# Patient Record
Sex: FEMALE | Race: WHITE | NOT HISPANIC OR LATINO | Employment: UNEMPLOYED | URBAN - METROPOLITAN AREA
[De-identification: names, ages, dates, MRNs, and addresses within clinical notes are randomized per-mention and may not be internally consistent; named-entity substitution may affect disease eponyms.]

---

## 2018-04-10 DIAGNOSIS — Z12.31 ENCOUNTER FOR SCREENING MAMMOGRAM FOR BREAST CANCER: Primary | ICD-10-CM

## 2018-04-10 DIAGNOSIS — R19.09 LEFT GROIN MASS: ICD-10-CM

## 2018-04-12 ENCOUNTER — HOSPITAL ENCOUNTER (OUTPATIENT)
Dept: RADIOLOGY | Facility: HOSPITAL | Age: 33
Discharge: HOME | End: 2018-04-12
Attending: SPECIALIST
Payer: COMMERCIAL

## 2018-04-12 DIAGNOSIS — R19.09 LEFT GROIN MASS: ICD-10-CM

## 2018-04-12 DIAGNOSIS — Z12.31 ENCOUNTER FOR SCREENING MAMMOGRAM FOR BREAST CANCER: ICD-10-CM

## 2018-04-12 PROCEDURE — 76856 US EXAM PELVIC COMPLETE: CPT | Mod: 26,,, | Performed by: RADIOLOGY

## 2018-04-12 PROCEDURE — 77067 SCR MAMMO BI INCL CAD: CPT | Mod: TC

## 2018-04-12 PROCEDURE — 76830 TRANSVAGINAL US NON-OB: CPT | Mod: 26,,, | Performed by: RADIOLOGY

## 2018-04-12 PROCEDURE — 77063 BREAST TOMOSYNTHESIS BI: CPT | Mod: 26,,, | Performed by: RADIOLOGY

## 2018-04-12 PROCEDURE — 77067 SCR MAMMO BI INCL CAD: CPT | Mod: 26,,, | Performed by: RADIOLOGY

## 2018-04-12 PROCEDURE — 76830 TRANSVAGINAL US NON-OB: CPT | Mod: TC

## 2018-05-05 ENCOUNTER — HOSPITAL ENCOUNTER (EMERGENCY)
Facility: HOSPITAL | Age: 33
Discharge: HOME | End: 2018-05-06
Attending: EMERGENCY MEDICINE
Payer: COMMERCIAL

## 2018-05-05 DIAGNOSIS — G43.419 INTRACTABLE HEMIPLEGIC MIGRAINE WITHOUT STATUS MIGRAINOSUS: Primary | ICD-10-CM

## 2018-05-05 PROCEDURE — 99284 EMERGENCY DEPT VISIT MOD MDM: CPT | Mod: 25

## 2018-05-05 PROCEDURE — 96365 THER/PROPH/DIAG IV INF INIT: CPT

## 2018-05-05 PROCEDURE — 96361 HYDRATE IV INFUSION ADD-ON: CPT

## 2018-05-05 PROCEDURE — 96375 TX/PRO/DX INJ NEW DRUG ADDON: CPT

## 2018-05-05 RX ORDER — BUPROPION HYDROCHLORIDE 150 MG/1
150 TABLET, EXTENDED RELEASE ORAL DAILY
COMMUNITY
End: 2019-09-30

## 2018-05-05 RX ORDER — VENLAFAXINE HYDROCHLORIDE 150 MG/1
75 CAPSULE, EXTENDED RELEASE ORAL DAILY
COMMUNITY
End: 2020-02-11

## 2018-05-06 VITALS
WEIGHT: 170 LBS | OXYGEN SATURATION: 100 % | HEART RATE: 84 BPM | SYSTOLIC BLOOD PRESSURE: 123 MMHG | RESPIRATION RATE: 16 BRPM | DIASTOLIC BLOOD PRESSURE: 85 MMHG | HEIGHT: 66 IN | TEMPERATURE: 98 F | BODY MASS INDEX: 27.32 KG/M2

## 2018-05-06 LAB
B-HCG UR QL: NEGATIVE
CTP QC/QA: YES

## 2018-05-06 PROCEDURE — 25000003 PHARM REV CODE 250: Performed by: EMERGENCY MEDICINE

## 2018-05-06 PROCEDURE — 63600175 PHARM REV CODE 636 W HCPCS: Performed by: EMERGENCY MEDICINE

## 2018-05-06 PROCEDURE — 81025 URINE PREGNANCY TEST: CPT | Performed by: EMERGENCY MEDICINE

## 2018-05-06 RX ORDER — KETOROLAC TROMETHAMINE 30 MG/ML
15 INJECTION, SOLUTION INTRAMUSCULAR; INTRAVENOUS
Status: COMPLETED | OUTPATIENT
Start: 2018-05-06 | End: 2018-05-06

## 2018-05-06 RX ORDER — DIPHENHYDRAMINE HYDROCHLORIDE 50 MG/ML
12.5 INJECTION INTRAMUSCULAR; INTRAVENOUS
Status: COMPLETED | OUTPATIENT
Start: 2018-05-06 | End: 2018-05-06

## 2018-05-06 RX ORDER — SODIUM CHLORIDE 9 MG/ML
1000 INJECTION, SOLUTION INTRAVENOUS
Status: COMPLETED | OUTPATIENT
Start: 2018-05-06 | End: 2018-05-06

## 2018-05-06 RX ORDER — METHYLPREDNISOLONE SOD SUCC 125 MG
125 VIAL (EA) INJECTION
Status: COMPLETED | OUTPATIENT
Start: 2018-05-06 | End: 2018-05-06

## 2018-05-06 RX ADMIN — KETOROLAC TROMETHAMINE 15 MG: 30 INJECTION, SOLUTION INTRAMUSCULAR at 12:05

## 2018-05-06 RX ADMIN — DIPHENHYDRAMINE HYDROCHLORIDE 12.5 MG: 50 INJECTION, SOLUTION INTRAMUSCULAR; INTRAVENOUS at 01:05

## 2018-05-06 RX ADMIN — SODIUM CHLORIDE 1000 ML: 0.9 INJECTION, SOLUTION INTRAVENOUS at 12:05

## 2018-05-06 RX ADMIN — PROMETHAZINE HYDROCHLORIDE 25 MG: 25 INJECTION INTRAMUSCULAR; INTRAVENOUS at 12:05

## 2018-05-06 RX ADMIN — METHYLPREDNISOLONE SODIUM SUCCINATE 125 MG: 125 INJECTION, POWDER, FOR SOLUTION INTRAMUSCULAR; INTRAVENOUS at 12:05

## 2018-05-06 NOTE — ED PROVIDER NOTES
Encounter Date: 5/5/2018    SCRIBE #1 NOTE: IAntony, am scribing for, and in the presence of, Dr. Haley.       History     Chief Complaint   Patient presents with    Headache     2 days       05/06/2018 1:01 AM     Chief complaint: Headaches      Subha Bach is a 32 y.o. female with a past medical history of migraine headaches presenting to the ED with a gradual onset of an acute headache beginning 2 days ago. The pt reported current symptoms are similar to previous migraine headache exacerbations. She noted her headache radiates from the back of her head to the top of her head with eye pressure. The pt noted she has been hospitalized previously for migraines. Associated symptoms of photophobia and nausea. She reported she took Topamax, Zofran,  motrin, and essential oils with no improvement of symptoms. The pt denied pregnancy and lifestyle changes. She reported her next appointment with Dr. Brenner is in June for migraine headache. The pt has no history of cigarette smoking. She has no pertinent surgical history noted.       The history is provided by the patient.     Review of patient's allergies indicates:   Allergen Reactions    Nubain [nalbuphine] Hives     Past Medical History:   Diagnosis Date    Migraine headache      Past Surgical History:   Procedure Laterality Date    SHOULDER SURGERY       History reviewed. No pertinent family history.  Social History   Substance Use Topics    Smoking status: Never Smoker    Smokeless tobacco: Never Used    Alcohol use Yes     Review of Systems   Constitutional: Negative for fever.   HENT: Negative for congestion.    Eyes: Positive for photophobia. Negative for visual disturbance.   Respiratory: Negative for wheezing.    Cardiovascular: Negative for chest pain.   Gastrointestinal: Positive for nausea. Negative for abdominal pain.   Genitourinary: Negative for dysuria.   Musculoskeletal: Negative for joint swelling.   Skin: Negative for rash.    Neurological: Positive for headaches. Negative for syncope.   Hematological: Does not bruise/bleed easily.   Psychiatric/Behavioral: Negative for confusion.       Physical Exam     Initial Vitals [05/05/18 2325]   BP Pulse Resp Temp SpO2   123/85 96 16 98.1 °F (36.7 °C) 100 %      MAP       97.67         Physical Exam    Nursing note and vitals reviewed.  Constitutional: She appears well-nourished.   HENT:   Head: Normocephalic and atraumatic.   Eyes: Conjunctivae and EOM are normal.   Neck: Normal range of motion. Neck supple. No thyroid mass present.   Cardiovascular: Normal rate, regular rhythm and normal heart sounds. Exam reveals no gallop and no friction rub.    No murmur heard.  Pulmonary/Chest: Breath sounds normal. She has no wheezes. She has no rhonchi. She has no rales.   Abdominal: Soft. Normal appearance and bowel sounds are normal. There is no tenderness.   Musculoskeletal: Normal range of motion.   Neurological: She is alert and oriented to person, place, and time. She has normal strength. No cranial nerve deficit or sensory deficit.   Skin: Skin is warm and dry. No rash noted. No erythema.   Psychiatric: She has a normal mood and affect. Her speech is normal. Cognition and memory are normal.         ED Course   Procedures  Labs Reviewed   POCT URINE PREGNANCY             Medical Decision Making:   History:   Old Medical Records: I decided to obtain old medical records.  Clinical Tests:   Lab Tests: Ordered and Reviewed  ED Management:  This patient was interviewed and examined him urgently.  Vital signs noted to be stable. She has no focal neurologic deficits. Patient's headache is consistent with previous headache and currently lacks features concerning for emergent or life threatening condition.  I do not suspect SAH, meningitis, increased IC pressure, infectious, toxic, vascular, CNS, or other EMC.  She had marked improvement with multiple IV migraine medications and hydration and reported  improvement on the tree assessment.  On final reassessment she was noted to be sleeping quietly and will be asked to go home and rest and follow up with her migraine specialist as soon as possible.  She is asked to return to the ER for any new, concerning, or worsening symptoms.  Patient and mother were agreeable with this plan for follow-up and the child was discharged in stable condition with her mother as a .            Scribe Attestation:   Scribe #1: I performed the above scribed service and the documentation accurately describes the services I performed. I attest to the accuracy of the note.    I, Dr. Imer Haley, personally performed the services described in this documentation. All medical record entries made by the scribe were at my direction and in my presence.  I have reviewed the chart and agree that the record reflects my personal performance and is accurate and complete. Imer Haley MD.  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    Disposition:   Disposition: Discharged  Condition: Stable                        Imer Haley MD  05/06/18 0352

## 2018-05-07 ENCOUNTER — OFFICE VISIT (OUTPATIENT)
Dept: SPORTS MEDICINE | Facility: CLINIC | Age: 33
End: 2018-05-07
Payer: COMMERCIAL

## 2018-05-07 ENCOUNTER — HOSPITAL ENCOUNTER (OUTPATIENT)
Dept: RADIOLOGY | Facility: HOSPITAL | Age: 33
Discharge: HOME | End: 2018-05-07
Attending: PHYSICIAN ASSISTANT
Payer: COMMERCIAL

## 2018-05-07 VITALS
HEIGHT: 66 IN | SYSTOLIC BLOOD PRESSURE: 138 MMHG | DIASTOLIC BLOOD PRESSURE: 90 MMHG | HEART RATE: 108 BPM | BODY MASS INDEX: 27.8 KG/M2 | WEIGHT: 173 LBS

## 2018-05-07 DIAGNOSIS — M25.552 BILATERAL HIP PAIN: Primary | ICD-10-CM

## 2018-05-07 DIAGNOSIS — M25.551 BILATERAL HIP PAIN: Primary | ICD-10-CM

## 2018-05-07 DIAGNOSIS — M25.552 BILATERAL HIP PAIN: ICD-10-CM

## 2018-05-07 DIAGNOSIS — M25.552 ACUTE HIP PAIN, LEFT: ICD-10-CM

## 2018-05-07 DIAGNOSIS — M25.551 CHRONIC RIGHT HIP PAIN: ICD-10-CM

## 2018-05-07 DIAGNOSIS — G89.29 CHRONIC RIGHT HIP PAIN: ICD-10-CM

## 2018-05-07 DIAGNOSIS — M25.551 BILATERAL HIP PAIN: ICD-10-CM

## 2018-05-07 PROCEDURE — 73523 X-RAY EXAM HIPS BI 5/> VIEWS: CPT | Mod: TC,FY,PO,LT

## 2018-05-07 PROCEDURE — 99203 OFFICE O/P NEW LOW 30 MIN: CPT | Mod: S$GLB,,, | Performed by: PHYSICIAN ASSISTANT

## 2018-05-07 PROCEDURE — 3008F BODY MASS INDEX DOCD: CPT | Mod: CPTII,S$GLB,, | Performed by: PHYSICIAN ASSISTANT

## 2018-05-07 PROCEDURE — 73523 X-RAY EXAM HIPS BI 5/> VIEWS: CPT | Mod: 26,LT,, | Performed by: RADIOLOGY

## 2018-05-07 PROCEDURE — 99999 PR PBB SHADOW E&M-EST. PATIENT-LVL IV: CPT | Mod: PBBFAC,,, | Performed by: PHYSICIAN ASSISTANT

## 2018-05-07 RX ORDER — MELOXICAM 15 MG/1
TABLET ORAL
Qty: 21 TABLET | Refills: 0 | Status: SHIPPED | OUTPATIENT
Start: 2018-05-07 | End: 2018-05-28 | Stop reason: SDUPTHER

## 2018-05-09 ENCOUNTER — TELEPHONE (OUTPATIENT)
Dept: SPORTS MEDICINE | Facility: CLINIC | Age: 33
End: 2018-05-09

## 2018-05-09 NOTE — TELEPHONE ENCOUNTER
----- Message from Haris Díaz III, PA-C sent at 5/8/2018  9:40 PM CDT -----  Contact: Pt  Okay. Need more specific messages, please. Don't have time to call all patients for every specific issue.        PT was sent to Stanislav at [facility] in [place]. Please call her and see what she needs. Does she need another recommendation. I can give her one if so.     Michael        ----- Message -----  From: Lynn Duran MA  Sent: 5/8/2018   3:09 PM  To: Haris Díaz III, PA-C        ----- Message -----  From: Leonor Pearce  Sent: 5/8/2018   2:54 PM  To: [facility] Staff    Would like a call regarding physical therapy recommendation.     Call her @ [phone]

## 2018-05-09 NOTE — TELEPHONE ENCOUNTER
Spoke with Pt. Regarding in basket message, was informed that her insurance does not cover Physical Therapy, needing new order. Will call her back before the end of business to update her with new information.

## 2018-05-12 NOTE — PROGRESS NOTES
"CC: Bilateral hip pain (right hip for 1 year and left hip for 2 months)    HPI:   Subha Bach is a pleasant 32 y.o. 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 No trauma, no mech sxs/instabilty. Right >left.     She describes her pain as constant aching and throbbing lateral hip pain that began 1 year ago in her right hip and 2 months ago in her left hip. She reports being out of exercise for some time and pain began when she tried to get back into activity. She also states that her right hip pain feels like something is "hitting inside" and it "feels loose".     Today the patient rates pain at a 4/10 on visual analog scale.      Affecting ADLs and exercising    Her pain is worse with getting in and out of a care or sitting for long periods. Also hurts to sleep on her right side.     Review of Systems   Constitution: Negative. Negative for chills, fever and night sweats.   HENT: Negative for congestion and headaches.    Eyes: Negative for blurred vision, left vision loss and right vision loss.   Cardiovascular: Negative for chest pain and syncope.   Respiratory: Negative for cough and shortness of breath.    Endocrine: Negative for polydipsia, polyphagia and polyuria.   Hematologic/Lymphatic: Negative for bleeding problem. Does not bruise/bleed easily.   Skin: Negative for dry skin, itching and rash.   Musculoskeletal: Negative for falls and muscle weakness.   Gastrointestinal: Negative for abdominal pain and bowel incontinence.   Genitourinary: Negative for bladder incontinence and nocturia.   Neurological: Negative for disturbances in coordination, loss of balance and seizures.   Psychiatric/Behavioral: Negative for depression. The patient does not have insomnia.    Allergic/Immunologic: Negative for hives and persistent infections.   All other systems negative.    PAST MEDICAL HISTORY:   Past Medical History:   Diagnosis Date    Migraine " "headache      PAST SURGICAL HISTORY:   Past Surgical History:   Procedure Laterality Date    SHOULDER SURGERY       FAMILY HISTORY: History reviewed. No pertinent family history.  SOCIAL HISTORY:   Social History     Social History    Marital status: Single     Spouse name: N/A    Number of children: N/A    Years of education: N/A     Occupational History    Not on file.     Social History Main Topics    Smoking status: Never Smoker    Smokeless tobacco: Never Used    Alcohol use Yes    Drug use: No    Sexual activity: Not on file     Other Topics Concern    Not on file     Social History Narrative    No narrative on file       MEDICATIONS:   Current Outpatient Prescriptions:     buPROPion (WELLBUTRIN SR) 150 MG TBSR 12 hr tablet, Take 150 mg by mouth once daily., Disp: , Rfl:     venlafaxine (EFFEXOR-XR) 150 MG Cp24, Take 75 mg by mouth once daily., Disp: , Rfl:     meloxicam (MOBIC) 15 MG tablet, Take 1 tablet by mouth once daily with food. May need 20mg prilosec once daily on days that you are taking mobic to protect the stomach., Disp: 21 tablet, Rfl: 0  ALLERGIES:   Review of patient's allergies indicates:   Allergen Reactions    Nubain [nalbuphine] Hives       VITAL SIGNS: BP (!) 138/90   Pulse 108   Ht 5' 6" (1.676 m)   Wt 78.5 kg (173 lb)   LMP 05/03/2018   BMI 27.92 kg/m²        PHYSICAL EXAM /  HIP  PHYSICAL EXAMINATION  General:  The patient is alert and oriented x 3.  Mood is pleasant.  Observation of ears, eyes and nose reveal no gross abnormalities.  HEENT: NCAT, sclera nonicteric  Lungs: Respirations are equal and unlabored..    Bilateral HIP EXAMINATION     OBSERVATION / INSPECTION  Gait:   Nonantalgic   Alignment:  Neutral   Scars:   None   Muscle atrophy: None   Effusion:  None   Warmth:  None   Discoloration:   None   Leg lengths:   Equal   Pelvis:   Level     TENDERNESS / CREPITUS (T/C):      T / C  Trochanteric bursa   Mild + on right / -  Piriformis    - / -  SI joint    - " / -  Psoas tendon   - / -  Rectus insertion  - / -  Adductor insertion  - / -  Pubic symphysis  - / -    + TTP of glute tendons on right and negative TTP on left.     ROM: (* = pain)    Flexion:    120 degrees  External rotation: 40 degrees  Internal rotation with axial load: 30 degrees  Internal rotation without axial load: 40 degrees  Abduction:  45 degrees  Adduction:   20 degrees    SPECIAL TESTS:  Pain w/ forced internal rotation (FADIR): Mild +  Pain w/ forced external rotation (PORTILLO): Absent   Circumduction test:    -  Stinchfield test:    Negative   Log roll:      Negative   Snapping hip (internal):   Negative   Sit-up pain:     Negative   Resisted sit-up pain:    Negative   Resisted sit-up with adductor contraction pain:  Negative   Step-down test:    +  Trendelenburg test:    Negative  Bridge test     +     EXTREMITY NEURO-VASCULAR EXAMINATION:   Sensation:  Grossly intact to light touch all dermatomal regions.   Motor Function:  Fully intact motor function at hip, knee, foot and ankle    DTRs;  quadriceps and  achilles 2+.  No clonus and downgoing Babinski.    Vascular status:  DP and PT pulses 2+, brisk capillary refill, symmetric.    Skin:  intact, compartments soft.    OTHER FINDINGS:    XRAYS:  CE angle:  Right 31 and left 34  Crossover:  + bilateral  CAM: + bilateral  Joint space narrowing: none    ASSESSMENT:    1. right hip pain, chronic  2. Left hip pain, acute  hip abd/core weakness    PLAN:  1. PT with Pradeep Jay for bilateral hip pain much worse on right. Posterior lateral hip pain likely from glute tendinopathy and tendinitis. PT for hip, pelvis, core stretching and strengthening. HEP and modalities if possible.  2. Mobic prn  3. Activity modifications as discussed. Avoid activity that causes pain. Okay to swim.   4. RTC in 2 months for recheck.     All questions were answered, pt will contact us for questions or concerns in the interim.

## 2018-05-28 ENCOUNTER — TELEPHONE (OUTPATIENT)
Dept: SPORTS MEDICINE | Facility: CLINIC | Age: 33
End: 2018-05-28

## 2018-05-28 ENCOUNTER — CLINICAL SUPPORT (OUTPATIENT)
Dept: REHABILITATION | Facility: HOSPITAL | Age: 33
End: 2018-05-28
Attending: PHYSICIAN ASSISTANT
Payer: COMMERCIAL

## 2018-05-28 DIAGNOSIS — M25.552 BILATERAL HIP PAIN: ICD-10-CM

## 2018-05-28 DIAGNOSIS — M25.551 BILATERAL HIP PAIN: ICD-10-CM

## 2018-05-28 DIAGNOSIS — M25.551 BILATERAL HIP PAIN: Primary | ICD-10-CM

## 2018-05-28 DIAGNOSIS — M25.552 BILATERAL HIP PAIN: Primary | ICD-10-CM

## 2018-05-28 PROCEDURE — 97161 PT EVAL LOW COMPLEX 20 MIN: CPT | Mod: PN | Performed by: PHYSICAL THERAPIST

## 2018-05-28 PROCEDURE — 97110 THERAPEUTIC EXERCISES: CPT | Mod: PN | Performed by: PHYSICAL THERAPIST

## 2018-05-28 RX ORDER — MELOXICAM 15 MG/1
TABLET ORAL
Qty: 21 TABLET | Refills: 0 | Status: SHIPPED | OUTPATIENT
Start: 2018-05-28 | End: 2018-06-25 | Stop reason: SDUPTHER

## 2018-05-28 NOTE — PLAN OF CARE
"[facility]   PATIENT EVALUATION    Date: 05/28/2018  Start Time: 10:00  Stop Time: 11:00    Patient Name: Subha Bach  Clinic Number: [id number]  Age: 32 y.o.  Gender: female    Diagnosis:   Encounter Diagnosis   Name Primary?    Bilateral hip pain Yes       Referring Physician: Haris Díaz III, *  Treatment Orders: PT Eval and Treat      History     Past Medical History:   Diagnosis Date    Migraine headache        Current Outpatient Prescriptions   Medication Sig    buPROPion (WELLBUTRIN SR) 150 MG TBSR 12 hr tablet Take 150 mg by mouth once daily.    meloxicam (MOBIC) 15 MG tablet Take 1 tablet by mouth once daily with food. May need 20mg prilosec once daily on days that you are taking mobic to protect the stomach.    venlafaxine (EFFEXOR-XR) 150 MG Cp24 Take 75 mg by mouth once daily.     No current facility-administered medications for this visit.        Review of patient's allergies indicates:   Allergen Reactions    Nubain [nalbuphine] Hives         Subjective     History of Present Condition: Pt reports having prior PT for left shoulder surgeries but not having been for the hips. Pt reports having bilateral hip pain that started 1 year ago. Reports having issues with activities with working out with weights and cardio. Pt backed off to just walking. Pt had to stop lifting due to increased pain after lifting. Pt does reports mild hypermobility. States I have bilateral hip "popping" but it is mainly my right hip. Pt is a former swimmer and triathlete.    PMHx: left shoulder surgery    Onset Date: 1 year   Date of Surgery: n/a  Precautions: none    Mechanism of Injury: gradual    Pain Location: bilateral hips (R>L)  Pain Description: Aching and Dull  Current Pain: 2/10  Least Pain: 0/10  Worst Pain: 8/10  Aggravating Factors: lifting, running, squatting, single leg deadlifts  Relieving Factors: rest      Prior Therapy: with success    Occupation: , works " at home, family    Sports/Recreational Activities: swimming, working out  Extremity Dominance: R    Prior Level of Function: Independent  Functional Deficits Leading to Referral/Nature of Injury: none  Patient Therapy Goals: To get back to full painfree activity  Cultural/Environmental/Spiritual Barriers to Treatment or Learning: none      Objective       Posture: increased anterior pelvic tilt  Gait: Left hip drop, increased stance LLE.    Palpation: TTP along proximal bilateral hip flexors.    Range of Motion:   WFL bilaterally without impingement    Joint Mobility:  Normal   Flexibility: increased tension with bilateral hamstrings, paraspinals, and gastroc/soleus    Strength:   Diminished core stability  Right hip abd = 3/5  Left hip abd = 3+/5    Bilateral hip ext = 5/5    Special Tests: - PORTILLO, - FADIR, - Scour, + Noble's bilaterally    Other:   DLS with trunk collapse and quad dominance  SLS with bilateral valgus and trunk collapse/quad dominance.    Treatment:   Clamshells 3x10  Side steps x 2 laps  SL hip abd 3x10      Functional Limitations Reports - G Codes  Category: Mobility  Tool: FOTO  Score: 48        History  Co-morbidities and personal factors that may impact the plan of care Low    Stable Clinical Presentation   Co-morbidities:       Personal Factors:     Body regions    Body systems    Activity Limitations    Participation Restrictons   No personal factors and/ or  comorbidites          Address 1-2 elements:           Assessment     This is a 32 y.o. female referred to outpatient physical therapy and presents with a medical diagnosis of bilateral hip pain and demonstrates limitations as described in the problem list. Pt demonstrates good rehab potential. Pt will benefit from physcial therapy services in order to maximize pain free and/or functional use of bilateral LEs. The following goals were discussed with the patient and patient is in agreement with them as to be addressed in the treatment  plan. Pt was given a HEP consisting of functional hip PREs. Pt verbally understood the instructions as they were given and demonstrated proper form and technique during therapy. Pt was advised to perform these exercises free of pain, and to stop performing them if pain occurs.     Medical necessity is demonstrated by the following problem list:   - Pain limits function of effected part for all activities  - Unable to participate in daily activities   - Requires skilled supervision to complete and progress HEP  - Fall risk - impaired balance   - Continued inability to participate in vocational pursuits    Short Term Goals (4-6 Weeks):  - Pt will increase strength of bilateral hip abd = 5/5  - Decrease Pain to 1/10 at worst with ADLs.  - Pt to self correct posture independently.   - Pt independent with HEP with progressions.     Long Term Goals (14-16 Weeks):  - Pt will increase ROM to WFL and painfree.  - Pt with normalized SLS x 1 min and Ybalance.  - Decrease Pain to 0/10 with sport specifics.   - Pt to return to training without pain or instability.       Plan     Pt will be treated by physical therapy 1-2 times a week for 14-16  weeks for manual therapy, therapeutic exercise, home exercise program, patient education, and modalities PRN to achieve established goals. Subha may at times be seen by a PTA as part of the Rehab Team.     Therapist: CECY ARAUZ, PT    I CERTIFY THE NEED FOR THESE SERVICES FURNISHED UNDER THIS PLAN OF TREATMENT AND WHILE UNDER MY CARE    Physician's comments: ________________________________________________________________________________________________________________________________________________    Physician's Name: ___________________________________

## 2018-06-04 ENCOUNTER — CLINICAL SUPPORT (OUTPATIENT)
Dept: REHABILITATION | Facility: HOSPITAL | Age: 33
End: 2018-06-04
Attending: PHYSICIAN ASSISTANT
Payer: COMMERCIAL

## 2018-06-04 DIAGNOSIS — M25.551 BILATERAL HIP PAIN: Primary | ICD-10-CM

## 2018-06-04 DIAGNOSIS — M25.552 BILATERAL HIP PAIN: Primary | ICD-10-CM

## 2018-06-04 PROCEDURE — 97110 THERAPEUTIC EXERCISES: CPT | Mod: PN | Performed by: PHYSICAL THERAPIST

## 2018-06-04 NOTE — PROGRESS NOTES
"                                                  Physical Therapy Daily Note     Date: 06/04/2018  Name: Subha Bach  Clinic Number: [id number]  Diagnosis:   Encounter Diagnosis   Name Primary?    Bilateral hip pain Yes     Physician: Haris Díaz III, *    Evaluation Date: 5/28/18  Date of Surgery: n/a  Visit #: 2  Start Time:  8:00  Stop Time:  9:00  Total Treatment Time: 60    Precautions: none    Subjective     Pt reports the hip is feeling a little better. Jenise been keeping up with the HEP.    Pain: 2/10    Objective     Measurements taken: none    Patient received individual therapy to increase strength, endurance and ROM with activities as follows:     Subha received therapeutic exercises to develop strength, endurance and ROM for 40 minutes including:   Clamshells 3x10  SL hip abd 3x10  SL bridges 3x10  Side steps x 2 laps  6 in step downs 3x10  Bird-dog UEs/LEs x 20 ea  Bird-dog alt x 20 ea  Fwd planks 3x30"        Subha received the following manual therapy techniques: Joint mobilizations and Soft tissue Mobilization were applied to the: bilateral hips  for 0 minutes.   PROM: flex, abd  Toña  Fadir  SL quad/hip flexor stretch      Written Home Exercises Provided: updated as per therex list  Pt demo good understanding of the education provided. Subha demonstrated good return demonstration of activities.     Education provided:  Subha verbalized good understanding of education provided.   No spiritual or educational barriers to learning identified.    Assessment     Improving bilateral hip and core activation. Needs continued work on core activation/maintaining neutral pelvis.     This is a 32 y.o. female referred to outpatient physical therapy and presents with a medical diagnosis of bilateral hip pain and demonstrates limitations as described in the problem list Pt prognosis is Good. Pt will continue to benefit from skilled outpatient physical therapy to address the deficits listed below in the problem " list, provide pt/family education and to maximize pt's level of independence in the home and community environment.    Will the patient continue to benefit from skilled PT intervention? yes        Medical necessity is demonstrated by:   - Pain limits function of effected part for all activities  - Unable to participate in daily activities   - Requires skilled supervision to complete and progress HEP  - Fall risk - impaired balance   - Continued inability to participate in vocational pursuits    Short Term Goals (4-6 Weeks):  - Pt will increase strength of bilateral hip abd = 5/5  - Decrease Pain to 1/10 at worst with ADLs.  - Pt to self correct posture independently.   - Pt independent with HEP with progressions.      Long Term Goals (14-16 Weeks):  - Pt will increase ROM to WFL and painfree.  - Pt with normalized SLS x 1 min and Ybalance.  - Decrease Pain to 0/10 with sport specifics.   - Pt to return to training without pain or instability.        Plan   Continue with established Plan of Care towards PT goals.      Therapist: CECY ARAUZ, PT

## 2018-06-04 NOTE — PLAN OF CARE
"[facility]   PATIENT EVALUATION    Date: 06/04/2018  Start Time: 10:00  Stop Time: 11:00    Patient Name: Subha Bach  Clinic Number: [id number]  Age: 32 y.o.  Gender: female    Diagnosis:   Encounter Diagnosis   Name Primary?    Bilateral hip pain Yes       Referring Physician: Haris Díaz III, *  Treatment Orders: PT Eval and Treat      History     Past Medical History:   Diagnosis Date    Migraine headache        Current Outpatient Prescriptions   Medication Sig    buPROPion (WELLBUTRIN SR) 150 MG TBSR 12 hr tablet Take 150 mg by mouth once daily.    meloxicam (MOBIC) 15 MG tablet Take 1 tablet by mouth once daily with food. May need 20mg prilosec once daily on days that you are taking mobic to protect the stomach.    venlafaxine (EFFEXOR-XR) 150 MG Cp24 Take 75 mg by mouth once daily.     No current facility-administered medications for this visit.        Review of patient's allergies indicates:   Allergen Reactions    Nubain [nalbuphine] Hives         Subjective     History of Present Condition: Pt reports having prior PT for left shoulder surgeries but not having been for the hips. Pt reports having bilateral hip pain that started 1 year ago. Reports having issues with activities with working out with weights and cardio. Pt backed off to just walking. Pt had to stop lifting due to increased pain after lifting. Pt does reports mild hypermobility. States I have bilateral hip "popping" but it is mainly my right hip. Pt is a former swimmer and triathlete.    PMHx: left shoulder surgery    Onset Date: 1 year   Date of Surgery: n/a  Precautions: none    Mechanism of Injury: gradual    Pain Location: bilateral hips (R>L)  Pain Description: Aching and Dull  Current Pain: 2/10  Least Pain: 0/10  Worst Pain: 8/10  Aggravating Factors: lifting, running, squatting, single leg deadlifts  Relieving Factors: rest      Prior Therapy: with success    Occupation: , works " at home, family    Sports/Recreational Activities: swimming, working out  Extremity Dominance: R    Prior Level of Function: Independent  Functional Deficits Leading to Referral/Nature of Injury: none  Patient Therapy Goals: To get back to full painfree activity  Cultural/Environmental/Spiritual Barriers to Treatment or Learning: none      Objective       Posture: increased anterior pelvic tilt  Gait: Left hip drop, increased stance LLE.    Palpation: TTP along proximal bilateral hip flexors.    Range of Motion:   WFL bilaterally without impingement    Joint Mobility:  Normal   Flexibility: increased tension with bilateral hamstrings, paraspinals, and gastroc/soleus    Strength:   Diminished core stability  Right hip abd = 3/5  Left hip abd = 3+/5    Bilateral hip ext = 5/5    Special Tests: - PORTILLO, - FADIR, - Scour, + Noble's bilaterally    Other:   DLS with trunk collapse and quad dominance  SLS with bilateral valgus and trunk collapse/quad dominance.    Treatment:   Clamshells 3x10  Side steps x 2 laps  SL hip abd 3x10      Functional Limitations Reports - G Codes  Category: Mobility  Tool: FOTO  Score: 48        History  Co-morbidities and personal factors that may impact the plan of care Low    Stable Clinical Presentation   Co-morbidities:       Personal Factors:     Body regions    Body systems    Activity Limitations    Participation Restrictons   No personal factors and/ or  comorbidites          Address 1-2 elements:           Assessment     This is a 32 y.o. female referred to outpatient physical therapy and presents with a medical diagnosis of bilateral hip pain and demonstrates limitations as described in the problem list. Pt demonstrates good rehab potential. Pt will benefit from physcial therapy services in order to maximize pain free and/or functional use of bilateral LEs. The following goals were discussed with the patient and patient is in agreement with them as to be addressed in the treatment  plan. Pt was given a HEP consisting of functional hip PREs. Pt verbally understood the instructions as they were given and demonstrated proper form and technique during therapy. Pt was advised to perform these exercises free of pain, and to stop performing them if pain occurs.     Medical necessity is demonstrated by the following problem list:   - Pain limits function of effected part for all activities  - Unable to participate in daily activities   - Requires skilled supervision to complete and progress HEP  - Fall risk - impaired balance   - Continued inability to participate in vocational pursuits    Short Term Goals (4-6 Weeks):  - Pt will increase strength of bilateral hip abd = 5/5  - Decrease Pain to 1/10 at worst with ADLs.  - Pt to self correct posture independently.   - Pt independent with HEP with progressions.     Long Term Goals (14-16 Weeks):  - Pt will increase ROM to WFL and painfree.  - Pt with normalized SLS x 1 min and Ybalance.  - Decrease Pain to 0/10 with sport specifics.   - Pt to return to training without pain or instability.       Plan     Pt will be treated by physical therapy 1-2 times a week for 14-16  weeks for manual therapy, therapeutic exercise, home exercise program, patient education, and modalities PRN to achieve established goals. Subha may at times be seen by a PTA as part of the Rehab Team.     Therapist: CECY ARAUZ, PT    I CERTIFY THE NEED FOR THESE SERVICES FURNISHED UNDER THIS PLAN OF TREATMENT AND WHILE UNDER MY CARE    Physician's comments: ________________________________________________________________________________________________________________________________________________    Physician's Name: ___________________________________

## 2018-06-11 ENCOUNTER — CLINICAL SUPPORT (OUTPATIENT)
Dept: REHABILITATION | Facility: HOSPITAL | Age: 33
End: 2018-06-11
Attending: PHYSICIAN ASSISTANT
Payer: COMMERCIAL

## 2018-06-11 DIAGNOSIS — M25.552 BILATERAL HIP PAIN: Primary | ICD-10-CM

## 2018-06-11 DIAGNOSIS — M25.551 BILATERAL HIP PAIN: Primary | ICD-10-CM

## 2018-06-11 PROCEDURE — 97110 THERAPEUTIC EXERCISES: CPT | Mod: PN | Performed by: PHYSICAL THERAPIST

## 2018-06-11 NOTE — PROGRESS NOTES
"                                                  Physical Therapy Daily Note     Date: 06/11/2018  Name: Subha Bach  Clinic Number: [id number]  Diagnosis:   Encounter Diagnosis   Name Primary?    Bilateral hip pain Yes     Physician: Haris Díaz III, *    Evaluation Date: 5/28/18  Date of Surgery: n/a  Visit #: 3  Start Time:  8:00  Stop Time:  9:00  Total Treatment Time: 60    Precautions: none    Subjective     Pt reports the hip is doing well.     Pain: 1/10    Objective     Measurements taken: none    Patient received individual therapy to increase strength, endurance and ROM with activities as follows:     Subha received therapeutic exercises to develop strength, endurance and ROM for 40 minutes including:   Clamshells 3x10  SL hip abd 3x10  SL bridges 3x10  Side steps x 2 laps  6 in step downs 3x10  Bird-dog UEs/LEs x 20 ea  Bird-dog alt x 20 ea  Fwd planks 3x30"  SL RDLs 3x10  Sport cord DL squats 3x10        Subha received the following manual therapy techniques: Joint mobilizations and Soft tissue Mobilization were applied to the: bilateral hips  for 0 minutes.   PROM: flex, abd  Toña  Fadir  SL quad/hip flexor stretch      Written Home Exercises Provided: updated as per therex list  Pt demo good understanding of the education provided. Subha demonstrated good return demonstration of activities.     Education provided:  Subha verbalized good understanding of education provided.   No spiritual or educational barriers to learning identified.    Assessment     Functional hip stability and core strength continue to improve with rx. Continue to progress functional hip stability and optimal loading mechanics.     This is a 32 y.o. female referred to outpatient physical therapy and presents with a medical diagnosis of bilateral hip pain and demonstrates limitations as described in the problem list Pt prognosis is Good. Pt will continue to benefit from skilled outpatient physical therapy to address the " deficits listed below in the problem list, provide pt/family education and to maximize pt's level of independence in the home and community environment.    Will the patient continue to benefit from skilled PT intervention? yes        Medical necessity is demonstrated by:   - Pain limits function of effected part for all activities  - Unable to participate in daily activities   - Requires skilled supervision to complete and progress HEP  - Fall risk - impaired balance   - Continued inability to participate in vocational pursuits    Short Term Goals (4-6 Weeks):  - Pt will increase strength of bilateral hip abd = 5/5  - Decrease Pain to 1/10 at worst with ADLs.  - Pt to self correct posture independently.   - Pt independent with HEP with progressions.      Long Term Goals (14-16 Weeks):  - Pt will increase ROM to WFL and painfree.  - Pt with normalized SLS x 1 min and Ybalance.  - Decrease Pain to 0/10 with sport specifics.   - Pt to return to training without pain or instability.        Plan   Continue with established Plan of Care towards PT goals.      Therapist: CECY ARAUZ, PT

## 2018-06-11 NOTE — PLAN OF CARE
"[facility]   PATIENT EVALUATION    Date: 06/11/2018  Start Time: 10:00  Stop Time: 11:00    Patient Name: Subha Bach  Clinic Number: [id number]  Age: 32 y.o.  Gender: female    Diagnosis:   Encounter Diagnosis   Name Primary?    Bilateral hip pain Yes       Referring Physician: Haris Díaz III, *  Treatment Orders: PT Eval and Treat      History     Past Medical History:   Diagnosis Date    Migraine headache        Current Outpatient Prescriptions   Medication Sig    buPROPion (WELLBUTRIN SR) 150 MG TBSR 12 hr tablet Take 150 mg by mouth once daily.    meloxicam (MOBIC) 15 MG tablet Take 1 tablet by mouth once daily with food. May need 20mg prilosec once daily on days that you are taking mobic to protect the stomach.    venlafaxine (EFFEXOR-XR) 150 MG Cp24 Take 75 mg by mouth once daily.     No current facility-administered medications for this visit.        Review of patient's allergies indicates:   Allergen Reactions    Nubain [nalbuphine] Hives         Subjective     History of Present Condition: Pt reports having prior PT for left shoulder surgeries but not having been for the hips. Pt reports having bilateral hip pain that started 1 year ago. Reports having issues with activities with working out with weights and cardio. Pt backed off to just walking. Pt had to stop lifting due to increased pain after lifting. Pt does reports mild hypermobility. States I have bilateral hip "popping" but it is mainly my right hip. Pt is a former swimmer and triathlete.    PMHx: left shoulder surgery    Onset Date: 1 year   Date of Surgery: n/a  Precautions: none    Mechanism of Injury: gradual    Pain Location: bilateral hips (R>L)  Pain Description: Aching and Dull  Current Pain: 2/10  Least Pain: 0/10  Worst Pain: 8/10  Aggravating Factors: lifting, running, squatting, single leg deadlifts  Relieving Factors: rest      Prior Therapy: with success    Occupation: , works " at home, family    Sports/Recreational Activities: swimming, working out  Extremity Dominance: R    Prior Level of Function: Independent  Functional Deficits Leading to Referral/Nature of Injury: none  Patient Therapy Goals: To get back to full painfree activity  Cultural/Environmental/Spiritual Barriers to Treatment or Learning: none      Objective       Posture: increased anterior pelvic tilt  Gait: Left hip drop, increased stance LLE.    Palpation: TTP along proximal bilateral hip flexors.    Range of Motion:   WFL bilaterally without impingement    Joint Mobility:  Normal   Flexibility: increased tension with bilateral hamstrings, paraspinals, and gastroc/soleus    Strength:   Diminished core stability  Right hip abd = 3/5  Left hip abd = 3+/5    Bilateral hip ext = 5/5    Special Tests: - PORTILLO, - FADIR, - Scour, + Noble's bilaterally    Other:   DLS with trunk collapse and quad dominance  SLS with bilateral valgus and trunk collapse/quad dominance.    Treatment:   Clamshells 3x10  Side steps x 2 laps  SL hip abd 3x10      Functional Limitations Reports - G Codes  Category: Mobility  Tool: FOTO  Score: 48        History  Co-morbidities and personal factors that may impact the plan of care Low    Stable Clinical Presentation   Co-morbidities:       Personal Factors:     Body regions    Body systems    Activity Limitations    Participation Restrictons   No personal factors and/ or  comorbidites          Address 1-2 elements:           Assessment     This is a 32 y.o. female referred to outpatient physical therapy and presents with a medical diagnosis of bilateral hip pain and demonstrates limitations as described in the problem list. Pt demonstrates good rehab potential. Pt will benefit from physcial therapy services in order to maximize pain free and/or functional use of bilateral LEs. The following goals were discussed with the patient and patient is in agreement with them as to be addressed in the treatment  plan. Pt was given a HEP consisting of functional hip PREs. Pt verbally understood the instructions as they were given and demonstrated proper form and technique during therapy. Pt was advised to perform these exercises free of pain, and to stop performing them if pain occurs.     Medical necessity is demonstrated by the following problem list:   - Pain limits function of effected part for all activities  - Unable to participate in daily activities   - Requires skilled supervision to complete and progress HEP  - Fall risk - impaired balance   - Continued inability to participate in vocational pursuits    Short Term Goals (4-6 Weeks):  - Pt will increase strength of bilateral hip abd = 5/5  - Decrease Pain to 1/10 at worst with ADLs.  - Pt to self correct posture independently.   - Pt independent with HEP with progressions.     Long Term Goals (14-16 Weeks):  - Pt will increase ROM to WFL and painfree.  - Pt with normalized SLS x 1 min and Ybalance.  - Decrease Pain to 0/10 with sport specifics.   - Pt to return to training without pain or instability.       Plan     Pt will be treated by physical therapy 1-2 times a week for 14-16  weeks for manual therapy, therapeutic exercise, home exercise program, patient education, and modalities PRN to achieve established goals. Subha may at times be seen by a PTA as part of the Rehab Team.     Therapist: CECY ARAUZ, PT    I CERTIFY THE NEED FOR THESE SERVICES FURNISHED UNDER THIS PLAN OF TREATMENT AND WHILE UNDER MY CARE    Physician's comments: ________________________________________________________________________________________________________________________________________________    Physician's Name: ___________________________________

## 2018-06-25 ENCOUNTER — TELEPHONE (OUTPATIENT)
Dept: SPORTS MEDICINE | Facility: CLINIC | Age: 33
End: 2018-06-25

## 2018-06-25 DIAGNOSIS — M25.551 BILATERAL HIP PAIN: ICD-10-CM

## 2018-06-25 DIAGNOSIS — M25.552 BILATERAL HIP PAIN: ICD-10-CM

## 2018-06-25 RX ORDER — MELOXICAM 15 MG/1
TABLET ORAL
Qty: 21 TABLET | Refills: 0 | Status: SHIPPED | OUTPATIENT
Start: 2018-06-25 | End: 2019-09-30

## 2019-09-30 ENCOUNTER — OFFICE VISIT (OUTPATIENT)
Dept: FAMILY MEDICINE | Facility: CLINIC | Age: 34
End: 2019-09-30
Payer: COMMERCIAL

## 2019-09-30 VITALS
OXYGEN SATURATION: 97 % | DIASTOLIC BLOOD PRESSURE: 72 MMHG | SYSTOLIC BLOOD PRESSURE: 116 MMHG | WEIGHT: 179.44 LBS | BODY MASS INDEX: 28.84 KG/M2 | HEIGHT: 66 IN | HEART RATE: 96 BPM | TEMPERATURE: 99 F

## 2019-09-30 DIAGNOSIS — Z00.00 ANNUAL PHYSICAL EXAM: Primary | ICD-10-CM

## 2019-09-30 DIAGNOSIS — M25.552 BILATERAL HIP PAIN: ICD-10-CM

## 2019-09-30 DIAGNOSIS — G43.009 MIGRAINE WITHOUT AURA AND WITHOUT STATUS MIGRAINOSUS, NOT INTRACTABLE: ICD-10-CM

## 2019-09-30 DIAGNOSIS — Z87.39: ICD-10-CM

## 2019-09-30 DIAGNOSIS — K21.9 GASTROESOPHAGEAL REFLUX DISEASE WITHOUT ESOPHAGITIS: ICD-10-CM

## 2019-09-30 DIAGNOSIS — M25.551 BILATERAL HIP PAIN: ICD-10-CM

## 2019-09-30 PROCEDURE — 99385 PREV VISIT NEW AGE 18-39: CPT | Mod: S$GLB,,, | Performed by: NURSE PRACTITIONER

## 2019-09-30 PROCEDURE — 99999 PR PBB SHADOW E&M-EST. PATIENT-LVL IV: CPT | Mod: PBBFAC,,, | Performed by: NURSE PRACTITIONER

## 2019-09-30 PROCEDURE — 99999 PR PBB SHADOW E&M-EST. PATIENT-LVL IV: ICD-10-PCS | Mod: PBBFAC,,, | Performed by: NURSE PRACTITIONER

## 2019-09-30 PROCEDURE — 99385 PR PREVENTIVE VISIT,NEW,18-39: ICD-10-PCS | Mod: S$GLB,,, | Performed by: NURSE PRACTITIONER

## 2019-09-30 RX ORDER — ELETRIPTAN HYDROBROMIDE 40 MG/1
40 TABLET, FILM COATED ORAL
COMMUNITY

## 2019-09-30 RX ORDER — TOPIRAMATE 50 MG/1
50 TABLET, FILM COATED ORAL NIGHTLY
COMMUNITY
Start: 2018-09-01 | End: 2022-05-05

## 2019-09-30 RX ORDER — OMEPRAZOLE 20 MG/1
20 CAPSULE, DELAYED RELEASE ORAL DAILY
Qty: 30 CAPSULE | Refills: 11 | Status: SHIPPED | OUTPATIENT
Start: 2019-09-30 | End: 2022-05-05

## 2019-09-30 RX ORDER — MELOXICAM 15 MG/1
15 TABLET ORAL DAILY PRN
Qty: 30 TABLET | Refills: 1 | Status: SHIPPED | OUTPATIENT
Start: 2019-09-30 | End: 2020-02-11

## 2019-09-30 NOTE — PATIENT INSTRUCTIONS
"mobic as needed  prilosec daily   GERD (Adult)    The esophagus is a tube that carries food from the mouth to the stomach. A valve at the lower end of the esophagus prevents stomach acid from flowing upward. When this valve doesn't work properly, stomach contents may repeatedly flow back up (reflux) into the esophagus. This is called gastroesophageal reflux disease (GERD). GERD can irritate the esophagus. It can cause problems with swallowing or breathing. In severe cases, GERD can cause recurrent pneumonia or other serious problems.  Symptoms of reflux include burning, pressure or sharp pain in the upper abdomen or mid to lower chest. The pain can spread to the neck, back, or shoulder. There may be belching, an acid taste in the back of the throat, chronic cough, or sore throat or hoarseness. GERD symptoms often occur during the day after a big meal. They can also occur at night when lying down.   Home care  Lifestyle changes can help reduce symptoms. If needed, medicines may be prescribed. Symptoms often improve with treatment, but if treatment is stopped, the symptoms often return after a few months. So most persons with GERD will need to continue treatment.  Lifestyle changes  · Limit or avoid fatty, fried, and spicy foods, as well as coffee, chocolate, mint, and foods with high acid content such as tomatoes and citrus fruit and juices (orange, grapefruit, lemon).  · Dont eat large meals, especially at night. Frequent, smaller meals are best. Do not lie down right after eating. And dont eat anything 3 hours before going to bed.  · Avoid drinking alcohol and smoking. As much as possible, stay away from second hand smoke.  · If you are overweight, losing weight will reduce symptoms.   · Avoid wearing tight clothing around your stomach area.  · If your symptoms occur during sleep, use a foam wedge to elevate your upper body (not just your head.) Or, place 4" blocks under the head of your bed.  Medicines  If " needed, medicines can help relieve the symptoms of GERD and prevent damage to the esophagus. Discuss a medicine plan with your healthcare provider. This may include one or more of the following medicines:  · Antacids to help neutralize the normal acids in your stomach.  · Acid blockers (H2 blockers) to decrease acid production.  · Acid inhibitors (PPIs) to decrease acid production in a different way than the blockers. They may work better, but can take a little longer to take effect.  Take an antacid 30-60 minutes after eating and at bedtime, but not at the same time as an acid blocker.  Try not to take medicines such as ibuprofen and aspirin. If you are taking aspirin for your heart or other medical reasons, talk to your healthcare provider about stopping it.  Follow-up care  Follow up with your healthcare provider or as advised by our staff.  When to seek medical advice  Call your healthcare provider if any of the following occur:  · Stomach pain gets worse or moves to the lower right abdomen (appendix area)  · Chest pain appears or gets worse, or spreads to the back, neck, shoulder, or arm  · Frequent vomiting (cant keep down liquids)  · Blood in the stool or vomit (red or black in color)  · Feeling weak or dizzy  · Fever of 100.4ºF (38ºC) or higher, or as directed by your healthcare provider  Date Last Reviewed: 6/23/2015 © 2000-2017 [employer]. [address] [place] [address], [place] [address]. All rights reserved. This information is not intended as a substitute for professional medical care. Always follow your healthcare professional's instructions.        Lifestyle Changes for Controlling GERD  When you have GERD, stomach acid feels as if its backing up toward your mouth. Whether or not you take medicine to control your GERD, your symptoms can often be improved with lifestyle changes. Talk to your healthcare provider about the following suggestions. These suggestions may help you get relief from  your symptoms.      Raise your head  Reflux is more likely to strike when youre lying down flat, because stomach fluid can flow backward more easily. Raising the head of your bed 4 to 6 inches can help. To do this:  · Slide blocks or books under the legs at the head of your bed. Or, place a wedge under the mattress. Many foam stores can make a suitable wedge for you. The wedge should run from your waist to the top of your head.  · Dont just prop your head on several pillows. This increases pressure on your stomach. It can make GERD worse.  Watch your eating habits  Certain foods may increase the acid in your stomach or relax the lower esophageal sphincter. This makes GERD more likely. Its best to avoid the following if they cause you symptoms:  · Coffee, tea, and carbonated drinks (with and without caffeine)  · Fatty, fried, or spicy food  · Mint, chocolate, onions, and tomatoes  · Peppermint  · Any other foods that seem to irritate your stomach or cause you pain  Relieve the pressure  Tips include the following:  · Eat smaller meals, even if you have to eat more often.  · Dont lie down right after you eat. Wait a few hours for your stomach to empty.  · Avoid tight belts and tight-fitting clothes.  · Lose excess weight.  Tobacco and alcohol  Avoid smoking tobacco and drinking alcohol. They can make GERD symptoms worse.  Date Last Reviewed: 7/1/2016  © 5609-8382 [employer]. [address] [place] [address], [place] [address]. All rights reserved. This information is not intended as a substitute for professional medical care. Always follow your healthcare professional's instructions.

## 2019-09-30 NOTE — PROGRESS NOTES
"Subjective:       Patient ID: Subha Bach is a 34 y.o. female.    Chief Complaint: Establish Care    Ms. Bach is a 34 year old female who presents today to [facility]. No previous PCP. GYN Dr. Ramos. Dr. Felix- neurologist, manages migraines. Diagnosed with migraines as a teenager, hospitalized 3x. While she was pregnant with her daughter (now 7 years old), developed gestational diabetes. Also had strep throat during pregnancy. After completion of the antibiotic, had severe all over joint pain. Led to a 3 day admission in the hospital. Diagnosed with what was thought to be reactive arthritis. Has rheumatology workup, told it was fibromyalgia. 2 weeks before her daughter's delivery, symptoms resolved. Still has intermittent joint pain. Describes this as pain "deep in the bones". Takes anti-inflammatories as needed but does not notice much improvement. Was also hospitalized at age 16 for what was thought to be spinal meningitis but ultimately diagnosed as a migraine headache. Also reports GERD symptoms. Has family history of thyroid disease and is progressively gaining weight. Has thyroid studies through GYN, within normal range. Vitals stable today.     Patient Active Problem List   Diagnosis    Gastroesophageal reflux disease without esophagitis    Personal history of reactive arthritis    Migraine without aura and without status migrainosus, not intractable       Current Outpatient Medications:     eletriptan (RELPAX) 40 MG tablet, Take 40 mg by mouth as needed. may repeat in 2 hours if necessary, Disp: , Rfl:     topiramate (TOPAMAX) 50 MG tablet, , Disp: , Rfl:     venlafaxine (EFFEXOR-XR) 150 MG Cp24, Take 75 mg by mouth once daily., Disp: , Rfl:     meloxicam (MOBIC) 15 MG tablet, Take 1 tablet (15 mg total) by mouth daily as needed for Pain., Disp: 30 tablet, Rfl: 1    omeprazole (PRILOSEC) 20 MG capsule, Take 1 capsule (20 mg total) by mouth once daily., Disp: 30 capsule, Rfl: 11    Review of " Systems   Constitutional: Positive for fatigue. Negative for activity change and unexpected weight change.   HENT: Negative for hearing loss, rhinorrhea and trouble swallowing.    Eyes: Negative for discharge and visual disturbance.   Respiratory: Negative for chest tightness and wheezing.    Cardiovascular: Negative for chest pain and palpitations.   Gastrointestinal: Positive for constipation. Negative for blood in stool, diarrhea and vomiting.   Endocrine: Negative for polydipsia and polyuria.   Genitourinary: Negative for difficulty urinating, dysuria, hematuria and menstrual problem.   Musculoskeletal: Positive for arthralgias and myalgias. Negative for joint swelling and neck pain.   Neurological: Positive for headaches (hx of migraines). Negative for weakness.   Psychiatric/Behavioral: Negative for confusion and dysphoric mood.       Objective:      Physical Exam   Constitutional: She is oriented to person, place, and time. Vital signs are normal. She appears well-developed and well-nourished. No distress.   Cardiovascular: Normal rate, regular rhythm and normal heart sounds.   Pulmonary/Chest: Effort normal and breath sounds normal. She has no wheezes.   Abdominal: Soft. Normal appearance and bowel sounds are normal.   Musculoskeletal: She exhibits no edema.   Neurological: She is alert and oriented to person, place, and time.   Skin: Skin is warm and dry.   Psychiatric: She has a normal mood and affect.   Nursing note and vitals reviewed.      Assessment:       1. Annual physical exam    2. Gastroesophageal reflux disease without esophagitis    3. Personal history of reactive arthritis    4. Migraine without aura and without status migrainosus, not intractable    5. Bilateral hip pain        Plan:       Subha was seen today for establish care.    Diagnoses and all orders for this visit:    Annual physical exam  -     Comprehensive metabolic panel; Future  -     CBC auto differential; Future  -     Lipid  panel; Future  Screen and treat as needed    Gastroesophageal reflux disease without esophagitis  -     omeprazole (PRILOSEC) 20 MG capsule; Take 1 capsule (20 mg total) by mouth once daily.  Counseled patient on prevention of reflux with changes in diet and behavior.  I recommended avoidance of greasy and spicy foods, caffeine and eating within 3 hours of bedtime.  I counseled the patient to avoid eating large meals and instead eating more frequent small meals.  I also recommended weight loss and elevation of the head of the bed by 6 inches.     Personal history of reactive arthritis  -     meloxicam (MOBIC) 15 MG tablet; Take 1 tablet (15 mg total) by mouth daily as needed for Pain.  -     Sedimentation rate; Future  -     C-reactive protein; Future  -     Rheumatoid factor; Future  -     ANNELISE Screen w/Reflex; Future  Refill Mobic to use PRN. Educated this may cause GERD symptoms. If this occurs, stop medication and notify clinic    Migraine without aura and without status migrainosus, not intractable  Continue current medications  Neurology following    Bilateral hip pain  -     meloxicam (MOBIC) 15 MG tablet; Take 1 tablet (15 mg total) by mouth daily as needed for Pain.  See above    F/U 1 month   Obtain labs, Pap records from GYN. Will fax request.

## 2019-10-01 ENCOUNTER — LAB VISIT (OUTPATIENT)
Dept: LAB | Facility: HOSPITAL | Age: 34
End: 2019-10-01
Attending: NURSE PRACTITIONER
Payer: COMMERCIAL

## 2019-10-01 DIAGNOSIS — Z00.00 ANNUAL PHYSICAL EXAM: ICD-10-CM

## 2019-10-01 DIAGNOSIS — Z87.39: ICD-10-CM

## 2019-10-01 LAB
ALBUMIN SERPL BCP-MCNC: 3.7 G/DL (ref 3.5–5.2)
ALP SERPL-CCNC: 78 U/L (ref 55–135)
ALT SERPL W/O P-5'-P-CCNC: 19 U/L (ref 10–44)
ANION GAP SERPL CALC-SCNC: 9 MMOL/L (ref 8–16)
AST SERPL-CCNC: 23 U/L (ref 10–40)
BASOPHILS # BLD AUTO: 0.05 K/UL (ref 0–0.2)
BASOPHILS NFR BLD: 0.6 % (ref 0–1.9)
BILIRUB SERPL-MCNC: 0.2 MG/DL (ref 0.1–1)
BUN SERPL-MCNC: 14 MG/DL (ref 6–20)
CALCIUM SERPL-MCNC: 9.2 MG/DL (ref 8.7–10.5)
CHLORIDE SERPL-SCNC: 105 MMOL/L (ref 95–110)
CHOLEST SERPL-MCNC: 166 MG/DL (ref 120–199)
CHOLEST/HDLC SERPL: 3.6 {RATIO} (ref 2–5)
CO2 SERPL-SCNC: 25 MMOL/L (ref 23–29)
CREAT SERPL-MCNC: 0.8 MG/DL (ref 0.5–1.4)
CRP SERPL-MCNC: 4.9 MG/L (ref 0–8.2)
DIFFERENTIAL METHOD: ABNORMAL
EOSINOPHIL # BLD AUTO: 0.1 K/UL (ref 0–0.5)
EOSINOPHIL NFR BLD: 0.8 % (ref 0–8)
ERYTHROCYTE [DISTWIDTH] IN BLOOD BY AUTOMATED COUNT: 14.2 % (ref 11.5–14.5)
ERYTHROCYTE [SEDIMENTATION RATE] IN BLOOD BY WESTERGREN METHOD: 22 MM/HR (ref 0–20)
EST. GFR  (AFRICAN AMERICAN): >60 ML/MIN/1.73 M^2
EST. GFR  (NON AFRICAN AMERICAN): >60 ML/MIN/1.73 M^2
GLUCOSE SERPL-MCNC: 81 MG/DL (ref 70–110)
HCT VFR BLD AUTO: 38.4 % (ref 37–48.5)
HDLC SERPL-MCNC: 46 MG/DL (ref 40–75)
HDLC SERPL: 27.7 % (ref 20–50)
HGB BLD-MCNC: 11 G/DL (ref 12–16)
IMM GRANULOCYTES # BLD AUTO: 0.02 K/UL (ref 0–0.04)
IMM GRANULOCYTES NFR BLD AUTO: 0.3 % (ref 0–0.5)
LDLC SERPL CALC-MCNC: 100.6 MG/DL (ref 63–159)
LYMPHOCYTES # BLD AUTO: 1.8 K/UL (ref 1–4.8)
LYMPHOCYTES NFR BLD: 22.7 % (ref 18–48)
MCH RBC QN AUTO: 25.2 PG (ref 27–31)
MCHC RBC AUTO-ENTMCNC: 28.6 G/DL (ref 32–36)
MCV RBC AUTO: 88 FL (ref 82–98)
MONOCYTES # BLD AUTO: 0.6 K/UL (ref 0.3–1)
MONOCYTES NFR BLD: 8 % (ref 4–15)
NEUTROPHILS # BLD AUTO: 5.4 K/UL (ref 1.8–7.7)
NEUTROPHILS NFR BLD: 67.6 % (ref 38–73)
NONHDLC SERPL-MCNC: 120 MG/DL
NRBC BLD-RTO: 0 /100 WBC
PLATELET # BLD AUTO: 376 K/UL (ref 150–350)
PMV BLD AUTO: 10.5 FL (ref 9.2–12.9)
POTASSIUM SERPL-SCNC: 4.2 MMOL/L (ref 3.5–5.1)
PROT SERPL-MCNC: 7.6 G/DL (ref 6–8.4)
RBC # BLD AUTO: 4.36 M/UL (ref 4–5.4)
RHEUMATOID FACT SERPL-ACNC: 13 IU/ML (ref 0–15)
SODIUM SERPL-SCNC: 139 MMOL/L (ref 136–145)
TRIGL SERPL-MCNC: 97 MG/DL (ref 30–150)
WBC # BLD AUTO: 7.98 K/UL (ref 3.9–12.7)

## 2019-10-01 PROCEDURE — 85651 RBC SED RATE NONAUTOMATED: CPT | Mod: PO

## 2019-10-01 PROCEDURE — 86235 NUCLEAR ANTIGEN ANTIBODY: CPT | Mod: 59

## 2019-10-01 PROCEDURE — 80053 COMPREHEN METABOLIC PANEL: CPT

## 2019-10-01 PROCEDURE — 86038 ANTINUCLEAR ANTIBODIES: CPT

## 2019-10-01 PROCEDURE — 86039 ANTINUCLEAR ANTIBODIES (ANA): CPT

## 2019-10-01 PROCEDURE — 36415 COLL VENOUS BLD VENIPUNCTURE: CPT | Mod: PO

## 2019-10-01 PROCEDURE — 86140 C-REACTIVE PROTEIN: CPT

## 2019-10-01 PROCEDURE — 85025 COMPLETE CBC W/AUTO DIFF WBC: CPT

## 2019-10-01 PROCEDURE — 86431 RHEUMATOID FACTOR QUANT: CPT

## 2019-10-01 PROCEDURE — 80061 LIPID PANEL: CPT

## 2019-10-02 LAB
ANA SER QL IF: POSITIVE
ANA TITR SER IF: NORMAL {TITER}

## 2019-10-04 DIAGNOSIS — Z87.39: ICD-10-CM

## 2019-10-04 DIAGNOSIS — R76.8 POSITIVE ANA (ANTINUCLEAR ANTIBODY): ICD-10-CM

## 2019-10-04 DIAGNOSIS — D64.9 ANEMIA, UNSPECIFIED TYPE: Primary | ICD-10-CM

## 2019-10-04 LAB
ANTI SM ANTIBODY: 1.82 EU (ref 0–19.99)
ANTI SM/RNP ANTIBODY: 1.32 EU (ref 0–19.99)
ANTI-SM INTERPRETATION: NEGATIVE
ANTI-SM/RNP INTERPRETATION: NEGATIVE
ANTI-SSA ANTIBODY: 0.88 EU (ref 0–19.99)
ANTI-SSA INTERPRETATION: NEGATIVE
ANTI-SSB ANTIBODY: 1.44 EU (ref 0–19.99)
ANTI-SSB INTERPRETATION: NEGATIVE
DSDNA AB SER-ACNC: NORMAL [IU]/ML

## 2019-10-06 ENCOUNTER — PATIENT MESSAGE (OUTPATIENT)
Dept: FAMILY MEDICINE | Facility: CLINIC | Age: 34
End: 2019-10-06

## 2019-10-08 ENCOUNTER — INITIAL CONSULT (OUTPATIENT)
Dept: RHEUMATOLOGY | Facility: CLINIC | Age: 34
End: 2019-10-08
Payer: COMMERCIAL

## 2019-10-08 VITALS
HEIGHT: 66 IN | WEIGHT: 183.88 LBS | SYSTOLIC BLOOD PRESSURE: 115 MMHG | HEART RATE: 80 BPM | BODY MASS INDEX: 29.55 KG/M2 | DIASTOLIC BLOOD PRESSURE: 80 MMHG

## 2019-10-08 DIAGNOSIS — M79.7 FIBROMYALGIA: ICD-10-CM

## 2019-10-08 DIAGNOSIS — G43.009 MIGRAINE WITHOUT AURA AND WITHOUT STATUS MIGRAINOSUS, NOT INTRACTABLE: Primary | ICD-10-CM

## 2019-10-08 PROCEDURE — 99999 PR PBB SHADOW E&M-EST. PATIENT-LVL III: CPT | Mod: PBBFAC,,, | Performed by: INTERNAL MEDICINE

## 2019-10-08 PROCEDURE — 99999 PR PBB SHADOW E&M-EST. PATIENT-LVL III: ICD-10-PCS | Mod: PBBFAC,,, | Performed by: INTERNAL MEDICINE

## 2019-10-08 PROCEDURE — 99244 PR OFFICE CONSULTATION,LEVEL IV: ICD-10-PCS | Mod: S$GLB,,, | Performed by: INTERNAL MEDICINE

## 2019-10-08 PROCEDURE — 99244 OFF/OP CNSLTJ NEW/EST MOD 40: CPT | Mod: S$GLB,,, | Performed by: INTERNAL MEDICINE

## 2019-10-08 RX ORDER — GABAPENTIN 100 MG/1
100 CAPSULE ORAL 3 TIMES DAILY
Qty: 90 CAPSULE | Refills: 3 | Status: SHIPPED | OUTPATIENT
Start: 2019-10-08 | End: 2020-02-11

## 2019-10-08 ASSESSMENT — ROUTINE ASSESSMENT OF PATIENT INDEX DATA (RAPID3)
AM STIFFNESS SCORE: 1, YES
PSYCHOLOGICAL DISTRESS SCORE: 2.2
MDHAQ FUNCTION SCORE: .7
WHEN YOU AWAKENED IN THE MORNING OVER THE LAST WEEK, PLEASE INDICATE THE AMOUNT OF TIME IT TAKES UNTIL YOU ARE AS LIMBER AS YOU WILL BE FOR THE DAY: 6HRS
TOTAL RAPID3 SCORE: 3.94
PAIN SCORE: 4.5
PATIENT GLOBAL ASSESSMENT SCORE: 5
FATIGUE SCORE: 8.5

## 2019-10-08 NOTE — PROGRESS NOTES
Chief Complaint   Patient presents with    Disease Management     EVALUATION OF POSITIVE ANNELISE        Patient was referred by Gale King    History of presenting illness    34 year old female started having symptoms 6 and half year ago : had strep infection and she got antibiotics : 24 weeks pregnant : then she had arm discomfort,deep   She couldn't raise the arms,she couldn't  the phone  Then went to the hospital  Skin was very tender in the arms and chest wall    She doesn't think she had swelling  Some stiffness    She did blood work    Steroids helped some not everything     Lortab,ibuprofen was offered  She took steroids until delivery    She had emesis the whole pregnancy/she had severe migraines/had gestational diabetes    After delivery she stopped steroids  Pain became mild  She would have discomfort in the hands and wrists,prn ibuprofen    But now she aches deep in the arms and hands and wrists  Hands feel hot and swollen,but looking at them they are not swollen   Neck is ok   No aggravating or relieving factors  Rest vs activity no diffference     Sometimes hips ache,gluteal muscles hurt    She does have minimal hypermobility     She has fatigue    ANNELISE 1: 160 speckled   Profile neg  RF neg  CRP nml  ESR 22  CBC 11/38.4  plts 376  CMP nml      Past history : migraines,gestational diabetes     Family history : asthma,migraines,arthritis,rheumatic fever,hyopothyroidism  Rheumatoid arthritis mom and grandmom      Social history : not a smoker         Review of Systems       No skin rashes,malar rash,photosensitivity   No telangiectasias   No calcinosis   No psoriasis   No patchy alopecia   No oral and nasal ulcers   No dry eyes and dry mouth   No pleurisy or any cardiopulmonary complaints   No dysphagia,diplopia and dysphonia and muscle weakness   No n/v/d/c   Has acid reflux+   No raynaud's+   No digital ulcers   No cytopenias   No renal issues   No blood clots   No fever,chills,night sweats,weight  loss and loss of appetite   No pregnancy losses/pre term deliveries /pregnancy complications   No new onset headaches   No recurrent conjunctivitis or uveitis or scleritis or episcleritis   No chronic or bloody diarrhea with no u colitis or crohn's /inflammatory bowel disease   No vaginal and urethral  d/c/STDs/no ulcers     Physical Exam     MADDEN-28 tender joint count: 0  MADDEN-28 swollen joint count: 0    Arm tenderness and fibromyalgia trigger point tenderness noted  No synovitis  Some hypermobility    Physical Exam   Constitutional: She is oriented to person, place, and time and well-developed, well-nourished, and in no distress. No distress.   HENT:   Head: Normocephalic.   Mouth/Throat: Oropharynx is clear and moist.   Eyes: Conjunctivae are normal. Pupils are equal, round, and reactive to light. Right eye exhibits no discharge. Left eye exhibits no discharge. No scleral icterus.   Neck: Normal range of motion. No thyromegaly present.   Cardiovascular: Normal rate, regular rhythm, normal heart sounds and intact distal pulses.    Pulmonary/Chest: Effort normal and breath sounds normal. No stridor.   Abdominal: Soft. Bowel sounds are normal.   Lymphadenopathy:     She has no cervical adenopathy.   Neurological: She is alert and oriented to person, place, and time.   Skin: Skin is warm. No rash noted. She is not diaphoretic.     Psychiatric: Affect and judgment normal.   Musculoskeletal: Normal range of motion.           Assessment     34 year old female with chronic pain in her arms for 6+ years  Started when she was sick with a strep infection when she was 24 weeks pregnant  Got misdiagnosed as reactive arthritis     She has a diffuse arm pain  She doesn't have joint pain  She has hip ache in the muscles     She does have hypermobility in some areas    She has done blood work in the past and that didn't help with a diagnosis  A rheumatologist told her she had fibromyalgia    Different providers had offered her  steroids,NSAIDs and lortab and she didn't respond much to any medication    She had emesis the whole pregnancy/she had severe migraines/had gestational diabetes    Exam : tenderness+    Labs :  ANNELISE 1: 160 speckled and anemia     No symptoms of systemic lupus    On exam she has diffuse upper extremity tenderness     She has fibromyalgia     1. Migraine without aura and without status migrainosus, not intractable    2. Fibromyalgia        New problem     Plan    Magnesium    Vi d    Gabapentin 100 mg bed time  Titration max dose 2400 mg daily    Yoga,water aerobics,nessa chi    Sleep clinic evaluation    Dietary modifications      Subha was seen today for disease management.    Diagnoses and all orders for this visit:    Migraine without aura and without status migrainosus, not intractable    Fibromyalgia    Other orders  -     gabapentin (NEURONTIN) 100 MG capsule; Take 1 capsule (100 mg total) by mouth 3 (three) times daily.

## 2019-10-08 NOTE — PROGRESS NOTES
Rapid3 Question Responses and Scores 10/7/2019   MDHAQ Score 0.7   Psychologic Score 2.2   Pain Score 4.5   When you awakened in the morning OVER THE LAST WEEK, did you feel stiff? Yes   If Yes, please indicate the number of hours until you are as limber as you will be for the day 6   Fatigue Score 8.5   Global Health Score 5   RAPID3 Score 3.94

## 2019-10-08 NOTE — LETTER
October 8, 2019      Gale King, NP  [address] [facility] [address]           [facility]  [address]  [place] [address]  Phone: [phone]  Fax: [phone]          Patient: Subha Bach   MR Number: [id number]   YOB: 1985   Date of Visit: 10/8/2019       Dear Gale King:    Thank you for referring Subha Bach to me for evaluation. Attached you will find relevant portions of my assessment and plan of care.    If you have questions, please do not hesitate to call me. I look forward to following Subha Bach along with you.    Sincerely,    Romeo Roach MD    Enclosure  CC:  No Recipients    If you would like to receive this communication electronically, please contact [email or web address] or [phone] to request more information on [facility] [employer] access.    For providers and/or their staff who would like to refer a patient to [facility], please contact us through our one-stop-shop provider referral line, [facility], at [phone].    If you feel you have received this communication in error or would no longer like to receive these types of communications, please e-mail [email or web address]

## 2019-10-28 ENCOUNTER — PATIENT MESSAGE (OUTPATIENT)
Dept: RHEUMATOLOGY | Facility: CLINIC | Age: 34
End: 2019-10-28

## 2020-02-11 ENCOUNTER — OFFICE VISIT (OUTPATIENT)
Dept: RHEUMATOLOGY | Facility: CLINIC | Age: 35
End: 2020-02-11
Payer: COMMERCIAL

## 2020-02-11 VITALS
HEART RATE: 106 BPM | BODY MASS INDEX: 29.09 KG/M2 | WEIGHT: 181 LBS | HEIGHT: 66 IN | DIASTOLIC BLOOD PRESSURE: 82 MMHG | SYSTOLIC BLOOD PRESSURE: 122 MMHG

## 2020-02-11 DIAGNOSIS — M79.7 FIBROMYALGIA: Primary | ICD-10-CM

## 2020-02-11 PROCEDURE — 3008F PR BODY MASS INDEX (BMI) DOCUMENTED: ICD-10-PCS | Mod: CPTII,S$GLB,, | Performed by: INTERNAL MEDICINE

## 2020-02-11 PROCEDURE — 99999 PR PBB SHADOW E&M-EST. PATIENT-LVL III: ICD-10-PCS | Mod: PBBFAC,,, | Performed by: INTERNAL MEDICINE

## 2020-02-11 PROCEDURE — 99214 OFFICE O/P EST MOD 30 MIN: CPT | Mod: S$GLB,,, | Performed by: INTERNAL MEDICINE

## 2020-02-11 PROCEDURE — 99999 PR PBB SHADOW E&M-EST. PATIENT-LVL III: CPT | Mod: PBBFAC,,, | Performed by: INTERNAL MEDICINE

## 2020-02-11 PROCEDURE — 3008F BODY MASS INDEX DOCD: CPT | Mod: CPTII,S$GLB,, | Performed by: INTERNAL MEDICINE

## 2020-02-11 PROCEDURE — 99214 PR OFFICE/OUTPT VISIT, EST, LEVL IV, 30-39 MIN: ICD-10-PCS | Mod: S$GLB,,, | Performed by: INTERNAL MEDICINE

## 2020-02-11 RX ORDER — BENZONATATE 100 MG/1
CAPSULE ORAL
Refills: 0 | COMMUNITY
Start: 2019-11-09 | End: 2020-02-11

## 2020-02-11 RX ORDER — VENLAFAXINE HYDROCHLORIDE 150 MG/1
150 CAPSULE, EXTENDED RELEASE ORAL DAILY
Qty: 30 CAPSULE | Refills: 4 | Status: SHIPPED | OUTPATIENT
Start: 2020-02-11 | End: 2022-06-22

## 2020-02-11 RX ORDER — BACLOFEN 10 MG/1
TABLET ORAL
COMMUNITY
Start: 2019-12-08 | End: 2020-02-11

## 2020-02-11 RX ORDER — GABAPENTIN 300 MG/1
300 CAPSULE ORAL 2 TIMES DAILY
Qty: 60 CAPSULE | Refills: 6 | Status: SHIPPED | OUTPATIENT
Start: 2020-02-11 | End: 2020-09-02 | Stop reason: CLARIF

## 2020-02-11 RX ORDER — PREDNISONE 20 MG/1
TABLET ORAL
Refills: 0 | COMMUNITY
Start: 2019-11-09 | End: 2020-02-11

## 2020-02-11 NOTE — PROGRESS NOTES
Chief Complaint   Patient presents with    Follow-up         History of presenting illness    34 year old female started having symptoms 6 and half year ago : had strep infection and she got antibiotics : 24 weeks pregnant : then she had arm discomfort,deep   She couldn't raise the arms,she couldn't  the phone  Then went to the hospital  Skin was very tender in the arms and chest wall    She doesn't think she had swelling  Some stiffness    She did blood work    Steroids helped some not everything     Lortab,ibuprofen was offered  She took steroids until delivery    She had emesis the whole pregnancy/she had severe migraines/had gestational diabetes    After delivery she stopped steroids  Pain became mild  She would have discomfort in the hands and wrists,prn ibuprofen    But now she aches deep in the arms and hands and wrists  Hands feel hot and swollen,but looking at them they are not swollen   Neck is ok   No aggravating or relieving factors  Rest vs activity no diffference     Sometimes hips ache,gluteal muscles hurt    She does have minimal hypermobility     We diagnosed her with fibromyalgia    She has fatigue    ANNELISE 1: 160 speckled   Profile neg  RF neg  CRP nml  ESR 22  CBC 11/38.4  plts 376  CMP nml      Now on gabapentin 600 mg bed time  She does well overall  She has some aches     She has made some dietary changes,she has done well    She has noticed changes in weather,hormones trigger her flares    Past history : migraines,gestational diabetes     Family history : asthma,migraines,arthritis,rheumatic fever,hyopothyroidism  Rheumatoid arthritis mom and grandmom      Social history : not a smoker         Review of Systems   Constitutional: Negative for fever and unexpected weight change.   HENT: Negative for trouble swallowing.    Eyes: Negative for redness.   Respiratory: Negative for cough and shortness of breath.    Cardiovascular: Negative for chest pain.   Gastrointestinal: Positive for  constipation. Negative for diarrhea.   Genitourinary: Negative for dysuria and genital sores.   Skin: Negative for rash.   Neurological: Positive for headaches.   Hematological: Does not bruise/bleed easily.          No skin rashes,malar rash,photosensitivity   No telangiectasias   No calcinosis   No psoriasis   No patchy alopecia   No oral and nasal ulcers   No dry eyes and dry mouth   No pleurisy or any cardiopulmonary complaints   No dysphagia,diplopia and dysphonia and muscle weakness   No n/v/d/c   Has acid reflux+   No raynaud's+   No digital ulcers   No cytopenias   No renal issues   No blood clots   No fever,chills,night sweats,weight loss and loss of appetite   No pregnancy losses/pre term deliveries /pregnancy complications   No new onset headaches   No recurrent conjunctivitis or uveitis or scleritis or episcleritis   No chronic or bloody diarrhea with no u colitis or crohn's /inflammatory bowel disease   No vaginal and urethral  d/c/STDs/no ulcers     Physical Exam     MADDEN-28 tender joint count: 0  MADDEN-28 swollen joint count: 0    Arm tenderness and fibromyalgia trigger point tenderness noted  No synovitis  Some hypermobility    Physical Exam   Constitutional: She is oriented to person, place, and time and well-developed, well-nourished, and in no distress. No distress.   HENT:   Head: Normocephalic.   Mouth/Throat: Oropharynx is clear and moist.   Eyes: Conjunctivae are normal. Pupils are equal, round, and reactive to light. Right eye exhibits no discharge. Left eye exhibits no discharge. No scleral icterus.   Neck: Normal range of motion. No thyromegaly present.   Cardiovascular: Normal rate, regular rhythm, normal heart sounds and intact distal pulses.    Pulmonary/Chest: Effort normal and breath sounds normal. No stridor.   Abdominal: Soft. Bowel sounds are normal.   Lymphadenopathy:     She has no cervical adenopathy.   Neurological: She is alert and oriented to person, place, and time.   Skin: Skin  is warm. No rash noted. She is not diaphoretic.     Psychiatric: Affect and judgment normal.   Musculoskeletal: Normal range of motion.           Assessment     34 year old female with chronic pain in her arms for 6+ years  Started when she was sick with a strep infection when she was 24 weeks pregnant  Got misdiagnosed as reactive arthritis     She has a diffuse arm pain  She doesn't have joint pain  She has hip ache in the muscles     She does have hypermobility in some areas    She has done blood work in the past and that didn't help with a diagnosis  A rheumatologist told her she had fibromyalgia    Different providers had offered her steroids,NSAIDs and lortab and she didn't respond much to any medication    She had emesis the whole pregnancy/she had severe migraines/had gestational diabetes    Exam : tenderness+    Labs :  ANNELISE 1: 160 speckled and anemia     No symptoms of systemic lupus    On exam she has diffuse upper extremity tenderness     She has fibromyalgia     She finds relief with gabapentin 600 mg daily  She does go through flares but she knows how to identify triggers and knows how to handle them  Migraines are much better now    On effexor 150 mg daily    1. Fibromyalgia        F/u  problem     Plan    Continue current meds    Daily exercise     Magnesium    Vit d    Yoga,water aerobics,nessa chi    Sleep clinic evaluation    Dietary modifications      Subah was seen today for follow-up.    Diagnoses and all orders for this visit:    Fibromyalgia    Other orders  -     venlafaxine (EFFEXOR-XR) 150 MG Cp24; Take 1 capsule (150 mg total) by mouth once daily.  -     gabapentin (NEURONTIN) 300 MG capsule; Take 1 capsule (300 mg total) by mouth 2 (two) times daily.        rtc in 6 months

## 2020-03-16 ENCOUNTER — PATIENT MESSAGE (OUTPATIENT)
Dept: RHEUMATOLOGY | Facility: CLINIC | Age: 35
End: 2020-03-16

## 2020-03-20 ENCOUNTER — OFFICE VISIT (OUTPATIENT)
Dept: URGENT CARE | Facility: CLINIC | Age: 35
End: 2020-03-20
Payer: COMMERCIAL

## 2020-03-20 VITALS
HEART RATE: 104 BPM | BODY MASS INDEX: 27 KG/M2 | HEIGHT: 66 IN | RESPIRATION RATE: 16 BRPM | DIASTOLIC BLOOD PRESSURE: 80 MMHG | SYSTOLIC BLOOD PRESSURE: 120 MMHG | TEMPERATURE: 99 F | WEIGHT: 168 LBS | OXYGEN SATURATION: 99 %

## 2020-03-20 DIAGNOSIS — R05.9 COUGH: ICD-10-CM

## 2020-03-20 DIAGNOSIS — J40 BRONCHITIS: ICD-10-CM

## 2020-03-20 DIAGNOSIS — R50.9 FEVER, UNSPECIFIED FEVER CAUSE: Primary | ICD-10-CM

## 2020-03-20 LAB
CTP QC/QA: YES
FLUAV AG NPH QL: NEGATIVE
FLUBV AG NPH QL: NEGATIVE

## 2020-03-20 PROCEDURE — 87804 INFLUENZA ASSAY W/OPTIC: CPT | Mod: QW,,, | Performed by: NURSE PRACTITIONER

## 2020-03-20 PROCEDURE — 99204 OFFICE O/P NEW MOD 45 MIN: CPT | Mod: S$GLB,,, | Performed by: NURSE PRACTITIONER

## 2020-03-20 PROCEDURE — 71046 PR XRAY, CHEST, 2 VIEWS: ICD-10-PCS | Mod: S$GLB,,, | Performed by: NURSE PRACTITIONER

## 2020-03-20 PROCEDURE — 87804 POCT INFLUENZA A/B: ICD-10-PCS | Mod: QW,,, | Performed by: NURSE PRACTITIONER

## 2020-03-20 PROCEDURE — 99204 PR OFFICE/OUTPT VISIT, NEW, LEVL IV, 45-59 MIN: ICD-10-PCS | Mod: S$GLB,,, | Performed by: NURSE PRACTITIONER

## 2020-03-20 PROCEDURE — 71046 X-RAY EXAM CHEST 2 VIEWS: CPT | Mod: S$GLB,,, | Performed by: NURSE PRACTITIONER

## 2020-03-20 RX ORDER — ALBUTEROL SULFATE 90 UG/1
2 AEROSOL, METERED RESPIRATORY (INHALATION) EVERY 6 HOURS PRN
Qty: 18 G | Refills: 0 | Status: SHIPPED | OUTPATIENT
Start: 2020-03-20 | End: 2020-09-02 | Stop reason: SDUPTHER

## 2020-03-20 RX ORDER — AZITHROMYCIN 250 MG/1
TABLET, FILM COATED ORAL
Qty: 6 TABLET | Refills: 0 | Status: SHIPPED | OUTPATIENT
Start: 2020-03-20 | End: 2020-09-02 | Stop reason: CLARIF

## 2020-03-20 NOTE — PATIENT INSTRUCTIONS
Quarantine until we notify you of your Covid result. Exercise proper handwashing. Go to the ER for any worsening of symptoms or difficulty breathing. Do not take Motrin of Advil for fever, only take Tylenol, as it could worsen your symptoms if you are Covid positive.       What Is Acute Bronchitis?  Acute bronchitis is when the airways in your lungs (bronchial tubes) become red and swollen (inflamed). It is usually caused by a viral infection. But it can also occur because of a bacteria or allergen. Symptoms include a cough that produces yellow or greenish mucus and can last for days or sometimes weeks.  Inside healthy lungs    Air travels in and out of the lungs through the airways. The linings of these airways produce sticky mucus. This mucus traps particles that enter the lungs. Tiny structures called cilia then sweep the particles out of the airways.     Healthy airway: Airways are normally open. Air moves in and out easily.      Healthy cilia: Tiny, hairlike cilia sweep mucus and particles up and out of the airways.   Lungs with bronchitis  Bronchitis often occurs with a cold or the flu virus. The airways become inflamed (red and swollen). There is a deep hacking cough from the extra mucus. Other symptoms may include:  · Wheezing  · Chest discomfort  · Shortness of breath  · Mild fever  A second infection, this time due to bacteria, may then occur. And airways irritated by allergens or smoke are more likely to get infected.        Inflamed airway: Inflammation and extra mucus narrow the airway, causing shortness of breath.      Impaired cilia: Extra mucus impairs cilia, causing congestion and wheezing. Smoking makes the problem worse.   Making a diagnosis  A physical exam, health history, and certain tests help your healthcare provider make the diagnosis.  Health history  Your healthcare provider will ask you about your symptoms.  The exam  Your provider listens to your chest for signs of congestion. He or she  may also check your ears, nose, and throat.  Possible tests  · A sputum test for bacteria. This requires a sample of mucus from your lungs.  · A nasal or throat swab. This tests to see if you have a bacterial infection.  · A chest X-ray. This is done if your healthcare provider thinks you have pneumonia.  · Tests to check for an underlying condition. Other tests may be done to check for things such as allergies, asthma, or COPD (chronic obstructive pulmonary disease). You may need to see a specialist for more lung function testing.  Treating a cough  The main treatment for bronchitis is easing symptoms. Avoiding smoke, allergens, and other things that trigger coughing can often help. If the infection is bacterial, you may be given antibiotics. During the illness, it's important to get plenty of sleep. To ease symptoms:  · Dont smoke. Also avoid secondhand smoke.  · Use a humidifier. Or try breathing in steam from a hot shower. This may help loosen mucus.  · Drink a lot of water and juice. They can soothe the throat and may help thin mucus.  · Sit up or use extra pillows when in bed. This helps to lessen coughing and congestion.  · Ask your provider about using medicine. Ask about using cough medicine, pain and fever medicine, or a decongestant.  Antibiotics  Most cases of bronchitis are caused by cold or flu viruses. They dont need antibiotics to treat them, even if your mucus is thick and green or yellow. Antibiotics dont treat viral illness and antibiotics have not been shown to have any benefit in cases of acute bronchitis. Taking antibiotics when they are not needed increases your risk of getting an infection later that is antibiotic-resistant. Antibiotics can also cause severe cases of diarrhea that require other antibiotics to treat.  It is important that you accept your healthcare provider's opinion to not use antibiotics. Your provider will prescribe antibiotics if the infection is caused by bacteria. If  they are prescribed:  · Take all of the medicine. Take the medicine until it is used up, even if symptoms have improved. If you dont, the bronchitis may come back.  · Take the medicines as directed. For instance, some medicines should be taken with food.  · Ask about side effects. Ask your provider or pharmacist what side effects are common, and what to do about them.  Follow-up care  You should see your provider again in 2 to 3 weeks. By this time, symptoms should have improved. An infection that lasts longer may mean you have a more serious problem.  Prevention  · Avoid tobacco smoke. If you smoke, quit. Stay away from smoky places. Ask friends and family not to smoke around you, or in your home or car.  · Get checked for allergies.  · Ask your provider about getting a yearly flu shot. Also ask about pneumococcal or pneumonia shots.  · Wash your hands often. This helps reduce the chance of picking up viruses that cause colds and flu.  Call your healthcare provider if:  · Symptoms worsen, or you have new symptoms  · Breathing problems worsen or  become severe  · Symptoms dont get better within a week, or within 3 days of taking antibiotics   Date Last Reviewed: 2/1/2017  © 1314-4577 [employer]. [address] [place] [address], [place] [address]. All rights reserved. This information is not intended as a substitute for professional medical care. Always follow your healthcare professional's instructions.

## 2020-03-20 NOTE — PROGRESS NOTES
"Subjective:       Patient ID: Subha Bach is a 34 y.o. female.    Vitals:  height is 5' 6" (1.676 m) and weight is 76.2 kg (168 lb). Her oral temperature is 98.7 °F (37.1 °C). Her blood pressure is 120/80 and her pulse is 104. Her respiration is 16 and oxygen saturation is 99%.     Chief Complaint: Cough    Patient reports he has had a fever, cough, and shortness of breath for a few days. Reports she has a history of asthma and fibromyalgia. Reports fever of 100.9 this morning. Denies any known exposure to covid positive patient, denies any recent travel.     Cough   This is a new problem. Episode onset: 03/15/2020. The problem has been gradually worsening. Associated symptoms include a fever, headaches and shortness of breath. Pertinent negatives include no chest pain, chills, myalgias, rash or sore throat. Treatments tried: Proair inhaler. The treatment provided mild relief. Her past medical history is significant for asthma.       Constitution: Positive for fever. Negative for chills and fatigue.   HENT: Negative for congestion and sore throat.    Neck: Negative for painful lymph nodes.   Cardiovascular: Negative for chest pain and leg swelling.   Eyes: Negative for double vision and blurred vision.   Respiratory: Positive for cough and shortness of breath.    Gastrointestinal: Negative for nausea, vomiting and diarrhea.   Genitourinary: Negative for dysuria, frequency, urgency and history of kidney stones.   Musculoskeletal: Negative for joint pain, joint swelling, muscle cramps and muscle ache.   Skin: Negative for color change, pale, rash and bruising.   Allergic/Immunologic: Negative for seasonal allergies.   Neurological: Positive for headaches. Negative for dizziness, history of vertigo, light-headedness and passing out.   Hematologic/Lymphatic: Negative for swollen lymph nodes.   Psychiatric/Behavioral: Negative for nervous/anxious, sleep disturbance and depression. The patient is not nervous/anxious.  "       Objective:      Physical Exam   Constitutional: She is oriented to person, place, and time. She appears well-developed and well-nourished. She is cooperative.  Non-toxic appearance. She does not have a sickly appearance. She does not appear ill. No distress.   HENT:   Head: Normocephalic and atraumatic.   Right Ear: Hearing, tympanic membrane, external ear and ear canal normal.   Left Ear: Hearing, tympanic membrane, external ear and ear canal normal.   Nose: Nose normal. No mucosal edema, rhinorrhea or nasal deformity. No epistaxis. Right sinus exhibits no maxillary sinus tenderness and no frontal sinus tenderness. Left sinus exhibits no maxillary sinus tenderness and no frontal sinus tenderness.   Mouth/Throat: Uvula is midline, oropharynx is clear and moist and mucous membranes are normal. No trismus in the jaw. Normal dentition. No uvula swelling. No posterior oropharyngeal erythema.   Eyes: Conjunctivae and lids are normal. Right eye exhibits no discharge. Left eye exhibits no discharge. No scleral icterus.   Neck: Trachea normal, normal range of motion, full passive range of motion without pain and phonation normal. Neck supple.   Cardiovascular: Regular rhythm, normal heart sounds, intact distal pulses and normal pulses. Tachycardia present.   Pulmonary/Chest: Effort normal and breath sounds normal. No respiratory distress.   Abdominal: Soft. Normal appearance and bowel sounds are normal. She exhibits no distension, no pulsatile midline mass and no mass. There is no tenderness.   Musculoskeletal: Normal range of motion. She exhibits no edema or deformity.   Lymphadenopathy:     She has no cervical adenopathy.   Neurological: She is alert and oriented to person, place, and time. She exhibits normal muscle tone. Coordination normal. GCS eye subscore is 4. GCS verbal subscore is 5. GCS motor subscore is 6.   Skin: Skin is warm, dry, intact, not diaphoretic and not pale.   Psychiatric: She has a normal mood  and affect. Her speech is normal and behavior is normal. Judgment and thought content normal. Cognition and memory are normal.   Nursing note and vitals reviewed.        Assessment:       1. Fever, unspecified fever cause    2. Cough        Plan:       Influenza negative. CXR negative.     Lungs clear throughout, sats 99% , and no respiratory distress.  I do not suspect pneumonia, pulmonary embolism or any other more serious condition requiring further treatment. Advised pt to return to clinic or go to ER for any worsening of symptoms. Based on my clinical evaluation, I do not appreciate any immediate, emergent, or life threatening condition or etiology that warrants additional workup today and feel that the patient can be discharged with close follow up care.    Advised patient: Quarantine until we notify you of your Covid result. Exercise proper handwashing. Go to the ER for any worsening of symptoms or difficulty breathing. Do not take Motrin of Advil for fever, only take Tylenol, as it could worsen your symptoms if you are Covid positive.       Fever, unspecified fever cause  -     SARS- CoV-2 (COVID-19) QUALITATIVE PCR  -     POCT Influenza A/B  -     X-Ray Chest PA And Lateral; Future; Expected date: 03/20/2020    Cough  -     SARS- CoV-2 (COVID-19) QUALITATIVE PCR  -     X-Ray Chest PA And Lateral; Future; Expected date: 03/20/2020

## 2020-03-21 ENCOUNTER — TELEPHONE (OUTPATIENT)
Dept: URGENT CARE | Facility: CLINIC | Age: 35
End: 2020-03-21

## 2020-03-21 NOTE — TELEPHONE ENCOUNTER
"Follow up call for COVID 19   Pt was informed that lab results wasn't in yet.  Pt was asked how was she feeling and pt states, " not better, still has a cough, sob, fever, H/A, and fatigue.'' Pt was told that if sx gets worse to go to the ER.     "

## 2020-03-22 ENCOUNTER — TELEPHONE (OUTPATIENT)
Dept: URGENT CARE | Facility: CLINIC | Age: 35
End: 2020-03-22

## 2020-03-22 NOTE — TELEPHONE ENCOUNTER
"Pt states "about the same as yesterday, breathing is worse, chest tightness and headache" informed pt that if pain became unbearable to go to hospital   "

## 2020-03-23 ENCOUNTER — HOSPITAL ENCOUNTER (EMERGENCY)
Facility: HOSPITAL | Age: 35
Discharge: HOME | End: 2020-03-24
Attending: EMERGENCY MEDICINE
Payer: COMMERCIAL

## 2020-03-23 ENCOUNTER — TELEPHONE (OUTPATIENT)
Dept: URGENT CARE | Facility: CLINIC | Age: 35
End: 2020-03-23

## 2020-03-23 VITALS
OXYGEN SATURATION: 98 % | RESPIRATION RATE: 18 BRPM | DIASTOLIC BLOOD PRESSURE: 78 MMHG | BODY MASS INDEX: 27.32 KG/M2 | WEIGHT: 170 LBS | SYSTOLIC BLOOD PRESSURE: 123 MMHG | HEIGHT: 66 IN | TEMPERATURE: 98 F | HEART RATE: 108 BPM

## 2020-03-23 DIAGNOSIS — R06.02 SOB (SHORTNESS OF BREATH): ICD-10-CM

## 2020-03-23 DIAGNOSIS — J06.9 VIRAL URI WITH COUGH: Primary | ICD-10-CM

## 2020-03-23 LAB
ALLENS TEST: ABNORMAL
DELSYS: ABNORMAL
FIO2: 21
HCO3 UR-SCNC: 19.9 MMOL/L (ref 24–28)
MODE: ABNORMAL
PCO2 BLDA: 33.5 MMHG (ref 35–45)
PH SMN: 7.38 [PH] (ref 7.35–7.45)
PO2 BLDA: 98 MMHG (ref 80–100)
POC BE: -5 MMOL/L
POC SATURATED O2: 98 % (ref 95–100)
POC TCO2: 21 MMOL/L (ref 23–27)
SAMPLE: ABNORMAL
SITE: ABNORMAL

## 2020-03-23 PROCEDURE — 82803 BLOOD GASES ANY COMBINATION: CPT

## 2020-03-23 PROCEDURE — 36600 WITHDRAWAL OF ARTERIAL BLOOD: CPT

## 2020-03-23 PROCEDURE — 99284 EMERGENCY DEPT VISIT MOD MDM: CPT | Mod: 25

## 2020-03-23 PROCEDURE — 99900035 HC TECH TIME PER 15 MIN (STAT)

## 2020-03-23 NOTE — TELEPHONE ENCOUNTER
Spoke with pt.  covid-19 results still pending.  Pt reports no fever, slight body aches, major fatigue, coughing(dry), and sob.  Pt has been using her inhaler and doing neb treatments with some relief.  Pt notified to pay attention to her symptoms and if symptoms worsen to go to er.  Pt to call with any questions or concerns.  Pt to continue self quarantine.  Pt expressed understanding.

## 2020-03-24 ENCOUNTER — TELEPHONE (OUTPATIENT)
Dept: URGENT CARE | Facility: CLINIC | Age: 35
End: 2020-03-24

## 2020-03-24 LAB
ALBUMIN SERPL BCP-MCNC: 3.8 G/DL (ref 3.5–5.2)
ALP SERPL-CCNC: 76 U/L (ref 55–135)
ALT SERPL W/O P-5'-P-CCNC: 11 U/L (ref 10–44)
ANION GAP SERPL CALC-SCNC: 8 MMOL/L (ref 8–16)
AST SERPL-CCNC: 12 U/L (ref 10–40)
BASOPHILS # BLD AUTO: 0.07 K/UL (ref 0–0.2)
BASOPHILS NFR BLD: 0.9 % (ref 0–1.9)
BILIRUB SERPL-MCNC: 0.1 MG/DL (ref 0.1–1)
BUN SERPL-MCNC: 8 MG/DL (ref 6–20)
CALCIUM SERPL-MCNC: 8.7 MG/DL (ref 8.7–10.5)
CHLORIDE SERPL-SCNC: 110 MMOL/L (ref 95–110)
CO2 SERPL-SCNC: 21 MMOL/L (ref 23–29)
CREAT SERPL-MCNC: 0.7 MG/DL (ref 0.5–1.4)
DIFFERENTIAL METHOD: ABNORMAL
EOSINOPHIL # BLD AUTO: 0.2 K/UL (ref 0–0.5)
EOSINOPHIL NFR BLD: 2 % (ref 0–8)
ERYTHROCYTE [DISTWIDTH] IN BLOOD BY AUTOMATED COUNT: 16 % (ref 11.5–14.5)
EST. GFR  (AFRICAN AMERICAN): >60 ML/MIN/1.73 M^2
EST. GFR  (NON AFRICAN AMERICAN): >60 ML/MIN/1.73 M^2
GLUCOSE SERPL-MCNC: 107 MG/DL (ref 70–110)
HCT VFR BLD AUTO: 25.1 % (ref 37–48.5)
HGB BLD-MCNC: 6.9 G/DL (ref 12–16)
IMM GRANULOCYTES # BLD AUTO: 0.02 K/UL (ref 0–0.04)
IMM GRANULOCYTES NFR BLD AUTO: 0.3 % (ref 0–0.5)
LYMPHOCYTES # BLD AUTO: 2.3 K/UL (ref 1–4.8)
LYMPHOCYTES NFR BLD: 31.2 % (ref 18–48)
MCH RBC QN AUTO: 19.3 PG (ref 27–31)
MCHC RBC AUTO-ENTMCNC: 27.5 G/DL (ref 32–36)
MCV RBC AUTO: 70 FL (ref 82–98)
MONOCYTES # BLD AUTO: 0.6 K/UL (ref 0.3–1)
MONOCYTES NFR BLD: 8.5 % (ref 4–15)
NEUTROPHILS # BLD AUTO: 4.2 K/UL (ref 1.8–7.7)
NEUTROPHILS NFR BLD: 57.1 % (ref 38–73)
NRBC BLD-RTO: 0 /100 WBC
PLATELET # BLD AUTO: 458 K/UL (ref 150–350)
PMV BLD AUTO: 9.6 FL (ref 9.2–12.9)
POTASSIUM SERPL-SCNC: 3.4 MMOL/L (ref 3.5–5.1)
PROT SERPL-MCNC: 7.3 G/DL (ref 6–8.4)
RBC # BLD AUTO: 3.57 M/UL (ref 4–5.4)
SODIUM SERPL-SCNC: 139 MMOL/L (ref 136–145)
WBC # BLD AUTO: 7.41 K/UL (ref 3.9–12.7)

## 2020-03-24 PROCEDURE — 85025 COMPLETE CBC W/AUTO DIFF WBC: CPT

## 2020-03-24 PROCEDURE — 80053 COMPREHEN METABOLIC PANEL: CPT

## 2020-03-24 PROCEDURE — 36415 COLL VENOUS BLD VENIPUNCTURE: CPT

## 2020-03-24 RX ORDER — PROMETHAZINE HYDROCHLORIDE AND DEXTROMETHORPHAN HYDROBROMIDE 6.25; 15 MG/5ML; MG/5ML
5 SYRUP ORAL EVERY 4 HOURS PRN
Qty: 118 ML | Refills: 0 | Status: SHIPPED | OUTPATIENT
Start: 2020-03-24 | End: 2020-04-03

## 2020-03-24 NOTE — ED TRIAGE NOTES
Subhaaudra Sorto Isreal is here with SOB and cough and dizzy with -flu test and pending Covid-19 test.

## 2020-03-24 NOTE — TELEPHONE ENCOUNTER
"Called to follow up with pt and she states "Was seen at the ER yesterday for SOB and chest pain and is still experiencing it today" I informed her if it seemed to worsen to go back to the ER or if she had any questions or concerns that we were here for her and to give us a call here at the clinic.  "

## 2020-03-24 NOTE — DISCHARGE INSTRUCTIONS
You may take over the counter medications as needed. You were tested for COVID 19 at another clinic. Follow with them for test results. Until then you are to self quarantine. Follow up with primary care doctor. Return to ED for new or worsening symptoms.

## 2020-03-24 NOTE — ED PROVIDER NOTES
Encounter Date: 3/23/2020       History     Chief Complaint   Patient presents with    Shortness of Breath     cough and dizzy     03/23/2020  11:33 PM     Chief Complaint:     The patient is a 34 y.o. female who presents with c/o worsening cough and increased sob. Pt reports symptoms began 8 days ago. Was seen at  3 days ago and had negative flu swab. Covid 19 testing done but pending. Pt reports she had normal CXR at that time. Reports intermittent fevers. Reports has been using albuterol with little improvement . PMHx of fibromyalgia and asthma.           Review of patient's allergies indicates:   Allergen Reactions    Nubain [nalbuphine] Hives     Past Medical History:   Diagnosis Date    Asthma     Gestational diabetes     Migraine headache      Past Surgical History:   Procedure Laterality Date    SHOULDER SURGERY       Family History   Problem Relation Age of Onset    Asthma Mother     Migraines Mother     Hypothyroidism Mother     Arthritis Mother     Migraines Father     Allergies Father     Chiari malformation Sister     Migraines Sister     Pancreatitis Maternal Grandmother     Migraines Maternal Grandmother     Thyroid disease Maternal Grandmother     Other Maternal Grandmother         pituitary gland issues    Heart failure Maternal Grandfather     Diabetes Maternal Grandfather     Kidney disease Maternal Grandfather     Alzheimer's disease Paternal Grandmother     Prostate cancer Paternal Grandfather      Social History     Tobacco Use    Smoking status: Never Smoker    Smokeless tobacco: Never Used   Substance Use Topics    Alcohol use: Yes     Frequency: Monthly or less     Drinks per session: 1 or 2     Binge frequency: Never    Drug use: No     Review of Systems   Constitutional: Negative for chills and fever.   HENT: Negative for facial swelling and trouble swallowing.    Eyes: Negative for discharge.   Respiratory: Positive for cough and shortness of breath. Negative  for chest tightness and wheezing.    Cardiovascular: Negative for chest pain and palpitations.   Gastrointestinal: Negative for abdominal pain, diarrhea, nausea and vomiting.   Genitourinary: Negative for dysuria and hematuria.   Musculoskeletal: Negative for arthralgias, back pain, joint swelling, myalgias, neck pain and neck stiffness.   Skin: Negative for color change, pallor, rash and wound.   Neurological: Negative for dizziness, syncope, weakness, light-headedness, numbness and headaches.   Hematological: Does not bruise/bleed easily.   Psychiatric/Behavioral: The patient is not nervous/anxious.        Physical Exam     Initial Vitals [03/23/20 2237]   BP Pulse Resp Temp SpO2   123/78 108 18 98.4 °F (36.9 °C) 100 %      MAP       --         Physical Exam    Nursing note and vitals reviewed.  Constitutional: She appears well-developed and well-nourished. She is not diaphoretic. No distress.   HENT:   Head: Normocephalic and atraumatic.   Right Ear: External ear normal.   Left Ear: External ear normal.   Nose: Nose normal.   Mouth/Throat: Oropharynx is clear and moist.   Eyes: Conjunctivae and EOM are normal. Pupils are equal, round, and reactive to light.   Neck: Normal range of motion. Neck supple.   Cardiovascular: Normal rate, regular rhythm, normal heart sounds and intact distal pulses. Exam reveals no gallop and no friction rub.    No murmur heard.  Pulmonary/Chest: Breath sounds normal. No respiratory distress. She has no wheezes. She has no rhonchi. She has no rales.   Abdominal: Soft. She exhibits no distension and no mass. There is no tenderness.   Musculoskeletal: Normal range of motion. She exhibits no edema or tenderness.   Lymphadenopathy:     She has no cervical adenopathy.   Neurological: She is alert and oriented to person, place, and time. She has normal strength. No cranial nerve deficit or sensory deficit.   Skin: Skin is warm and dry. No rash and no abscess noted. No erythema.   Psychiatric:  She has a normal mood and affect.         ED Course   Procedures  Labs Reviewed   CBC W/ AUTO DIFFERENTIAL - Abnormal; Notable for the following components:       Result Value    RBC 3.57 (*)     Hemoglobin 6.9 (*)     Hematocrit 25.1 (*)     Mean Corpuscular Volume 70 (*)     Mean Corpuscular Hemoglobin 19.3 (*)     Mean Corpuscular Hemoglobin Conc 27.5 (*)     RDW 16.0 (*)     Platelets 458 (*)     All other components within normal limits   COMPREHENSIVE METABOLIC PANEL - Abnormal; Notable for the following components:    Potassium 3.4 (*)     CO2 21 (*)     All other components within normal limits   ISTAT PROCEDURE - Abnormal; Notable for the following components:    POC PCO2 33.5 (*)     POC HCO3 19.9 (*)     POC TCO2 21 (*)     All other components within normal limits          Imaging Results          X-Ray Chest 1 View (In process)                  Medical Decision Making:   History:   Old Medical Records: I decided to obtain old medical records.  Differential Diagnosis:   Pneumonia  Viral syndrome  Respiratory distress   Clinical Tests:   Lab Tests: Ordered and Reviewed  Radiological Study: Ordered and Reviewed       APC / Resident Notes:   The patient appears to have a viral upper respiratory infection. She was previously tested for Covid 19 but tests are pending. Today's CXR shows no acute findings. CBC and CMP unremarkable. ABGs show no signs of significant hypoxia. Physical exam normal, no tachypnea or increased work of breathing.   Based upon the history and physical exam the patient does not appear to have a serious bacterial infection such as pneumonia, sepsis, otitis media, bacterial sinusitis, strep pharyngitis, parapharyngeal or peritonsillar abscess, meningitis.  Patient appears very well and I have given specific return precautions to the patient  The patient can take over the counter medications and does not appear to need antibiotics at this time. She is instructed to continue self quarantine  until covid test results. She is given specific return precautions. I have discussed pt with Dr Haley who agrees with poc. Pt  voices understanding and is agreeable to the plan.  She is given specific return precautions.                                 Clinical Impression:       ICD-10-CM ICD-9-CM   1. Viral URI with cough J06.9 465.9    B97.89    2. SOB (shortness of breath) R06.02 786.05         Disposition:   Disposition: Discharged  Condition: Stable     ED Disposition Condition    Discharge Stable        ED Prescriptions     Medication Sig Dispense Start Date End Date Auth. Provider    promethazine-dextromethorphan (PROMETHAZINE-DM) 6.25-15 mg/5 mL Syrp Take 5 mLs by mouth every 4 (four) hours as needed. 118 mL 3/24/2020 4/3/2020 Alessia Fish NP        Follow-up Information     Follow up With Specialties Details Why Contact Info    [facility]  Schedule an appointment as soon as possible for a visit in 1 week for primary care evaluation [address] [place] [id number]  [phone]      [facility]  As needed, If symptoms worsen [address] [place] [id number]  [phone]                                     Alessia Fish NP  03/24/20 0138

## 2020-03-25 ENCOUNTER — TELEPHONE (OUTPATIENT)
Dept: URGENT CARE | Facility: CLINIC | Age: 35
End: 2020-03-25

## 2020-03-25 NOTE — TELEPHONE ENCOUNTER
Spoke to pt. Same symptoms as yesterday. Nothing is better and nothing has worsened. I advised her we would let her know as soon as we get results in however, if anything worsens she should go to the ER. Pt expressed understanding.

## 2020-03-26 ENCOUNTER — TELEPHONE (OUTPATIENT)
Dept: URGENT CARE | Facility: CLINIC | Age: 35
End: 2020-03-26

## 2020-03-26 NOTE — TELEPHONE ENCOUNTER
Spoke with pt.  Pending covid-19 results.  Pt reports continued sob and coughing.  Fever has decreased.  Pt did go ER on 03/23/20 for sob and chest tightness.  Discussed with pt returnable precautions to the ER.  Understanding was expressed.  Pt to continue self quarantine.

## 2020-03-27 ENCOUNTER — TELEPHONE (OUTPATIENT)
Dept: URGENT CARE | Facility: CLINIC | Age: 35
End: 2020-03-27

## 2020-03-27 NOTE — TELEPHONE ENCOUNTER
"Pt states "Still experiencing symptoms including fatigue, headaches, SOB, chest tightness/pain. She did states she had been diagnosed with pleurisy and that could be why; she told me that she's trying to take care of it on her on, but I did tell her if her symptoms worsened, to return to the ER. I also informed her of pending results and we will contact her as soon as we receive them.  "

## 2020-03-29 ENCOUNTER — TELEPHONE (OUTPATIENT)
Dept: URGENT CARE | Facility: CLINIC | Age: 35
End: 2020-03-29

## 2020-03-29 NOTE — TELEPHONE ENCOUNTER
"Spoke with patient via telephone. I informed her that her COVID-19 test from 03/20/20 results were negative and resulted as "non-detected." All questions answered appropriately.       "

## 2020-05-22 ENCOUNTER — HOSPITAL ENCOUNTER (EMERGENCY)
Facility: HOSPITAL | Age: 35
Discharge: HOME | End: 2020-05-22
Attending: EMERGENCY MEDICINE
Payer: COMMERCIAL

## 2020-05-22 ENCOUNTER — PATIENT MESSAGE (OUTPATIENT)
Dept: RHEUMATOLOGY | Facility: CLINIC | Age: 35
End: 2020-05-22

## 2020-05-22 VITALS
TEMPERATURE: 98 F | HEIGHT: 66 IN | RESPIRATION RATE: 18 BRPM | SYSTOLIC BLOOD PRESSURE: 106 MMHG | BODY MASS INDEX: 26.84 KG/M2 | DIASTOLIC BLOOD PRESSURE: 67 MMHG | WEIGHT: 167 LBS | OXYGEN SATURATION: 100 % | HEART RATE: 87 BPM
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
## 2020-05-22 PROCEDURE — 96372 THER/PROPH/DIAG INJ SC/IM: CPT | Mod: 59

## 2020-05-22 PROCEDURE — 63600175 PHARM REV CODE 636 W HCPCS: Performed by: EMERGENCY MEDICINE

## 2020-05-22 PROCEDURE — 99284 EMERGENCY DEPT VISIT MOD MDM: CPT | Mod: 25

## 2020-05-22 RX ORDER — AMOXICILLIN AND CLAVULANATE POTASSIUM 875; 125 MG/1; MG/1
1 TABLET, FILM COATED ORAL 2 TIMES DAILY
Qty: 14 TABLET | Refills: 0 | Status: SHIPPED | OUTPATIENT
Start: 2020-05-22 | End: 2020-09-02 | Stop reason: CLARIF

## 2020-05-22 RX ORDER — CEFAZOLIN SODIUM 1 G/3ML
1 INJECTION, POWDER, FOR SOLUTION INTRAMUSCULAR; INTRAVENOUS
Status: COMPLETED | OUTPATIENT
Start: 2020-05-22 | End: 2020-05-22

## 2020-05-22 RX ADMIN — CEFAZOLIN 1 G: 1 INJECTION, POWDER, FOR SOLUTION INTRAMUSCULAR; INTRAVENOUS; PARENTERAL at 09:05
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
## 2020-09-02 ENCOUNTER — LAB VISIT (OUTPATIENT)
Dept: LAB | Facility: HOSPITAL | Age: 35
End: 2020-09-02
Attending: INTERNAL MEDICINE
Payer: COMMERCIAL

## 2020-09-02 ENCOUNTER — OFFICE VISIT (OUTPATIENT)
Dept: PULMONOLOGY | Facility: CLINIC | Age: 35
End: 2020-09-02
Payer: COMMERCIAL

## 2020-09-02 ENCOUNTER — HOSPITAL ENCOUNTER (OUTPATIENT)
Facility: HOSPITAL | Age: 35
Discharge: HOME | End: 2020-09-03
Attending: EMERGENCY MEDICINE | Admitting: HOSPITALIST
Payer: COMMERCIAL

## 2020-09-02 VITALS
OXYGEN SATURATION: 100 % | WEIGHT: 170.63 LBS | BODY MASS INDEX: 27.54 KG/M2 | HEART RATE: 90 BPM | SYSTOLIC BLOOD PRESSURE: 121 MMHG | DIASTOLIC BLOOD PRESSURE: 95 MMHG

## 2020-09-02 DIAGNOSIS — J45.20 MILD INTERMITTENT ASTHMA WITHOUT COMPLICATION: ICD-10-CM

## 2020-09-02 DIAGNOSIS — D50.9 MICROCYTIC ANEMIA: ICD-10-CM

## 2020-09-02 DIAGNOSIS — D64.9 SYMPTOMATIC ANEMIA: Primary | ICD-10-CM

## 2020-09-02 DIAGNOSIS — N92.0 MENORRHAGIA WITH REGULAR CYCLE: ICD-10-CM

## 2020-09-02 DIAGNOSIS — R06.09 DOE (DYSPNEA ON EXERTION): ICD-10-CM

## 2020-09-02 LAB
ABO + RH BLD: NORMAL
ANION GAP SERPL CALC-SCNC: 10 MMOL/L (ref 8–16)
ANISOCYTOSIS BLD QL SMEAR: SLIGHT
BASOPHILS # BLD AUTO: 0.05 K/UL (ref 0–0.2)
BASOPHILS # BLD AUTO: 0.07 K/UL (ref 0–0.2)
BASOPHILS NFR BLD: 0.6 % (ref 0–1.9)
BASOPHILS NFR BLD: 0.7 % (ref 0–1.9)
BLD GP AB SCN CELLS X3 SERPL QL: NORMAL
BLD PROD TYP BPU: NORMAL
BLD PROD TYP BPU: NORMAL
BLOOD UNIT EXPIRATION DATE: NORMAL
BLOOD UNIT EXPIRATION DATE: NORMAL
BLOOD UNIT TYPE CODE: 5100
BLOOD UNIT TYPE CODE: 5100
BLOOD UNIT TYPE: NORMAL
BLOOD UNIT TYPE: NORMAL
BUN SERPL-MCNC: 9 MG/DL (ref 6–20)
CALCIUM SERPL-MCNC: 8.8 MG/DL (ref 8.7–10.5)
CHLORIDE SERPL-SCNC: 108 MMOL/L (ref 95–110)
CO2 SERPL-SCNC: 20 MMOL/L (ref 23–29)
CODING SYSTEM: NORMAL
CODING SYSTEM: NORMAL
CREAT SERPL-MCNC: 0.7 MG/DL (ref 0.5–1.4)
DIFFERENTIAL METHOD: ABNORMAL
DIFFERENTIAL METHOD: ABNORMAL
DISPENSE STATUS: NORMAL
DISPENSE STATUS: NORMAL
EOSINOPHIL # BLD AUTO: 0.1 K/UL (ref 0–0.5)
EOSINOPHIL # BLD AUTO: 0.1 K/UL (ref 0–0.5)
EOSINOPHIL NFR BLD: 1.3 % (ref 0–8)
EOSINOPHIL NFR BLD: 1.4 % (ref 0–8)
ERYTHROCYTE [DISTWIDTH] IN BLOOD BY AUTOMATED COUNT: 20.7 % (ref 11.5–14.5)
ERYTHROCYTE [DISTWIDTH] IN BLOOD BY AUTOMATED COUNT: 20.8 % (ref 11.5–14.5)
EST. GFR  (AFRICAN AMERICAN): >60 ML/MIN/1.73 M^2
EST. GFR  (NON AFRICAN AMERICAN): >60 ML/MIN/1.73 M^2
FERRITIN SERPL-MCNC: <1 NG/ML (ref 20–300)
GLUCOSE SERPL-MCNC: 81 MG/DL (ref 70–110)
HCT VFR BLD AUTO: 21.9 % (ref 37–48.5)
HCT VFR BLD AUTO: 22.8 % (ref 37–48.5)
HGB BLD-MCNC: 5.5 G/DL (ref 12–16)
HGB BLD-MCNC: 5.7 G/DL (ref 12–16)
HYPOCHROMIA BLD QL SMEAR: ABNORMAL
IMM GRANULOCYTES # BLD AUTO: 0.05 K/UL (ref 0–0.04)
IMM GRANULOCYTES # BLD AUTO: 0.05 K/UL (ref 0–0.04)
IMM GRANULOCYTES NFR BLD AUTO: 0.5 % (ref 0–0.5)
IMM GRANULOCYTES NFR BLD AUTO: 0.6 % (ref 0–0.5)
IRON SERPL-MCNC: <10 UG/DL (ref 30–160)
LYMPHOCYTES # BLD AUTO: 2.1 K/UL (ref 1–4.8)
LYMPHOCYTES # BLD AUTO: 2.4 K/UL (ref 1–4.8)
LYMPHOCYTES NFR BLD: 21.3 % (ref 18–48)
LYMPHOCYTES NFR BLD: 26.6 % (ref 18–48)
MCH RBC QN AUTO: 15.2 PG (ref 27–31)
MCH RBC QN AUTO: 15.7 PG (ref 27–31)
MCHC RBC AUTO-ENTMCNC: 25 G/DL (ref 32–36)
MCHC RBC AUTO-ENTMCNC: 25.1 G/DL (ref 32–36)
MCV RBC AUTO: 61 FL (ref 82–98)
MCV RBC AUTO: 63 FL (ref 82–98)
MONOCYTES # BLD AUTO: 0.7 K/UL (ref 0.3–1)
MONOCYTES # BLD AUTO: 0.8 K/UL (ref 0.3–1)
MONOCYTES NFR BLD: 7.7 % (ref 4–15)
MONOCYTES NFR BLD: 8.7 % (ref 4–15)
NEUTROPHILS # BLD AUTO: 5.7 K/UL (ref 1.8–7.7)
NEUTROPHILS # BLD AUTO: 6.5 K/UL (ref 1.8–7.7)
NEUTROPHILS NFR BLD: 63.2 % (ref 38–73)
NEUTROPHILS NFR BLD: 67.4 % (ref 38–73)
NRBC BLD-RTO: 0 /100 WBC
NRBC BLD-RTO: 0 /100 WBC
NUM UNITS TRANS PACKED RBC: NORMAL
NUM UNITS TRANS PACKED RBC: NORMAL
OVALOCYTES BLD QL SMEAR: ABNORMAL
PLATELET # BLD AUTO: 449 K/UL (ref 150–350)
PLATELET # BLD AUTO: 471 K/UL (ref 150–350)
PMV BLD AUTO: 9.5 FL (ref 9.2–12.9)
PMV BLD AUTO: 9.6 FL (ref 9.2–12.9)
POIKILOCYTOSIS BLD QL SMEAR: SLIGHT
POTASSIUM SERPL-SCNC: 3.9 MMOL/L (ref 3.5–5.1)
RBC # BLD AUTO: 3.61 M/UL (ref 4–5.4)
RBC # BLD AUTO: 3.63 M/UL (ref 4–5.4)
RETICS/RBC NFR AUTO: 1.8 % (ref 0.5–2.5)
SARS-COV-2 RDRP RESP QL NAA+PROBE: NEGATIVE
SATURATED IRON: ABNORMAL % (ref 20–50)
SODIUM SERPL-SCNC: 138 MMOL/L (ref 136–145)
TOTAL IRON BINDING CAPACITY: 571 UG/DL (ref 250–450)
TRANSFERRIN SERPL-MCNC: 386 MG/DL (ref 200–375)
TRANSFERRIN SERPL-MCNC: 386 MG/DL (ref 200–375)
WBC # BLD AUTO: 9.06 K/UL (ref 3.9–12.7)
WBC # BLD AUTO: 9.66 K/UL (ref 3.9–12.7)

## 2020-09-02 PROCEDURE — P9016 RBC LEUKOCYTES REDUCED: HCPCS

## 2020-09-02 PROCEDURE — 99999 PR PBB SHADOW E&M-EST. PATIENT-LVL IV: ICD-10-PCS | Mod: PBBFAC,,, | Performed by: INTERNAL MEDICINE

## 2020-09-02 PROCEDURE — 86920 COMPATIBILITY TEST SPIN: CPT

## 2020-09-02 PROCEDURE — 85025 COMPLETE CBC W/AUTO DIFF WBC: CPT

## 2020-09-02 PROCEDURE — 3008F BODY MASS INDEX DOCD: CPT | Mod: CPTII,S$GLB,, | Performed by: INTERNAL MEDICINE

## 2020-09-02 PROCEDURE — U0002 COVID-19 LAB TEST NON-CDC: HCPCS

## 2020-09-02 PROCEDURE — 80048 BASIC METABOLIC PNL TOTAL CA: CPT

## 2020-09-02 PROCEDURE — 85025 COMPLETE CBC W/AUTO DIFF WBC: CPT | Mod: 91

## 2020-09-02 PROCEDURE — 99204 PR OFFICE/OUTPT VISIT, NEW, LEVL IV, 45-59 MIN: ICD-10-PCS | Mod: S$GLB,,, | Performed by: INTERNAL MEDICINE

## 2020-09-02 PROCEDURE — 36415 COLL VENOUS BLD VENIPUNCTURE: CPT

## 2020-09-02 PROCEDURE — 99999 PR PBB SHADOW E&M-EST. PATIENT-LVL IV: CPT | Mod: PBBFAC,,, | Performed by: INTERNAL MEDICINE

## 2020-09-02 PROCEDURE — 83540 ASSAY OF IRON: CPT

## 2020-09-02 PROCEDURE — G0378 HOSPITAL OBSERVATION PER HR: HCPCS

## 2020-09-02 PROCEDURE — 36430 TRANSFUSION BLD/BLD COMPNT: CPT

## 2020-09-02 PROCEDURE — 85045 AUTOMATED RETICULOCYTE COUNT: CPT

## 2020-09-02 PROCEDURE — 99291 CRITICAL CARE FIRST HOUR: CPT | Mod: 25

## 2020-09-02 PROCEDURE — 99204 OFFICE O/P NEW MOD 45 MIN: CPT | Mod: S$GLB,,, | Performed by: INTERNAL MEDICINE

## 2020-09-02 PROCEDURE — 25000003 PHARM REV CODE 250: Performed by: HOSPITALIST

## 2020-09-02 PROCEDURE — 86901 BLOOD TYPING SEROLOGIC RH(D): CPT

## 2020-09-02 PROCEDURE — 82728 ASSAY OF FERRITIN: CPT

## 2020-09-02 PROCEDURE — 3008F PR BODY MASS INDEX (BMI) DOCUMENTED: ICD-10-PCS | Mod: CPTII,S$GLB,, | Performed by: INTERNAL MEDICINE

## 2020-09-02 RX ORDER — LANOLIN ALCOHOL/MO/W.PET/CERES
800 CREAM (GRAM) TOPICAL
Status: DISCONTINUED | OUTPATIENT
Start: 2020-09-02 | End: 2020-09-03 | Stop reason: HOSPADM

## 2020-09-02 RX ORDER — HYDROCODONE BITARTRATE AND ACETAMINOPHEN 500; 5 MG/1; MG/1
TABLET ORAL
Status: DISCONTINUED | OUTPATIENT
Start: 2020-09-02 | End: 2020-09-03 | Stop reason: HOSPADM

## 2020-09-02 RX ORDER — GABAPENTIN 300 MG/1
600 CAPSULE ORAL 2 TIMES DAILY
Status: DISCONTINUED | OUTPATIENT
Start: 2020-09-02 | End: 2020-09-03 | Stop reason: HOSPADM

## 2020-09-02 RX ORDER — POTASSIUM CHLORIDE 1.5 G/1.58G
60 POWDER, FOR SOLUTION ORAL
Status: DISCONTINUED | OUTPATIENT
Start: 2020-09-02 | End: 2020-09-03 | Stop reason: HOSPADM

## 2020-09-02 RX ORDER — POTASSIUM CHLORIDE 1.5 G/1.58G
40 POWDER, FOR SOLUTION ORAL
Status: DISCONTINUED | OUTPATIENT
Start: 2020-09-02 | End: 2020-09-03 | Stop reason: HOSPADM

## 2020-09-02 RX ORDER — ONDANSETRON 4 MG/1
8 TABLET, ORALLY DISINTEGRATING ORAL EVERY 8 HOURS PRN
Status: DISCONTINUED | OUTPATIENT
Start: 2020-09-02 | End: 2020-09-03 | Stop reason: HOSPADM

## 2020-09-02 RX ORDER — SODIUM,POTASSIUM PHOSPHATES 280-250MG
2 POWDER IN PACKET (EA) ORAL
Status: DISCONTINUED | OUTPATIENT
Start: 2020-09-02 | End: 2020-09-03 | Stop reason: HOSPADM

## 2020-09-02 RX ORDER — IBUPROFEN 200 MG
24 TABLET ORAL
Status: DISCONTINUED | OUTPATIENT
Start: 2020-09-02 | End: 2020-09-03 | Stop reason: HOSPADM

## 2020-09-02 RX ORDER — TIZANIDINE 4 MG/1
4 TABLET ORAL NIGHTLY
COMMUNITY
Start: 2020-07-17

## 2020-09-02 RX ORDER — ALBUTEROL SULFATE 90 UG/1
2 AEROSOL, METERED RESPIRATORY (INHALATION) EVERY 6 HOURS PRN
Qty: 18 G | Refills: 11 | Status: SHIPPED | OUTPATIENT
Start: 2020-09-02 | End: 2022-05-05

## 2020-09-02 RX ORDER — GLUCAGON 1 MG
1 KIT INJECTION
Status: DISCONTINUED | OUTPATIENT
Start: 2020-09-02 | End: 2020-09-03 | Stop reason: HOSPADM

## 2020-09-02 RX ORDER — SODIUM CHLORIDE 0.9 % (FLUSH) 0.9 %
10 SYRINGE (ML) INJECTION
Status: DISCONTINUED | OUTPATIENT
Start: 2020-09-02 | End: 2020-09-03 | Stop reason: HOSPADM

## 2020-09-02 RX ORDER — TOPIRAMATE 25 MG/1
50 TABLET ORAL NIGHTLY
Status: DISCONTINUED | OUTPATIENT
Start: 2020-09-02 | End: 2020-09-03 | Stop reason: HOSPADM

## 2020-09-02 RX ORDER — GABAPENTIN 600 MG/1
600 TABLET ORAL 2 TIMES DAILY
COMMUNITY

## 2020-09-02 RX ORDER — IBUPROFEN 200 MG
16 TABLET ORAL
Status: DISCONTINUED | OUTPATIENT
Start: 2020-09-02 | End: 2020-09-03 | Stop reason: HOSPADM

## 2020-09-02 RX ORDER — TIZANIDINE 4 MG/1
4 TABLET ORAL NIGHTLY
Status: DISCONTINUED | OUTPATIENT
Start: 2020-09-02 | End: 2020-09-03 | Stop reason: HOSPADM

## 2020-09-02 RX ORDER — VENLAFAXINE HYDROCHLORIDE 150 MG/1
150 CAPSULE, EXTENDED RELEASE ORAL DAILY
Status: DISCONTINUED | OUTPATIENT
Start: 2020-09-03 | End: 2020-09-03 | Stop reason: HOSPADM

## 2020-09-02 RX ORDER — FREMANEZUMAB-VFRM 225 MG/1.5ML
INJECTION SUBCUTANEOUS
COMMUNITY
Start: 2020-08-14 | End: 2022-05-05

## 2020-09-02 RX ADMIN — TOPIRAMATE 50 MG: 25 TABLET, FILM COATED ORAL at 09:09

## 2020-09-02 RX ADMIN — GABAPENTIN 600 MG: 300 CAPSULE ORAL at 09:09

## 2020-09-02 RX ADMIN — TIZANIDINE 4 MG: 4 TABLET ORAL at 09:09

## 2020-09-02 NOTE — ED NOTES
Transfusing 1 unti of PRBCs after dual RN check-off via blood tubing & pump with saline backup. Tolerating well. NAD. Will continue to monitor

## 2020-09-02 NOTE — ED NOTES
"Report called to ALEXI Saldaña (1st Floor) re: pending admit. "Room not yet clean...will call when available to receive patient". NAD  "

## 2020-09-02 NOTE — SUBJECTIVE & OBJECTIVE
Past Medical History:   Diagnosis Date    Asthma     Gestational diabetes     Migraine headache        Past Surgical History:   Procedure Laterality Date    SHOULDER SURGERY         Review of patient's allergies indicates:   Allergen Reactions    Nubain [nalbuphine] Hives       No current facility-administered medications on file prior to encounter.      Current Outpatient Medications on File Prior to Encounter   Medication Sig    AJOVY AUTOINJECTOR 225 mg/1.5 mL autoinjector INJECT 1.5 MG INTO THE SKIN Q MONTH    albuterol (PROVENTIL HFA) 90 mcg/actuation inhaler Inhale 2 puffs into the lungs every 6 (six) hours as needed for Wheezing or Shortness of Breath. Rescue    eletriptan (RELPAX) 40 MG tablet Take 40 mg by mouth as needed. may repeat in 2 hours if necessary    gabapentin (NEURONTIN) 600 MG tablet Take 600 mg by mouth 2 (two) times daily.    omeprazole (PRILOSEC) 20 MG capsule Take 1 capsule (20 mg total) by mouth once daily. (Patient taking differently: Take 20 mg by mouth daily as needed (heartburn). )    tiZANidine (ZANAFLEX) 4 MG tablet Take 4 mg by mouth every evening. May take up to 3 tablets    topiramate (TOPAMAX) 50 MG tablet Take 50 mg by mouth every evening.     venlafaxine (EFFEXOR-XR) 150 MG Cp24 Take 1 capsule (150 mg total) by mouth once daily.    [DISCONTINUED] albuterol (PROVENTIL HFA) 90 mcg/actuation inhaler Inhale 2 puffs into the lungs every 6 (six) hours as needed for Wheezing. Rescue (Patient not taking: Reported on 9/2/2020)    [DISCONTINUED] amoxicillin-clavulanate 875-125mg (AUGMENTIN) 875-125 mg per tablet Take 1 tablet by mouth 2 (two) times daily.    [DISCONTINUED] azithromycin (ZITHROMAX) 250 MG tablet Take 2 tablets (500 mg) on  Day 1,  followed by 1 tablet (250 mg) once daily on Days 2 through 5.    [DISCONTINUED] gabapentin (NEURONTIN) 300 MG capsule Take 1 capsule (300 mg total) by mouth 2 (two) times daily.     Family History     Problem Relation (Age of  Onset)    Allergies Father    Alzheimer's disease Paternal Grandmother    Arthritis Mother    Asthma Mother    Chiari malformation Sister    Diabetes Maternal Grandfather    Heart failure Maternal Grandfather    Hypothyroidism Mother    Kidney disease Maternal Grandfather    Migraines Mother, Father, Sister, Maternal Grandmother    Other Maternal Grandmother    Pancreatitis Maternal Grandmother    Prostate cancer Paternal Grandfather    Thyroid disease Maternal Grandmother        Tobacco Use    Smoking status: Never Smoker    Smokeless tobacco: Never Used   Substance and Sexual Activity    Alcohol use: Yes     Frequency: Monthly or less     Drinks per session: 1 or 2     Binge frequency: Never    Drug use: No    Sexual activity: Not on file     Review of Systems   Constitutional: Positive for fatigue. Negative for chills and fever.   HENT: Negative for congestion and rhinorrhea.    Respiratory: Positive for shortness of breath. Negative for cough and wheezing.    Cardiovascular: Negative for chest pain, palpitations and leg swelling.   Gastrointestinal: Negative for abdominal distention, abdominal pain, nausea and vomiting.   Endocrine: Negative for cold intolerance and heat intolerance.   Genitourinary: Negative for dysuria and urgency.   Musculoskeletal: Negative for arthralgias and neck stiffness.   Skin: Positive for pallor. Negative for rash and wound.   Neurological: Negative for dizziness and weakness.   Psychiatric/Behavioral: Negative for agitation and confusion.   All other systems reviewed and are negative.    Objective:     Vital Signs (Most Recent):  Temp: 99 °F (37.2 °C) (09/02/20 1445)  Pulse: 81 (09/02/20 1445)  Resp: 18 (09/02/20 1445)  BP: 122/65 (09/02/20 1445)  SpO2: 100 % (09/02/20 1445) Vital Signs (24h Range):  Temp:  [98.5 °F (36.9 °C)-99 °F (37.2 °C)] 99 °F (37.2 °C)  Pulse:  [81-95] 81  Resp:  [16-20] 18  SpO2:  [99 %-100 %] 100 %  BP: (110-131)/(65-95) 122/65     Weight: 77.1 kg (170  lb)  Body mass index is 27.44 kg/m².    Physical Exam  Constitutional:       General: She is not in acute distress.     Appearance: She is well-developed.   HENT:      Head: Normocephalic and atraumatic.   Eyes:      Pupils: Pupils are equal, round, and reactive to light.   Cardiovascular:      Rate and Rhythm: Normal rate and regular rhythm.      Heart sounds: No murmur.   Pulmonary:      Effort: Pulmonary effort is normal. No respiratory distress.      Breath sounds: Normal breath sounds. No wheezing or rales.   Abdominal:      General: Bowel sounds are normal. There is no distension.      Palpations: Abdomen is soft.      Tenderness: There is no abdominal tenderness.   Musculoskeletal: Normal range of motion.   Skin:     General: Skin is warm and dry.      Coloration: Skin is pale.      Findings: No rash.   Neurological:      Mental Status: She is alert and oriented to person, place, and time.      Cranial Nerves: No cranial nerve deficit.   Psychiatric:         Behavior: Behavior normal.           CRANIAL NERVES     CN III, IV, VI   Pupils are equal, round, and reactive to light.       Significant Labs: All pertinent labs within the past 24 hours have been reviewed.    Significant Imaging: I have reviewed and interpreted all pertinent imaging results/findings within the past 24 hours.

## 2020-09-02 NOTE — ED NOTES
NAD. Texting on computer @ this time. Will continue to monitor. Still with no reaction noted. Awaiting admit bed availability

## 2020-09-02 NOTE — PATIENT INSTRUCTIONS
You are very anemic on labs in March- this can explain your symptoms  Go to the lab and get blood tests  Will follow up labs and likely send you to a hematologist for further management  Albuterol inhaler reordered if you need it

## 2020-09-02 NOTE — NURSING
Patient arrived to floor via stretcher. Patient currently has blood infusing to a PIV that is c/d/i. Patient is AAO. Admission started, patient oriented to floor. No further questions at this time. Will continue to monitor.

## 2020-09-02 NOTE — ED NOTES
Dr. Argueta (Hospitalist) @ bedside. Transfusion of 1 unit of PRBCs in progress via IVP without difficulty

## 2020-09-02 NOTE — ED PROVIDER NOTES
Encounter Date: 9/2/2020    SCRIBE #1 NOTE: I, Fartun Driver, am scribing for, and in the presence of, Alejandro You MD.       History     Chief Complaint   Patient presents with    Anemia     Hgb 5     Time seen by provider: 12:09 PM on 09/02/2020    Subha Bach is a 35 y.o. female with a PMHx of fibroids, heavy menstrual cycles, migraines, asthma, and gestational diabetes who presents to the ED with an onset of anemia. The patient was directed to the ED by Pulmonologist, Dr. Em Lee, for a hemoglobin of 5.7. The patient complains of feeling SOB since March. Patient denies history of anemia. Patient was COVID-19 positive ~6 months ago, and had a hemoglobin of 6.9. Patient's LMP was almost a month ago. The patient denies history of liver disease. Patient denies taking ibuprofen regularly. Patient denies alcohol use. The patient denies blood in stool, hematuria, vaginal bleeding, hematemesis, nausea, vomiting, diarrhea or any other symptoms at this time. No pertinent PSHx.    The history is provided by the patient.     Review of patient's allergies indicates:   Allergen Reactions    Nubain [nalbuphine] Hives     Past Medical History:   Diagnosis Date    Asthma     Gestational diabetes     Migraine headache      Past Surgical History:   Procedure Laterality Date    SHOULDER SURGERY       Family History   Problem Relation Age of Onset    Asthma Mother     Migraines Mother     Hypothyroidism Mother     Arthritis Mother     Migraines Father     Allergies Father     Chiari malformation Sister     Migraines Sister     Pancreatitis Maternal Grandmother     Migraines Maternal Grandmother     Thyroid disease Maternal Grandmother     Other Maternal Grandmother         pituitary gland issues    Heart failure Maternal Grandfather     Diabetes Maternal Grandfather     Kidney disease Maternal Grandfather     Alzheimer's disease Paternal Grandmother     Prostate cancer Paternal Grandfather       Social History     Tobacco Use    Smoking status: Never Smoker    Smokeless tobacco: Never Used   Substance Use Topics    Alcohol use: Yes     Frequency: Monthly or less     Drinks per session: 1 or 2     Binge frequency: Never    Drug use: No     Review of Systems   Constitutional: Negative for activity change, diaphoresis and fever.   HENT: Negative for ear pain, rhinorrhea, sore throat and trouble swallowing.    Eyes: Negative for pain and visual disturbance.   Respiratory: Negative for cough, shortness of breath and stridor.    Cardiovascular: Negative for chest pain.   Gastrointestinal: Negative for abdominal pain, blood in stool, diarrhea, nausea and vomiting.        + Hematemesis.   Genitourinary: Negative for dysuria, hematuria, vaginal bleeding and vaginal discharge.   Musculoskeletal: Negative for gait problem.   Skin: Negative for rash and wound.   Neurological: Negative for seizures and headaches.   Psychiatric/Behavioral: Negative for hallucinations and suicidal ideas.       Physical Exam     Initial Vitals [09/02/20 1204]   BP Pulse Resp Temp SpO2   131/69 94 16 98.7 °F (37.1 °C) 100 %      MAP       --         Physical Exam    Nursing note and vitals reviewed.  Constitutional: She appears well-developed. She is not diaphoretic. No distress.   HENT:   Head: Normocephalic and atraumatic.   Nose: Nose normal.   Eyes: EOM are normal. No scleral icterus.   Pale conjunctivae.   Neck: Normal range of motion. Neck supple.   Cardiovascular: Normal rate, regular rhythm, normal heart sounds and intact distal pulses. Exam reveals no gallop and no friction rub.    No murmur heard.  Pulmonary/Chest: Breath sounds normal. No stridor. No respiratory distress. She has no wheezes. She has no rhonchi. She has no rales.   Abdominal: Soft. Bowel sounds are normal. There is no abdominal tenderness.   Musculoskeletal: Normal range of motion.   Neurological: She is alert and oriented to person, place, and time. No  cranial nerve deficit.   Skin: Skin is warm and dry. Capillary refill takes less than 2 seconds. No rash noted.   Psychiatric: She has a normal mood and affect.         ED Course   Critical Care    Date/Time: 9/2/2020 4:36 PM  Performed by: Alejandro You MD  Authorized by: Alejandro You MD   Total critical care time (exclusive of procedural time) : 30 minutes  Critical care time was exclusive of separately billable procedures and treating other patients and teaching time.  Critical care was necessary to treat or prevent imminent or life-threatening deterioration of the following conditions: symptomatic anemia.  Critical care was time spent personally by me on the following activities: examination of patient, obtaining history from patient or surrogate, ordering and performing treatments and interventions, ordering and review of laboratory studies, re-evaluation of patient's condition and pulse oximetry.        Labs Reviewed   CBC W/ AUTO DIFFERENTIAL - Abnormal; Notable for the following components:       Result Value    RBC 3.61 (*)     Hemoglobin 5.5 (*)     Hematocrit 21.9 (*)     Mean Corpuscular Volume 61 (*)     Mean Corpuscular Hemoglobin 15.2 (*)     Mean Corpuscular Hemoglobin Conc 25.1 (*)     RDW 20.8 (*)     Platelets 471 (*)     Immature Granulocytes 0.6 (*)     Immature Grans (Abs) 0.05 (*)     All other components within normal limits    Narrative:        critical result(s) called and verbal readback obtained from Keisha Russell @1922 by [facility] 09/02/2020 13:28   BASIC METABOLIC PANEL - Abnormal; Notable for the following components:    CO2 20 (*)     All other components within normal limits   SARS-COV-2 RNA AMPLIFICATION, QUAL   TYPE & SCREEN   PREPARE RBC SOFT   PREPARE RBC SOFT          Imaging Results    None          Medical Decision Making:   History:   Old Medical Records: I decided to obtain old medical records.  Clinical Tests:   Lab Tests: Ordered and Reviewed            Scribe  Attestation:   Scribe #1: I performed the above scribed service and the documentation accurately describes the services I performed. I attest to the accuracy of the note.      Attending Attestation:     Physician Attestation for Scribe:    I, Dr. Alejandro You, personally performed the services described in this documentation.   All medical record entries made by the scribe were at my direction and in my presence.   I have reviewed the chart and agree that the record is accurate and complete.   Alejandro You MD  4:35 PM 09/02/2020     DISCLAIMER: This note was prepared with [employer] voice recognition transcription software. Garbled syntax, mangled pronouns, and other bizarre constructions may be attributed to that software system.          ED Course as of Sep 02 1635   Wed Sep 02, 2020   1446 35-year-old female presents from pulmonology Clinic for symptomatic anemia.  Hemoglobin of 5.5 in the ED. Hemodynamically stable.  Patient consented and type and cross for 2 units PRBC.  Patient admitted to Hospital Medicine Dr. Argueta who accepted patient and will admit for further workup and management.    [BD]      ED Course User Index  [BD] Alejandro You MD                Clinical Impression:       ICD-10-CM ICD-9-CM   1. Symptomatic anemia  D64.9 285.9             ED Disposition Condition    Observation                           Alejandro You MD  09/02/20 1637

## 2020-09-02 NOTE — HPI
Patient is a 35 female with a history of migraines, fibromyalgia, asthma,  menorrhagia who presents to the ED today pulmonary clinic for symptomatic anemia.  Patient reports she has been sort of breath since being diagnosed with COVID in March.  That shortness of breath had been overall unchanged until few weeks ago when it started  to become progressively worse.  She reports a few presyncopal episodes but no loss of consciousness.  She reports that her menstrual cycles are very heavy and last 7 days.  She wears tampons along with pads, both meant for the heaviest flow.  She has to change these as frequently as every hour.  Her cycles are very regular and her last cycle started 24 days ago and her next cycle should start in 4 days.  Patient states she has no known history of anemia, but from her laboratory in March her hemoglobin at that time was 6.9.  Patient reports fatigue.  She denies any fevers, chills, chest pain, abdominal pain, bowel complaints.  She is currently receiving a unit of PRBCs in the ED.

## 2020-09-02 NOTE — H&P
[facility] Hospital Medicine  History & Physical    Patient Name: Subha Bach  MRN: [id number]  Admission Date: 9/2/2020  Attending Physician: Kim Argueta MD  Primary Care Provider: Primary Doctor No         Patient information was obtained from patient and ER records.     Subjective:     Principal Problem:Symptomatic anemia    Chief Complaint:   Chief Complaint   Patient presents with    Anemia     Hgb 5        HPI: Patient is a 35 female with a history of migraines, fibromyalgia, asthma,  menorrhagia who presents to the ED today pulmonary clinic for symptomatic anemia.  Patient reports she has been sort of breath since being diagnosed with COVID in March.  That shortness of breath had been overall unchanged until few weeks ago when it started  to become progressively worse.  She reports a few presyncopal episodes but no loss of consciousness.  She reports that her menstrual cycles are very heavy and last 7 days.  She wears tampons along with pads, both meant for the heaviest flow.  She has to change these as frequently as every hour.  Her cycles are very regular and her last cycle started 24 days ago and her next cycle should start in 4 days.  Patient states she has no known history of anemia, but from her laboratory in March her hemoglobin at that time was 6.9.  Patient reports fatigue.  She denies any fevers, chills, chest pain, abdominal pain, bowel complaints.  She is currently receiving a unit of PRBCs in the ED.    Past Medical History:   Diagnosis Date    Asthma     Gestational diabetes     Migraine headache        Past Surgical History:   Procedure Laterality Date    SHOULDER SURGERY         Review of patient's allergies indicates:   Allergen Reactions    Nubain [nalbuphine] Hives       No current facility-administered medications on file prior to encounter.      Current Outpatient Medications on File Prior to Encounter   Medication Sig    AJOVY AUTOINJECTOR 225 mg/1.5 mL  autoinjector INJECT 1.5 MG INTO THE SKIN Q MONTH    albuterol (PROVENTIL HFA) 90 mcg/actuation inhaler Inhale 2 puffs into the lungs every 6 (six) hours as needed for Wheezing or Shortness of Breath. Rescue    eletriptan (RELPAX) 40 MG tablet Take 40 mg by mouth as needed. may repeat in 2 hours if necessary    gabapentin (NEURONTIN) 600 MG tablet Take 600 mg by mouth 2 (two) times daily.    omeprazole (PRILOSEC) 20 MG capsule Take 1 capsule (20 mg total) by mouth once daily. (Patient taking differently: Take 20 mg by mouth daily as needed (heartburn). )    tiZANidine (ZANAFLEX) 4 MG tablet Take 4 mg by mouth every evening. May take up to 3 tablets    topiramate (TOPAMAX) 50 MG tablet Take 50 mg by mouth every evening.     venlafaxine (EFFEXOR-XR) 150 MG Cp24 Take 1 capsule (150 mg total) by mouth once daily.    [DISCONTINUED] albuterol (PROVENTIL HFA) 90 mcg/actuation inhaler Inhale 2 puffs into the lungs every 6 (six) hours as needed for Wheezing. Rescue (Patient not taking: Reported on 9/2/2020)    [DISCONTINUED] amoxicillin-clavulanate 875-125mg (AUGMENTIN) 875-125 mg per tablet Take 1 tablet by mouth 2 (two) times daily.    [DISCONTINUED] azithromycin (ZITHROMAX) 250 MG tablet Take 2 tablets (500 mg) on  Day 1,  followed by 1 tablet (250 mg) once daily on Days 2 through 5.    [DISCONTINUED] gabapentin (NEURONTIN) 300 MG capsule Take 1 capsule (300 mg total) by mouth 2 (two) times daily.     Family History     Problem Relation (Age of Onset)    Allergies Father    Alzheimer's disease Paternal Grandmother    Arthritis Mother    Asthma Mother    Chiari malformation Sister    Diabetes Maternal Grandfather    Heart failure Maternal Grandfather    Hypothyroidism Mother    Kidney disease Maternal Grandfather    Migraines Mother, Father, Sister, Maternal Grandmother    Other Maternal Grandmother    Pancreatitis Maternal Grandmother    Prostate cancer Paternal Grandfather    Thyroid disease Maternal  Grandmother        Tobacco Use    Smoking status: Never Smoker    Smokeless tobacco: Never Used   Substance and Sexual Activity    Alcohol use: Yes     Frequency: Monthly or less     Drinks per session: 1 or 2     Binge frequency: Never    Drug use: No    Sexual activity: Not on file     Review of Systems   Constitutional: Positive for fatigue. Negative for chills and fever.   HENT: Negative for congestion and rhinorrhea.    Respiratory: Positive for shortness of breath. Negative for cough and wheezing.    Cardiovascular: Negative for chest pain, palpitations and leg swelling.   Gastrointestinal: Negative for abdominal distention, abdominal pain, nausea and vomiting.   Endocrine: Negative for cold intolerance and heat intolerance.   Genitourinary: Negative for dysuria and urgency.   Musculoskeletal: Negative for arthralgias and neck stiffness.   Skin: Positive for pallor. Negative for rash and wound.   Neurological: Negative for dizziness and weakness.   Psychiatric/Behavioral: Negative for agitation and confusion.   All other systems reviewed and are negative.    Objective:     Vital Signs (Most Recent):  Temp: 99 °F (37.2 °C) (09/02/20 1445)  Pulse: 81 (09/02/20 1445)  Resp: 18 (09/02/20 1445)  BP: 122/65 (09/02/20 1445)  SpO2: 100 % (09/02/20 1445) Vital Signs (24h Range):  Temp:  [98.5 °F (36.9 °C)-99 °F (37.2 °C)] 99 °F (37.2 °C)  Pulse:  [81-95] 81  Resp:  [16-20] 18  SpO2:  [99 %-100 %] 100 %  BP: (110-131)/(65-95) 122/65     Weight: 77.1 kg (170 lb)  Body mass index is 27.44 kg/m².    Physical Exam  Constitutional:       General: She is not in acute distress.     Appearance: She is well-developed.   HENT:      Head: Normocephalic and atraumatic.   Eyes:      Pupils: Pupils are equal, round, and reactive to light.   Cardiovascular:      Rate and Rhythm: Normal rate and regular rhythm.      Heart sounds: No murmur.   Pulmonary:      Effort: Pulmonary effort is normal. No respiratory distress.      Breath  sounds: Normal breath sounds. No wheezing or rales.   Abdominal:      General: Bowel sounds are normal. There is no distension.      Palpations: Abdomen is soft.      Tenderness: There is no abdominal tenderness.   Musculoskeletal: Normal range of motion.   Skin:     General: Skin is warm and dry.      Coloration: Skin is pale.      Findings: No rash.   Neurological:      Mental Status: She is alert and oriented to person, place, and time.      Cranial Nerves: No cranial nerve deficit.   Psychiatric:         Behavior: Behavior normal.           CRANIAL NERVES     CN III, IV, VI   Pupils are equal, round, and reactive to light.       Significant Labs: All pertinent labs within the past 24 hours have been reviewed.    Significant Imaging: I have reviewed and interpreted all pertinent imaging results/findings within the past 24 hours.    Assessment/Plan:     * Symptomatic anemia  Likely iron deficiency anemia secondary to menorrhagia of transfuse 2 units PRBCs  and recheck CBC in a.m..    Patient's anemia is currently uncontrolled.   Current CBC reviewed-   Lab Results   Component Value Date    HGB 5.5 (LL) 09/02/2020    HCT 21.9 (L) 09/02/2020     Monitor serial CBC and transfuse if patient becomes hemodynamically unstable, symptomatic or H/H drops below 7/21.         Menorrhagia  Likely source of anemia.  Patient uses Nexplanon for birth control.  Needs OB follow-up for menorrhagia on discharge.      Migraine without aura and without status migrainosus, not intractable  Chronic/controlled.  Continue home medications.        VTE Risk Mitigation (From admission, onward)    None       Place SCDs.  Patient is ambulatory.       Kim Argueta MD  Department of Hospital Medicine   [facility]

## 2020-09-02 NOTE — ASSESSMENT & PLAN NOTE
Likely iron deficiency anemia secondary to menorrhagia of transfuse 2 units PRBCs  and recheck CBC in a.m..    Patient's anemia is currently uncontrolled.   Current CBC reviewed-   Lab Results   Component Value Date    HGB 5.5 (LL) 09/02/2020    HCT 21.9 (L) 09/02/2020     Monitor serial CBC and transfuse if patient becomes hemodynamically unstable, symptomatic or H/H drops below 7/21.

## 2020-09-02 NOTE — ASSESSMENT & PLAN NOTE
Likely source of anemia.  Patient uses Nexplanon for birth control.  Needs OB follow-up for menorrhagia on discharge.

## 2020-09-02 NOTE — PROGRESS NOTES
9/2/2020    Subha Noreenrenetta Bach  New Patient Consult    Chief Complaint   Patient presents with    Establish Care     shortness of breath, getting worse, had pleurisy    Asthma       HPI:  Patient is a 35-year-old female with migraines, fibromyalgia and GERD presenting for new evaluation of shortness of breath. Pt reports illness with fatigue, headaches with SOB for about 7-10 days. She was tested for COVID which was negative at the time. She has hx of asthma and used albuterol when she was sick- not using inhalers lately and denies wheezing. Asthma has been well controlled as an adult. Initially dx at age 9 or 10 after a bad episode of bronchitis and tx at home with nebulizers. SOB has persisted since then which is constant, nothing makes it better, walking makes it worse. Feels like someone sitting on her chest when active. She used to be a competitive swimmer. 2 wks ago went to beach with her daughter in [place] and had such SOB walking about 100 yards she almost passed out so this is when she decided to get more evaluation.    She did not know she is very anemic. She has very heavy periods, worse since having daughter 7 yrs ago. Has fibroids. She has nexplanon implant which hasn't really helped.   She follows with neuro for migraines and fibromyalgia. Migraines are very severe, happen daily and better than they were at age 13. She takes an injection monthly which she just started for migraines.    The chief complaint problem is new to me    PFSH:  Past Medical History:   Diagnosis Date    Asthma     Gestational diabetes     Migraine headache          Past Surgical History:   Procedure Laterality Date    SHOULDER SURGERY       Social History     Tobacco Use    Smoking status: Never Smoker    Smokeless tobacco: Never Used   Substance Use Topics    Alcohol use: Yes     Frequency: Monthly or less     Drinks per session: 1 or 2     Binge frequency: Never    Drug use: No     Family History   Problem Relation Age of  Onset    Asthma Mother     Migraines Mother     Hypothyroidism Mother     Arthritis Mother     Migraines Father     Allergies Father     Chiari malformation Sister     Migraines Sister     Pancreatitis Maternal Grandmother     Migraines Maternal Grandmother     Thyroid disease Maternal Grandmother     Other Maternal Grandmother         pituitary gland issues    Heart failure Maternal Grandfather     Diabetes Maternal Grandfather     Kidney disease Maternal Grandfather     Alzheimer's disease Paternal Grandmother     Prostate cancer Paternal Grandfather      Review of patient's allergies indicates:   Allergen Reactions    Nubain [nalbuphine] Hives       Performance Status:The patient's activity level is functions out of house.      Review of Systems:  a review of eleven systems covering constitutional, Eye, HEENT, Psych, Respiratory, Cardiac, GI, , Musculoskeletal, Endocrine, Dermatologic was negative except for pertinent findings as listed ABOVE and below:  Joint pains, saw rheum in past w/ reactive arthritis     Exam:Comprehensive exam done. BP (!) 121/95 (BP Location: Right arm, Patient Position: Sitting)   Pulse 90   Wt 77.4 kg (170 lb 10.2 oz)   SpO2 100% Comment: on room air at rest  BMI 27.54 kg/m²   Exam included Vitals as listed, and patient's appearance and affect and alertness and mood, oral exam for yeast and hygiene and pharynx lesions and Mallapatti (M) score, neck with inspection for jvd and masses and thyroid abnormalities and lymph nodes (supraclavicular and infraclavicular nodes and axillary also examined and noted if abn), chest exam included symmetry and effort and fremitus and percussion and auscultation, cardiac exam included rhythm and gallops and murmur and rubs and jvd and edema, abdominal exam for mass and hepatosplenomegaly and tenderness and hernias and bowel sounds, Musculoskeletal exam with muscle tone and posture and mobility/gait and  strength, and skin for  rashes and cyanosis and pallor and turgor, extremity for clubbing.  Findings were normal except for pertinent findings listed below:  With pallor of conjunctivae  M2  No acute distress  Lungs clear  No edema or clubbing    Radiographs (ct chest and cxr) reviewed: view by direct vision   CXR 3/23/20- clear lungs    Labs reviewed    Results for LEONILA RIOS (MRN [id number]) as of 9/2/2020 09:25   Ref. Range 10/1/2019 08:20   ANNELISE Screen Latest Ref Range: Negative <1:160  Positive (A)   ANNELISE HEP-2 Titer Unknown Positive 1:160 Speckled   ds DNA Ab Latest Ref Range: Negative 1:10  Negative 1:10   Anti-SSA Antibody Latest Ref Range: 0.00 - 19.99 EU 0.88   Anti-SSA Interpretation Latest Ref Range: Negative  Negative   Anti-SSB Antibody Latest Ref Range: 0.00 - 19.99 EU 1.44   Anti-SSB Interpretation Latest Ref Range: Negative  Negative   Anti Sm Antibody Latest Ref Range: 0.00 - 19.99 EU 1.82   Anti-Sm Interpretation Latest Ref Range: Negative  Negative   Anti Sm/RNP Antibody Latest Ref Range: 0.00 - 19.99 EU 1.32   Anti-Sm/RNP Interpretation Latest Ref Range: Negative  Negative   Rheumatoid Factor Latest Ref Range: 0.0 - 15.0 IU/mL 13.0     Results for LEONILA RIOS (MRN [id number]) as of 9/2/2020 09:25   Ref. Range 3/23/2020 23:38   POC PH Latest Ref Range: 7.35 - 7.45  7.381   POC PCO2 Latest Ref Range: 35 - 45 mmHg 33.5 (L)   POC PO2 Latest Ref Range: 80 - 100 mmHg 98   POC BE Latest Ref Range: -2 to 2 mmol/L -5   POC HCO3 Latest Ref Range: 24 - 28 mmol/L 19.9 (L)   POC SATURATED O2 Latest Ref Range: 95 - 100 % 98   POC TCO2 Latest Ref Range: 23 - 27 mmol/L 21 (L)   FiO2 Unknown 21   Sample Unknown ARTERIAL   DelSys Unknown Room Air   Allens Test Unknown Pass   Site Unknown LR   Mode Unknown SPONT     3/2020-    Results for LEONILA ROIS (MRN [id number]) as of 9/2/2020 09:25   Ref. Range 3/24/2020 00:25   WBC Latest Ref Range: 3.90 - 12.70 K/uL 7.41   RBC Latest Ref Range: 4.00 - 5.40 M/uL 3.57 (L)   Hemoglobin Latest Ref  Range: 12.0 - 16.0 g/dL 6.9 (L)   Hematocrit Latest Ref Range: 37.0 - 48.5 % 25.1 (L)   MCV Latest Ref Range: 82 - 98 fL 70 (L)   MCH Latest Ref Range: 27.0 - 31.0 pg 19.3 (L)   MCHC Latest Ref Range: 32.0 - 36.0 g/dL 27.5 (L)   RDW Latest Ref Range: 11.5 - 14.5 % 16.0 (H)   Platelets Latest Ref Range: 150 - 350 K/uL 458 (H)       PFT was not done      Plan:  Clinical impression is resonably certain and repeated evaluation prn +/- follow up will be needed as below. Symptomatic anemia likely due to menorrhagia. Has mild asthma, well controlled and suspect COVID-19 infection in march although tested negative at the time. Will get labs and likely send to hematology for further recommendations.    Subha was seen today for establish care and asthma.    Diagnoses and all orders for this visit:    Menorrhagia with regular cycle    Microcytic anemia  -     CBC auto differential; Future  -     FERRITIN; Future  -     TRANSFERRIN; Future  -     IRON AND TIBC; Future  -     RETICULOCYTES; Future  -     Ambulatory referral/consult to Hematology / Oncology; Future    CHICAS (dyspnea on exertion)    Mild intermittent asthma without complication  -     albuterol (PROVENTIL HFA) 90 mcg/actuation inhaler; Inhale 2 puffs into the lungs every 6 (six) hours as needed for Wheezing or Shortness of Breath. Rescue        Follow up in about 3 months (around 12/2/2020).    Discussed with patient above for education the following:      Patient Instructions   You are very anemic on labs in March- this can explain your symptoms  Go to the lab and get blood tests  Will follow up labs and likely send you to a hematologist for further management  Albuterol inhaler reordered if you need it    Addendum- lab called with urgent results hemoglobin 5.7. will try to arrange outpatient transfusion for her vs send her to the ED for blood transfusion.

## 2020-09-03 ENCOUNTER — TELEPHONE (OUTPATIENT)
Dept: PULMONOLOGY | Facility: CLINIC | Age: 35
End: 2020-09-03

## 2020-09-03 VITALS
BODY MASS INDEX: 27.44 KG/M2 | SYSTOLIC BLOOD PRESSURE: 132 MMHG | DIASTOLIC BLOOD PRESSURE: 72 MMHG | HEART RATE: 83 BPM | OXYGEN SATURATION: 100 % | TEMPERATURE: 98 F | RESPIRATION RATE: 17 BRPM | WEIGHT: 170 LBS

## 2020-09-03 PROBLEM — D50.0 IRON DEFICIENCY ANEMIA DUE TO CHRONIC BLOOD LOSS: Status: ACTIVE | Noted: 2020-09-03

## 2020-09-03 LAB
ANION GAP SERPL CALC-SCNC: 11 MMOL/L (ref 8–16)
BASOPHILS # BLD AUTO: 0.06 K/UL (ref 0–0.2)
BASOPHILS NFR BLD: 0.7 % (ref 0–1.9)
BUN SERPL-MCNC: 9 MG/DL (ref 6–20)
CALCIUM SERPL-MCNC: 8.6 MG/DL (ref 8.7–10.5)
CHLORIDE SERPL-SCNC: 110 MMOL/L (ref 95–110)
CO2 SERPL-SCNC: 18 MMOL/L (ref 23–29)
CREAT SERPL-MCNC: 0.8 MG/DL (ref 0.5–1.4)
DIFFERENTIAL METHOD: ABNORMAL
EOSINOPHIL # BLD AUTO: 0.2 K/UL (ref 0–0.5)
EOSINOPHIL NFR BLD: 2.6 % (ref 0–8)
ERYTHROCYTE [DISTWIDTH] IN BLOOD BY AUTOMATED COUNT: 25.7 % (ref 11.5–14.5)
EST. GFR  (AFRICAN AMERICAN): >60 ML/MIN/1.73 M^2
EST. GFR  (NON AFRICAN AMERICAN): >60 ML/MIN/1.73 M^2
GLUCOSE SERPL-MCNC: 84 MG/DL (ref 70–110)
HCT VFR BLD AUTO: 29.9 % (ref 37–48.5)
HGB BLD-MCNC: 8.2 G/DL (ref 12–16)
IMM GRANULOCYTES # BLD AUTO: 0.03 K/UL (ref 0–0.04)
IMM GRANULOCYTES NFR BLD AUTO: 0.3 % (ref 0–0.5)
LYMPHOCYTES # BLD AUTO: 3.3 K/UL (ref 1–4.8)
LYMPHOCYTES NFR BLD: 37.2 % (ref 18–48)
MCH RBC QN AUTO: 18.6 PG (ref 27–31)
MCHC RBC AUTO-ENTMCNC: 27.4 G/DL (ref 32–36)
MCV RBC AUTO: 68 FL (ref 82–98)
MONOCYTES # BLD AUTO: 0.9 K/UL (ref 0.3–1)
MONOCYTES NFR BLD: 10.8 % (ref 4–15)
NEUTROPHILS # BLD AUTO: 4.2 K/UL (ref 1.8–7.7)
NEUTROPHILS NFR BLD: 48.4 % (ref 38–73)
NRBC BLD-RTO: 0 /100 WBC
PLATELET # BLD AUTO: 408 K/UL (ref 150–350)
PMV BLD AUTO: 9.4 FL (ref 9.2–12.9)
POTASSIUM SERPL-SCNC: 3.7 MMOL/L (ref 3.5–5.1)
RBC # BLD AUTO: 4.4 M/UL (ref 4–5.4)
SODIUM SERPL-SCNC: 139 MMOL/L (ref 136–145)
WBC # BLD AUTO: 8.74 K/UL (ref 3.9–12.7)

## 2020-09-03 PROCEDURE — 80048 BASIC METABOLIC PNL TOTAL CA: CPT

## 2020-09-03 PROCEDURE — G0378 HOSPITAL OBSERVATION PER HR: HCPCS

## 2020-09-03 PROCEDURE — 63600175 PHARM REV CODE 636 W HCPCS: Performed by: HOSPITALIST

## 2020-09-03 PROCEDURE — 36415 COLL VENOUS BLD VENIPUNCTURE: CPT

## 2020-09-03 PROCEDURE — 85025 COMPLETE CBC W/AUTO DIFF WBC: CPT

## 2020-09-03 PROCEDURE — 25000003 PHARM REV CODE 250: Performed by: HOSPITALIST

## 2020-09-03 PROCEDURE — 96374 THER/PROPH/DIAG INJ IV PUSH: CPT

## 2020-09-03 RX ORDER — FERROUS SULFATE 325(65) MG
325 TABLET ORAL 3 TIMES DAILY
Qty: 90 TABLET | Refills: 1 | Status: SHIPPED | OUTPATIENT
Start: 2020-09-03 | End: 2022-05-05

## 2020-09-03 RX ORDER — DOCUSATE SODIUM 100 MG/1
100 CAPSULE, LIQUID FILLED ORAL 2 TIMES DAILY
Qty: 60 CAPSULE | Refills: 1 | Status: SHIPPED | OUTPATIENT
Start: 2020-09-03 | End: 2022-05-05

## 2020-09-03 RX ADMIN — GABAPENTIN 600 MG: 300 CAPSULE ORAL at 09:09

## 2020-09-03 RX ADMIN — IRON SUCROSE 200 MG: 20 INJECTION, SOLUTION INTRAVENOUS at 09:09

## 2020-09-03 RX ADMIN — VENLAFAXINE HYDROCHLORIDE 150 MG: 150 CAPSULE, EXTENDED RELEASE ORAL at 09:09

## 2020-09-03 NOTE — NURSING
Reviewed discharge instructions with patient, patient verbalize understanding. PIV removed intact, bleeding controlled with coban. Patient left unit via wheelchair with RN at side.

## 2020-09-03 NOTE — PLAN OF CARE
2 units of blood administered. Pt tolerated well. VSS. Drinking and voiding well. Frequent and safety checks completed. Plan of care reviewed with patient and verbalizes understanding. Will continue to monitor.

## 2020-09-03 NOTE — PLAN OF CARE
Patient lives with daughter. She has no needs at this time. CM will continue to follow patient until discharged.        09/03/20 1019   Discharge Assessment   Assessment Type Discharge Planning Assessment   Confirmed/corrected address and phone number on facesheet? Yes   Assessment information obtained from? Patient   Communicated expected length of stay with patient/caregiver yes   Prior to hospitilization cognitive status: Alert/Oriented   Prior to hospitalization functional status: Independent   Current cognitive status: Alert/Oriented   Current Functional Status: Independent   Facility Arrived From: Home   Lives With child(nick), dependent   Able to Return to Prior Arrangements yes   Is patient able to care for self after discharge? Yes   Patient's perception of discharge disposition home or selfcare   Readmission Within the Last 30 Days no previous admission in last 30 days   Patient currently being followed by outpatient case management? No   Patient currently receives any other outside agency services? No   Equipment Currently Used at Home none   Do you have any problems affording any of your prescribed medications? No   Is the patient taking medications as prescribed? yes   Does the patient have transportation home? Yes   Transportation Anticipated family or friend will provide   Dialysis Name and Scheduled days N/A   Does the patient receive services at the [facility]mad[facility]? No   Discharge Plan A Home   Discharge Plan B Home   DME Needed Upon Discharge  none   Patient/Family in Agreement with Plan yes

## 2020-09-03 NOTE — PLAN OF CARE
The pt is discharging home and is clear for discharge from case management. Isabella Spann, SAMANTA     09/03/20 0858   Final Note   Assessment Type Final Discharge Note   Anticipated Discharge Disposition Home

## 2020-09-03 NOTE — DISCHARGE INSTRUCTIONS
Thank you for choosing [facility] for your medical care. The primary doctor who is taking care of you at the time of your discharge is Kim Argueta MD.     You were admitted to the hospital with Symptomatic anemia.     Please note your discharge instructions, including diet/activity restrictions, follow-up appointments, and medication changes.  If you have any questions about your medical issues, prescriptions, or any other questions, please feel free to contact the [facility] Medicine Dept at [phone] and we will help.    If you are previously with Home health, outpatient PT/OT or under a therapy program, you are cleared to return to those programs.    Please direct all long term medication refills and follow up to your primary care provider, Primary Doctor No. Thank you again for letting us take care of your health care needs.    Please note the following discharge instructions per your discharging physician-  Take iron supplement 3x a day, take stool softener twice a day.  I sent a referral to hematology, but the cause of your anemia is known and is your heavy cycles so schedule an appointment with your OBGYN to discuss options. Follow up with your PCP office within a week.  Repeat CBC in 1 week ordered.

## 2020-09-03 NOTE — TELEPHONE ENCOUNTER
----- Message from Em Lee MD sent at 9/3/2020  4:01 PM CDT -----  MsJane Long, your iron levels are extremely low. Recommend start taking iron pills 325mg three times a day. Can buy over the counter pills. I placed referral to hematologist so they may want to do other testing or change medicines when you see them.  Dr. Lee

## 2020-09-03 NOTE — PLAN OF CARE
09/03/20 1027   Final Note   Assessment Type Final Discharge Note   Anticipated Discharge Disposition Home

## 2020-09-04 ENCOUNTER — OFFICE VISIT (OUTPATIENT)
Dept: FAMILY MEDICINE | Facility: CLINIC | Age: 35
End: 2020-09-04
Payer: COMMERCIAL

## 2020-09-04 VITALS
HEART RATE: 94 BPM | HEIGHT: 66 IN | DIASTOLIC BLOOD PRESSURE: 86 MMHG | TEMPERATURE: 97 F | RESPIRATION RATE: 18 BRPM | WEIGHT: 168 LBS | BODY MASS INDEX: 27 KG/M2 | OXYGEN SATURATION: 98 % | SYSTOLIC BLOOD PRESSURE: 108 MMHG

## 2020-09-04 DIAGNOSIS — N92.0 MENORRHAGIA WITH REGULAR CYCLE: ICD-10-CM

## 2020-09-04 DIAGNOSIS — D50.0 IRON DEFICIENCY ANEMIA DUE TO CHRONIC BLOOD LOSS: Primary | ICD-10-CM

## 2020-09-04 DIAGNOSIS — Z11.4 SCREENING FOR HIV (HUMAN IMMUNODEFICIENCY VIRUS): ICD-10-CM

## 2020-09-04 DIAGNOSIS — G43.009 MIGRAINE WITHOUT AURA AND WITHOUT STATUS MIGRAINOSUS, NOT INTRACTABLE: ICD-10-CM

## 2020-09-04 DIAGNOSIS — Z13.220 SCREENING FOR LIPID DISORDERS: ICD-10-CM

## 2020-09-04 DIAGNOSIS — Z86.32 HISTORY OF GESTATIONAL DIABETES: ICD-10-CM

## 2020-09-04 DIAGNOSIS — Z00.00 WELLNESS EXAMINATION: ICD-10-CM

## 2020-09-04 DIAGNOSIS — J45.20 MILD INTERMITTENT ASTHMA WITHOUT COMPLICATION: ICD-10-CM

## 2020-09-04 DIAGNOSIS — M79.7 FIBROMYALGIA: ICD-10-CM

## 2020-09-04 DIAGNOSIS — Z11.59 NEED FOR HEPATITIS C SCREENING TEST: ICD-10-CM

## 2020-09-04 PROCEDURE — 99999 PR PBB SHADOW E&M-EST. PATIENT-LVL V: ICD-10-PCS | Mod: PBBFAC,,, | Performed by: FAMILY MEDICINE

## 2020-09-04 PROCEDURE — 99999 PR PBB SHADOW E&M-EST. PATIENT-LVL V: CPT | Mod: PBBFAC,,, | Performed by: FAMILY MEDICINE

## 2020-09-04 PROCEDURE — 99395 PR PREVENTIVE VISIT,EST,18-39: ICD-10-PCS | Mod: 95,S$GLB,, | Performed by: FAMILY MEDICINE

## 2020-09-04 PROCEDURE — 99395 PREV VISIT EST AGE 18-39: CPT | Mod: 95,S$GLB,, | Performed by: FAMILY MEDICINE

## 2020-09-04 NOTE — PROGRESS NOTES
Subjective:       Patient ID: Subha Bach is a 35 y.o. female.    Chief Complaint: Establish Care    HPI   Patient presents scheduled to establish care with a new family doctor and for her annual wellness physical but is also here for hospital follow up as she was hospitalized at [facility] this week and just discharged yesterday.  No discharge summary has been created yet.  She has not had a past family doctor but did establish care with nurse practitioner Fernando here almost 1 year ago. She has generally been seen in the urgent care for acute problems and followed with her specialists for all of her past medical problems which include iron deficiency anemia secondary to blood loss due to menorrhagia, fibromyalgia, mild intermittent asthma, migraines and gestational diabetes during her last pregnancy 8 years ago.  She has had chronic issues with decreased energy and shortness of breath since being diagnosed with COVID-19 in March but over past few weeks symptoms were significantly worse and she was sent to the ER by pulmonology after completing labs with a hemoglobin of 5.7 found on CBC 09/02/2020.  She received 2 units of packed red blood cells and was monitored overnight with symptoms improving and her H&H at discharged yesterday improving as expected to 8.2 and 29.9 respectively.   She reports extremely heavy menstrual periods lasting 7 days with a 24 day cycle and her next cycle should start in 2 days.  She still reports fatigue and mild shortness of breath on very heavy exertion but these symptoms are greatly improved and she reports she feels pretty good today.  She follows with neurology (Dr. Felix) outside of our system and with pulmonology, Rheumatology and gynecology within our system and was referred to hematology at her pulmonology appointment 2 days ago.  She has appointments with both Hematology and gyn on 09/14/2020.  She was recommended to start over-the-counter iron pills 325mg three  times a day and repeat a CBC next week on day 3 of her menstrual cycle.  She reports her migraines, fibromyalgia symptoms and asthma have been well controlled chronically, denies any breakthrough GERD symptoms and denies any other signs or symptoms of diabetes but reports she has never had a glucose tolerance test or hemoglobin A1c testing since diagnosed as a prediabetic with her last pregnancy.  She has no other concerns or complaints today.  Question her about health maintenance issues she reports she is not up-to-date with Pap smear a plans on completing this at her upcoming gyn appointment.  She reports she is up-to-date with Tdap vaccination receiving this 8 years ago at the [facility] before delivering her child.  She does get yearly flu shots and was interested in this today but these are not in stock yet.  She has never had HIV or hepatitis C antibody screening and was interested in both of these today after discussing them.    Review of Systems   Constitutional: Positive for fatigue. Negative for activity change, appetite change, chills, fever and unexpected weight change.   HENT: Negative for congestion, hearing loss, sore throat, trouble swallowing and voice change.    Eyes: Negative for visual disturbance.   Respiratory: Positive for shortness of breath (Mild shortness of breath with heavy exertion still.). Negative for cough, chest tightness and wheezing.    Cardiovascular: Negative for chest pain, palpitations and leg swelling.   Gastrointestinal: Negative for abdominal pain, anal bleeding, blood in stool, constipation, diarrhea, nausea and vomiting.   Genitourinary: Positive for menstrual problem. Negative for difficulty urinating, dysuria, flank pain, frequency, hematuria, pelvic pain, urgency and vaginal bleeding.   Musculoskeletal: Negative for arthralgias, back pain, gait problem, joint swelling, myalgias, neck pain and neck stiffness.   Skin: Negative for rash and  "wound.   Neurological: Negative for dizziness, tremors, syncope, weakness, light-headedness, numbness and headaches.   Hematological: Negative for adenopathy. Does not bruise/bleed easily.   Psychiatric/Behavioral: Negative for dysphoric mood and sleep disturbance. The patient is not nervous/anxious.          Objective:      Vitals:    09/04/20 0811   BP: 108/86   Pulse: 94   Resp: 18   Temp: 97.3 °F (36.3 °C)   TempSrc: Temporal   SpO2: 98%   Weight: 76.2 kg (167 lb 15.9 oz)   Height: 5' 6" (1.676 m)   PainSc: 0-No pain     Physical Exam  Vitals signs and nursing note reviewed.   Constitutional:       General: She is not in acute distress.     Appearance: Normal appearance. She is well-developed. She is not ill-appearing, toxic-appearing or diaphoretic.   HENT:      Head: Normocephalic and atraumatic.      Right Ear: External ear normal.      Left Ear: External ear normal.   Eyes:      General: No scleral icterus.        Right eye: No discharge.         Left eye: No discharge.      Conjunctiva/sclera: Conjunctivae normal.   Neck:      Musculoskeletal: Normal range of motion and neck supple.      Thyroid: No thyroid mass or thyromegaly.      Vascular: No JVD.      Trachea: Trachea and phonation normal.   Cardiovascular:      Rate and Rhythm: Normal rate and regular rhythm.      Heart sounds: Normal heart sounds. No murmur. No friction rub. No gallop.    Pulmonary:      Effort: Pulmonary effort is normal. No respiratory distress.      Breath sounds: Normal breath sounds. No wheezing.   Abdominal:      General: Bowel sounds are normal. There is no distension.      Palpations: Abdomen is soft. There is no mass.      Tenderness: There is no abdominal tenderness. There is no guarding.   Musculoskeletal: Normal range of motion.         General: No deformity.   Lymphadenopathy:      Cervical: No cervical adenopathy.   Skin:     General: Skin is warm and dry.   Neurological:      Mental Status: She is alert and oriented to " person, place, and time.   Psychiatric:         Mood and Affect: Mood normal.         Behavior: Behavior normal.         Thought Content: Thought content normal.         Judgment: Judgment normal.           Assessment:       1. Iron deficiency anemia due to chronic blood loss    2. Menorrhagia with regular cycle    3. Mild intermittent asthma without complication    4. Migraine without aura and without status migrainosus, not intractable    5. Fibromyalgia    6. History of gestational diabetes    7. Need for hepatitis C screening test    8. Wellness examination    9. Screening for lipid disorders    10. Screening for HIV (human immunodeficiency virus)          Plan:   Iron deficiency anemia due to chronic blood loss  -     Iron and TIBC; Future; Expected date: 09/04/2020    Menorrhagia with regular cycle    Mild intermittent asthma without complication    Migraine without aura and without status migrainosus, not intractable    Fibromyalgia    History of gestational diabetes  -     Hemoglobin A1C; Future; Expected date: 09/04/2020    Need for hepatitis C screening test  -     Hepatitis C Antibody; Future; Expected date: 09/04/2020    Wellness examination  -     Hepatitis C Antibody; Future; Expected date: 09/04/2020  -     HIV 1/2 Ag/Ab (4th Gen); Future; Expected date: 09/04/2020  -     Hemoglobin A1C; Future; Expected date: 09/04/2020  -     TSH; Future; Expected date: 09/04/2020  -     Lipid Panel; Future; Expected date: 09/04/2020  -     ALT (SGPT); Future; Expected date: 09/04/2020  -     AST (SGOT); Future; Expected date: 09/04/2020  -     Iron and TIBC; Future; Expected date: 09/04/2020    Screening for lipid disorders  -     Lipid Panel; Future; Expected date: 09/04/2020    Screening for HIV (human immunodeficiency virus)  -     HIV 1/2 Ag/Ab (4th Gen); Future; Expected date: 09/04/2020      Follow up in about 6 months (around 3/4/2021), or if symptoms worsen or fail to improve.    Subha appears to be stable  and doing well today with exception of improving symptoms of anemia.  I advised her to make sure she follows up with gyn and Hematology and completes another CBC next week as directed.  She Will start taking iron supplementation as directed by pulmonology today and I discussed checking labs as above with her CBC next week.  I advised her that and iron and TIBC should be followed as well but this will not likely improve by next week with iron supplementation.  She was still interested in rechecking this prior to her appointment with Hematology and I am fine with this but expected will be very low still.  I reviewed her past labs including her CBC with anemia as above and essentially normal BMP completed yesterday.  As long she continues to follow with her specialists and does well without any significant abnormalities found on her above labs I recommended following up with her at 6 month interval.  I discussed signs and symptoms of anemia that would need immediate evaluation in the ER if these occur again and I am happy to see her back sooner than routine follow-up if symptoms worsen or new present or she has any other problems.  I have asked my nurse to obtain her Tdap vaccine record from the [facility] and will update this once records are received and reviewed.

## 2020-09-09 NOTE — DISCHARGE SUMMARY
[facility] Hospital Medicine  Discharge Summary      Patient Name: Subha Bach  MRN: [id number]  Admission Date: 9/2/2020  Hospital Length of Stay: 0 days  Discharge Date and Time: 9/3/2020 10:35 AM  Attending Physician: No att. providers found   Discharging Provider: Kim Argueta MD  Primary Care Provider: Mikael Bach,       HPI:   Patient is a 35 female with a history of migraines, fibromyalgia, asthma,  menorrhagia who presents to the ED today pulmonary clinic for symptomatic anemia.  Patient reports she has been sort of breath since being diagnosed with COVID in March.  That shortness of breath had been overall unchanged until few weeks ago when it started  to become progressively worse.  She reports a few presyncopal episodes but no loss of consciousness.  She reports that her menstrual cycles are very heavy and last 7 days.  She wears tampons along with pads, both meant for the heaviest flow.  She has to change these as frequently as every hour.  Her cycles are very regular and her last cycle started 24 days ago and her next cycle should start in 4 days.  Patient states she has no known history of anemia, but from her laboratory in March her hemoglobin at that time was 6.9.  Patient reports fatigue.  She denies any fevers, chills, chest pain, abdominal pain, bowel complaints.  She is currently receiving a unit of PRBCs in the ED.    * No surgery found *      Hospital Course:   Patient was observed by the hospital medicine service for symptomatic anemia. She was transfused 2 units PRBCs with improvements in fatigue and shortness of breath. Iron studies consistent with NANNETTE. She was given a dose of IV iron prior to discharge and started on TID iron supplements with stool softener. Side effects of PO iron discussed. She was referred to hematology on discharge for severe NANNETTE but also instructed to have close follow up with her OBGYN as her heavy menstrual cycles are the cause of her NANNETTE  and need to be addressed. Patient agreeable to and understanding of discharge plan.    Discharge exam  Awake, alert, no apparent distress  Regular rate and rhythm no murmur  Lungs clear to auscultation no wheeze  Abdomen soft nontender nondistended       Consults:     No new Assessment & Plan notes have been filed under this hospital service since the last note was generated.  Service: Hospital Medicine    Final Active Diagnoses:    Diagnosis Date Noted POA    PRINCIPAL PROBLEM:  Symptomatic anemia [D64.9] 09/02/2020 Yes    Iron deficiency anemia due to chronic blood loss [D50.0] 09/03/2020 Yes    Menorrhagia [N92.0] 09/02/2020 Yes    Migraine without aura and without status migrainosus, not intractable [G43.009] 09/30/2019 Yes      Problems Resolved During this Admission:       Discharged Condition: good    Disposition: Home or Self Care    Follow Up:  Follow-up Information     Your Primary Care  Office In 1 week.           Your OBGYN In 2 weeks.           Referral sent to hematologist.               Patient Instructions:      CBC auto differential   Standing Status: Future Standing Exp. Date: 11/02/21     Ambulatory referral/consult to Hematology / Oncology   Standing Status: Future   Referral Priority: Routine Referral Type: Consultation   Referral Reason: Specialty Services Required   Requested Specialty: Hematology and Oncology   Number of Visits Requested: 1     Notify your health care provider if you experience any of the following:  difficulty breathing or increased cough     Notify your health care provider if you experience any of the following:  persistent dizziness, light-headedness, or visual disturbances     Notify your health care provider if you experience any of the following:  increased confusion or weakness     Activity as tolerated       Significant Diagnostic Studies:     Hgb 5.5 > 8.2 after 2 units PRBCs  Iron <10  TIBC 571  Ferritin <1      Pending Diagnostic Studies:     None          Medications:  Reconciled Home Medications:      Medication List      START taking these medications    docusate sodium 100 MG capsule  Commonly known as: COLACE  Take 1 capsule (100 mg total) by mouth 2 (two) times daily.     ferrous sulfate 325 mg (65 mg iron) Tab tablet  Commonly known as: FEOSOL  Take 1 tablet (325 mg total) by mouth 3 (three) times daily.        CHANGE how you take these medications    omeprazole 20 MG capsule  Commonly known as: PRILOSEC  Take 1 capsule (20 mg total) by mouth once daily.  What changed:   · when to take this  · reasons to take this        CONTINUE taking these medications    AJOVY AUTOINJECTOR 225 mg/1.5 mL autoinjector  Generic drug: fremanezumab-vfrm  INJECT 1.5 MG INTO THE SKIN Q MONTH     albuterol 90 mcg/actuation inhaler  Commonly known as: PROVENTIL HFA  Inhale 2 puffs into the lungs every 6 (six) hours as needed for Wheezing or Shortness of Breath. Rescue     eletriptan 40 MG tablet  Commonly known as: RELPAX  Take 40 mg by mouth as needed. may repeat in 2 hours if necessary     gabapentin 600 MG tablet  Commonly known as: NEURONTIN  Take 600 mg by mouth 2 (two) times daily.     tiZANidine 4 MG tablet  Commonly known as: ZANAFLEX  Take 4 mg by mouth every evening. May take up to 3 tablets     TOPAMAX 50 MG tablet  Generic drug: topiramate  Take 50 mg by mouth every evening.     venlafaxine 150 MG Cp24  Commonly known as: EFFEXOR-XR  Take 1 capsule (150 mg total) by mouth once daily.            Indwelling Lines/Drains at time of discharge:   Lines/Drains/Airways     None                 Time spent on the discharge of patient: 35 minutes  Patient was seen and examined on the date of discharge and determined to be suitable for discharge.         Kim Argueta MD  Department of Hospital Medicine  [facility]

## 2020-09-09 NOTE — HOSPITAL COURSE
Patient was observed by the hospital medicine service for symptomatic anemia. She was transfused 2 units PRBCs with improvements in fatigue and shortness of breath. Iron studies consistent with NANNETTE. She was given a dose of IV iron prior to discharge and started on TID iron supplements with stool softener. Side effects of PO iron discussed. She was referred to hematology on discharge for severe NANNETTE but also instructed to have close follow up with her OBGYN as her heavy menstrual cycles are the cause of her NANNETTE and need to be addressed. Patient agreeable to and understanding of discharge plan.    Discharge exam  Awake, alert, no apparent distress  Regular rate and rhythm no murmur  Lungs clear to auscultation no wheeze  Abdomen soft nontender nondistended

## 2020-09-11 ENCOUNTER — LAB VISIT (OUTPATIENT)
Dept: LAB | Facility: HOSPITAL | Age: 35
End: 2020-09-11
Attending: FAMILY MEDICINE
Payer: COMMERCIAL

## 2020-09-11 DIAGNOSIS — Z11.59 NEED FOR HEPATITIS C SCREENING TEST: ICD-10-CM

## 2020-09-11 DIAGNOSIS — Z86.32 HISTORY OF GESTATIONAL DIABETES: ICD-10-CM

## 2020-09-11 DIAGNOSIS — Z11.4 SCREENING FOR HIV (HUMAN IMMUNODEFICIENCY VIRUS): ICD-10-CM

## 2020-09-11 DIAGNOSIS — D50.0 IRON DEFICIENCY ANEMIA DUE TO CHRONIC BLOOD LOSS: ICD-10-CM

## 2020-09-11 DIAGNOSIS — Z13.220 SCREENING FOR LIPID DISORDERS: ICD-10-CM

## 2020-09-11 DIAGNOSIS — Z00.00 WELLNESS EXAMINATION: ICD-10-CM

## 2020-09-11 LAB
ALT SERPL W/O P-5'-P-CCNC: 24 U/L (ref 10–44)
AST SERPL-CCNC: 21 U/L (ref 10–40)
CHOLEST SERPL-MCNC: 168 MG/DL (ref 120–199)
CHOLEST/HDLC SERPL: 3.7 {RATIO} (ref 2–5)
HDLC SERPL-MCNC: 46 MG/DL (ref 40–75)
HDLC SERPL: 27.4 % (ref 20–50)
IRON SERPL-MCNC: 78 UG/DL (ref 30–160)
LDLC SERPL CALC-MCNC: 108.2 MG/DL (ref 63–159)
NONHDLC SERPL-MCNC: 122 MG/DL
SATURATED IRON: 17 % (ref 20–50)
TOTAL IRON BINDING CAPACITY: 471 UG/DL (ref 250–450)
TRANSFERRIN SERPL-MCNC: 318 MG/DL (ref 200–375)
TRIGL SERPL-MCNC: 69 MG/DL (ref 30–150)
TSH SERPL DL<=0.005 MIU/L-ACNC: 1.45 UIU/ML (ref 0.4–4)

## 2020-09-11 PROCEDURE — 80061 LIPID PANEL: CPT

## 2020-09-11 PROCEDURE — 83036 HEMOGLOBIN GLYCOSYLATED A1C: CPT

## 2020-09-11 PROCEDURE — 86703 HIV-1/HIV-2 1 RESULT ANTBDY: CPT

## 2020-09-11 PROCEDURE — 84443 ASSAY THYROID STIM HORMONE: CPT

## 2020-09-11 PROCEDURE — 84450 TRANSFERASE (AST) (SGOT): CPT

## 2020-09-11 PROCEDURE — 86803 HEPATITIS C AB TEST: CPT

## 2020-09-11 PROCEDURE — 36415 COLL VENOUS BLD VENIPUNCTURE: CPT

## 2020-09-11 PROCEDURE — 83540 ASSAY OF IRON: CPT

## 2020-09-11 PROCEDURE — 84460 ALANINE AMINO (ALT) (SGPT): CPT

## 2020-09-12 LAB
ESTIMATED AVG GLUCOSE: 85 MG/DL (ref 68–131)
HBA1C MFR BLD HPLC: 4.6 % (ref 4–5.6)

## 2020-09-13 ENCOUNTER — PATIENT OUTREACH (OUTPATIENT)
Dept: ADMINISTRATIVE | Facility: OTHER | Age: 35
End: 2020-09-13

## 2020-09-13 NOTE — PROGRESS NOTES
Health Maintenance Due   Topic Date Due    HIV Screening  07/26/1998    TETANUS VACCINE  07/26/2001    Pneumococcal Vaccine (Medium Risk) (1 of 1 - PPSV23) 07/26/2002    Influenza Vaccine (1) 08/01/2020     Updates were requested from care everywhere.  Chart was reviewed for overdue Proactive [facility] Encounters ([facility]) topics (CRS, Breast Cancer Screening, Eye exam)  Health Maintenance has been updated.  LINKS immunization registry triggered.  Immunizations were reconciled.

## 2020-09-14 ENCOUNTER — OFFICE VISIT (OUTPATIENT)
Dept: OBSTETRICS AND GYNECOLOGY | Facility: CLINIC | Age: 35
End: 2020-09-14
Payer: COMMERCIAL

## 2020-09-14 ENCOUNTER — OFFICE VISIT (OUTPATIENT)
Dept: HEMATOLOGY/ONCOLOGY | Facility: CLINIC | Age: 35
End: 2020-09-14
Payer: COMMERCIAL

## 2020-09-14 VITALS — BODY MASS INDEX: 27.11 KG/M2 | WEIGHT: 168 LBS | DIASTOLIC BLOOD PRESSURE: 70 MMHG | SYSTOLIC BLOOD PRESSURE: 138 MMHG

## 2020-09-14 VITALS
DIASTOLIC BLOOD PRESSURE: 70 MMHG | TEMPERATURE: 98 F | HEART RATE: 81 BPM | SYSTOLIC BLOOD PRESSURE: 112 MMHG | HEIGHT: 66 IN | OXYGEN SATURATION: 98 % | WEIGHT: 168.44 LBS | RESPIRATION RATE: 18 BRPM | BODY MASS INDEX: 27.07 KG/M2

## 2020-09-14 DIAGNOSIS — D75.89 BONE MARROW SUPPRESSION: ICD-10-CM

## 2020-09-14 DIAGNOSIS — K21.9 GASTROESOPHAGEAL REFLUX DISEASE WITHOUT ESOPHAGITIS: ICD-10-CM

## 2020-09-14 DIAGNOSIS — R06.09 DOE (DYSPNEA ON EXERTION): ICD-10-CM

## 2020-09-14 DIAGNOSIS — N92.0 MENORRHAGIA WITH REGULAR CYCLE: ICD-10-CM

## 2020-09-14 DIAGNOSIS — Z12.4 PAP SMEAR FOR CERVICAL CANCER SCREENING: Primary | ICD-10-CM

## 2020-09-14 DIAGNOSIS — R76.8 ANA POSITIVE: ICD-10-CM

## 2020-09-14 DIAGNOSIS — D21.9 FIBROIDS: ICD-10-CM

## 2020-09-14 DIAGNOSIS — D50.0 IRON DEFICIENCY ANEMIA DUE TO CHRONIC BLOOD LOSS: ICD-10-CM

## 2020-09-14 DIAGNOSIS — M79.7 FIBROMYALGIA: ICD-10-CM

## 2020-09-14 DIAGNOSIS — D50.9 MICROCYTIC ANEMIA: Primary | ICD-10-CM

## 2020-09-14 LAB
HCV AB SERPL QL IA: NEGATIVE
HIV 1+2 AB+HIV1 P24 AG SERPL QL IA: NEGATIVE

## 2020-09-14 PROCEDURE — 3008F BODY MASS INDEX DOCD: CPT | Mod: CPTII,S$GLB,, | Performed by: INTERNAL MEDICINE

## 2020-09-14 PROCEDURE — 99203 PR OFFICE/OUTPT VISIT, NEW, LEVL III, 30-44 MIN: ICD-10-PCS | Mod: S$GLB,,, | Performed by: OBSTETRICS & GYNECOLOGY

## 2020-09-14 PROCEDURE — 3008F BODY MASS INDEX DOCD: CPT | Mod: CPTII,S$GLB,, | Performed by: OBSTETRICS & GYNECOLOGY

## 2020-09-14 PROCEDURE — 99999 PR PBB SHADOW E&M-EST. PATIENT-LVL V: ICD-10-PCS | Mod: PBBFAC,,, | Performed by: INTERNAL MEDICINE

## 2020-09-14 PROCEDURE — 1125F AMNT PAIN NOTED PAIN PRSNT: CPT | Mod: S$GLB,,, | Performed by: INTERNAL MEDICINE

## 2020-09-14 PROCEDURE — 3008F PR BODY MASS INDEX (BMI) DOCUMENTED: ICD-10-PCS | Mod: CPTII,S$GLB,, | Performed by: OBSTETRICS & GYNECOLOGY

## 2020-09-14 PROCEDURE — 99203 OFFICE O/P NEW LOW 30 MIN: CPT | Mod: S$GLB,,, | Performed by: OBSTETRICS & GYNECOLOGY

## 2020-09-14 PROCEDURE — 1125F PR PAIN SEVERITY QUANTIFIED, PAIN PRESENT: ICD-10-PCS | Mod: S$GLB,,, | Performed by: INTERNAL MEDICINE

## 2020-09-14 PROCEDURE — 3008F PR BODY MASS INDEX (BMI) DOCUMENTED: ICD-10-PCS | Mod: CPTII,S$GLB,, | Performed by: INTERNAL MEDICINE

## 2020-09-14 PROCEDURE — 99245 OFF/OP CONSLTJ NEW/EST HI 55: CPT | Mod: S$GLB,,, | Performed by: INTERNAL MEDICINE

## 2020-09-14 PROCEDURE — 99999 PR PBB SHADOW E&M-EST. PATIENT-LVL III: ICD-10-PCS | Mod: PBBFAC,,, | Performed by: OBSTETRICS & GYNECOLOGY

## 2020-09-14 PROCEDURE — 99245 PR OFFICE CONSULTATION,LEVEL V: ICD-10-PCS | Mod: S$GLB,,, | Performed by: INTERNAL MEDICINE

## 2020-09-14 PROCEDURE — 99999 PR PBB SHADOW E&M-EST. PATIENT-LVL V: CPT | Mod: PBBFAC,,, | Performed by: INTERNAL MEDICINE

## 2020-09-14 PROCEDURE — 88175 CYTOPATH C/V AUTO FLUID REDO: CPT

## 2020-09-14 PROCEDURE — 99999 PR PBB SHADOW E&M-EST. PATIENT-LVL III: CPT | Mod: PBBFAC,,, | Performed by: OBSTETRICS & GYNECOLOGY

## 2020-09-14 RX ORDER — FOLIC ACID 1 MG/1
1 TABLET ORAL DAILY
Qty: 90 TABLET | Refills: 2 | Status: SHIPPED | OUTPATIENT
Start: 2020-09-14 | End: 2022-05-05

## 2020-09-14 NOTE — PROGRESS NOTES
CC:  I cannot breathe       This is a 35 year old female here for establishment of anemia .  SHe was diagnosed with Covid pneumonia and had SOB. When she saw pulmonology she was found to be consistently low in hemoglobin. She has menorrhagia with increased clotting, Her periods usually last for seven days.     . Anemia has been stable but not well controlled  but the patient has noticed worsening  CHICAS  over the past  weeks .  Migraines has been stable on a statin. The patient states compliance with all prescribed medications.     Medications reviewed including :    Current Outpatient Medications:     AJOVY AUTOINJECTOR 225 mg/1.5 mL autoinjector, INJECT 1.5 MG INTO THE SKIN Q MONTH, Disp: , Rfl:     albuterol (PROVENTIL HFA) 90 mcg/actuation inhaler, Inhale 2 puffs into the lungs every 6 (six) hours as needed for Wheezing or Shortness of Breath. Rescue, Disp: 18 g, Rfl: 11    docusate sodium (COLACE) 100 MG capsule, Take 1 capsule (100 mg total) by mouth 2 (two) times daily., Disp: 60 capsule, Rfl: 1    eletriptan (RELPAX) 40 MG tablet, Take 40 mg by mouth as needed. may repeat in 2 hours if necessary, Disp: , Rfl:     ferrous sulfate (FEOSOL) 325 mg (65 mg iron) Tab tablet, Take 1 tablet (325 mg total) by mouth 3 (three) times daily., Disp: 90 tablet, Rfl: 1    gabapentin (NEURONTIN) 600 MG tablet, Take 600 mg by mouth 2 (two) times daily., Disp: , Rfl:     omeprazole (PRILOSEC) 20 MG capsule, Take 1 capsule (20 mg total) by mouth once daily. (Patient taking differently: Take 20 mg by mouth daily as needed (heartburn). ), Disp: 30 capsule, Rfl: 11    tiZANidine (ZANAFLEX) 4 MG tablet, Take 4 mg by mouth every evening. May take up to 3 tablets, Disp: , Rfl:     topiramate (TOPAMAX) 50 MG tablet, Take 50 mg by mouth every evening. , Disp: , Rfl:     venlafaxine (EFFEXOR-XR) 150 MG Cp24, Take 1 capsule (150 mg total) by mouth once daily., Disp: 30 capsule, Rfl: 4    Past Medical History:   Diagnosis Date     Asthma     Gestational diabetes     Migraine headache      Past Surgical History:   Procedure Laterality Date    SHOULDER SURGERY       Review of patient's allergies indicates:   Allergen Reactions    Nubain [nalbuphine] Hives     Family History   Problem Relation Age of Onset    Asthma Mother     Migraines Mother     Hypothyroidism Mother     Arthritis Mother     Migraines Father     Allergies Father     Chiari malformation Sister     Migraines Sister     Pancreatitis Maternal Grandmother     Migraines Maternal Grandmother     Thyroid disease Maternal Grandmother     Other Maternal Grandmother         pituitary gland issues    Heart failure Maternal Grandfather     Diabetes Maternal Grandfather     Kidney disease Maternal Grandfather     Alzheimer's disease Paternal Grandmother     Prostate cancer Paternal Grandfather      Social History     Tobacco Use    Smoking status: Never Smoker    Smokeless tobacco: Never Used   Substance Use Topics    Alcohol use: Yes     Frequency: Monthly or less     Drinks per session: 1 or 2     Binge frequency: Never     Comment: Martini every 6 months    Drug use: No       ROS: 14 point complete review of systems reviewed and otherwise negative except for pertinent positives above      CONSTITUTIONAL: No fevers, chills, night sweats, wt. loss, appetite changes  SKIN: no rashes or itching  ENT: No headaches, head trauma, vision changes, or eye pain  LYMPH NODES: None noticed   CV: No chest pain, palpitations.   RESP: No cough, wheezing, or hemoptysis  GI: No nausea, emesis, diarrhea, constipation, melena, hematochezia, pain.   : No dysuria, hematuria, urgency, or frequency   HEME: No easy bruising, bleeding problems  PSYCHIATRIC: No depression, anxiety, psychosis, hallucinations.  NEURO: No seizures, memory loss, dizziness or difficulty sleeping  MSK: No arthralgias or joint swelling    /70 (BP Location: Right arm, Patient Position: Sitting, BP  "Method: Large (Automatic))   Pulse 81   Temp 98.4 °F (36.9 °C) (Temporal)   Resp 18   Ht 5' 6" (1.676 m)   Wt 76.4 kg (168 lb 6.9 oz)   SpO2 98%   BMI 27.19 kg/m²   PHYSICAL EXAM:  Height / weight / VS reviewed  GENERAL.: Well-developed, well-nourished, in no acute distress  PSYCH: pleasant affect, no anxiety, no depression  HEENT: Normocephalic, lids intact, conjunctiva pink, sinuses nontender to palpation    normal auricula, nasal septum, dentition, gums and mucosa ,OP clear,no palatal pallor  Endocrine: no palpable thyromegaly, no diaphoresis  NECK: Supple,trachea midline, no palpable abnormalities  LYMPHATICS: No cervical or axillary adenopathy  RESPIRATORY: CTAB, no wheezes, rubs or rhonchi  Good respiratory effort without any retractions or diaphragmatic movement  CARDIOVASCULAR: no JVD, S1 / S2 with RRR, no murmur, no rub,2+ capillary refill  ABDOMEN: NTND, normal bowel sounds, no palpable HSM or mass  EXTREMITIES: No cyanosis, no clubbing, no edema, no joint effusion  NEUROLOGICAL: alert and oriented x 3  SKIN: Warm, dry, no rash or lesions noted, no tenting, no petechiae or ecchymosis    Labs reviewed: pertinent findings include  Lab Results   Component Value Date    WBC 6.47 09/11/2020    RBC 4.23 09/11/2020    HGB 8.7 (L) 09/11/2020    HCT 31.2 (L) 09/11/2020    MCV 74 (L) 09/11/2020    MCH 20.6 (L) 09/11/2020    MCHC 27.9 (L) 09/11/2020    RDW 33.2 (H) 09/11/2020     (H) 09/11/2020    MPV 9.3 09/11/2020    GRAN 3.8 09/11/2020    GRAN 58.5 09/11/2020    LYMPH 1.8 09/11/2020    LYMPH 28.3 09/11/2020    MONO 0.5 09/11/2020    MONO 7.3 09/11/2020    EOS 0.3 09/11/2020    BASO 0.07 09/11/2020    EOSINOPHIL 4.5 09/11/2020    BASOPHIL 1.1 09/11/2020     Lab Results   Component Value Date    IRON 78 09/11/2020    TIBC 471 (H) 09/11/2020    FERRITIN <1 (L) 09/02/2020     ANNELISE positive titer 1:160 speckled pattern Thought to be due to Fibromyalgia  She sees neurology for migraines     Imaging " reviewed: pertinent findings include     Assessment:     1.Moderate  exacerbation of CHICAS : seen by pulmonology : no abnormality found to explain dyspnea except severe anemia   2.ANemia multifactorial : menorrhagia, positive ramses possibly AIHA and bone marrow suppression form medications   3.Fibromyalgia : on meds that can cause bone suppression   4.History of Migraines  which appears to be controlled  5. Iron deficiency      Plan:     1.Workup in place for severe anemia  2.See gyn and discuss treatment options to help prevent further menorrhagia  3.Repeat labs now   4.The importance of diet pertaining to rich in iron was explained to the patient over 15 minutes  5. Cont same meds for migraines   6.Return visit  After labs and US       Advance directives and care plan have been reviewed , Patient reports no changes to such  Pt is encouraged to use My Chart and ask the staff for assistance in setting up during wrap up.    Recommend healthy living: no nicotine,  should avoid alcohol, healthy diet and regular exercise aiming for fitness, and weight control  1. Discussed healthy alcohol daily limit of 0.5 oz of pure alcohol in any 24 hours (roughly one 12-oz beers, 4 oz of wine (8%-12% alcohol), or 1.25 oz (half a shot) of liquor (80 proof)), can not save up.  2.   Discussed good exercise program, 4 key elements: 1. Aerobic (walking, swimming, dancing, jogging, biking, etc, 2. Muscle strengthening / resistance exercise, need to do 2-3 times  weekly, 3. Stretching daily, good stretch takes a whole  total minute. 4. Balance exercise daily.  3.   Discussed healthy diet for over 15 minutes with handouts given to the patient     Discussed COVID-19 and social distancing in great detail, avoid all non-essential visits out of the home if possible and avoid sick contacts.

## 2020-09-14 NOTE — PATIENT INSTRUCTIONS
Anemia  Anemia is a condition that occurs when your body does not have enough healthy red blood cells (RBCs). RBCs are the parts of your blood that carry oxygen throughout your body. A protein called hemoglobin allows your RBCs to absorb and release oxygen. Without enough RBCs or hemoglobin, your body doesn't get enough oxygen. Symptoms of anemia may then occur.    What are the symptoms of anemia?  Some people with anemia have no symptoms. But most people have symptoms that range from mild to severe. These can include:  · Tiredness (fatigue)  · Weakness  · Pale skin  · Shortness of breath  · Dizziness or fainting  · Rapid heartbeat  · Trouble doing normal amounts of activity  · Jaundice (yellowing of your eyes, skin, or mouth; dark urine)  What causes anemia?  Anemia can occur when your body:  · Loses too much blood  · Does not make enough RBCs  · Destroys your RBCs at a faster rate than it can replace them  · Does not make a normal amount of hemoglobin in your RBCs  These problems can occur for many reasons, including:  · A condition that you are born with (congenital or inherited), such as sickle cell disease or thalassemia  · Heavy bleeding for any reason, including injury, surgery, childbirth, or even heavy menstrual periods  · Being low in certain nutrients, such as iron, folate, or vitamin B12, possibly from a poor diet or a condition like celiac disease or Crohn's disease  · Certain chronic conditions like diabetes, arthritis, or kidney disease  · Certain chronic infections like tuberculosis or HIV  · Exposure to certain medicines, such as those used for chemotherapy  There are different types of anemia. Your healthcare provider can tell you more about the type of anemia you have and what may have caused it.  How is anemia diagnosed?  To diagnose anemia, your healthcare provider orders blood tests. These can include:  · Complete blood cell count (CBC). This test measures the amounts of the different types  of blood cells.  · Blood smear. This test checks the size and shape of your blood cells. To do the test, a drop of your blood is viewed under a microscope. A stain is used to make the blood cells easier to see.  · Iron studies. These tests measure the amount of iron in your blood. Your body needs iron to make hemoglobin in your RBCs.  · Vitamin B12 and folate studies. These tests check for some of the components that help give RBCs a normal size and shape.  · Reticulocyte count. This test measures the amount of new RBCs that your bone marrow makes.  · Hemoglobin electrophoresis. This test checks for problems with your hemoglobin in RBCs.  How is anemia treated?  Treatment for anemia is based on the type of anemia, its cause, and the severity of your symptoms. Treatments may include:  · Diet changes. This involves increasing the amount of certain nutrients in your diet, such as iron, vitamin B12, or folate. Your healthcare provider may also prescribe nutrient supplements.  · Medicines. Certain medicines treat the cause of your anemia. Others help build new RBCs or relieve symptoms. If a medicine is the cause of your anemia, you may need to stop or change it.  · Blood transfusions. Replacing some of your blood can increase the number of healthy RBCs in your body.  · Surgery. In some cases, your doctor may do surgery to treat the underlying cause of anemia. If you need surgery, your healthcare provider will explain the procedure and outline the risks and benefits for you.  What are the long-term concerns?  If you have a certain type of anemia, you can expect a full recovery after treatment. If you have other types of anemia (especially a type you're born with), you will need to manage it for life. Your doctor can tell you more.  Date Last Reviewed: 12/1/2016  © 3333-8787 [employer]. [address] [place] [address], [place] [address]. All rights reserved. This information is not intended as a substitute for  professional medical care. Always follow your healthcare professional's instructions.        Heavy Menstrual Bleeding    Heavy menstrual bleeding means that your periods are heavier or longer than usual. You may soak through a pad or tampon every 1 to 3 hours on the heaviest days of your period. You may also pass large, dark clots. And your periods may last longer than 7 days.  If you have heavy periods often, this can cause a problem called anemia. With anemia, your red blood cell count is too low. Red blood cells are needed because they help carry oxygen throughout your body. Severe anemia may cause you to look pale and feel weak or tired. You might also become short of breath easily.  There are many possible causes of heavy menstrual bleeding. Hormonal imbalance is the most common cause. Having benign growths in your uterus, such as fibroids or polyps, is another cause. Taking certain medicines or having certain health problems or bleeding disorders are also causes.  To treat heavy menstrual bleeding, medicines are often tried first. If these dont help, further testing and treatments will likely be needed.  Home care  Medicines  If youre prescribed medicines, be sure to take them as directed.  · To help control heavy bleeding, any of the following may be used:  ¨ Hormone therapy (this includes all methods of hormonal birth control such as pills, shots, cream, ring, patch, or hormone-releasing IUD)  ¨ Nonsteroidal anti-inflammatory drugs (NSAIDs), such as ibuprofen  ¨ Antifibrinolytic medicines, such as transexamic acid  · To help treat anemia, iron supplements may be prescribed.            General care  · Get plenty of rest if you tire easily. Avoid heavy exertion.  · To help relieve pain or cramping, try using a heating pad on the lower belly or back. A warm bath may also help.  Follow-up care  Follow up with your healthcare provider as directed.  When to seek medical advice  Call your healthcare provider  right away if any of these occur:  · Heavier bleeding (soaking 1 pad or tampon every hour for 3 hours)  · Heavy bleeding that lasts longer than 1 week  · Fever of 100.4ºF (38ºC) or higher, or as directed by your provider  · Pain or cramping that gets worse instead of better  · Signs of anemia such as pale skin, extreme fatigue or weakness, or shortness of breath  · Dizziness or fainting  Date Last Reviewed: 6/11/2015 © 2000-2017 [employer]. [address] [place] [address], [place] [id number]. All rights reserved. This information is not intended as a substitute for professional medical care. Always follow your healthcare professional's instructions.

## 2020-09-14 NOTE — LETTER
September 14, 2020      Em Lee MD  [address]  [address]  [place] [address]           [facility]  [address]  [facility] [address]  Phone: [phone]          Patient: Subha Bach   MR Number: [id number]   YOB: 1985   Date of Visit: 9/14/2020       Dear Dr. Em Lee:    Thank you for referring Subha Bach to me for evaluation. Attached you will find relevant portions of my assessment and plan of care.    If you have questions, please do not hesitate to call me. I look forward to following Subha Bach along with you.    Sincerely,    Kristen Arevalo MD    Enclosure  CC:  No Recipients    If you would like to receive this communication electronically, please contact [email or web address] or [phone] to request more information on [employer] access.    For providers and/or their staff who would like to refer a patient to [facility], please contact us through our one-stop-shop provider referral line, [facility], at [phone].    If you feel you have received this communication in error or would no longer like to receive these types of communications, please e-mail [email or web address]

## 2020-09-14 NOTE — PROGRESS NOTES
Chief Complaint   Patient presents with    Well Woman    Fibroids     Heavy Periods       History of Present Illness   35 y.o.  female patient presents today for heavy menses with clots and flooding lasting 7 days each month > 7 years, h/o fibroids on ultrasound 3 years ago. No h/o abnormal PAP. Migraines and fibromyalgia.     Past medical and surgical history reviewed.   I have reviewed the patient's medical history in detail and updated the computerized patient record.    Review of patient's allergies indicates:   Allergen Reactions    Nubain [nalbuphine] Hives         Review of Systems - Negative except HPI  GEN ROS: negative for - chills or fever  Breast ROS: negative for breast lumps  Genito-Urinary ROS: no dysuria, trouble voiding, or hematuria      Physical Examination:  /70   Wt 76.2 kg (167 lb 15.9 oz)   LMP 09/06/2020 (Exact Date)   BMI 27.11 kg/m²    Constitutional: She appears alert and responsive. She appears well-developed, well-groomed, and well-nourished. No distress. Normal weight  HENT:   Head: Normocephalic and atraumatic.   Eyes: Conjunctivae and EOM are normal. No scleral icterus.   Neck: Symmetrical. Normal range of motion. Neck supple. No tracheal deviation present. THYROID: without masses or tenderness.  Cardiovascular: Normal rate, no rhythm abnormality noted. Extremities without swelling or edema, warm.    Pulmonary/Chest: Normal respiratory Effort. No distress or retractions. She exhibits no tenderness.  Breasts: deferred  Abdominal: Soft. She exhibits no distension, hernias or masses. There is no tenderness. No enlargement of liver edge or spleen.  There is no rebound and no guarding.   Genitourinary:    External rectal exam shows no thrombosed external hemorrhoids, no lesions.     Pelvic exam was performed with patient supine.   No labial fusion, and symmetrical.    There is no rash, lesion or injury on the right labia.   There is no rash, lesion or injury on the  left labia.   No bleeding and no signs of injury around the vaginal introitus, urethral meatus is normal size and without prolapse or lesions, urethra well supported. The cervix is visualized with no discharge, lesions or friability.   No vaginal discharge found.    No significant Cystocele, Enterocele or rectocele, and cervix and uterus well supported.   Bimanual exam:   The urethra is normal to palpation and there are no palpable vaginal wall masses.   Uterus is not deviated, not enlarged, not fixed, normal shape and not tender.   Cervix exhibits no motion tenderness.    Right adnexum displays no mass or nodularity and no tenderness.   Left adnexum displays no mass or nodularity and no tenderness.  Musculoskeletal: Normal range of motion.   Lymphadenopathy: No inguinal adenopathy present.   Neurological: She is alert and oriented to person, place, and time. Coordination normal.   Skin: Skin is warm and dry. She is not diaphoretic. No rashes, lesions or ulcers.   Psychiatric: She has a normal mood and affect, oriented to person, place, and time.            Assessment:  Small fibroid uterus  Menorrhagia with anemia requiring transfusion  1. Pap smear for cervical cancer screening  Liquid-Based Pap Smear, Screening       Plan:  Counseled, interested in definitive surgery   recommended Total laparoscopic Hysterectomy with salpingectomy - OVARIAN CONSERVATION  PAP today  Patient informed will be contacted with results within 2 weeks. Encouraged to please call back or email if she has not heard from us by then.

## 2020-09-15 ENCOUNTER — TELEPHONE (OUTPATIENT)
Dept: HEMATOLOGY/ONCOLOGY | Facility: CLINIC | Age: 35
End: 2020-09-15

## 2020-09-15 NOTE — TELEPHONE ENCOUNTER
Spoke to pt to confirm f/up appts. Also sent portal message.     ----- Message from Kristen Arevalo MD sent at 9/14/2020  9:56 AM CDT -----  Labs and US abdomen as soon as we can then RTC in 7 days for results and recs

## 2020-09-16 ENCOUNTER — TELEPHONE (OUTPATIENT)
Dept: HEMATOLOGY/ONCOLOGY | Facility: CLINIC | Age: 35
End: 2020-09-16

## 2020-09-16 ENCOUNTER — LAB VISIT (OUTPATIENT)
Dept: LAB | Facility: HOSPITAL | Age: 35
End: 2020-09-16
Attending: INTERNAL MEDICINE
Payer: COMMERCIAL

## 2020-09-16 DIAGNOSIS — R76.8 ANA POSITIVE: ICD-10-CM

## 2020-09-16 DIAGNOSIS — D50.0 IRON DEFICIENCY ANEMIA DUE TO CHRONIC BLOOD LOSS: ICD-10-CM

## 2020-09-16 LAB
ANISOCYTOSIS BLD QL SMEAR: SLIGHT
BASOPHILS NFR BLD: 0 % (ref 0–1.9)
DAT IGG-SP REAG RBC-IMP: NORMAL
DIFFERENTIAL METHOD: ABNORMAL
EOSINOPHIL NFR BLD: 0 % (ref 0–8)
ERYTHROCYTE [DISTWIDTH] IN BLOOD BY AUTOMATED COUNT: ABNORMAL % (ref 11.5–14.5)
FERRITIN SERPL-MCNC: 29 NG/ML (ref 20–300)
HCT VFR BLD AUTO: 35.7 % (ref 37–48.5)
HGB BLD-MCNC: 9.7 G/DL (ref 12–16)
HYPOCHROMIA BLD QL SMEAR: ABNORMAL
IMM GRANULOCYTES # BLD AUTO: ABNORMAL K/UL (ref 0–0.04)
IMM GRANULOCYTES NFR BLD AUTO: ABNORMAL % (ref 0–0.5)
LYMPHOCYTES NFR BLD: 38 % (ref 18–48)
MCH RBC QN AUTO: 20.9 PG (ref 27–31)
MCHC RBC AUTO-ENTMCNC: 27.2 G/DL (ref 32–36)
MCV RBC AUTO: 77 FL (ref 82–98)
MONOCYTES NFR BLD: 5 % (ref 4–15)
NEUTROPHILS NFR BLD: 54 % (ref 38–73)
NEUTS BAND NFR BLD MANUAL: 3 %
NRBC BLD-RTO: 0 /100 WBC
OVALOCYTES BLD QL SMEAR: ABNORMAL
PATH REV BLD -IMP: NORMAL
PLATELET # BLD AUTO: 525 K/UL (ref 150–350)
PLATELET BLD QL SMEAR: ABNORMAL
PMV BLD AUTO: 9.2 FL (ref 9.2–12.9)
POIKILOCYTOSIS BLD QL SMEAR: SLIGHT
RBC # BLD AUTO: 4.64 M/UL (ref 4–5.4)
RETICS/RBC NFR AUTO: 0.6 % (ref 0.5–2.5)
WBC # BLD AUTO: 5.8 K/UL (ref 3.9–12.7)

## 2020-09-16 PROCEDURE — 85045 AUTOMATED RETICULOCYTE COUNT: CPT

## 2020-09-16 PROCEDURE — 85060 PATHOLOGIST REVIEW: ICD-10-PCS | Mod: ,,, | Performed by: PATHOLOGY

## 2020-09-16 PROCEDURE — 36415 COLL VENOUS BLD VENIPUNCTURE: CPT

## 2020-09-16 PROCEDURE — 85025 COMPLETE CBC W/AUTO DIFF WBC: CPT

## 2020-09-16 PROCEDURE — 84425 ASSAY OF VITAMIN B-1: CPT

## 2020-09-16 PROCEDURE — 84207 ASSAY OF VITAMIN B-6: CPT

## 2020-09-16 PROCEDURE — 82728 ASSAY OF FERRITIN: CPT

## 2020-09-16 PROCEDURE — 86880 COOMBS TEST DIRECT: CPT

## 2020-09-16 PROCEDURE — 83921 ORGANIC ACID SINGLE QUANT: CPT

## 2020-09-16 PROCEDURE — 85060 BLOOD SMEAR INTERPRETATION: CPT | Mod: ,,, | Performed by: PATHOLOGY

## 2020-09-16 NOTE — TELEPHONE ENCOUNTER
----- Message from Flor Knight LPN sent at 9/16/2020  1:52 PM CDT -----  Regarding: Surgery pending  Ms. Bach saw Dr. Walsh for fibroids and heavy bleeding . She would like to proceed with a laparoscopic hysterectomy. However Dr. Walsh would like Dr. Arevalo to let him know when she feels patient is ready to proceed.   Please advise when patient has completed test and may proceed with surgery.     Thanks, Flor Knight LPN    [phone]

## 2020-09-17 LAB — PATH REV BLD -IMP: NORMAL

## 2020-09-21 ENCOUNTER — HOSPITAL ENCOUNTER (OUTPATIENT)
Dept: RADIOLOGY | Facility: HOSPITAL | Age: 35
Discharge: HOME | End: 2020-09-21
Attending: INTERNAL MEDICINE
Payer: COMMERCIAL

## 2020-09-21 DIAGNOSIS — D50.0 IRON DEFICIENCY ANEMIA DUE TO CHRONIC BLOOD LOSS: ICD-10-CM

## 2020-09-21 DIAGNOSIS — R76.8 ANA POSITIVE: ICD-10-CM

## 2020-09-21 LAB
METHYLMALONATE SERPL-SCNC: 0.2 UMOL/L
PYRIDOXAL SERPL-MCNC: 19 UG/L (ref 5–50)
VIT B1 BLD-MCNC: 70 UG/L (ref 38–122)

## 2020-09-21 PROCEDURE — 76700 US EXAM ABDOM COMPLETE: CPT | Mod: TC,PO

## 2020-09-23 NOTE — PROGRESS NOTES
Results are unremarkable. Patient notified of results and to continue current treatment plan.    Ranjit Villalpando MD  09/23/2020

## 2020-09-24 ENCOUNTER — LAB VISIT (OUTPATIENT)
Dept: LAB | Facility: HOSPITAL | Age: 35
End: 2020-09-24
Attending: INTERNAL MEDICINE
Payer: COMMERCIAL

## 2020-09-24 ENCOUNTER — OFFICE VISIT (OUTPATIENT)
Dept: HEMATOLOGY/ONCOLOGY | Facility: CLINIC | Age: 35
End: 2020-09-24
Payer: COMMERCIAL

## 2020-09-24 VITALS
HEART RATE: 90 BPM | SYSTOLIC BLOOD PRESSURE: 118 MMHG | RESPIRATION RATE: 18 BRPM | DIASTOLIC BLOOD PRESSURE: 65 MMHG | TEMPERATURE: 98 F | WEIGHT: 171.31 LBS | OXYGEN SATURATION: 95 % | BODY MASS INDEX: 27.65 KG/M2

## 2020-09-24 DIAGNOSIS — R71.8 SCHISTOCYTOSIS: ICD-10-CM

## 2020-09-24 DIAGNOSIS — D64.89: ICD-10-CM

## 2020-09-24 DIAGNOSIS — D50.9 MICROCYTIC ANEMIA: ICD-10-CM

## 2020-09-24 DIAGNOSIS — Z01.818 PRE-OP EVALUATION: Primary | ICD-10-CM

## 2020-09-24 DIAGNOSIS — D50.9 MICROCYTIC ANEMIA: Primary | ICD-10-CM

## 2020-09-24 LAB
ALBUMIN SERPL BCP-MCNC: 4.2 G/DL (ref 3.5–5.2)
ALP SERPL-CCNC: 95 U/L (ref 55–135)
ALT SERPL W/O P-5'-P-CCNC: 32 U/L (ref 10–44)
ANION GAP SERPL CALC-SCNC: 5 MMOL/L (ref 8–16)
ANISOCYTOSIS BLD QL SMEAR: ABNORMAL
AST SERPL-CCNC: 23 U/L (ref 10–40)
BASOPHILS # BLD AUTO: 0.06 K/UL (ref 0–0.2)
BASOPHILS NFR BLD: 0.6 % (ref 0–1.9)
BILIRUB SERPL-MCNC: 0.2 MG/DL (ref 0.1–1)
BUN SERPL-MCNC: 8 MG/DL (ref 6–20)
CALCIUM SERPL-MCNC: 9.9 MG/DL (ref 8.7–10.5)
CHLORIDE SERPL-SCNC: 104 MMOL/L (ref 95–110)
CO2 SERPL-SCNC: 30 MMOL/L (ref 23–29)
CREAT SERPL-MCNC: 0.8 MG/DL (ref 0.5–1.4)
DIFFERENTIAL METHOD: ABNORMAL
EOSINOPHIL # BLD AUTO: 0.1 K/UL (ref 0–0.5)
EOSINOPHIL NFR BLD: 1.4 % (ref 0–8)
ERYTHROCYTE [DISTWIDTH] IN BLOOD BY AUTOMATED COUNT: ABNORMAL % (ref 11.5–14.5)
EST. GFR  (AFRICAN AMERICAN): >60 ML/MIN/1.73 M^2
EST. GFR  (NON AFRICAN AMERICAN): >60 ML/MIN/1.73 M^2
GLUCOSE SERPL-MCNC: 87 MG/DL (ref 70–110)
HCT VFR BLD AUTO: 36.9 % (ref 37–48.5)
HGB BLD-MCNC: 10.7 G/DL (ref 12–16)
HYPOCHROMIA BLD QL SMEAR: ABNORMAL
IMM GRANULOCYTES # BLD AUTO: 0.02 K/UL (ref 0–0.04)
IMM GRANULOCYTES NFR BLD AUTO: 0.2 % (ref 0–0.5)
LDH SERPL L TO P-CCNC: 199 U/L (ref 110–260)
LYMPHOCYTES # BLD AUTO: 1.9 K/UL (ref 1–4.8)
LYMPHOCYTES NFR BLD: 20.1 % (ref 18–48)
MCH RBC QN AUTO: 22.7 PG (ref 27–31)
MCHC RBC AUTO-ENTMCNC: 29 G/DL (ref 32–36)
MCV RBC AUTO: 78 FL (ref 82–98)
MONOCYTES # BLD AUTO: 0.7 K/UL (ref 0.3–1)
MONOCYTES NFR BLD: 7.1 % (ref 4–15)
NEUTROPHILS # BLD AUTO: 6.7 K/UL (ref 1.8–7.7)
NEUTROPHILS NFR BLD: 70.6 % (ref 38–73)
NRBC BLD-RTO: 0 /100 WBC
OVALOCYTES BLD QL SMEAR: ABNORMAL
PATH REV BLD -IMP: NORMAL
PLATELET # BLD AUTO: 488 K/UL (ref 150–350)
PLATELET BLD QL SMEAR: ABNORMAL
PMV BLD AUTO: 9.4 FL (ref 9.2–12.9)
POIKILOCYTOSIS BLD QL SMEAR: SLIGHT
POTASSIUM SERPL-SCNC: 3.8 MMOL/L (ref 3.5–5.1)
PROT SERPL-MCNC: 8.1 G/DL (ref 6–8.4)
RBC # BLD AUTO: 4.71 M/UL (ref 4–5.4)
SODIUM SERPL-SCNC: 139 MMOL/L (ref 136–145)
WBC # BLD AUTO: 9.44 K/UL (ref 3.9–12.7)

## 2020-09-24 PROCEDURE — 99214 PR OFFICE/OUTPT VISIT, EST, LEVL IV, 30-39 MIN: ICD-10-PCS | Mod: S$GLB,,, | Performed by: INTERNAL MEDICINE

## 2020-09-24 PROCEDURE — 85025 COMPLETE CBC W/AUTO DIFF WBC: CPT

## 2020-09-24 PROCEDURE — 83615 LACTATE (LD) (LDH) ENZYME: CPT

## 2020-09-24 PROCEDURE — 36415 COLL VENOUS BLD VENIPUNCTURE: CPT

## 2020-09-24 PROCEDURE — 80053 COMPREHEN METABOLIC PANEL: CPT

## 2020-09-24 PROCEDURE — 3008F BODY MASS INDEX DOCD: CPT | Mod: CPTII,S$GLB,, | Performed by: INTERNAL MEDICINE

## 2020-09-24 PROCEDURE — 99999 PR PBB SHADOW E&M-EST. PATIENT-LVL IV: CPT | Mod: PBBFAC,,, | Performed by: INTERNAL MEDICINE

## 2020-09-24 PROCEDURE — 3008F PR BODY MASS INDEX (BMI) DOCUMENTED: ICD-10-PCS | Mod: CPTII,S$GLB,, | Performed by: INTERNAL MEDICINE

## 2020-09-24 PROCEDURE — 83010 ASSAY OF HAPTOGLOBIN QUANT: CPT

## 2020-09-24 PROCEDURE — 99214 OFFICE O/P EST MOD 30 MIN: CPT | Mod: S$GLB,,, | Performed by: INTERNAL MEDICINE

## 2020-09-24 PROCEDURE — 99999 PR PBB SHADOW E&M-EST. PATIENT-LVL IV: ICD-10-PCS | Mod: PBBFAC,,, | Performed by: INTERNAL MEDICINE

## 2020-09-24 NOTE — PROGRESS NOTES
CC:  I cannot breathe       This is a 35 year old female here for follow-up of anemia .  SHe was diagnosed with Covid pneumonia and had SOB. When she saw pulmonology she was found to be consistently low in hemoglobin. She has menorrhagia with increased clotting, Her periods usually last for seven days.      Anemia has been stable but not well controlled  but the patient has noticed worsening  CHICAS  over the past  weeks .  Migraines has been stable on a statin. The patient states compliance with all prescribed medications.   She received a blood transfusion in the hospital and is here today to follow-up on other lab results to assess her response to such.  She remains with intermittent shortness of breath she is also on gabapentin to help with her headaches and Prilosec to help with GERD.  She does take an inhaler intermittently for asthma she reports no evidence of melena hematochezia no hematuria  Medications reviewed including :    Current Outpatient Medications:     AJOVY AUTOINJECTOR 225 mg/1.5 mL autoinjector, INJECT 1.5 MG INTO THE SKIN Q MONTH, Disp: , Rfl:     albuterol (PROVENTIL HFA) 90 mcg/actuation inhaler, Inhale 2 puffs into the lungs every 6 (six) hours as needed for Wheezing or Shortness of Breath. Rescue, Disp: 18 g, Rfl: 11    docusate sodium (COLACE) 100 MG capsule, Take 1 capsule (100 mg total) by mouth 2 (two) times daily., Disp: 60 capsule, Rfl: 1    eletriptan (RELPAX) 40 MG tablet, Take 40 mg by mouth as needed. may repeat in 2 hours if necessary, Disp: , Rfl:     ferrous sulfate (FEOSOL) 325 mg (65 mg iron) Tab tablet, Take 1 tablet (325 mg total) by mouth 3 (three) times daily., Disp: 90 tablet, Rfl: 1    folic acid (FOLVITE) 1 MG tablet, Take 1 tablet (1 mg total) by mouth once daily., Disp: 90 tablet, Rfl: 2    gabapentin (NEURONTIN) 600 MG tablet, Take 600 mg by mouth 2 (two) times daily., Disp: , Rfl:     omeprazole (PRILOSEC) 20 MG capsule, Take 1 capsule (20 mg total) by  mouth once daily. (Patient taking differently: Take 20 mg by mouth daily as needed (heartburn). ), Disp: 30 capsule, Rfl: 11    tiZANidine (ZANAFLEX) 4 MG tablet, Take 4 mg by mouth every evening. May take up to 3 tablets, Disp: , Rfl:     topiramate (TOPAMAX) 50 MG tablet, Take 50 mg by mouth every evening. , Disp: , Rfl:     venlafaxine (EFFEXOR-XR) 150 MG Cp24, Take 1 capsule (150 mg total) by mouth once daily., Disp: 30 capsule, Rfl: 4    Past Medical History:   Diagnosis Date    Asthma     Gestational diabetes     Migraine headache      Past Surgical History:   Procedure Laterality Date    SHOULDER SURGERY       Review of patient's allergies indicates:   Allergen Reactions    Nubain [nalbuphine] Hives     Family History   Problem Relation Age of Onset    Asthma Mother     Migraines Mother     Hypothyroidism Mother     Arthritis Mother     Migraines Father     Allergies Father     Chiari malformation Sister     Migraines Sister     Pancreatitis Maternal Grandmother     Migraines Maternal Grandmother     Thyroid disease Maternal Grandmother     Other Maternal Grandmother         pituitary gland issues    Heart failure Maternal Grandfather     Diabetes Maternal Grandfather     Kidney disease Maternal Grandfather     Alzheimer's disease Paternal Grandmother     Prostate cancer Paternal Grandfather      Social History     Tobacco Use    Smoking status: Never Smoker    Smokeless tobacco: Never Used   Substance Use Topics    Alcohol use: Yes     Frequency: Monthly or less     Drinks per session: 1 or 2     Binge frequency: Never     Comment: Martini every 6 months    Drug use: No       ROS: 14 point complete review of systems reviewed and otherwise negative except for pertinent positives above      CONSTITUTIONAL: No fevers, chills, night sweats, wt. loss, appetite changes  SKIN: no rashes or itching  ENT: No headaches, head trauma, vision changes, or eye pain  LYMPH NODES: None noticed    CV: No chest pain, palpitations.   RESP: No cough, wheezing, or hemoptysis  GI: No nausea, emesis, diarrhea, constipation, melena, hematochezia, pain.   : No dysuria, hematuria, urgency, or frequency   HEME: No easy bruising, bleeding problems  PSYCHIATRIC: No depression, anxiety, psychosis, hallucinations.  NEURO: No seizures, memory loss, dizziness or difficulty sleeping  MSK: No arthralgias or joint swelling    /65 (BP Location: Right arm, Patient Position: Sitting, BP Method: Large (Automatic))   Pulse 90   Temp 97.5 °F (36.4 °C) (Temporal)   Resp 18   Wt 77.7 kg (171 lb 4.8 oz)   LMP 09/06/2020 (Exact Date)   SpO2 95%   BMI 27.65 kg/m²   PHYSICAL EXAM:  Height / weight / VS reviewed  GENERAL.: Well-developed, well-nourished, in no acute distress  PSYCH: pleasant affect, no anxiety, no depression  HEENT: Normocephalic, lids intact, conjunctiva pink, sinuses nontender to palpation    normal auricula, nasal septum, dentition, gums and mucosa ,OP clear,no palatal pallor  Endocrine: no palpable thyromegaly, no diaphoresis  NECK: Supple,trachea midline, no palpable abnormalities  LYMPHATICS: No cervical or axillary adenopathy  RESPIRATORY: CTAB, no wheezes, rubs or rhonchi  Good respiratory effort without any retractions or diaphragmatic movement  CARDIOVASCULAR: no JVD, S1 / S2 with RRR, no murmur, no rub,2+ capillary refill  ABDOMEN: NTND, normal bowel sounds, no palpable HSM or mass  EXTREMITIES: No cyanosis, no clubbing, no edema, no joint effusion  NEUROLOGICAL: alert and oriented x 3  SKIN: Warm, dry, no rash or lesions noted, no tenting, no petechiae or ecchymosis    Labs reviewed: pertinent findings include  Lab Results   Component Value Date    WBC 5.80 09/16/2020    RBC 4.64 09/16/2020    HGB 9.7 (L) 09/16/2020    HCT 35.7 (L) 09/16/2020    MCV 77 (L) 09/16/2020    MCH 20.9 (L) 09/16/2020    MCHC 27.2 (L) 09/16/2020    RDW SEE COMMENT 09/16/2020     (H) 09/16/2020    MPV 9.2  09/16/2020    GRAN 54.0 09/16/2020    LYMPH 38.0 09/16/2020    MONO 5.0 09/16/2020    EOS 0.3 09/11/2020    BASO 0.07 09/11/2020    EOSINOPHIL 0.0 09/16/2020    BASOPHIL 0.0 09/16/2020     Lab Results   Component Value Date    IRON 78 09/11/2020    TIBC 471 (H) 09/11/2020    FERRITIN 29 09/16/2020     RAMSES positive titer 1:160 speckled pattern Thought to be due to Fibromyalgia  She sees neurology for migraines   Smear from September 16th revealing schistocytes and target cells and ovalocytes    Imaging reviewed: pertinent findings include     Assessment:     1.Continues with SOB due to anemia  and asthma  2.ANemia multifactorial : menorrhagia, positive ramses possibly AIHA and bone marrow suppression form medications   3.Schistocytes and target cells on smear in past : re check with ldh and haptoglobin   4.History of Migraines  which appears to be controlled  5. Iron deficiency  In past currently counts have normalized as of sept 16     Plan:     1.Workup in place for severe anemia  2.See gyn and discuss treatment options to help prevent further menorrhagia  3.Repeat labs now   4.The importance of diet pertaining to rich in iron was explained to the patient over 15 minutes  5. Surgery planned with gyn for October 19   6.VV to discuss lab results   I am ordering a more recent CBC with LDH and haptoglobin to truly check for possible hemolysis.  Her smear was not read by a hematopathologist and I will ask for another smear to be made in order for further assessment.  The patient is definitely not toxic she is not presenting as if she has an infection she has no evidence of renal failure and appears completely stable at this time will have a virtual visit to discuss results and further recs to follow.  I do appreciate her gyn taking her to surgery to try and help with menorrhagia.  I will make sure her labs are stable prior to her surgical date of October 19th  Advance directives and care plan have been reviewed , Patient  reports no changes to such  Pt is encouraged to use My Chart and ask the staff for assistance in setting up during wrap up.    Recommend healthy living: no nicotine,  should avoid alcohol, healthy diet and regular exercise aiming for fitness, and weight control  1. Discussed healthy alcohol daily limit of 0.5 oz of pure alcohol in any 24 hours (roughly one 12-oz beers, 4 oz of wine (8%-12% alcohol), or 1.25 oz (half a shot) of liquor (80 proof)), can not save up.  2.   Discussed good exercise program, 4 key elements: 1. Aerobic (walking, swimming, dancing, jogging, biking, etc, 2. Muscle strengthening / resistance exercise, need to do 2-3 times  weekly, 3. Stretching daily, good stretch takes a whole  total minute. 4. Balance exercise daily.  3.   Discussed healthy diet for over 15 minutes with handouts given to the patient     Discussed COVID-19 and social distancing in great detail, avoid all non-essential visits out of the home if possible and avoid sick contacts.

## 2020-09-25 LAB — HAPTOGLOB SERPL-MCNC: 142 MG/DL (ref 30–250)

## 2020-09-29 LAB
FINAL PATHOLOGIC DIAGNOSIS: NORMAL
Lab: NORMAL

## 2020-09-30 ENCOUNTER — PATIENT MESSAGE (OUTPATIENT)
Dept: HEMATOLOGY/ONCOLOGY | Facility: CLINIC | Age: 35
End: 2020-09-30

## 2020-09-30 ENCOUNTER — TELEPHONE (OUTPATIENT)
Dept: HEMATOLOGY/ONCOLOGY | Facility: CLINIC | Age: 35
End: 2020-09-30

## 2020-09-30 NOTE — TELEPHONE ENCOUNTER
----- Message from Kristen Arevalo MD sent at 9/24/2020  2:04 PM CDT -----  Please send her back to lab at [facility] for labs in epic then virtual F/U in 4-5 days please

## 2020-10-02 ENCOUNTER — OFFICE VISIT (OUTPATIENT)
Dept: HEMATOLOGY/ONCOLOGY | Facility: CLINIC | Age: 35
End: 2020-10-02
Payer: COMMERCIAL

## 2020-10-02 DIAGNOSIS — M79.7 FIBROMYALGIA: ICD-10-CM

## 2020-10-02 DIAGNOSIS — K21.9 GASTROESOPHAGEAL REFLUX DISEASE WITHOUT ESOPHAGITIS: ICD-10-CM

## 2020-10-02 DIAGNOSIS — D64.89: Primary | ICD-10-CM

## 2020-10-02 DIAGNOSIS — R76.8 ANA POSITIVE: ICD-10-CM

## 2020-10-02 DIAGNOSIS — N92.0 MENORRHAGIA WITH REGULAR CYCLE: ICD-10-CM

## 2020-10-02 PROCEDURE — 99214 PR OFFICE/OUTPT VISIT, EST, LEVL IV, 30-39 MIN: ICD-10-PCS | Mod: 95,,, | Performed by: INTERNAL MEDICINE

## 2020-10-02 PROCEDURE — 99214 OFFICE O/P EST MOD 30 MIN: CPT | Mod: 95,,, | Performed by: INTERNAL MEDICINE

## 2020-10-02 NOTE — PROGRESS NOTES
The patient location is: in [place]  The chief complaint leading to consultation is: I feel worried      Visit type: synchronous audiovisual     Face to Face time with patient:        minutes of total time spent on the encounter, which includes face to face time and non-face to face time preparing to see the patient (eg, review of tests), Obtaining and/or reviewing separately obtained history, Documenting clinical information in the electronic or other health record, Independently interpreting results (not separately reported) and communicating results to the patient/family/caregiver, or Care coordination (not separately reported). More than 90% on medical discussion, counseling and coordination of care.  This visit is to also involve counseling, patient education, informed consent for ordered diagnostic and laboratory tests. Each patient to whom he or she provides medical services by telemedicine is:  (1) informed of the relationship between the physician and patient and the respective role of any other health care provider with respect to management of the patient; and (2) notified that he or she may decline to receive medical services by telemedicine and may withdraw from such care at any time.       CC:  I am tired and weak     This is a 35 year old female here for follow-up of anemia .  SHe was diagnosed with Covid pneumonia and had SOB. When she saw pulmonology she was found to be consistently low in hemoglobin. She has menorrhagia with increased clotting, Her periods usually last for seven days.      Anemia has been stable but not well controlled  but the patient has noticed worsening  CHICAS  over the past  weeks .  Migraines has been stable on a statin. The patient states compliance with all prescribed medications.   She received a blood transfusion in the hospital and is here today to follow-up on other lab results to assess her response to such.  She remains with intermittent shortness of breath she is also on  gabapentin to help with her headaches and Prilosec to help with GERD.  She does take an inhaler intermittently for asthma she reports no evidence of melena hematochezia no hematuria  On  October 19h she is getting a hysterectomy with ovaries  Medications reviewed including :    Current Outpatient Medications:     AJOVY AUTOINJECTOR 225 mg/1.5 mL autoinjector, INJECT 1.5 MG INTO THE SKIN Q MONTH, Disp: , Rfl:     albuterol (PROVENTIL HFA) 90 mcg/actuation inhaler, Inhale 2 puffs into the lungs every 6 (six) hours as needed for Wheezing or Shortness of Breath. Rescue, Disp: 18 g, Rfl: 11    docusate sodium (COLACE) 100 MG capsule, Take 1 capsule (100 mg total) by mouth 2 (two) times daily., Disp: 60 capsule, Rfl: 1    eletriptan (RELPAX) 40 MG tablet, Take 40 mg by mouth as needed. may repeat in 2 hours if necessary, Disp: , Rfl:     ferrous sulfate (FEOSOL) 325 mg (65 mg iron) Tab tablet, Take 1 tablet (325 mg total) by mouth 3 (three) times daily., Disp: 90 tablet, Rfl: 1    folic acid (FOLVITE) 1 MG tablet, Take 1 tablet (1 mg total) by mouth once daily., Disp: 90 tablet, Rfl: 2    gabapentin (NEURONTIN) 600 MG tablet, Take 600 mg by mouth 2 (two) times daily., Disp: , Rfl:     omeprazole (PRILOSEC) 20 MG capsule, Take 1 capsule (20 mg total) by mouth once daily. (Patient taking differently: Take 20 mg by mouth daily as needed (heartburn). ), Disp: 30 capsule, Rfl: 11    tiZANidine (ZANAFLEX) 4 MG tablet, Take 4 mg by mouth every evening. May take up to 3 tablets, Disp: , Rfl:     topiramate (TOPAMAX) 50 MG tablet, Take 50 mg by mouth every evening. , Disp: , Rfl:     venlafaxine (EFFEXOR-XR) 150 MG Cp24, Take 1 capsule (150 mg total) by mouth once daily., Disp: 30 capsule, Rfl: 4    Past Medical History:   Diagnosis Date    Asthma     Gestational diabetes     Migraine headache      Past Surgical History:   Procedure Laterality Date    SHOULDER SURGERY       Review of patient's allergies indicates:    Allergen Reactions    Nubain [nalbuphine] Hives     Family History   Problem Relation Age of Onset    Asthma Mother     Migraines Mother     Hypothyroidism Mother     Arthritis Mother     Migraines Father     Allergies Father     Chiari malformation Sister     Migraines Sister     Pancreatitis Maternal Grandmother     Migraines Maternal Grandmother     Thyroid disease Maternal Grandmother     Other Maternal Grandmother         pituitary gland issues    Heart failure Maternal Grandfather     Diabetes Maternal Grandfather     Kidney disease Maternal Grandfather     Alzheimer's disease Paternal Grandmother     Prostate cancer Paternal Grandfather      Social History     Tobacco Use    Smoking status: Never Smoker    Smokeless tobacco: Never Used   Substance Use Topics    Alcohol use: Yes     Frequency: Monthly or less     Drinks per session: 1 or 2     Binge frequency: Never     Comment: Martini every 6 months    Drug use: No         ROS: 14 point complete review of systems reviewed and otherwise negative except for pertinent positives above      CONSTITUTIONAL: No fevers, chills, night sweats, wt. loss, appetite changes  SKIN: no rashes or itching  ENT: No headaches, head trauma, vision changes, or eye pain  LYMPH NODES: None noticed   CV: No chest pain, palpitations.   RESP: No cough, wheezing, or hemoptysis  GI: No nausea, emesis, diarrhea, constipation, melena, hematochezia, pain.   : No dysuria, hematuria, urgency, or frequency   HEME: No easy bruising, bleeding problems  PSYCHIATRIC: No depression, anxiety, psychosis, hallucinations.  NEURO: No seizures, memory loss, dizziness or difficulty sleeping  MSK: No arthralgias or joint swelling      General patient is in no distress well developed well nourished  Psych pleasant affect no evidence of depression no evidence of anxiety  Head normocephalic atraumatic, no hoarseness,  well-spoken speech intact  Eyes noninjected sclera conjunctiva  appear pink  Face is symmetric  Skin intact no lesions noted no ecchymosis no rash  Neck with no limited range of motion no JVD evident  Chest with no use of accessory muscles no labored breathing no evidence of tachypnea  No respiratory distress, effort normal   Abdomen appears to be nondistended  Extremities with no cyanosis no evidence of edema  Neuro muscular cranial nerves appear grossly intact  Gait appears steady  No joint effusions  Behavior normal judgment normal    Labs reviewed: pertinent findings include  Lab Results   Component Value Date    WBC 9.44 09/24/2020    RBC 4.71 09/24/2020    HGB 10.7 (L) 09/24/2020    HCT 36.9 (L) 09/24/2020    MCV 78 (L) 09/24/2020    MCH 22.7 (L) 09/24/2020    MCHC 29.0 (L) 09/24/2020    RDW SEE COMMENT 09/24/2020     (H) 09/24/2020    MPV 9.4 09/24/2020    GRAN 6.7 09/24/2020    GRAN 70.6 09/24/2020    LYMPH 1.9 09/24/2020    LYMPH 20.1 09/24/2020    MONO 0.7 09/24/2020    MONO 7.1 09/24/2020    EOS 0.1 09/24/2020    BASO 0.06 09/24/2020    EOSINOPHIL 1.4 09/24/2020    BASOPHIL 0.6 09/24/2020     Lab Results   Component Value Date    IRON 78 09/11/2020    TIBC 471 (H) 09/11/2020    FERRITIN 29 09/16/2020     RAMSES positive titer 1:160 speckled pattern Thought to be due to Fibromyalgia  She sees neurology for migraines   Smear from September 16th revealing schistocytes and target cells and ovalocytes    Imaging reviewed: pertinent findings include     Assessment:     1.Anemia with menorrhagia  2.ANemia multifactorial : menorrhagia, positive ramses possibly AIHA and bone marrow suppression form medications   3.Schistocytes and target cells on smear in past : re check with ldh and haptoglobin   4.History of Migraines  which appears to be controlled  5. Iron deficiency  In past currently counts have normalized as of sept 16     Plan:     1.RTC 8 weeks with labs   2.See gyn and discuss treatment options to help prevent further menorrhagia  3.Repeat labs now   4.The  importance of diet pertaining to rich in iron was explained to the patient over 15 minutes  5. Surgery planned with gyn for October 19     I am ordering a more recent CBC with LDH and haptoglobin to truly check for possible hemolysis.  Her smear was not read by a hematopathologist and I will ask for another smear to be made in order for further assessment.  The patient is definitely not toxic she is not presenting as if she has an infection she has no evidence of renal failure and appears completely stable at this time will have a virtual visit to discuss results and further recs to follow.  I do appreciate her gyn taking her to surgery to try and help with menorrhagia.  I will make sure her labs are stable prior to her surgical date of October 19th  Advance directives and care plan have been reviewed , Patient reports no changes to such  Pt is encouraged to use My Chart and ask the staff for assistance in setting up during wrap up.    Recommend healthy living: no nicotine,  should avoid alcohol, healthy diet and regular exercise aiming for fitness, and weight control  1. Discussed healthy alcohol daily limit of 0.5 oz of pure alcohol in any 24 hours (roughly one 12-oz beers, 4 oz of wine (8%-12% alcohol), or 1.25 oz (half a shot) of liquor (80 proof)), can not save up.  2.   Discussed good exercise program, 4 key elements: 1. Aerobic (walking, swimming, dancing, jogging, biking, etc, 2. Muscle strengthening / resistance exercise, need to do 2-3 times  weekly, 3. Stretching daily, good stretch takes a whole  total minute. 4. Balance exercise daily.  3.   Discussed healthy diet for over 15 minutes with handouts given to the patient     Discussed COVID-19 and social distancing in great detail, avoid all non-essential visits out of the home if possible and avoid sick contacts.

## 2020-10-12 ENCOUNTER — PATIENT MESSAGE (OUTPATIENT)
Dept: HEMATOLOGY/ONCOLOGY | Facility: CLINIC | Age: 35
End: 2020-10-12

## 2020-10-13 ENCOUNTER — OFFICE VISIT (OUTPATIENT)
Dept: OBSTETRICS AND GYNECOLOGY | Facility: CLINIC | Age: 35
End: 2020-10-13
Payer: COMMERCIAL

## 2020-10-13 ENCOUNTER — PATIENT MESSAGE (OUTPATIENT)
Dept: HEMATOLOGY/ONCOLOGY | Facility: CLINIC | Age: 35
End: 2020-10-13

## 2020-10-13 ENCOUNTER — TELEPHONE (OUTPATIENT)
Dept: OBSTETRICS AND GYNECOLOGY | Facility: CLINIC | Age: 35
End: 2020-10-13

## 2020-10-13 VITALS
HEIGHT: 66 IN | WEIGHT: 176.56 LBS | DIASTOLIC BLOOD PRESSURE: 70 MMHG | RESPIRATION RATE: 18 BRPM | BODY MASS INDEX: 28.38 KG/M2 | SYSTOLIC BLOOD PRESSURE: 118 MMHG

## 2020-10-13 DIAGNOSIS — D21.9 FIBROIDS: ICD-10-CM

## 2020-10-13 DIAGNOSIS — Z01.818 PRE-OP EVALUATION: Primary | ICD-10-CM

## 2020-10-13 DIAGNOSIS — N92.0 MENORRHAGIA WITH REGULAR CYCLE: ICD-10-CM

## 2020-10-13 PROCEDURE — 99213 PR OFFICE/OUTPT VISIT, EST, LEVL III, 20-29 MIN: ICD-10-PCS | Mod: 57,S$GLB,, | Performed by: OBSTETRICS & GYNECOLOGY

## 2020-10-13 PROCEDURE — 99213 OFFICE O/P EST LOW 20 MIN: CPT | Mod: 57,S$GLB,, | Performed by: OBSTETRICS & GYNECOLOGY

## 2020-10-13 PROCEDURE — 3008F PR BODY MASS INDEX (BMI) DOCUMENTED: ICD-10-PCS | Mod: CPTII,S$GLB,, | Performed by: OBSTETRICS & GYNECOLOGY

## 2020-10-13 PROCEDURE — 99999 PR PBB SHADOW E&M-EST. PATIENT-LVL IV: CPT | Mod: PBBFAC,,, | Performed by: OBSTETRICS & GYNECOLOGY

## 2020-10-13 PROCEDURE — 99999 PR PBB SHADOW E&M-EST. PATIENT-LVL IV: ICD-10-PCS | Mod: PBBFAC,,, | Performed by: OBSTETRICS & GYNECOLOGY

## 2020-10-13 PROCEDURE — 3008F BODY MASS INDEX DOCD: CPT | Mod: CPTII,S$GLB,, | Performed by: OBSTETRICS & GYNECOLOGY

## 2020-10-13 RX ORDER — DEXTROSE, SODIUM CHLORIDE, SODIUM LACTATE, POTASSIUM CHLORIDE, AND CALCIUM CHLORIDE 5; .6; .31; .03; .02 G/100ML; G/100ML; G/100ML; G/100ML; G/100ML
INJECTION, SOLUTION INTRAVENOUS CONTINUOUS
Status: CANCELLED | OUTPATIENT
Start: 2020-10-13

## 2020-10-13 RX ORDER — CHLORHEXIDINE GLUCONATE ORAL RINSE 1.2 MG/ML
10 SOLUTION DENTAL
Status: CANCELLED | OUTPATIENT
Start: 2020-10-13

## 2020-10-13 RX ORDER — MUPIROCIN 20 MG/G
OINTMENT TOPICAL
Status: CANCELLED | OUTPATIENT
Start: 2020-10-13

## 2020-10-13 NOTE — TELEPHONE ENCOUNTER
Discussed with patient    ----- Message from Katherine Wheatley MA sent at 10/13/2020  4:02 PM CDT -----  Type: Needs Medical Advice  Who Called:  Subha Torres Call Back Number: [phone]  Additional Information: patient needs clarification on what time she needs to arrive for her procedure on Monday.  She states you can call or message through the portal

## 2020-10-13 NOTE — PROGRESS NOTES
ADMISSION H&P:         History of Present Illness   35 y.o.  female patient presents today for heavy menses with clots and flooding lasting 7 days each month > 7 years, h/o fibroids on ultrasound 3 years ago. No h/o abnormal PAP. Migraines and fibromyalgia.      Past medical and surgical history reviewed.   I have reviewed the patient's medical history in detail and updated the computerized patient record.          Review of patient's allergies indicates:   Allergen Reactions    Nubain [nalbuphine] Hives           OB History    No obstetric history on file.         Past Medical History:   Diagnosis Date    Asthma     Gestational diabetes     Migraine headache        Past Surgical History:   Procedure Laterality Date    SHOULDER SURGERY         Current Outpatient Medications on File Prior to Visit   Medication Sig Dispense Refill    AJOVY AUTOINJECTOR 225 mg/1.5 mL autoinjector INJECT 1.5 MG INTO THE SKIN Q MONTH      albuterol (PROVENTIL HFA) 90 mcg/actuation inhaler Inhale 2 puffs into the lungs every 6 (six) hours as needed for Wheezing or Shortness of Breath. Rescue 18 g 11    docusate sodium (COLACE) 100 MG capsule Take 1 capsule (100 mg total) by mouth 2 (two) times daily. 60 capsule 1    eletriptan (RELPAX) 40 MG tablet Take 40 mg by mouth as needed. may repeat in 2 hours if necessary      ferrous sulfate (FEOSOL) 325 mg (65 mg iron) Tab tablet Take 1 tablet (325 mg total) by mouth 3 (three) times daily. 90 tablet 1    folic acid (FOLVITE) 1 MG tablet Take 1 tablet (1 mg total) by mouth once daily. 90 tablet 2    gabapentin (NEURONTIN) 600 MG tablet Take 600 mg by mouth 2 (two) times daily.      tiZANidine (ZANAFLEX) 4 MG tablet Take 4 mg by mouth every evening. May take up to 3 tablets      topiramate (TOPAMAX) 50 MG tablet Take 50 mg by mouth every evening.       venlafaxine (EFFEXOR-XR) 150 MG Cp24 Take 1 capsule (150 mg total) by mouth once daily. 30 capsule 4    omeprazole  (PRILOSEC) 20 MG capsule Take 1 capsule (20 mg total) by mouth once daily. (Patient taking differently: Take 20 mg by mouth daily as needed (heartburn). ) 30 capsule 11     No current facility-administered medications on file prior to visit.        Review of patient's allergies indicates:   Allergen Reactions    Nubain [nalbuphine] Hives       Social History     Socioeconomic History    Marital status: Single     Spouse name: Not on file    Number of children: Not on file    Years of education: Not on file    Highest education level: Not on file   Occupational History    Not on file   Social Needs    Financial resource strain: Somewhat hard    Food insecurity     Worry: Never true     Inability: Never true    Transportation needs     Medical: No     Non-medical: No   Tobacco Use    Smoking status: Never Smoker    Smokeless tobacco: Never Used   Substance and Sexual Activity    Alcohol use: Yes     Frequency: Monthly or less     Drinks per session: 1 or 2     Binge frequency: Never     Comment: Martini every 6 months    Drug use: No    Sexual activity: Yes     Partners: Male   Lifestyle    Physical activity     Days per week: 3 days     Minutes per session: 50 min    Stress: Only a little   Relationships    Social connections     Talks on phone: More than three times a week     Gets together: More than three times a week     Attends Gnosticist service: Not on file     Active member of club or organization: Yes     Attends meetings of clubs or organizations: More than 4 times per year     Relationship status: Never    Other Topics Concern    Not on file   Social History Narrative    Not on file       Family History   Problem Relation Age of Onset    Asthma Mother     Migraines Mother     Hypothyroidism Mother     Arthritis Mother     Migraines Father     Allergies Father     Chiari malformation Sister     Migraines Sister     Pancreatitis Maternal Grandmother     Migraines Maternal  Grandmother     Thyroid disease Maternal Grandmother     Other Maternal Grandmother         pituitary gland issues    Heart failure Maternal Grandfather     Diabetes Maternal Grandfather     Kidney disease Maternal Grandfather     Alzheimer's disease Paternal Grandmother     Prostate cancer Paternal Grandfather           Review of Systems - Negative except HPI  GEN ROS: negative for - chills or fever  Breast ROS: negative for breast lumps  Genito-Urinary ROS: no dysuria, trouble voiding, or hematuria        Physical Examination:  /70   Wt 76.2 kg (167 lb 15.9 oz)   LMP 09/06/2020 (Exact Date)   BMI 27.11 kg/m²    Constitutional: She appears alert and responsive. She appears well-developed, well-groomed, and well-nourished. No distress. Normal weight  HENT:   Head: Normocephalic and atraumatic.   Eyes: Conjunctivae and EOM are normal. No scleral icterus.   Neck: Symmetrical. Normal range of motion. Neck supple. No tracheal deviation present. THYROID: without masses or tenderness.  Cardiovascular: Normal rate, no rhythm abnormality noted. Extremities without swelling or edema, warm.    Pulmonary/Chest: Normal respiratory Effort. No distress or retractions. She exhibits no tenderness.  Breasts: deferred  Abdominal: Soft. She exhibits no distension, hernias or masses. There is no tenderness. No enlargement of liver edge or spleen.  There is no rebound and no guarding.   Genitourinary:    External rectal exam shows no thrombosed external hemorrhoids, no lesions.     Pelvic exam was performed with patient supine.   No labial fusion, and symmetrical.    There is no rash, lesion or injury on the right labia.   There is no rash, lesion or injury on the left labia.   No bleeding and no signs of injury around the vaginal introitus, urethral meatus is normal size and without prolapse or lesions, urethra well supported. The cervix is visualized with no discharge, lesions or friability.   No vaginal discharge  found.    No significant Cystocele, Enterocele or rectocele, and cervix and uterus well supported.   Bimanual exam:   The urethra is normal to palpation and there are no palpable vaginal wall masses.   Uterus is not deviated, not enlarged, not fixed, normal shape and not tender.   Cervix exhibits no motion tenderness.    Right adnexum displays no mass or nodularity and no tenderness.   Left adnexum displays no mass or nodularity and no tenderness.  Musculoskeletal: Normal range of motion.   Lymphadenopathy: No inguinal adenopathy present.   Neurological: She is alert and oriented to person, place, and time. Coordination normal.   Skin: Skin is warm and dry. She is not diaphoretic. No rashes, lesions or ulcers.   Psychiatric: She has a normal mood and affect, oriented to person, place, and time.              Assessment:  Small fibroid uterus  Menorrhagia with anemia requiring transfusion  1. Pap smear for cervical cancer screening  Liquid-Based Pap Smear, Screening         Plan:  Counseled, interested in definitive surgery   recommended Total laparoscopic Hysterectomy with salpingectomy - OVARIAN CONSERVATION  PAP today  Patient informed will be contacted with results within 2 weeks. Encouraged to please call back or email if she has not heard from us by then.

## 2020-10-13 NOTE — H&P (VIEW-ONLY)
ADMISSION H&P:         History of Present Illness   35 y.o.  female patient presents today for heavy menses with clots and flooding lasting 7 days each month > 7 years, h/o fibroids on ultrasound 3 years ago. No h/o abnormal PAP. Migraines and fibromyalgia.      Past medical and surgical history reviewed.   I have reviewed the patient's medical history in detail and updated the computerized patient record.          Review of patient's allergies indicates:   Allergen Reactions    Nubain [nalbuphine] Hives           OB History    No obstetric history on file.         Past Medical History:   Diagnosis Date    Asthma     Gestational diabetes     Migraine headache        Past Surgical History:   Procedure Laterality Date    SHOULDER SURGERY         Current Outpatient Medications on File Prior to Visit   Medication Sig Dispense Refill    AJOVY AUTOINJECTOR 225 mg/1.5 mL autoinjector INJECT 1.5 MG INTO THE SKIN Q MONTH      albuterol (PROVENTIL HFA) 90 mcg/actuation inhaler Inhale 2 puffs into the lungs every 6 (six) hours as needed for Wheezing or Shortness of Breath. Rescue 18 g 11    docusate sodium (COLACE) 100 MG capsule Take 1 capsule (100 mg total) by mouth 2 (two) times daily. 60 capsule 1    eletriptan (RELPAX) 40 MG tablet Take 40 mg by mouth as needed. may repeat in 2 hours if necessary      ferrous sulfate (FEOSOL) 325 mg (65 mg iron) Tab tablet Take 1 tablet (325 mg total) by mouth 3 (three) times daily. 90 tablet 1    folic acid (FOLVITE) 1 MG tablet Take 1 tablet (1 mg total) by mouth once daily. 90 tablet 2    gabapentin (NEURONTIN) 600 MG tablet Take 600 mg by mouth 2 (two) times daily.      tiZANidine (ZANAFLEX) 4 MG tablet Take 4 mg by mouth every evening. May take up to 3 tablets      topiramate (TOPAMAX) 50 MG tablet Take 50 mg by mouth every evening.       venlafaxine (EFFEXOR-XR) 150 MG Cp24 Take 1 capsule (150 mg total) by mouth once daily. 30 capsule 4    omeprazole  (PRILOSEC) 20 MG capsule Take 1 capsule (20 mg total) by mouth once daily. (Patient taking differently: Take 20 mg by mouth daily as needed (heartburn). ) 30 capsule 11     No current facility-administered medications on file prior to visit.        Review of patient's allergies indicates:   Allergen Reactions    Nubain [nalbuphine] Hives       Social History     Socioeconomic History    Marital status: Single     Spouse name: Not on file    Number of children: Not on file    Years of education: Not on file    Highest education level: Not on file   Occupational History    Not on file   Social Needs    Financial resource strain: Somewhat hard    Food insecurity     Worry: Never true     Inability: Never true    Transportation needs     Medical: No     Non-medical: No   Tobacco Use    Smoking status: Never Smoker    Smokeless tobacco: Never Used   Substance and Sexual Activity    Alcohol use: Yes     Frequency: Monthly or less     Drinks per session: 1 or 2     Binge frequency: Never     Comment: Martini every 6 months    Drug use: No    Sexual activity: Yes     Partners: Male   Lifestyle    Physical activity     Days per week: 3 days     Minutes per session: 50 min    Stress: Only a little   Relationships    Social connections     Talks on phone: More than three times a week     Gets together: More than three times a week     Attends Islam service: Not on file     Active member of club or organization: Yes     Attends meetings of clubs or organizations: More than 4 times per year     Relationship status: Never    Other Topics Concern    Not on file   Social History Narrative    Not on file       Family History   Problem Relation Age of Onset    Asthma Mother     Migraines Mother     Hypothyroidism Mother     Arthritis Mother     Migraines Father     Allergies Father     Chiari malformation Sister     Migraines Sister     Pancreatitis Maternal Grandmother     Migraines Maternal  Grandmother     Thyroid disease Maternal Grandmother     Other Maternal Grandmother         pituitary gland issues    Heart failure Maternal Grandfather     Diabetes Maternal Grandfather     Kidney disease Maternal Grandfather     Alzheimer's disease Paternal Grandmother     Prostate cancer Paternal Grandfather           Review of Systems - Negative except HPI  GEN ROS: negative for - chills or fever  Breast ROS: negative for breast lumps  Genito-Urinary ROS: no dysuria, trouble voiding, or hematuria        Physical Examination:  /70   Wt 76.2 kg (167 lb 15.9 oz)   LMP 09/06/2020 (Exact Date)   BMI 27.11 kg/m²    Constitutional: She appears alert and responsive. She appears well-developed, well-groomed, and well-nourished. No distress. Normal weight  HENT:   Head: Normocephalic and atraumatic.   Eyes: Conjunctivae and EOM are normal. No scleral icterus.   Neck: Symmetrical. Normal range of motion. Neck supple. No tracheal deviation present. THYROID: without masses or tenderness.  Cardiovascular: Normal rate, no rhythm abnormality noted. Extremities without swelling or edema, warm.    Pulmonary/Chest: Normal respiratory Effort. No distress or retractions. She exhibits no tenderness.  Breasts: deferred  Abdominal: Soft. She exhibits no distension, hernias or masses. There is no tenderness. No enlargement of liver edge or spleen.  There is no rebound and no guarding.   Genitourinary:    External rectal exam shows no thrombosed external hemorrhoids, no lesions.     Pelvic exam was performed with patient supine.   No labial fusion, and symmetrical.    There is no rash, lesion or injury on the right labia.   There is no rash, lesion or injury on the left labia.   No bleeding and no signs of injury around the vaginal introitus, urethral meatus is normal size and without prolapse or lesions, urethra well supported. The cervix is visualized with no discharge, lesions or friability.   No vaginal discharge  found.    No significant Cystocele, Enterocele or rectocele, and cervix and uterus well supported.   Bimanual exam:   The urethra is normal to palpation and there are no palpable vaginal wall masses.   Uterus is not deviated, not enlarged, not fixed, normal shape and not tender.   Cervix exhibits no motion tenderness.    Right adnexum displays no mass or nodularity and no tenderness.   Left adnexum displays no mass or nodularity and no tenderness.  Musculoskeletal: Normal range of motion.   Lymphadenopathy: No inguinal adenopathy present.   Neurological: She is alert and oriented to person, place, and time. Coordination normal.   Skin: Skin is warm and dry. She is not diaphoretic. No rashes, lesions or ulcers.   Psychiatric: She has a normal mood and affect, oriented to person, place, and time.              Assessment:  Small fibroid uterus  Menorrhagia with anemia requiring transfusion  1. Pap smear for cervical cancer screening  Liquid-Based Pap Smear, Screening         Plan:  Counseled, interested in definitive surgery   recommended Total laparoscopic Hysterectomy with salpingectomy - OVARIAN CONSERVATION  PAP today  Patient informed will be contacted with results within 2 weeks. Encouraged to please call back or email if she has not heard from us by then.

## 2020-10-15 ENCOUNTER — LAB VISIT (OUTPATIENT)
Dept: LAB | Facility: HOSPITAL | Age: 35
End: 2020-10-15
Attending: OBSTETRICS & GYNECOLOGY
Payer: COMMERCIAL

## 2020-10-15 ENCOUNTER — TELEPHONE (OUTPATIENT)
Dept: HEMATOLOGY/ONCOLOGY | Facility: CLINIC | Age: 35
End: 2020-10-15

## 2020-10-15 ENCOUNTER — TELEPHONE (OUTPATIENT)
Dept: OBSTETRICS AND GYNECOLOGY | Facility: CLINIC | Age: 35
End: 2020-10-15

## 2020-10-15 DIAGNOSIS — Z01.818 PRE-OP EVALUATION: ICD-10-CM

## 2020-10-15 DIAGNOSIS — D64.89: ICD-10-CM

## 2020-10-15 DIAGNOSIS — D64.9 ANEMIA, UNSPECIFIED TYPE: ICD-10-CM

## 2020-10-15 LAB
ALBUMIN SERPL BCP-MCNC: 4 G/DL (ref 3.5–5.2)
ALP SERPL-CCNC: 96 U/L (ref 55–135)
ALT SERPL W/O P-5'-P-CCNC: 38 U/L (ref 10–44)
ANION GAP SERPL CALC-SCNC: 9 MMOL/L (ref 8–16)
ANISOCYTOSIS BLD QL SMEAR: ABNORMAL
AST SERPL-CCNC: 24 U/L (ref 10–40)
BASOPHILS # BLD AUTO: 0.06 K/UL (ref 0–0.2)
BASOPHILS NFR BLD: 0.9 % (ref 0–1.9)
BILIRUB SERPL-MCNC: 0.2 MG/DL (ref 0.1–1)
BUN SERPL-MCNC: 8 MG/DL (ref 6–20)
CALCIUM SERPL-MCNC: 9.4 MG/DL (ref 8.7–10.5)
CHLORIDE SERPL-SCNC: 105 MMOL/L (ref 95–110)
CO2 SERPL-SCNC: 26 MMOL/L (ref 23–29)
CREAT SERPL-MCNC: 0.7 MG/DL (ref 0.5–1.4)
DIFFERENTIAL METHOD: ABNORMAL
EOSINOPHIL # BLD AUTO: 0.1 K/UL (ref 0–0.5)
EOSINOPHIL NFR BLD: 2 % (ref 0–8)
ERYTHROCYTE [DISTWIDTH] IN BLOOD BY AUTOMATED COUNT: ABNORMAL % (ref 11.5–14.5)
EST. GFR  (AFRICAN AMERICAN): >60 ML/MIN/1.73 M^2
EST. GFR  (NON AFRICAN AMERICAN): >60 ML/MIN/1.73 M^2
FERRITIN SERPL-MCNC: 2 NG/ML (ref 20–300)
GLUCOSE SERPL-MCNC: 79 MG/DL (ref 70–110)
HCG INTACT+B SERPL-ACNC: <1.2 MIU/ML
HCT VFR BLD AUTO: 34.2 % (ref 37–48.5)
HGB BLD-MCNC: 10.1 G/DL (ref 12–16)
HYPOCHROMIA BLD QL SMEAR: ABNORMAL
IMM GRANULOCYTES # BLD AUTO: 0.02 K/UL (ref 0–0.04)
IMM GRANULOCYTES NFR BLD AUTO: 0.3 % (ref 0–0.5)
IRON SERPL-MCNC: 13 UG/DL (ref 30–160)
LYMPHOCYTES # BLD AUTO: 2.1 K/UL (ref 1–4.8)
LYMPHOCYTES NFR BLD: 30.4 % (ref 18–48)
MCH RBC QN AUTO: 24.1 PG (ref 27–31)
MCHC RBC AUTO-ENTMCNC: 29.5 G/DL (ref 32–36)
MCV RBC AUTO: 82 FL (ref 82–98)
MONOCYTES # BLD AUTO: 0.5 K/UL (ref 0.3–1)
MONOCYTES NFR BLD: 7.4 % (ref 4–15)
NEUTROPHILS # BLD AUTO: 4.2 K/UL (ref 1.8–7.7)
NEUTROPHILS NFR BLD: 59 % (ref 38–73)
NRBC BLD-RTO: 0 /100 WBC
PLATELET # BLD AUTO: 563 K/UL (ref 150–350)
PLATELET BLD QL SMEAR: ABNORMAL
PMV BLD AUTO: 9.3 FL (ref 9.2–12.9)
POTASSIUM SERPL-SCNC: 4 MMOL/L (ref 3.5–5.1)
PROT SERPL-MCNC: 8 G/DL (ref 6–8.4)
RBC # BLD AUTO: 4.19 M/UL (ref 4–5.4)
SATURATED IRON: 3 % (ref 20–50)
SODIUM SERPL-SCNC: 140 MMOL/L (ref 136–145)
TOTAL IRON BINDING CAPACITY: 484 UG/DL (ref 250–450)
TRANSFERRIN SERPL-MCNC: 327 MG/DL (ref 200–375)
WBC # BLD AUTO: 7.03 K/UL (ref 3.9–12.7)

## 2020-10-15 PROCEDURE — 84702 CHORIONIC GONADOTROPIN TEST: CPT

## 2020-10-15 PROCEDURE — 85025 COMPLETE CBC W/AUTO DIFF WBC: CPT

## 2020-10-15 PROCEDURE — 36415 COLL VENOUS BLD VENIPUNCTURE: CPT

## 2020-10-15 PROCEDURE — 83540 ASSAY OF IRON: CPT

## 2020-10-15 PROCEDURE — 80053 COMPREHEN METABOLIC PANEL: CPT

## 2020-10-15 PROCEDURE — 82728 ASSAY OF FERRITIN: CPT

## 2020-10-15 NOTE — TELEPHONE ENCOUNTER
Labs noted and patient called and notified of lab results and of the Ferritin results and stated they would call Dr. Arevalo and consult. Dr. Raya was notified as well. Patient stated would call us back after talking with Dr. Arevalo. Patient stated they appreciated the call back.

## 2020-10-15 NOTE — TELEPHONE ENCOUNTER
Patient having surgery on 10- and Dr. Walsh wanted us to view labs due to Patient HX. Please note labs that were drawn this day. Ferritin was Abnormal. Stated if Hemoglobin was less than 10 call Dr. Arevalo for blood transfusion it was 10.1.   Please advise.  Thank you.

## 2020-10-15 NOTE — TELEPHONE ENCOUNTER
S/w patient regarding abnormal lab values today.  Hemoglobin = 10.1 and ferrtin = 2.  Patient reports she is having laparascopic hysterectomy Monday, October 19th.    Discussed the above with Dr. Arevalo.  Per Dr. Arevalo, patient OK for surgery Monday.  Return to end of next week to discuss IV iron.  Per Dr. Arevalo, no labs needed prior to office visit.    Spoke with patient and informed of the above.  F/u appt to discuss IV iron scheduled 10/23/2020 @ 2 PM per patient request.  Patient verbalized understanding.  No further questions/concerns noted at this time.    ----- Message from Wen Blackmon sent at 10/15/2020 12:52 PM CDT -----  Contact: call pt [phone]    Type: Needs Medical Advice  Who Called: pt  Best Call Back Number: call pt [phone]  Additional Information:   pt is calling to   discuss   some  abnormal   lab // please call  for details

## 2020-10-15 NOTE — TELEPHONE ENCOUNTER
----- Message from Wen Blackmon sent at 10/15/2020 12:52 PM CDT -----  Contact: call pt [phone]    Type: Needs Medical Advice  Who Called: pt  Best Call Back Number: call pt [phone]  Additional Information:   pt is calling to   discuss   some  abnormal   lab // please call  for details

## 2020-10-16 ENCOUNTER — TELEPHONE (OUTPATIENT)
Dept: OBSTETRICS AND GYNECOLOGY | Facility: CLINIC | Age: 35
End: 2020-10-16

## 2020-10-16 ENCOUNTER — LAB VISIT (OUTPATIENT)
Dept: FAMILY MEDICINE | Facility: CLINIC | Age: 35
End: 2020-10-16
Payer: COMMERCIAL

## 2020-10-16 ENCOUNTER — ANESTHESIA EVENT (OUTPATIENT)
Dept: SURGERY | Facility: HOSPITAL | Age: 35
End: 2020-10-16
Payer: COMMERCIAL

## 2020-10-16 DIAGNOSIS — Z01.818 PRE-OP EVALUATION: ICD-10-CM

## 2020-10-16 PROCEDURE — U0003 INFECTIOUS AGENT DETECTION BY NUCLEIC ACID (DNA OR RNA); SEVERE ACUTE RESPIRATORY SYNDROME CORONAVIRUS 2 (SARS-COV-2) (CORONAVIRUS DISEASE [COVID-19]), AMPLIFIED PROBE TECHNIQUE, MAKING USE OF HIGH THROUGHPUT TECHNOLOGIES AS DESCRIBED BY CMS-2020-01-R: HCPCS

## 2020-10-16 RX ORDER — SODIUM CHLORIDE 0.9 % (FLUSH) 0.9 %
10 SYRINGE (ML) INJECTION
Status: CANCELLED | OUTPATIENT
Start: 2020-10-16

## 2020-10-16 RX ORDER — HEPARIN 100 UNIT/ML
5 SYRINGE INTRAVENOUS
Status: CANCELLED | OUTPATIENT
Start: 2020-10-16

## 2020-10-16 RX ORDER — SODIUM CHLORIDE 9 MG/ML
INJECTION, SOLUTION INTRAVENOUS CONTINUOUS
Status: CANCELLED | OUTPATIENT
Start: 2020-10-16

## 2020-10-16 NOTE — TELEPHONE ENCOUNTER
Called patient and went over second page of questions for pre surgery. Asked patient when they were tested Positive for Covid and they stated all test came back Negative but was treated for Covid infection. Patient stated was treated in March for Covid but started feeling better around April the 15th. Second page sent in [place].

## 2020-10-16 NOTE — PROGRESS NOTES
Pt called and explained to nurse she is having surgery   I reviewed her labs from her gyn   and  She will need two doses of weekly IV iron starting next Friday then RTC 6 weeks with cbc and iron levels     Orders sent to TO  [facility]  [facility]  And [facility]

## 2020-10-16 NOTE — Clinical Note
Please auth two doses of iron IV as her hemoglobin decreased again from bleeding , try to set up first dose next Friday and notify pt then RTC 6 weeks with cbc and iron levels

## 2020-10-17 LAB — SARS-COV-2 RNA RESP QL NAA+PROBE: NOT DETECTED

## 2020-10-19 ENCOUNTER — HOSPITAL ENCOUNTER (OUTPATIENT)
Facility: HOSPITAL | Age: 35
Discharge: HOME | End: 2020-10-19
Attending: OBSTETRICS & GYNECOLOGY | Admitting: OBSTETRICS & GYNECOLOGY
Payer: COMMERCIAL

## 2020-10-19 ENCOUNTER — ANESTHESIA (OUTPATIENT)
Dept: SURGERY | Facility: HOSPITAL | Age: 35
End: 2020-10-19
Payer: COMMERCIAL

## 2020-10-19 ENCOUNTER — PATIENT MESSAGE (OUTPATIENT)
Dept: HEMATOLOGY/ONCOLOGY | Facility: CLINIC | Age: 35
End: 2020-10-19

## 2020-10-19 VITALS
WEIGHT: 176 LBS | RESPIRATION RATE: 19 BRPM | OXYGEN SATURATION: 95 % | HEIGHT: 66 IN | SYSTOLIC BLOOD PRESSURE: 99 MMHG | HEART RATE: 64 BPM | DIASTOLIC BLOOD PRESSURE: 54 MMHG | BODY MASS INDEX: 28.28 KG/M2 | TEMPERATURE: 97 F

## 2020-10-19 DIAGNOSIS — N92.0 MENORRHAGIA WITH REGULAR CYCLE: ICD-10-CM

## 2020-10-19 DIAGNOSIS — D21.9 FIBROIDS: Primary | ICD-10-CM

## 2020-10-19 DIAGNOSIS — Z01.818 PRE-OP EVALUATION: ICD-10-CM

## 2020-10-19 LAB
B-HCG UR QL: NEGATIVE
CTP QC/QA: YES

## 2020-10-19 PROCEDURE — D9220A PRA ANESTHESIA: Mod: ANES,,, | Performed by: ANESTHESIOLOGY

## 2020-10-19 PROCEDURE — 88307 PR  SURG PATH,LEVEL V: ICD-10-PCS | Mod: 26,,, | Performed by: PATHOLOGY

## 2020-10-19 PROCEDURE — 25000003 PHARM REV CODE 250: Mod: PO | Performed by: OBSTETRICS & GYNECOLOGY

## 2020-10-19 PROCEDURE — 27201423 OPTIME MED/SURG SUP & DEVICES STERILE SUPPLY: Mod: PO | Performed by: OBSTETRICS & GYNECOLOGY

## 2020-10-19 PROCEDURE — 88307 TISSUE EXAM BY PATHOLOGIST: CPT | Mod: 26,,, | Performed by: PATHOLOGY

## 2020-10-19 PROCEDURE — 71000016 HC POSTOP RECOV ADDL HR: Mod: PO | Performed by: OBSTETRICS & GYNECOLOGY

## 2020-10-19 PROCEDURE — 58571 TLH W/T/O 250 G OR LESS: CPT | Mod: ,,, | Performed by: OBSTETRICS & GYNECOLOGY

## 2020-10-19 PROCEDURE — 81025 URINE PREGNANCY TEST: CPT | Mod: PO | Performed by: ANESTHESIOLOGY

## 2020-10-19 PROCEDURE — 11982 REMOVE DRUG IMPLANT DEVICE: CPT | Mod: 51,,, | Performed by: OBSTETRICS & GYNECOLOGY

## 2020-10-19 PROCEDURE — 37000009 HC ANESTHESIA EA ADD 15 MINS: Mod: PO | Performed by: OBSTETRICS & GYNECOLOGY

## 2020-10-19 PROCEDURE — 88300 PR  SURG PATH,GROSS,LEVEL I: ICD-10-PCS | Mod: 26,,, | Performed by: PATHOLOGY

## 2020-10-19 PROCEDURE — 25000003 PHARM REV CODE 250: Mod: PO | Performed by: ANESTHESIOLOGY

## 2020-10-19 PROCEDURE — 25000003 PHARM REV CODE 250: Mod: PO | Performed by: NURSE ANESTHETIST, CERTIFIED REGISTERED

## 2020-10-19 PROCEDURE — 71000033 HC RECOVERY, INTIAL HOUR: Mod: PO | Performed by: OBSTETRICS & GYNECOLOGY

## 2020-10-19 PROCEDURE — D9220A PRA ANESTHESIA: Mod: CRNA,,, | Performed by: NURSE ANESTHETIST, CERTIFIED REGISTERED

## 2020-10-19 PROCEDURE — 58571 PR LAPAROSCOPY W TOT HYSTERECTUTERUS <=250 GRAM  W TUBE/OVARY: ICD-10-PCS | Mod: ,,, | Performed by: OBSTETRICS & GYNECOLOGY

## 2020-10-19 PROCEDURE — 36000711: Mod: PO | Performed by: OBSTETRICS & GYNECOLOGY

## 2020-10-19 PROCEDURE — 63600175 PHARM REV CODE 636 W HCPCS: Mod: PO | Performed by: NURSE ANESTHETIST, CERTIFIED REGISTERED

## 2020-10-19 PROCEDURE — 36000710: Mod: PO | Performed by: OBSTETRICS & GYNECOLOGY

## 2020-10-19 PROCEDURE — 63600175 PHARM REV CODE 636 W HCPCS: Mod: PO | Performed by: ANESTHESIOLOGY

## 2020-10-19 PROCEDURE — 88307 TISSUE EXAM BY PATHOLOGIST: CPT | Performed by: PATHOLOGY

## 2020-10-19 PROCEDURE — 88300 SURGICAL PATH GROSS: CPT | Mod: 26,,, | Performed by: PATHOLOGY

## 2020-10-19 PROCEDURE — 63600175 PHARM REV CODE 636 W HCPCS: Mod: PO | Performed by: OBSTETRICS & GYNECOLOGY

## 2020-10-19 PROCEDURE — 11982 PR REMOVAL DRUG IMPLANT DEVICE: ICD-10-PCS | Mod: 51,,, | Performed by: OBSTETRICS & GYNECOLOGY

## 2020-10-19 PROCEDURE — D9220A PRA ANESTHESIA: ICD-10-PCS | Mod: CRNA,,, | Performed by: NURSE ANESTHETIST, CERTIFIED REGISTERED

## 2020-10-19 PROCEDURE — D9220A PRA ANESTHESIA: ICD-10-PCS | Mod: ANES,,, | Performed by: ANESTHESIOLOGY

## 2020-10-19 PROCEDURE — 71000039 HC RECOVERY, EACH ADD'L HOUR: Mod: PO | Performed by: OBSTETRICS & GYNECOLOGY

## 2020-10-19 PROCEDURE — 37000008 HC ANESTHESIA 1ST 15 MINUTES: Mod: PO | Performed by: OBSTETRICS & GYNECOLOGY

## 2020-10-19 PROCEDURE — 71000015 HC POSTOP RECOV 1ST HR: Mod: PO | Performed by: OBSTETRICS & GYNECOLOGY

## 2020-10-19 PROCEDURE — C2628 CATHETER, OCCLUSION: HCPCS | Mod: PO | Performed by: OBSTETRICS & GYNECOLOGY

## 2020-10-19 PROCEDURE — 88300 SURGICAL PATH GROSS: CPT | Performed by: PATHOLOGY

## 2020-10-19 RX ORDER — KETOROLAC TROMETHAMINE 30 MG/ML
INJECTION, SOLUTION INTRAMUSCULAR; INTRAVENOUS
Status: DISCONTINUED | OUTPATIENT
Start: 2020-10-19 | End: 2020-10-19

## 2020-10-19 RX ORDER — IBUPROFEN 800 MG/1
800 TABLET ORAL EVERY 8 HOURS PRN
Qty: 30 TABLET | Refills: 1 | Status: SHIPPED | OUTPATIENT
Start: 2020-10-19 | End: 2022-05-05

## 2020-10-19 RX ORDER — ACETAMINOPHEN 10 MG/ML
INJECTION, SOLUTION INTRAVENOUS
Status: DISCONTINUED | OUTPATIENT
Start: 2020-10-19 | End: 2020-10-19

## 2020-10-19 RX ORDER — DEXAMETHASONE SODIUM PHOSPHATE 4 MG/ML
INJECTION, SOLUTION INTRA-ARTICULAR; INTRALESIONAL; INTRAMUSCULAR; INTRAVENOUS; SOFT TISSUE
Status: DISCONTINUED | OUTPATIENT
Start: 2020-10-19 | End: 2020-10-19

## 2020-10-19 RX ORDER — AMOXICILLIN 250 MG
1 CAPSULE ORAL 2 TIMES DAILY
Status: DISCONTINUED | OUTPATIENT
Start: 2020-10-19 | End: 2020-10-19 | Stop reason: HOSPADM

## 2020-10-19 RX ORDER — ONDANSETRON 4 MG/1
8 TABLET, ORALLY DISINTEGRATING ORAL EVERY 8 HOURS PRN
Qty: 12 TABLET | Refills: 0 | Status: SHIPPED | OUTPATIENT
Start: 2020-10-19 | End: 2022-05-05

## 2020-10-19 RX ORDER — POLYETHYLENE GLYCOL 3350 17 G/17G
17 POWDER, FOR SOLUTION ORAL DAILY
Qty: 240 G | Refills: 1 | Status: SHIPPED | OUTPATIENT
Start: 2020-10-19 | End: 2022-05-05

## 2020-10-19 RX ORDER — ONDANSETRON 8 MG/1
8 TABLET, ORALLY DISINTEGRATING ORAL EVERY 8 HOURS PRN
Status: DISCONTINUED | OUTPATIENT
Start: 2020-10-19 | End: 2020-10-19 | Stop reason: HOSPADM

## 2020-10-19 RX ORDER — SODIUM CHLORIDE, SODIUM LACTATE, POTASSIUM CHLORIDE, CALCIUM CHLORIDE 600; 310; 30; 20 MG/100ML; MG/100ML; MG/100ML; MG/100ML
500 INJECTION, SOLUTION INTRAVENOUS ONCE
Status: DISCONTINUED | OUTPATIENT
Start: 2020-10-19 | End: 2020-10-19 | Stop reason: HOSPADM

## 2020-10-19 RX ORDER — SODIUM CHLORIDE, SODIUM LACTATE, POTASSIUM CHLORIDE, CALCIUM CHLORIDE 600; 310; 30; 20 MG/100ML; MG/100ML; MG/100ML; MG/100ML
INJECTION, SOLUTION INTRAVENOUS CONTINUOUS
Status: DISCONTINUED | OUTPATIENT
Start: 2020-10-19 | End: 2020-10-19 | Stop reason: HOSPADM

## 2020-10-19 RX ORDER — PROPOFOL 10 MG/ML
VIAL (ML) INTRAVENOUS
Status: DISCONTINUED | OUTPATIENT
Start: 2020-10-19 | End: 2020-10-19

## 2020-10-19 RX ORDER — CHLORHEXIDINE GLUCONATE ORAL RINSE 1.2 MG/ML
10 SOLUTION DENTAL
Status: DISCONTINUED | OUTPATIENT
Start: 2020-10-19 | End: 2020-10-19 | Stop reason: HOSPADM

## 2020-10-19 RX ORDER — SODIUM CHLORIDE 0.9 % (FLUSH) 0.9 %
3 SYRINGE (ML) INJECTION
Status: DISCONTINUED | OUTPATIENT
Start: 2020-10-19 | End: 2020-10-19 | Stop reason: HOSPADM

## 2020-10-19 RX ORDER — CEFAZOLIN SODIUM 2 G/50ML
2 SOLUTION INTRAVENOUS
Status: DISCONTINUED | OUTPATIENT
Start: 2020-10-19 | End: 2020-10-19 | Stop reason: HOSPADM

## 2020-10-19 RX ORDER — ONDANSETRON 2 MG/ML
INJECTION INTRAMUSCULAR; INTRAVENOUS
Status: DISCONTINUED | OUTPATIENT
Start: 2020-10-19 | End: 2020-10-19

## 2020-10-19 RX ORDER — IBUPROFEN 400 MG/1
800 TABLET ORAL EVERY 8 HOURS PRN
Status: DISCONTINUED | OUTPATIENT
Start: 2020-10-19 | End: 2020-10-19 | Stop reason: HOSPADM

## 2020-10-19 RX ORDER — HYDROMORPHONE HYDROCHLORIDE 2 MG/ML
1 INJECTION, SOLUTION INTRAMUSCULAR; INTRAVENOUS; SUBCUTANEOUS EVERY 6 HOURS PRN
Status: DISCONTINUED | OUTPATIENT
Start: 2020-10-19 | End: 2020-10-19 | Stop reason: HOSPADM

## 2020-10-19 RX ORDER — MUPIROCIN 20 MG/G
OINTMENT TOPICAL
Status: DISCONTINUED | OUTPATIENT
Start: 2020-10-19 | End: 2020-10-19 | Stop reason: HOSPADM

## 2020-10-19 RX ORDER — FENTANYL CITRATE 50 UG/ML
INJECTION, SOLUTION INTRAMUSCULAR; INTRAVENOUS
Status: DISCONTINUED | OUTPATIENT
Start: 2020-10-19 | End: 2020-10-19

## 2020-10-19 RX ORDER — OXYCODONE AND ACETAMINOPHEN 5; 325 MG/1; MG/1
1 TABLET ORAL EVERY 4 HOURS PRN
Qty: 14 TABLET | Refills: 0 | Status: SHIPPED | OUTPATIENT
Start: 2020-10-19 | End: 2022-05-05

## 2020-10-19 RX ORDER — LIDOCAINE HYDROCHLORIDE 10 MG/ML
1 INJECTION, SOLUTION EPIDURAL; INFILTRATION; INTRACAUDAL; PERINEURAL ONCE
Status: DISCONTINUED | OUTPATIENT
Start: 2020-10-19 | End: 2020-10-19 | Stop reason: HOSPADM

## 2020-10-19 RX ORDER — NEOSTIGMINE METHYLSULFATE 1 MG/ML
INJECTION, SOLUTION INTRAVENOUS
Status: DISCONTINUED | OUTPATIENT
Start: 2020-10-19 | End: 2020-10-19

## 2020-10-19 RX ORDER — SCOLOPAMINE TRANSDERMAL SYSTEM 1 MG/1
1 PATCH, EXTENDED RELEASE TRANSDERMAL
Status: DISCONTINUED | OUTPATIENT
Start: 2020-10-19 | End: 2020-10-19 | Stop reason: HOSPADM

## 2020-10-19 RX ORDER — OXYCODONE HYDROCHLORIDE 5 MG/1
5 TABLET ORAL ONCE AS NEEDED
Status: COMPLETED | OUTPATIENT
Start: 2020-10-19 | End: 2020-10-19

## 2020-10-19 RX ORDER — LIDOCAINE HYDROCHLORIDE 20 MG/ML
INJECTION INTRAVENOUS
Status: DISCONTINUED | OUTPATIENT
Start: 2020-10-19 | End: 2020-10-19

## 2020-10-19 RX ORDER — SODIUM CHLORIDE 0.9 % (FLUSH) 0.9 %
3 SYRINGE (ML) INJECTION EVERY 8 HOURS
Status: DISCONTINUED | OUTPATIENT
Start: 2020-10-19 | End: 2020-10-19 | Stop reason: HOSPADM

## 2020-10-19 RX ORDER — DEXTROSE, SODIUM CHLORIDE, SODIUM LACTATE, POTASSIUM CHLORIDE, AND CALCIUM CHLORIDE 5; .6; .31; .03; .02 G/100ML; G/100ML; G/100ML; G/100ML; G/100ML
INJECTION, SOLUTION INTRAVENOUS CONTINUOUS
Status: DISCONTINUED | OUTPATIENT
Start: 2020-10-19 | End: 2020-10-19 | Stop reason: HOSPADM

## 2020-10-19 RX ORDER — HYDROCODONE BITARTRATE AND ACETAMINOPHEN 5; 325 MG/1; MG/1
1 TABLET ORAL EVERY 4 HOURS PRN
Status: DISCONTINUED | OUTPATIENT
Start: 2020-10-19 | End: 2020-10-19 | Stop reason: HOSPADM

## 2020-10-19 RX ORDER — ROCURONIUM BROMIDE 10 MG/ML
INJECTION, SOLUTION INTRAVENOUS
Status: DISCONTINUED | OUTPATIENT
Start: 2020-10-19 | End: 2020-10-19

## 2020-10-19 RX ORDER — CEFAZOLIN SODIUM 1 G/3ML
INJECTION, POWDER, FOR SOLUTION INTRAMUSCULAR; INTRAVENOUS
Status: DISCONTINUED | OUTPATIENT
Start: 2020-10-19 | End: 2020-10-19

## 2020-10-19 RX ORDER — BUPIVACAINE HYDROCHLORIDE AND EPINEPHRINE 2.5; 5 MG/ML; UG/ML
INJECTION, SOLUTION EPIDURAL; INFILTRATION; INTRACAUDAL; PERINEURAL
Status: DISCONTINUED | OUTPATIENT
Start: 2020-10-19 | End: 2020-10-19 | Stop reason: HOSPADM

## 2020-10-19 RX ORDER — KETAMINE HCL IN 0.9 % NACL 50 MG/5 ML
SYRINGE (ML) INTRAVENOUS
Status: DISCONTINUED | OUTPATIENT
Start: 2020-10-19 | End: 2020-10-19

## 2020-10-19 RX ORDER — FENTANYL CITRATE 50 UG/ML
25 INJECTION, SOLUTION INTRAMUSCULAR; INTRAVENOUS EVERY 5 MIN PRN
Status: COMPLETED | OUTPATIENT
Start: 2020-10-19 | End: 2020-10-19

## 2020-10-19 RX ORDER — MIDAZOLAM HYDROCHLORIDE 1 MG/ML
INJECTION, SOLUTION INTRAMUSCULAR; INTRAVENOUS
Status: DISCONTINUED | OUTPATIENT
Start: 2020-10-19 | End: 2020-10-19

## 2020-10-19 RX ORDER — DIPHENHYDRAMINE HCL 50 MG
50 CAPSULE ORAL EVERY 4 HOURS PRN
Status: DISCONTINUED | OUTPATIENT
Start: 2020-10-19 | End: 2020-10-19 | Stop reason: HOSPADM

## 2020-10-19 RX ADMIN — OXYCODONE 5 MG: 5 TABLET ORAL at 10:10

## 2020-10-19 RX ADMIN — PROPOFOL 180 MG: 10 INJECTION, EMULSION INTRAVENOUS at 07:10

## 2020-10-19 RX ADMIN — NEOSTIGMINE METHYLSULFATE 4 MG: 1 INJECTION INTRAVENOUS at 08:10

## 2020-10-19 RX ADMIN — ROCURONIUM BROMIDE 40 MG: 10 INJECTION, SOLUTION INTRAVENOUS at 07:10

## 2020-10-19 RX ADMIN — FENTANYL CITRATE 25 MCG: 50 INJECTION, SOLUTION INTRAMUSCULAR; INTRAVENOUS at 08:10

## 2020-10-19 RX ADMIN — SODIUM CHLORIDE, SODIUM LACTATE, POTASSIUM CHLORIDE, AND CALCIUM CHLORIDE: .6; .31; .03; .02 INJECTION, SOLUTION INTRAVENOUS at 06:10

## 2020-10-19 RX ADMIN — CEFAZOLIN 2 G: 1 INJECTION, POWDER, FOR SOLUTION INTRAVENOUS at 07:10

## 2020-10-19 RX ADMIN — ONDANSETRON 4 MG: 2 INJECTION, SOLUTION INTRAMUSCULAR; INTRAVENOUS at 07:10

## 2020-10-19 RX ADMIN — FENTANYL CITRATE 25 MCG: 50 INJECTION, SOLUTION INTRAMUSCULAR; INTRAVENOUS at 09:10

## 2020-10-19 RX ADMIN — IBUPROFEN 800 MG: 400 TABLET, FILM COATED ORAL at 02:10

## 2020-10-19 RX ADMIN — ACETAMINOPHEN 1000 MG: 10 INJECTION, SOLUTION INTRAVENOUS at 07:10

## 2020-10-19 RX ADMIN — FENTANYL CITRATE 50 MCG: 50 INJECTION, SOLUTION INTRAMUSCULAR; INTRAVENOUS at 07:10

## 2020-10-19 RX ADMIN — SODIUM CHLORIDE, SODIUM LACTATE, POTASSIUM CHLORIDE, AND CALCIUM CHLORIDE: .6; .31; .03; .02 INJECTION, SOLUTION INTRAVENOUS at 07:10

## 2020-10-19 RX ADMIN — OXYCODONE 5 MG: 5 TABLET ORAL at 09:10

## 2020-10-19 RX ADMIN — MIDAZOLAM 2 MG: 1 INJECTION INTRAMUSCULAR; INTRAVENOUS at 07:10

## 2020-10-19 RX ADMIN — HYDROCODONE BITARTRATE AND ACETAMINOPHEN 1 TABLET: 5; 325 TABLET ORAL at 03:10

## 2020-10-19 RX ADMIN — DEXAMETHASONE SODIUM PHOSPHATE 8 MG: 4 INJECTION, SOLUTION INTRAMUSCULAR; INTRAVENOUS at 07:10

## 2020-10-19 RX ADMIN — SCOPALAMINE 1 PATCH: 1 PATCH, EXTENDED RELEASE TRANSDERMAL at 06:10

## 2020-10-19 RX ADMIN — KETOROLAC TROMETHAMINE 30 MG: 30 INJECTION, SOLUTION INTRAMUSCULAR; INTRAVENOUS at 08:10

## 2020-10-19 RX ADMIN — HYDROCODONE BITARTRATE AND ACETAMINOPHEN 1 TABLET: 5; 325 TABLET ORAL at 12:10

## 2020-10-19 RX ADMIN — ROCURONIUM BROMIDE 10 MG: 10 INJECTION, SOLUTION INTRAVENOUS at 07:10

## 2020-10-19 RX ADMIN — LIDOCAINE HYDROCHLORIDE 100 MG: 20 INJECTION, SOLUTION INTRAVENOUS at 07:10

## 2020-10-19 RX ADMIN — PROMETHAZINE HYDROCHLORIDE 6.25 MG: 25 INJECTION INTRAMUSCULAR; INTRAVENOUS at 08:10

## 2020-10-19 RX ADMIN — HYDROMORPHONE HYDROCHLORIDE 1 MG: 2 INJECTION INTRAMUSCULAR; INTRAVENOUS; SUBCUTANEOUS at 10:10

## 2020-10-19 RX ADMIN — Medication 25 MG: at 07:10

## 2020-10-19 RX ADMIN — MUPIROCIN: 20 OINTMENT TOPICAL at 06:10

## 2020-10-19 NOTE — PLAN OF CARE
Pt care reassumed from GREGORY Atkinson RN.  Pt called out from restroom to verbalize inability to void at this time and feeling hot and clammy.  Pt assisted to ambulate back to room with stanby assist.  Pt VS taken and noted to be stable patient provided a cool cloth and IV fluids running.

## 2020-10-19 NOTE — DISCHARGE INSTRUCTIONS
VAGINAL HYSTERECTOMY    DOS:   Minimal activity for 48 hours.    Advance diet as tolerated   Expect some vaginal spotting only.   Keep incisions clean and dry if applies.   Showers only for 6 weeks and pat incisions dry.   Resume home medications as prescribed    DONT:   No lifting more than 30 pounds for 3 weeks.    Nothing in the vagina for 6 weeks. No tampons, douching or intercourse.   No driving for 2 weeks or while taking narcotic pain medication   Before driving, be sure you can press the brakes all the way to the floor without difficulty or pain.   DO NOT TAKE ADDITIONAL TYLENOL/ACETAMINOPHEN WHILE TAKING NARCOTIC PAIN MEDICATION THAT CONTAINS TYLENOL/ACETAMINOPHEN.    CALL PHYSICIAN FOR:   Heavy vaginal bleeding .   Fever>100.5   Nausea and vomiting lasting > 6 hours   Redness, swelling, or drainage from incisions   Abdominal pain not improving day to day.

## 2020-10-19 NOTE — ANESTHESIA PREPROCEDURE EVALUATION
10/19/2020  Subha Bach is a 35 y.o., female.    Anesthesia Evaluation          Review of Systems  Anesthesia Hx:  Hx of Anesthetic complications PONV   Social:  Non-Smoker   Cardiovascular:   Exercise tolerance: good Denies MI.  Denies CAD.    Denies CABG/stent.  CHICAS    Pulmonary:   Asthma Shortness of breath    Renal/:  Renal/ Normal     Hepatic/GI:   GERD, well controlled    Neurological:   Denies CVA. Neuromuscular Disease,  Headaches Denies Seizures. Fibromyalgai  Chronic Pain Syndrome   Endocrine:  Endocrine Normal        Physical Exam  General:  Well nourished    Airway/Jaw/Neck:  Airway Findings: Mouth Opening: Normal Tongue: Normal  General Airway Assessment: Adult  Mallampati: II  Improves to I with phonation.  TM Distance: Normal, at least 6 cm       Chest/Lungs:  Chest/Lungs Clear    Heart/Vascular:  Heart Findings: Normal            Anesthesia Plan  Type of Anesthesia, risks & benefits discussed:  Anesthesia Type:  general  Patient's Preference:   Intra-op Monitoring Plan: standard ASA monitors  Intra-op Monitoring Plan Comments:   Post Op Pain Control Plan: multimodal analgesia and IV/PO Opioids PRN  Post Op Pain Control Plan Comments:   Induction:   IV  Beta Blocker:  Patient is not currently on a Beta-Blocker (No further documentation required).       Informed Consent: Patient understands risks and agrees with Anesthesia plan.  Questions answered. Anesthesia consent signed with patient.  ASA Score: 2     Day of Surgery Review of History & Physical:    H&P update referred to the surgeon.     Anesthesia Plan Notes: I have personally evaluated the patient and discussed risk/benefits/alternatives of general anesthesia.        Ready For Surgery From Anesthesia Perspective.

## 2020-10-19 NOTE — ANESTHESIA PROCEDURE NOTES
Intubation  Performed by: Makayla Henriquez CRNA  Authorized by: Cedric Banegas MD     Intubation:     Induction:  Intravenous    Intubated:  Postinduction    Mask Ventilation:  Easy mask    Attempts:  1    Attempted By:  CRNA    Method of Intubation:  Direct    Laryngeal View Grade: Grade I - full view of chords      Difficult Airway Encountered?: No      Airway Device:  Oral endotracheal tube    Airway Device Size:  7.0    Style/Cuff Inflation:  Cuffed    Secured at:  The lips    Placement Verified By:  Capnometry    Complicating Factors:  None    Findings Post-Intubation:  BS equal bilateral

## 2020-10-19 NOTE — TRANSFER OF CARE
"Anesthesia Transfer of Care Note    Patient: Subha Bach    Procedure(s) Performed: Procedure(s) (LRB):  HYSTERECTOMY, TOTAL, LAPAROSCOPIC (N/A)  SALPINGECTOMY, LAPAROSCOPIC (Bilateral)    Patient location: PACU    Anesthesia Type: general    Transport from OR: Transported from OR on room air with adequate spontaneous ventilation    Post pain: adequate analgesia    Post assessment: no apparent anesthetic complications and tolerated procedure well    Post vital signs: stable    Level of consciousness: awake    Nausea/Vomiting: no nausea/vomiting    Complications: none    Transfer of care protocol was followed      Last vitals:   Visit Vitals  /78   Pulse 76   Temp 36.7 °C (98.1 °F) (Skin)   Resp 16   Ht 5' 6" (1.676 m)   Wt 79.8 kg (176 lb)   LMP 10/13/2020   SpO2 99%   Breastfeeding No   BMI 28.41 kg/m²     "

## 2020-10-19 NOTE — PLAN OF CARE
Pt martinez cath d/c'd.  Tip and balloon intact.  900cc clear, yellow urine emptied from martinez bag.

## 2020-10-19 NOTE — OP NOTE
Operative Report:  10/19/2020  Hospital: [facility]    SURGEON: Yonas Walsh M.D., F.A.C.O.G.    ASSISTANT: Claudia Cabrera MD    PREOPERATIVE DIAGNOSIS:   Menorrhagia, anemia  Patient Active Problem List   Diagnosis    Gastroesophageal reflux disease without esophagitis    Migraine without aura and without status migrainosus, not intractable    Menorrhagia    Microcytic anemia    CHICAS (dyspnea on exertion)    Mild intermittent asthma without complication    Symptomatic anemia    Iron deficiency anemia due to chronic blood loss    History of gestational diabetes    Fibromyalgia    Pre-op evaluation   nexplanon due out    POSTOPERATIVE DIAGNOSIS:   Same as above    OPERATION:   1. Total laparoscopic hysterectomy with Bilateral salpingectomy  2. Removal nexplanon left arm.    ANESTHESIA: General.    ESTIMATED BLOOD LOSS: 100cc.     URINE OUTPUT: 100cc    Fluids: 1000cc Crystalloid    FINDINGS:    Grossly normal female anatomy with normal ovaries and tubes. Ureters clearly visualized bilaterally throughout the procedure. Normal upper abdomen. At the completion of the procedure the ureters clearly visualized with peristalsis. Uterine weight <250gm. Nexplanon implant removed from left arm.       PROCEDURE IN DETAIL:    Patient was taken to the operating room, where general endotracheal anesthesia was found to be adequate. Patient was prepped and draped in the usual sterile fashion in the dorsal lithotomy position in the [facility]. A Garcia catheter was placed in the bladder and SONIDO uterine manipulator was transfixed to the cervix and uterus in the usual fashion. Attention was then turned to the abdomen, where a 10-mm umbilical skin incision was made sharply with the scalpel and carried down until the rectus fascia was identified in the midline and scored. The 5mm bladeless trocar was introduced through this incision, and after intraabdominal placement was confirmed with the 5-mm laparoscope,  approximately 3 liters of CO2 gas were used to insufflate the abdominal cavity. A 5mm trocar was introduced into the right lower quadrant under direct laparoscopic visualization, and a 10mm bladeless trocar introduced into the left lower quadrant. The harmonic scalpel was then used to incise the round ligaments bilaterally and a bladder flap was created anteriorly. The ureters were identified bilaterally well away from the infundibulopelvic ligaments.The fimbria of the right tube was grasped and the mesosalpinx was incised with the harmonic scalpel down to its insertion on the uterus. This procedure was repeated on the left. The uteroovarian ligaments were taken with the harmonic scalpel first on the right then the left.The broad ligaments were taken with the harmonic scalpel bilaterally. The uterine arteries were taken with the harmonic scalpel bilaterally. The anterior and posterior cul-de-sac were entered with the harmonic scalpel in the usual fashion over the metal cup. The uterosacral and cardinal ligaments were taken with the harmonic scalpel bilaterally and the specimen was brought into the vaginal. The vaginal cuff was closed with running 0 absorbable suture using the Endo-stitch device. All areas of dissection were inspected and noted to be hemostatic. The endoclose needle was then used to close the fascia on the left with 0 Vicryl. The remaining ports were then removed under direct laparoscopic visualization. All CO2 gas was allowed to escape from the abdominal cavity. The skin incisions were closed with 4-0 plane gut suture in a subcuticular fashion, and band-aids placed for dressings. Sponge, lap, and needle counts were correct x 2 and there were no complications.    The left upper arm was prepped and draped, Nexplanon was located inferior medial left upper arm, easily palpable. Area cleaned with duraprep, injected with 1 % lidocaine over prior scar, and stab incision made with #11 blade thru scar. Edge  of nexplanon located and grasped with hemostats. Removed without difficulty and sent to pathology. The defect was closed with steri-strips and wrapped with bandage.     The patient was taken out of the stirrups, awakened from anesthesia, extubated, and doing well on her way to the recovery room. Final count was correct x 2.    Pathology:  1. Uterus, cervix and tubes  2. nexplanon implant for grosss I.D. only.           Yonas Walsh M.D., FACOG

## 2020-10-19 NOTE — DISCHARGE SUMMARY
[facility]  Discharge Note  Short Stay    Procedure(s) (LRB):  HYSTERECTOMY, TOTAL, LAPAROSCOPIC (N/A)  SALPINGECTOMY, LAPAROSCOPIC (Bilateral)    OUTCOME: Patient tolerated treatment/procedure well without complication and is now ready for discharge.    DISPOSITION: Home or Self Care    FINAL DIAGNOSIS:  Menorrhagia, s/p Total laparoscopic Hysterectomy     FOLLOWUP: In clinic    DISCHARGE INSTRUCTIONS:    Discharge Procedure Orders   Diet general     Call MD for:  temperature >100.4     Call MD for:  persistent nausea and vomiting     Call MD for:  severe uncontrolled pain     Call MD for:  difficulty breathing, headache or visual disturbances     Call MD for:  redness, tenderness, or signs of infection (pain, swelling, redness, odor or green/yellow discharge around incision site)     Call MD for:  hives     Call MD for:  persistent dizziness or light-headedness     Call MD for:  extreme fatigue     Call MD for:   Order Comments: Heavy vaginal bleeding     Pelvic Rest   Order Comments: Pelvic rest x 6 weeks, light x 2 weeks     Remove dressing in 48 hours

## 2020-10-19 NOTE — PLAN OF CARE
Pt has earring to left eat that she cannot take out; patient informed that if cautery is used she has a risk of being burned; pt verbalizes understanding.

## 2020-10-19 NOTE — ANESTHESIA POSTPROCEDURE EVALUATION
Anesthesia Post Evaluation    Patient: Subha Bach    Procedure(s) Performed: Procedure(s) (LRB):  HYSTERECTOMY, TOTAL, LAPAROSCOPIC (N/A)  SALPINGECTOMY, LAPAROSCOPIC (Bilateral)  REMOVAL, IMPLANT (Left)    Final Anesthesia Type: general    Patient location during evaluation: PACU  Patient participation: Yes- Able to Participate  Level of consciousness: awake and alert and oriented  Post-procedure vital signs: reviewed and stable  Pain management: adequate  Airway patency: patent    PONV status at discharge: No PONV  Anesthetic complications: no      Cardiovascular status: blood pressure returned to baseline and stable  Respiratory status: unassisted and spontaneous ventilation  Hydration status: euvolemic  Follow-up not needed.          Vitals Value Taken Time   /67 10/19/20 1200   Temp 36.1 °C (97 °F) 10/19/20 0815   Pulse 92 10/19/20 1200   Resp 14 10/19/20 1231   SpO2 100 % 10/19/20 1200         No case tracking events are documented in the log.      Pain/Deonte Score: Pain Rating Prior to Med Admin: 5 (10/19/2020 12:31 PM)  Deonte Score: 9 (10/19/2020 10:30 AM)

## 2020-10-20 ENCOUNTER — PATIENT MESSAGE (OUTPATIENT)
Dept: OBSTETRICS AND GYNECOLOGY | Facility: CLINIC | Age: 35
End: 2020-10-20

## 2020-10-23 ENCOUNTER — OFFICE VISIT (OUTPATIENT)
Dept: HEMATOLOGY/ONCOLOGY | Facility: CLINIC | Age: 35
End: 2020-10-23
Payer: COMMERCIAL

## 2020-10-23 ENCOUNTER — INFUSION (OUTPATIENT)
Dept: INFUSION THERAPY | Facility: HOSPITAL | Age: 35
End: 2020-10-23
Attending: INTERNAL MEDICINE
Payer: COMMERCIAL

## 2020-10-23 VITALS
RESPIRATION RATE: 18 BRPM | HEART RATE: 110 BPM | TEMPERATURE: 98 F | BODY MASS INDEX: 28.93 KG/M2 | OXYGEN SATURATION: 97 % | WEIGHT: 179.25 LBS | DIASTOLIC BLOOD PRESSURE: 63 MMHG | SYSTOLIC BLOOD PRESSURE: 112 MMHG

## 2020-10-23 VITALS
BODY MASS INDEX: 28.45 KG/M2 | TEMPERATURE: 98 F | OXYGEN SATURATION: 98 % | SYSTOLIC BLOOD PRESSURE: 98 MMHG | HEIGHT: 66 IN | RESPIRATION RATE: 18 BRPM | HEART RATE: 95 BPM | DIASTOLIC BLOOD PRESSURE: 76 MMHG | WEIGHT: 177 LBS

## 2020-10-23 DIAGNOSIS — D64.89: ICD-10-CM

## 2020-10-23 DIAGNOSIS — N92.0 MENORRHAGIA WITH REGULAR CYCLE: ICD-10-CM

## 2020-10-23 DIAGNOSIS — D50.0 IRON DEFICIENCY ANEMIA DUE TO CHRONIC BLOOD LOSS: Primary | ICD-10-CM

## 2020-10-23 PROCEDURE — 96365 THER/PROPH/DIAG IV INF INIT: CPT

## 2020-10-23 PROCEDURE — 25000003 PHARM REV CODE 250: Performed by: INTERNAL MEDICINE

## 2020-10-23 PROCEDURE — 3008F BODY MASS INDEX DOCD: CPT | Mod: CPTII,S$GLB,, | Performed by: PHYSICIAN ASSISTANT

## 2020-10-23 PROCEDURE — 99214 OFFICE O/P EST MOD 30 MIN: CPT | Mod: S$GLB,,, | Performed by: PHYSICIAN ASSISTANT

## 2020-10-23 PROCEDURE — 99214 PR OFFICE/OUTPT VISIT, EST, LEVL IV, 30-39 MIN: ICD-10-PCS | Mod: S$GLB,,, | Performed by: PHYSICIAN ASSISTANT

## 2020-10-23 PROCEDURE — 99999 PR PBB SHADOW E&M-EST. PATIENT-LVL V: CPT | Mod: PBBFAC,,, | Performed by: PHYSICIAN ASSISTANT

## 2020-10-23 PROCEDURE — 63600175 PHARM REV CODE 636 W HCPCS: Mod: JG | Performed by: INTERNAL MEDICINE

## 2020-10-23 PROCEDURE — 3008F PR BODY MASS INDEX (BMI) DOCUMENTED: ICD-10-PCS | Mod: CPTII,S$GLB,, | Performed by: PHYSICIAN ASSISTANT

## 2020-10-23 PROCEDURE — 99999 PR PBB SHADOW E&M-EST. PATIENT-LVL V: ICD-10-PCS | Mod: PBBFAC,,, | Performed by: PHYSICIAN ASSISTANT

## 2020-10-23 RX ORDER — SODIUM CHLORIDE 0.9 % (FLUSH) 0.9 %
10 SYRINGE (ML) INJECTION
Status: DISCONTINUED | OUTPATIENT
Start: 2020-10-23 | End: 2020-10-23 | Stop reason: HOSPADM

## 2020-10-23 RX ORDER — SODIUM CHLORIDE 9 MG/ML
INJECTION, SOLUTION INTRAVENOUS CONTINUOUS
Status: CANCELLED | OUTPATIENT
Start: 2020-10-30

## 2020-10-23 RX ORDER — SODIUM CHLORIDE 0.9 % (FLUSH) 0.9 %
10 SYRINGE (ML) INJECTION
Status: CANCELLED | OUTPATIENT
Start: 2020-10-30

## 2020-10-23 RX ORDER — SODIUM CHLORIDE 9 MG/ML
INJECTION, SOLUTION INTRAVENOUS CONTINUOUS
Status: DISCONTINUED | OUTPATIENT
Start: 2020-10-23 | End: 2020-10-23 | Stop reason: HOSPADM

## 2020-10-23 RX ORDER — HEPARIN 100 UNIT/ML
5 SYRINGE INTRAVENOUS
Status: CANCELLED | OUTPATIENT
Start: 2020-10-30

## 2020-10-23 RX ORDER — HEPARIN 100 UNIT/ML
5 SYRINGE INTRAVENOUS
Status: DISCONTINUED | OUTPATIENT
Start: 2020-10-23 | End: 2020-10-23 | Stop reason: HOSPADM

## 2020-10-23 RX ADMIN — SODIUM CHLORIDE: 0.9 INJECTION, SOLUTION INTRAVENOUS at 10:10

## 2020-10-23 RX ADMIN — FERRIC CARBOXYMALTOSE INJECTION 750 MG: 50 INJECTION, SOLUTION INTRAVENOUS at 10:10

## 2020-10-23 NOTE — PLAN OF CARE
Problem: Anemia  Goal: Anemia Symptom Improvement  Outcome: Ongoing, Progressing  Intervention: Monitor and Manage Anemia  Flowsheets (Taken 10/23/2020 1047)  Oral Nutrition Promotion:   medicated   nutritional therapy counseling provided  Fatigue Management:   fatigue-related activity identified   frequent rest breaks encouraged   paced activity encouraged

## 2020-10-23 NOTE — PROGRESS NOTES
[facility]     Subjective:      Patient: Subha Bach Patient PCP: Mikael Bach DO         :  1985     Sex:  female      MRN:  [id number]          Date of Visit: 10/23/2020      Chief Complaint: Anemia    Patient ID: Subha Bach is a 35 y.o. female with menorrhagia with regular cycle, iron deficiency anemia due to chronic blood loss who presents to office for injectafer clearance x2 days 1 and 8 for treatment of anemia. Pt sees Gyn Dr. Yonas Walsh who performed a total laparoscopic hysterectomy with bilateral salpingectomy with removal of nexplanon on her left arm on 10/19/2020. Pt states she is having abdominal pain since the surgery, but that it is slowly decreasing. She has no had much abnormal vaginal bleeding since the surgery. Pt states she has some mild fatigue and SOB. She has no other complaints today.     Review of Systems   Constitutional: Positive for fatigue. Negative for activity change, appetite change, chills, fever and unexpected weight change.   HENT: Negative for mouth sores and trouble swallowing.    Eyes: Negative for photophobia and visual disturbance.   Respiratory: Positive for shortness of breath. Negative for cough, chest tightness, wheezing and stridor.    Cardiovascular: Negative for chest pain and leg swelling.   Gastrointestinal: Positive for abdominal pain (s/p hysterectomy). Negative for constipation, diarrhea, nausea and vomiting.   Musculoskeletal: Negative for arthralgias, back pain and myalgias.   Skin: Negative for color change, pallor, rash and wound.   Neurological: Negative for syncope, speech difficulty and weakness.   Hematological: Negative for adenopathy. Does not bruise/bleed easily.   Psychiatric/Behavioral: Negative for agitation, behavioral problems, confusion, decreased concentration and dysphoric mood.        Past Medical History:   Diagnosis Date    Asthma     Encounter for blood transfusion     Gestational diabetes     gestational  diabetes    Migraine headache      Family History   Problem Relation Age of Onset    Asthma Mother     Migraines Mother     Hypothyroidism Mother     Arthritis Mother     Migraines Father     Allergies Father     Chiari malformation Sister     Migraines Sister     Pancreatitis Maternal Grandmother     Migraines Maternal Grandmother     Thyroid disease Maternal Grandmother     Other Maternal Grandmother         pituitary gland issues    Heart failure Maternal Grandfather     Diabetes Maternal Grandfather     Kidney disease Maternal Grandfather     Alzheimer's disease Paternal Grandmother     Prostate cancer Paternal Grandfather      Past Surgical History:   Procedure Laterality Date    LAPAROSCOPIC SALPINGECTOMY Bilateral 10/19/2020    Procedure: SALPINGECTOMY, LAPAROSCOPIC;  Surgeon: Yonas Walsh MD;  Location: [facility] OR;  Service: OB/GYN;  Laterality: Bilateral;  Possible Overnight  Claudia Cabrera MD to assist    LAPAROSCOPIC TOTAL HYSTERECTOMY N/A 10/19/2020    Procedure: HYSTERECTOMY, TOTAL, LAPAROSCOPIC;  Surgeon: Yonas Walsh MD;  Location: [facility] OR;  Service: OB/GYN;  Laterality: N/A;    REMOVAL OF IMPLANT Left 10/19/2020    Procedure: REMOVAL, IMPLANT;  Surgeon: Yonas Walsh MD;  Location: [facility] OR;  Service: OB/GYN;  Laterality: Left;  Nextplan birthcontrol implant removal    SHOULDER SURGERY       Social History     Socioeconomic History    Marital status: Single     Spouse name: Not on file    Number of children: Not on file    Years of education: Not on file    Highest education level: Not on file   Occupational History    Not on file   Social Needs    Financial resource strain: Somewhat hard    Food insecurity     Worry: Never true     Inability: Never true    Transportation needs     Medical: No     Non-medical: No   Tobacco Use    Smoking status: Never Smoker    Smokeless tobacco: Never Used   Substance and Sexual Activity    Alcohol use: Yes     Frequency:  Monthly or less     Drinks per session: 1 or 2     Binge frequency: Never     Comment: Martini every 6 months    Drug use: No    Sexual activity: Yes     Partners: Male   Lifestyle    Physical activity     Days per week: 3 days     Minutes per session: 50 min    Stress: Only a little   Relationships    Social connections     Talks on phone: More than three times a week     Gets together: More than three times a week     Attends Mandaen service: Not on file     Active member of club or organization: Yes     Attends meetings of clubs or organizations: More than 4 times per year     Relationship status: Never    Other Topics Concern    Not on file   Social History Narrative    Not on file       Current Outpatient Medications:     AJOVY AUTOINJECTOR 225 mg/1.5 mL autoinjector, INJECT 1.5 MG INTO THE SKIN Q MONTH, Disp: , Rfl:     albuterol (PROVENTIL HFA) 90 mcg/actuation inhaler, Inhale 2 puffs into the lungs every 6 (six) hours as needed for Wheezing or Shortness of Breath. Rescue, Disp: 18 g, Rfl: 11    docusate sodium (COLACE) 100 MG capsule, Take 1 capsule (100 mg total) by mouth 2 (two) times daily., Disp: 60 capsule, Rfl: 1    eletriptan (RELPAX) 40 MG tablet, Take 40 mg by mouth as needed. may repeat in 2 hours if necessary, Disp: , Rfl:     ferrous sulfate (FEOSOL) 325 mg (65 mg iron) Tab tablet, Take 1 tablet (325 mg total) by mouth 3 (three) times daily., Disp: 90 tablet, Rfl: 1    folic acid (FOLVITE) 1 MG tablet, Take 1 tablet (1 mg total) by mouth once daily., Disp: 90 tablet, Rfl: 2    gabapentin (NEURONTIN) 600 MG tablet, Take 600 mg by mouth 2 (two) times daily., Disp: , Rfl:     ibuprofen (ADVIL,MOTRIN) 800 MG tablet, Take 1 tablet (800 mg total) by mouth every 8 (eight) hours as needed for Pain., Disp: 30 tablet, Rfl: 1    omeprazole (PRILOSEC) 20 MG capsule, Take 1 capsule (20 mg total) by mouth once daily. (Patient taking differently: Take 20 mg by mouth daily as needed  "(heartburn). ), Disp: 30 capsule, Rfl: 11    oxyCODONE-acetaminophen (PERCOCET) 5-325 mg per tablet, Take 1 tablet by mouth every 4 (four) hours as needed for Pain., Disp: 14 tablet, Rfl: 0    polyethylene glycol (GLYCOLAX) 17 gram/dose powder, Take 17 g by mouth once daily. In 8 oz of water, Disp: 240 g, Rfl: 1    tiZANidine (ZANAFLEX) 4 MG tablet, Take 4 mg by mouth every evening. May take up to 3 tablets, Disp: , Rfl:     topiramate (TOPAMAX) 50 MG tablet, Take 50 mg by mouth every evening. , Disp: , Rfl:     venlafaxine (EFFEXOR-XR) 150 MG Cp24, Take 1 capsule (150 mg total) by mouth once daily., Disp: 30 capsule, Rfl: 4    ondansetron (ZOFRAN-ODT) 4 MG TbDL, Take 2 tablets (8 mg total) by mouth every 8 (eight) hours as needed. (Patient not taking: Reported on 10/23/2020), Disp: 12 tablet, Rfl: 0    Review of patient's allergies indicates:   Allergen Reactions    Nubain [nalbuphine] Hives     All medications, allergies, and past history have been reviewed.  Objective:      Vitals:  Vitals - 1 value per visit 10/16/2020 10/19/2020 10/23/2020   SYSTOLIC - 99 112   DIASTOLIC - 54 63   PULSE - 64 110   TEMPERATURE - 97 98   RESPIRATIONS - 19 18   SPO2 - 95 97   Weight (lb) 176 - 179.23   Weight (kg) 79.833 - 81.3   HEIGHT 5' 6" - -   BODY MASS INDEX - 28.41 28.93   VISIT REPORT - - -   Pain Score  - - 8       Body surface area is 1.95 meters squared.  Weight Readings:  Wt Readings from Last 5 Encounters:   10/23/20 81.3 kg (179 lb 3.7 oz)   10/16/20 79.8 kg (176 lb)   10/13/20 80.1 kg (176 lb 9.4 oz)   09/24/20 77.7 kg (171 lb 4.8 oz)   09/14/20 76.2 kg (167 lb 15.9 oz)      Blood Type:  O POS     Physical Exam  Constitutional:       General: She is not in acute distress.     Appearance: Normal appearance. She is not ill-appearing.   HENT:      Head: Normocephalic and atraumatic.      Right Ear: External ear normal.      Left Ear: External ear normal.      Nose: Nose normal. No congestion or rhinorrhea. "   Eyes:      General:         Right eye: No discharge.         Left eye: No discharge.      Extraocular Movements: Extraocular movements intact.      Conjunctiva/sclera: Conjunctivae normal.      Pupils: Pupils are equal, round, and reactive to light.   Neck:      Musculoskeletal: Normal range of motion and neck supple. No neck rigidity.   Cardiovascular:      Rate and Rhythm: Normal rate and regular rhythm.      Heart sounds: Normal heart sounds. No murmur.   Pulmonary:      Effort: Pulmonary effort is normal. No respiratory distress.      Breath sounds: Normal breath sounds. No stridor. No wheezing, rhonchi or rales.   Abdominal:      General: Bowel sounds are normal. There is no distension.      Palpations: Abdomen is soft.      Tenderness: There is no abdominal tenderness. There is no guarding.   Musculoskeletal: Normal range of motion.         General: No deformity.      Right lower leg: No edema.      Left lower leg: No edema.   Lymphadenopathy:      Cervical: No cervical adenopathy.   Skin:     General: Skin is warm and dry.      Coloration: Skin is not pale.      Findings: No erythema or rash.   Neurological:      General: No focal deficit present.      Mental Status: She is alert and oriented to person, place, and time. Mental status is at baseline.      Cranial Nerves: No cranial nerve deficit.   Psychiatric:         Mood and Affect: Mood normal.         Behavior: Behavior normal.         Thought Content: Thought content normal.         Judgment: Judgment normal.       Labs:    CBC  WBC   Date Value Ref Range Status   10/15/2020 7.03 3.90 - 12.70 K/uL Final   09/24/2020 9.44 3.90 - 12.70 K/uL Final   09/16/2020 5.80 3.90 - 12.70 K/uL Final     RBC   Date Value Ref Range Status   10/15/2020 4.19 4.00 - 5.40 M/uL Final   09/24/2020 4.71 4.00 - 5.40 M/uL Final   09/16/2020 4.64 4.00 - 5.40 M/uL Final     Hemoglobin   Date Value Ref Range Status   10/15/2020 10.1 (L) 12.0 - 16.0 g/dL Final   09/24/2020 10.7  (L) 12.0 - 16.0 g/dL Final   09/16/2020 9.7 (L) 12.0 - 16.0 g/dL Final     Hematocrit   Date Value Ref Range Status   10/15/2020 34.2 (L) 37.0 - 48.5 % Final   09/24/2020 36.9 (L) 37.0 - 48.5 % Final   09/16/2020 35.7 (L) 37.0 - 48.5 % Final     Mean Corpuscular Volume   Date Value Ref Range Status   10/15/2020 82 82 - 98 fL Final   09/24/2020 78 (L) 82 - 98 fL Final   09/16/2020 77 (L) 82 - 98 fL Final     Platelets   Date Value Ref Range Status   10/15/2020 563 (H) 150 - 350 K/uL Final   09/24/2020 488 (H) 150 - 350 K/uL Final   09/16/2020 525 (H) 150 - 350 K/uL Final     Mean Corpuscular Hemoglobin   Date Value Ref Range Status   10/15/2020 24.1 (L) 27.0 - 31.0 pg Final   09/24/2020 22.7 (L) 27.0 - 31.0 pg Final   09/16/2020 20.9 (L) 27.0 - 31.0 pg Final     Mean Corpuscular Hemoglobin Conc   Date Value Ref Range Status   10/15/2020 29.5 (L) 32.0 - 36.0 g/dL Final   09/24/2020 29.0 (L) 32.0 - 36.0 g/dL Final   09/16/2020 27.2 (L) 32.0 - 36.0 g/dL Final     RDW   Date Value Ref Range Status   10/15/2020 SEE COMMENT 11.5 - 14.5 % Final     Comment:     Result not available.   09/24/2020 SEE COMMENT 11.5 - 14.5 % Final     Comment:     Result not available.   09/16/2020 SEE COMMENT 11.5 - 14.5 % Final     Comment:     Result not available.       CMP  Sodium   Date Value Ref Range Status   10/15/2020 140 136 - 145 mmol/L Final   09/24/2020 139 136 - 145 mmol/L Final   09/03/2020 139 136 - 145 mmol/L Final     Potassium   Date Value Ref Range Status   10/15/2020 4.0 3.5 - 5.1 mmol/L Final   09/24/2020 3.8 3.5 - 5.1 mmol/L Final   09/03/2020 3.7 3.5 - 5.1 mmol/L Final     Chloride   Date Value Ref Range Status   10/15/2020 105 95 - 110 mmol/L Final   09/24/2020 104 95 - 110 mmol/L Final   09/03/2020 110 95 - 110 mmol/L Final     CO2   Date Value Ref Range Status   10/15/2020 26 23 - 29 mmol/L Final   09/24/2020 30 (H) 23 - 29 mmol/L Final   09/03/2020 18 (L) 23 - 29 mmol/L Final     BUN, Bld   Date Value Ref Range  Status   10/15/2020 8 6 - 20 mg/dL Final   09/24/2020 8 6 - 20 mg/dL Final   09/03/2020 9 6 - 20 mg/dL Final     Creatinine   Date Value Ref Range Status   10/15/2020 0.7 0.5 - 1.4 mg/dL Final   09/24/2020 0.8 0.5 - 1.4 mg/dL Final   09/03/2020 0.8 0.5 - 1.4 mg/dL Final     Glucose   Date Value Ref Range Status   10/15/2020 79 70 - 110 mg/dL Final   09/24/2020 87 70 - 110 mg/dL Final   09/03/2020 84 70 - 110 mg/dL Final     Calcium   Date Value Ref Range Status   10/15/2020 9.4 8.7 - 10.5 mg/dL Final   09/24/2020 9.9 8.7 - 10.5 mg/dL Final   09/03/2020 8.6 (L) 8.7 - 10.5 mg/dL Final     Alkaline Phosphatase   Date Value Ref Range Status   10/15/2020 96 55 - 135 U/L Final   09/24/2020 95 55 - 135 U/L Final   03/24/2020 76 55 - 135 U/L Final     Total Protein   Date Value Ref Range Status   10/15/2020 8.0 6.0 - 8.4 g/dL Final   09/24/2020 8.1 6.0 - 8.4 g/dL Final   03/24/2020 7.3 6.0 - 8.4 g/dL Final     Albumin   Date Value Ref Range Status   10/15/2020 4.0 3.5 - 5.2 g/dL Final   09/24/2020 4.2 3.5 - 5.2 g/dL Final   03/24/2020 3.8 3.5 - 5.2 g/dL Final     Total Bilirubin   Date Value Ref Range Status   10/15/2020 0.2 0.1 - 1.0 mg/dL Final     Comment:     For infants and newborns, interpretation of results should be based  on gestational age, weight and in agreement with clinical  observations.  Premature Infant recommended reference ranges:  Up to 24 hours.............<8.0 mg/dL  Up to 48 hours............<12.0 mg/dL  3-5 days..................<15.0 mg/dL  6-29 days.................<15.0 mg/dL     09/24/2020 0.2 0.1 - 1.0 mg/dL Final     Comment:     For infants and newborns, interpretation of results should be based  on gestational age, weight and in agreement with clinical  observations.  Premature Infant recommended reference ranges:  Up to 24 hours.............<8.0 mg/dL  Up to 48 hours............<12.0 mg/dL  3-5 days..................<15.0 mg/dL  6-29 days.................<15.0 mg/dL     03/24/2020 0.1 0.1 -  1.0 mg/dL Final     Comment:     For infants and newborns, interpretation of results should be based  on gestational age, weight and in agreement with clinical  observations.  Premature Infant recommended reference ranges:  Up to 24 hours.............<8.0 mg/dL  Up to 48 hours............<12.0 mg/dL  3-5 days..................<15.0 mg/dL  6-29 days.................<15.0 mg/dL       AST   Date Value Ref Range Status   10/15/2020 24 10 - 40 U/L Final   09/24/2020 23 10 - 40 U/L Final   09/11/2020 21 10 - 40 U/L Final     ALT   Date Value Ref Range Status   10/15/2020 38 10 - 44 U/L Final   09/24/2020 32 10 - 44 U/L Final   09/11/2020 24 10 - 44 U/L Final       Anemia Labs  Retic   Date Value Ref Range Status   09/16/2020 0.6 0.5 - 2.5 % Final   09/02/2020 1.8 0.5 - 2.5 % Final     Haptoglobin   Date Value Ref Range Status   09/24/2020 142 30 - 250 mg/dL Final     Iron   Date Value Ref Range Status   10/15/2020 13 (L) 30 - 160 ug/dL Final   09/11/2020 78 30 - 160 ug/dL Final   09/02/2020 <10 (L) 30 - 160 ug/dL Final     Transferrin   Date Value Ref Range Status   10/15/2020 327 200 - 375 mg/dL Final   09/11/2020 318 200 - 375 mg/dL Final   09/02/2020 386 (H) 200 - 375 mg/dL Final   09/02/2020 386 (H) 200 - 375 mg/dL Final     TIBC   Date Value Ref Range Status   10/15/2020 484 (H) 250 - 450 ug/dL Final   09/11/2020 471 (H) 250 - 450 ug/dL Final   09/02/2020 571 (H) 250 - 450 ug/dL Final     Ferritin   Date Value Ref Range Status   10/15/2020 2 (L) 20.0 - 300.0 ng/mL Final   09/16/2020 29 20.0 - 300.0 ng/mL Final   09/02/2020 <1 (L) 20.0 - 300.0 ng/mL Final     Vitamin B6   Date Value Ref Range Status   09/16/2020 19 5 - 50 ug/L Final     Comment:     This test was developed and the performance   characteristics determined by [facility].   It has not been cleared or approved by the FDA.   The laboratory is regulated under CLIA as qualified to   perform high-complexity testing. This test is used for    patient testing purposes. It should not be regarded   as investigational or for research.  Test performed at [facility],  [address] , [place]  [address]     [phone]  Rafa Triplett MD  - Medical Director       TSH   Date Value Ref Range Status   09/11/2020 1.449 0.400 - 4.000 uIU/mL Final           Imaging:   US abdomen 09/21/2020  FINDINGS:  Solid hyperechoic mass in right hepatic lobe measures 12 x 17 x 16 mm  with through sound transmission. Liver otherwise maintains normal  echogenicity without intrahepatic bile duct dilation. Hepatopedal flow  in main portal vein.     Gallbladder contains nonmobile 5 mm echogenic nonshadowing focus along  the nondependent wall consistent with an incidental polyp. Negative  for cholelithiasis. Common duct diameter is normal.     Bowel gas obscures the pancreas. Visualized abdominal aorta is  nonaneurysmal. Juxtahepatic IVC is unremarkable. Spleen is normal in  size and echotexture.     Right kidney measures 10.1 cm in length, and left kidney measures 10.9  cm. Bilateral kidneys maintain normal cortical thickness and  echotexture without hydronephrosis.     IMPRESSION:     1. 17 mm hyperechoic mass in right hepatic lobe has sonographic  characteristics suggestive of hemangioma. As no prior imaging of this  is available, three-month follow-up right upper quadrant ultrasound or  hepatic protocol MRI is recommended to document stability and further  characterize.  2. Incidental gallbladder polyp, of no clinical significance.    All lab results and imaging results have been reviewed.    Assessment and Plan:        ICD-10-CM ICD-9-CM   1. Menorrhagia with regular cycle   -Corrected per patient s/p total laparoscopic hysterectomy with bilateral salpingectomy by Dr. Walsh.   N92.0 626.2   2. Iron deficiency anemia due to chronic blood loss   -Pt cleared for injectafer. Proceed with Injectafer x2 on days 1 and day 8 with repeat CBC, CMP, Iron and TIBC, and  ferritin to be done one month after day 8 injectafer dose to assess response.  -Pt is to follow up with Dr. Arevalo 1 month after day 8 injectafer with CBC, CMP, Iron and TIBC, and ferritin to assess response to IV iron.    D50.0 280.0   3. Target cell anemia  D64.89 285.8     -Pt would like letter made for her to allow her to take medical leave from court with her ex-spouse who lives in a different state. Pt was previously given one by our office. She would like to have another signed. Pt just had surgery and is receiving IV iron treatment so we will proceed with writing her letter for her.    Sincerely,  Tian Mehta PA-C    Note is available for collaborating MD; Dr. Kristen rAevalo for review.  Electronically signed by: Tian Mehta PA-C    Patient Contact Information: [phone]  Patient Address: [address] [place] [address]  Extended Emergency Contact Information  Primary Emergency Contact: Andrés Bach   [place] [facility]  Mobile Phone: [phone]  Relation: Sister  Pt Insurance: Payor: BLUE CROSS BLUE SHIELD / Plan: BCBS ALL OUT OF STATE / Product Type: PPO /

## 2020-10-23 NOTE — Clinical Note
Pt is to follow up with Dr. Arevalo 1 month after day 8 injectafer with CBC, CMP, Iron and TIBC, and ferritin.

## 2020-10-24 ENCOUNTER — NURSE TRIAGE (OUTPATIENT)
Dept: ADMINISTRATIVE | Facility: CLINIC | Age: 35
End: 2020-10-24

## 2020-10-24 ENCOUNTER — HOSPITAL ENCOUNTER (EMERGENCY)
Facility: HOSPITAL | Age: 35
Discharge: HOME | End: 2020-10-24
Attending: EMERGENCY MEDICINE
Payer: COMMERCIAL

## 2020-10-24 VITALS
TEMPERATURE: 99 F | SYSTOLIC BLOOD PRESSURE: 127 MMHG | DIASTOLIC BLOOD PRESSURE: 72 MMHG | WEIGHT: 175 LBS | RESPIRATION RATE: 18 BRPM | HEART RATE: 78 BPM | BODY MASS INDEX: 28.25 KG/M2 | OXYGEN SATURATION: 96 %

## 2020-10-24 DIAGNOSIS — R21 RASH: ICD-10-CM

## 2020-10-24 DIAGNOSIS — G89.18 POST-OPERATIVE PAIN: Primary | ICD-10-CM

## 2020-10-24 PROCEDURE — 99282 EMERGENCY DEPT VISIT SF MDM: CPT

## 2020-10-24 NOTE — DISCHARGE INSTRUCTIONS
As we discussed, return to the emergency department for new or worsening symptoms including difficulty breathing or swallowing, or any swelling.  In regard to the abdominal pain, return for worsening pain at rest, fever, or wound drainage.

## 2020-10-24 NOTE — TELEPHONE ENCOUNTER
"    Reason for Disposition   Patient sounds very sick or weak to the triager    Additional Information   Negative: [1] Life-threatening reaction (anaphylaxis) in the past to the same drug AND [2] < 2 hours since exposure   Negative: Difficulty breathing or wheezing   Negative: [1] Hoarseness or cough AND [2] started soon after 1st dose of drug   Negative: [1] Swollen tongue AND [2] started soon after 1st dose of drug   Negative: [1] Purple or blood-colored rash (spots or dots) AND [2] fever   Negative: Sounds like a life-threatening emergency to the triager   Negative: Rash is only on 1 part of the body (localized)   Negative: Taking new non-prescription (OTC) antihistamine, decongestant, ear drops, eye drops, or other OTC cough/cold medicine   Negative: Taking new prescription antihistamine, allergy medicine, asthma medicine, eye drops, ear drops or nose drops   Negative: Rash started more than 3 days after stopping new prescription medicine   Negative: Swollen tongue   Negative: [1] Widespread hives AND [2] onset < 2 hours of exposure to 1st dose of drug   Negative: Fever    Protocols used: RASH - WIDESPREAD ON DRUGS-A-    Subha had Covid 19 03/2020.  Had Injectafer infustion yesterday for the first time.  Noticed rash of purple dots this morning when changing clothes, that extends from under both breasts down to the tops of both thighs.  No fever.  She states she is "a little short of breath, but that's because I'm so anemic". She also states she had hysterectomy on Monday 10/19/2020. Per [facility] triage protocol, recommend ED now for evaluation.  Her  will bring her to [facility] in [place].  Message to Dr Arevalo, Dr Walsh. Please contact caller directly with any additional care advice.   "

## 2020-10-24 NOTE — ED NOTES
Subha Bach presents to the ED with c/o purple raised rash to right side of abdomen.  Patient had iron infusion yesterday and had total hysterectomy on Monday 11/19.  Patient called on call line and was advised to come to ED.

## 2020-10-24 NOTE — ED PROVIDER NOTES
Chief complaint:  Rash (after iron infusion yesterday; hysterectomy on monday) and Post-op Problem (increased abdominal pain)      HPI:  Subha Bach is a 35 y.o. female uterine fibroids with menorrhagia and iron-defiency anemia POD 5 s/p total laparoscopic hysterex and nexplanon removal presenting with rash and abdominal pain.  Patient endorses 2nd iron infusion yesterday with slight rash on the trunk more prominent this morning and now less than in the emergency department.  There is no associated new dyspnea, swelling, voice changes, or difficulty swallowing.  She has not taken anything for it.  She denies other new exposures.  No intraoral lesions.  She describes increased abdominal pain yesterday with increased activity going to a doctor's appointment.  Pain is primarily suprapubic and left lower quadrant and minimal at rest and increased with activity and walking.  She denies radiation or migration of pain.  No drainage or redness around wound sites.  No associated fever.    ROS: As per HPI and below:  No headache, neck pain, chest pain, shortness of breath, emesis, diarrhea, urinary frequency, urgency, dysuria, fever.  The patient/family denies blurry vision, dysphagia,  joint swelling, easy bruising.    Review of patient's allergies indicates:   Allergen Reactions    Nubain [nalbuphine] Hives       Discharge Medication List as of 10/24/2020 12:20 PM      CONTINUE these medications which have NOT CHANGED    Details   AJOVY AUTOINJECTOR 225 mg/1.5 mL autoinjector INJECT 1.5 MG INTO THE SKIN Q MONTH, Historical Med      albuterol (PROVENTIL HFA) 90 mcg/actuation inhaler Inhale 2 puffs into the lungs every 6 (six) hours as needed for Wheezing or Shortness of Breath. Rescue, Starting Wed 9/2/2020, Until Thu 9/2/2021, Normal      docusate sodium (COLACE) 100 MG capsule Take 1 capsule (100 mg total) by mouth 2 (two) times daily., Starting Thu 9/3/2020, Normal      eletriptan (RELPAX) 40 MG tablet Take 40 mg by  mouth as needed. may repeat in 2 hours if necessary, Historical Med      ferrous sulfate (FEOSOL) 325 mg (65 mg iron) Tab tablet Take 1 tablet (325 mg total) by mouth 3 (three) times daily., Starting Thu 9/3/2020, Normal      folic acid (FOLVITE) 1 MG tablet Take 1 tablet (1 mg total) by mouth once daily., Starting Mon 9/14/2020, Until Tue 9/14/2021, Normal      gabapentin (NEURONTIN) 600 MG tablet Take 600 mg by mouth 2 (two) times daily., Historical Med      ibuprofen (ADVIL,MOTRIN) 800 MG tablet Take 1 tablet (800 mg total) by mouth every 8 (eight) hours as needed for Pain., Starting Mon 10/19/2020, Normal      omeprazole (PRILOSEC) 20 MG capsule Take 1 capsule (20 mg total) by mouth once daily., Starting Mon 9/30/2019, Until Fri 10/23/2020, Normal      ondansetron (ZOFRAN-ODT) 4 MG TbDL Take 2 tablets (8 mg total) by mouth every 8 (eight) hours as needed., Starting Mon 10/19/2020, Normal      oxyCODONE-acetaminophen (PERCOCET) 5-325 mg per tablet Take 1 tablet by mouth every 4 (four) hours as needed for Pain., Starting Mon 10/19/2020, Normal      polyethylene glycol (GLYCOLAX) 17 gram/dose powder Take 17 g by mouth once daily. In 8 oz of water, Starting Mon 10/19/2020, Normal      tiZANidine (ZANAFLEX) 4 MG tablet Take 4 mg by mouth every evening. May take up to 3 tablets, Starting Fri 7/17/2020, Historical Med      topiramate (TOPAMAX) 50 MG tablet Take 50 mg by mouth every evening. , Starting Sat 9/1/2018, Historical Med      venlafaxine (EFFEXOR-XR) 150 MG Cp24 Take 1 capsule (150 mg total) by mouth once daily., Starting Tue 2/11/2020, Until Wed 2/10/2021, Normal             PMH:  As per HPI and below:  Past Medical History:   Diagnosis Date    Asthma     Encounter for blood transfusion     Gestational diabetes     gestational diabetes    Migraine headache      Past Surgical History:   Procedure Laterality Date    LAPAROSCOPIC SALPINGECTOMY Bilateral 10/19/2020    Procedure: SALPINGECTOMY, LAPAROSCOPIC;   Surgeon: Yonas Walsh MD;  Location: [facility] OR;  Service: OB/GYN;  Laterality: Bilateral;  Possible Overnight  Claudia Cabrera MD to assist    LAPAROSCOPIC TOTAL HYSTERECTOMY N/A 10/19/2020    Procedure: HYSTERECTOMY, TOTAL, LAPAROSCOPIC;  Surgeon: Yonas Walsh MD;  Location: [facility] OR;  Service: OB/GYN;  Laterality: N/A;    REMOVAL OF IMPLANT Left 10/19/2020    Procedure: REMOVAL, IMPLANT;  Surgeon: Yonas Walsh MD;  Location: [facility] OR;  Service: OB/GYN;  Laterality: Left;  Nextplan birthcontrol implant removal    SHOULDER SURGERY         Social History     Socioeconomic History    Marital status: Single     Spouse name: Not on file    Number of children: Not on file    Years of education: Not on file    Highest education level: Not on file   Occupational History    Not on file   Social Needs    Financial resource strain: Somewhat hard    Food insecurity     Worry: Never true     Inability: Never true    Transportation needs     Medical: No     Non-medical: No   Tobacco Use    Smoking status: Never Smoker    Smokeless tobacco: Never Used   Substance and Sexual Activity    Alcohol use: Yes     Frequency: Monthly or less     Drinks per session: 1 or 2     Binge frequency: Never     Comment: Martini every 6 months    Drug use: No    Sexual activity: Yes     Partners: Male   Lifestyle    Physical activity     Days per week: 3 days     Minutes per session: 50 min    Stress: Only a little   Relationships    Social connections     Talks on phone: More than three times a week     Gets together: More than three times a week     Attends Church service: Not on file     Active member of club or organization: Yes     Attends meetings of clubs or organizations: More than 4 times per year     Relationship status: Never    Other Topics Concern    Not on file   Social History Narrative    Not on file       Family History   Problem Relation Age of Onset    Asthma Mother     Migraines Mother      Hypothyroidism Mother     Arthritis Mother     Migraines Father     Allergies Father     Chiari malformation Sister     Migraines Sister     Pancreatitis Maternal Grandmother     Migraines Maternal Grandmother     Thyroid disease Maternal Grandmother     Other Maternal Grandmother         pituitary gland issues    Heart failure Maternal Grandfather     Diabetes Maternal Grandfather     Kidney disease Maternal Grandfather     Alzheimer's disease Paternal Grandmother     Prostate cancer Paternal Grandfather        Physical Exam:    Vitals:    10/24/20 1224   BP: 127/72   Pulse: 78   Resp: 18   Temp:      GENERAL:  No apparent distress.  Alert.    HEENT:  Moist mucous membranes.  Normocephalic and atraumatic.  Normal phonation with midline uvula.  No trismus.  No lip or tongue swelling.  No intraoral lesions.  NECK:  No swelling.  Midline trachea. No stridor.   CARDIOVASCULAR:  Regular rate and rhythm.  2+ radial pulses.    PULMONARY:  Lungs clear to auscultation bilaterally.  No wheezes, rales, or rhonci.  Unlabored respirations.  ABDOMEN:  Mild suprapubic and left lower quadrant tenderness with mild voluntary guarding left lower quadrant.  Port site incisions clean, dry, and intact with no erythema or drainage.  No involuntary guarding.  Minimal distention.  No palpable hernias.  EXTREMITIES:  Warm and well perfused.  Brisk capillary refill.  No edema.  NEUROLOGICAL:  Normal mental status.  Appropriate and conversant.    SKIN:  Fine, non confluent, papular rash noted to the truncal region largely sparing the extremities.  No palmar or sole lesions.  No intraoral lesions.  Papules are 1 mm and nonvesicular.  There is no tenderness to palpation or associated erythema or scaling.  They to cary direct pressure.  No petechiae or purpura.  BACK:  Atraumatic.  No CVA tenderness to palpation.      Labs Reviewed - No data to display    Discharge Medication List as of 10/24/2020 12:20 PM      CONTINUE these  medications which have NOT CHANGED    Details   AJOVY AUTOINJECTOR 225 mg/1.5 mL autoinjector INJECT 1.5 MG INTO THE SKIN Q MONTH, Historical Med      albuterol (PROVENTIL HFA) 90 mcg/actuation inhaler Inhale 2 puffs into the lungs every 6 (six) hours as needed for Wheezing or Shortness of Breath. Rescue, Starting Wed 9/2/2020, Until Thu 9/2/2021, Normal      docusate sodium (COLACE) 100 MG capsule Take 1 capsule (100 mg total) by mouth 2 (two) times daily., Starting Thu 9/3/2020, Normal      eletriptan (RELPAX) 40 MG tablet Take 40 mg by mouth as needed. may repeat in 2 hours if necessary, Historical Med      ferrous sulfate (FEOSOL) 325 mg (65 mg iron) Tab tablet Take 1 tablet (325 mg total) by mouth 3 (three) times daily., Starting Thu 9/3/2020, Normal      folic acid (FOLVITE) 1 MG tablet Take 1 tablet (1 mg total) by mouth once daily., Starting Mon 9/14/2020, Until Tue 9/14/2021, Normal      gabapentin (NEURONTIN) 600 MG tablet Take 600 mg by mouth 2 (two) times daily., Historical Med      ibuprofen (ADVIL,MOTRIN) 800 MG tablet Take 1 tablet (800 mg total) by mouth every 8 (eight) hours as needed for Pain., Starting Mon 10/19/2020, Normal      omeprazole (PRILOSEC) 20 MG capsule Take 1 capsule (20 mg total) by mouth once daily., Starting Mon 9/30/2019, Until Fri 10/23/2020, Normal      ondansetron (ZOFRAN-ODT) 4 MG TbDL Take 2 tablets (8 mg total) by mouth every 8 (eight) hours as needed., Starting Mon 10/19/2020, Normal      oxyCODONE-acetaminophen (PERCOCET) 5-325 mg per tablet Take 1 tablet by mouth every 4 (four) hours as needed for Pain., Starting Mon 10/19/2020, Normal      polyethylene glycol (GLYCOLAX) 17 gram/dose powder Take 17 g by mouth once daily. In 8 oz of water, Starting Mon 10/19/2020, Normal      tiZANidine (ZANAFLEX) 4 MG tablet Take 4 mg by mouth every evening. May take up to 3 tablets, Starting Fri 7/17/2020, Historical Med      topiramate (TOPAMAX) 50 MG tablet Take 50 mg by mouth every  evening. , Starting Sat 9/1/2018, Historical Med      venlafaxine (EFFEXOR-XR) 150 MG Cp24 Take 1 capsule (150 mg total) by mouth once daily., Starting Tue 2/11/2020, Until Wed 2/10/2021, Normal             No orders of the defined types were placed in this encounter.      Imaging Results    None         ED Course as of Oct 24 2011   Sat Oct 24, 2020   1121 Call placed to Dr. Arevalo [phone]; awaiting return to [facility].    [MR]   1138 Discussed with Dr. Arevalo hematology who agrees with no further treatment or diagnostic studies here and will follow-up with patient as planned.  She will know about potential for premedication prior to future iron infusions if necessary.    [MR]   1140 Discussed with Dr. Walsh OB=-Gyne [phone] regarding increase in pain with activity with current presentation and exam.  He agrees with no further testing here and follow-up as planned with appropriate return precautions as the patient has minimal subjective pain at rest and no other concerning findings such as fever.    [MR]      ED Course User Index  [MR] Chay Pelaez MD       MDM:    35 y.o. female with 2 complaints of improving rash after iron infusion for iron deficiency anemia yesterday along with increase in postoperative abdominal pain yesterday although minimal at rest.  No sign of anaphylaxis in regard to rash.  Low suspicion for end-organ dysfunction.  I did speak with Hematology regarding rash who agreed with observation for now and discussion of pre treatment for future iron infusions.  I did speak with her gynecologist regarding abdominal pain who agreed with outpatient follow-up with observation at home.  He agreed no further testing necessary and I do not think further laboratories or imaging is indicated.  I doubt complications such as perforated viscus, abscess, obstruction.  I have review detailed return precautions for all complaints of patient was reliable and has capacity to return.  Follow-up  with gynecology as well.    Diagnoses:    1. Rash s/p iron infusion  2. Post-operative abdominal pain       Chay Pelaez MD  10/24/20 2011

## 2020-10-24 NOTE — ED PROVIDER NOTES
Encounter Date: 10/24/2020       History     Chief Complaint   Patient presents with    Rash     after iron infusion yesterday; hysterectomy on monday    Post-op Problem     increased abdominal pain     Time seen by provider: 11:04 AM on 10/24/2020      Subha Bach is a 35 y.o. female with a PMHx of *** who presents to the ED for *** that started ***. The patient has a PSHx of ***.         Uterine fib  Post op day 5 totally laparoscopic hysterectomy by Dr. Walsh        Review of patient's allergies indicates:   Allergen Reactions    Nubain [nalbuphine] Hives     Past Medical History:   Diagnosis Date    Asthma     Encounter for blood transfusion     Gestational diabetes     gestational diabetes    Migraine headache      Past Surgical History:   Procedure Laterality Date    LAPAROSCOPIC SALPINGECTOMY Bilateral 10/19/2020    Procedure: SALPINGECTOMY, LAPAROSCOPIC;  Surgeon: Yonas Walsh MD;  Location: [facility] OR;  Service: OB/GYN;  Laterality: Bilateral;  Possible Overnight  Claudia Cabrera MD to assist    LAPAROSCOPIC TOTAL HYSTERECTOMY N/A 10/19/2020    Procedure: HYSTERECTOMY, TOTAL, LAPAROSCOPIC;  Surgeon: Yonas Walsh MD;  Location: [facility] OR;  Service: OB/GYN;  Laterality: N/A;    REMOVAL OF IMPLANT Left 10/19/2020    Procedure: REMOVAL, IMPLANT;  Surgeon: Yonas Walsh MD;  Location: [facility] OR;  Service: OB/GYN;  Laterality: Left;  Nextplan birthcontrol implant removal    SHOULDER SURGERY       Family History   Problem Relation Age of Onset    Asthma Mother     Migraines Mother     Hypothyroidism Mother     Arthritis Mother     Migraines Father     Allergies Father     Chiari malformation Sister     Migraines Sister     Pancreatitis Maternal Grandmother     Migraines Maternal Grandmother     Thyroid disease Maternal Grandmother     Other Maternal Grandmother         pituitary gland issues    Heart failure Maternal Grandfather     Diabetes Maternal Grandfather     Kidney disease  "Maternal Grandfather     Alzheimer's disease Paternal Grandmother     Prostate cancer Paternal Grandfather      Social History     Tobacco Use    Smoking status: Never Smoker    Smokeless tobacco: Never Used   Substance Use Topics    Alcohol use: Yes     Frequency: Monthly or less     Drinks per session: 1 or 2     Binge frequency: Never     Comment: Martini every 6 months    Drug use: No     Review of Systems    Physical Exam     Initial Vitals [10/24/20 1059]   BP Pulse Resp Temp SpO2   125/81 102 20 98.8 °F (37.1 °C) 98 %      MAP       --         Physical Exam    ED Course   Procedures  Labs Reviewed - No data to display       Imaging Results    None                                      Clinical Impression:      ***Please document a Clinical Impression and click the "Refresh" button to refresh your note and automatically pull in before signing.***                                    "

## 2020-10-27 ENCOUNTER — DOCUMENTATION ONLY (OUTPATIENT)
Dept: OBSTETRICS AND GYNECOLOGY | Facility: CLINIC | Age: 35
End: 2020-10-27

## 2020-10-27 LAB
FINAL PATHOLOGIC DIAGNOSIS: NORMAL
Lab: NORMAL

## 2020-10-27 NOTE — PROGRESS NOTES
RELIAPATH DIAGNOSIS:   A. UTERUS AND CERVIX, TOTAL LAPAROSCOPIC HYSTERECTOMY AND BILATERAL   SALPINGECTOMY:   -Weakly proliferative phase endometrium with submucosal leiomyoma; negative   for hyperplasia or carcinoma.   -Myometrium, without diagnostic abnormality.   -Cervix and endocervix showing focal HPV effect (LGSIL); no high grade   dysplasia or invasive carcinoma identified.   -Bilateral fallopian tubes, without diagnostic abnormality.   -Negative for malignancy. (D25.9)   B. LEFT ARM EXPLANT, REMOVAL:   -Explant (gross examination only). (T84.9XXA)   Gosia Campbell M.D.

## 2020-10-28 ENCOUNTER — TELEPHONE (OUTPATIENT)
Dept: OBSTETRICS AND GYNECOLOGY | Facility: CLINIC | Age: 35
End: 2020-10-28

## 2020-10-28 NOTE — TELEPHONE ENCOUNTER
No answer x 2 850 AM and 11:42 am, no VM pick-up. Unable to leave message    ----- Message from Noreen Pope sent at 10/28/2020  8:40 AM CDT -----  Regarding: post surgery issues  Type: Needs Medical Advice  Who Called:  pt  Symptoms (please be specific):    How long has patient had these symptoms:    Pharmacy name and phone #:    Best Call Back Number: [phone] (home)     Additional Information: pt states she is a week out post surgery she stated that she is experiencing pain and spotting pls call to advise

## 2020-10-29 ENCOUNTER — PATIENT MESSAGE (OUTPATIENT)
Dept: HEMATOLOGY/ONCOLOGY | Facility: CLINIC | Age: 35
End: 2020-10-29

## 2020-10-29 ENCOUNTER — DOCUMENTATION ONLY (OUTPATIENT)
Dept: HEMATOLOGY/ONCOLOGY | Facility: CLINIC | Age: 35
End: 2020-10-29

## 2020-10-30 ENCOUNTER — INFUSION (OUTPATIENT)
Dept: INFUSION THERAPY | Facility: HOSPITAL | Age: 35
End: 2020-10-30
Attending: INTERNAL MEDICINE
Payer: COMMERCIAL

## 2020-10-30 VITALS
SYSTOLIC BLOOD PRESSURE: 100 MMHG | BODY MASS INDEX: 28.42 KG/M2 | HEART RATE: 78 BPM | DIASTOLIC BLOOD PRESSURE: 68 MMHG | RESPIRATION RATE: 18 BRPM | TEMPERATURE: 98 F | WEIGHT: 176.13 LBS

## 2020-10-30 DIAGNOSIS — D50.0 IRON DEFICIENCY ANEMIA DUE TO CHRONIC BLOOD LOSS: Primary | ICD-10-CM

## 2020-10-30 PROCEDURE — 25000003 PHARM REV CODE 250: Performed by: INTERNAL MEDICINE

## 2020-10-30 PROCEDURE — 96365 THER/PROPH/DIAG IV INF INIT: CPT

## 2020-10-30 PROCEDURE — 63600175 PHARM REV CODE 636 W HCPCS: Mod: JG | Performed by: INTERNAL MEDICINE

## 2020-10-30 RX ORDER — SODIUM CHLORIDE 9 MG/ML
INJECTION, SOLUTION INTRAVENOUS CONTINUOUS
Status: DISCONTINUED | OUTPATIENT
Start: 2020-10-30 | End: 2020-10-30 | Stop reason: HOSPADM

## 2020-10-30 RX ORDER — HEPARIN 100 UNIT/ML
5 SYRINGE INTRAVENOUS
Status: CANCELLED | OUTPATIENT
Start: 2020-10-30

## 2020-10-30 RX ORDER — SODIUM CHLORIDE 9 MG/ML
INJECTION, SOLUTION INTRAVENOUS CONTINUOUS
Status: CANCELLED | OUTPATIENT
Start: 2020-10-30

## 2020-10-30 RX ORDER — SODIUM CHLORIDE 0.9 % (FLUSH) 0.9 %
10 SYRINGE (ML) INJECTION
Status: CANCELLED | OUTPATIENT
Start: 2020-10-30

## 2020-10-30 RX ORDER — SODIUM CHLORIDE 0.9 % (FLUSH) 0.9 %
10 SYRINGE (ML) INJECTION
Status: DISCONTINUED | OUTPATIENT
Start: 2020-10-30 | End: 2020-10-30 | Stop reason: HOSPADM

## 2020-10-30 RX ADMIN — SODIUM CHLORIDE: 0.9 INJECTION, SOLUTION INTRAVENOUS at 03:10

## 2020-10-30 RX ADMIN — FERRIC CARBOXYMALTOSE INJECTION 750 MG: 50 INJECTION, SOLUTION INTRAVENOUS at 03:10

## 2020-10-30 NOTE — PLAN OF CARE
Problem: Fatigue  Goal: Improved Activity Tolerance  Outcome: Ongoing, Progressing  Intervention: Promote Energy Conservation  Flowsheets (Taken 10/30/2020 4936)  Fatigue Management: frequent rest breaks encouraged

## 2020-11-03 ENCOUNTER — OFFICE VISIT (OUTPATIENT)
Dept: OBSTETRICS AND GYNECOLOGY | Facility: CLINIC | Age: 35
End: 2020-11-03
Payer: COMMERCIAL

## 2020-11-03 VITALS
BODY MASS INDEX: 28.36 KG/M2 | WEIGHT: 175.69 LBS | DIASTOLIC BLOOD PRESSURE: 78 MMHG | SYSTOLIC BLOOD PRESSURE: 124 MMHG

## 2020-11-03 DIAGNOSIS — Z01.818 PRE-OP EVALUATION: Primary | ICD-10-CM

## 2020-11-03 PROCEDURE — 99024 PR POST-OP FOLLOW-UP VISIT: ICD-10-PCS | Mod: S$GLB,,, | Performed by: OBSTETRICS & GYNECOLOGY

## 2020-11-03 PROCEDURE — 99999 PR PBB SHADOW E&M-EST. PATIENT-LVL III: CPT | Mod: PBBFAC,,, | Performed by: OBSTETRICS & GYNECOLOGY

## 2020-11-03 PROCEDURE — 99024 POSTOP FOLLOW-UP VISIT: CPT | Mod: S$GLB,,, | Performed by: OBSTETRICS & GYNECOLOGY

## 2020-11-03 PROCEDURE — 99999 PR PBB SHADOW E&M-EST. PATIENT-LVL III: ICD-10-PCS | Mod: PBBFAC,,, | Performed by: OBSTETRICS & GYNECOLOGY

## 2020-11-03 NOTE — PROGRESS NOTES
History of Present Illness:   Pateint presents today  2 weeks postop, status post Total laparoscopic Hysterectomy , with complaint of none.    Pathology:   Weakly proliferative phase endometrium with submucosal leiomyoma; negative   for hyperplasia or carcinoma.   -Myometrium, without diagnostic abnormality.   -Cervix and endocervix showing focal HPV effect (LGSIL); no high grade   dysplasia or invasive carcinoma identified.   -Bilateral fallopian tubes, without diagnostic abnormality.   -Negative for malignancy. (D25.9)     B. LEFT ARM EXPLANT, REMOVAL:   -Explant (gross examination only).        Past medical and surgical history reviewed.   I have reviewed the patient's medical history in detail and updated the computerized patient record.    Physical exam:  Vital Signs: as above, reviewed.  Constitutional: She is oriented to person, place, and time. She appears well-developed and well-nourished. No distress.   HENT:   Head: Normocephalic and atraumatic.   Eyes: Conjunctivae and EOM are normal. No scleral icterus.   Neck: Normal range of motion. Neck supple. No tracheal deviation present.   Cardiovascular: Normal rate.    Pulmonary/Chest: Effort normal. No respiratory distress. She exhibits no tenderness.  Breasts: deferred  Abdominal: Soft. She exhibits no distension and no mass.  The incisions are healing well. There is no rebound and no guarding.   Genitourinary: Deferred  Musculoskeletal: Normal range of motion.   Lymphadenopathy: No inguinal adenopathy present.   Neurological: She is alert and oriented to person, place, and time. Coordination normal.   Skin: Skin is warm and dry. She is not diaphoretic.   Psychiatric: She has a normal mood and affect.    Assessment:  Normal 2 week postop check    Plan:  Follow up  4 weeks  Pelvic at follow up

## 2020-11-20 ENCOUNTER — LAB VISIT (OUTPATIENT)
Dept: LAB | Facility: HOSPITAL | Age: 35
End: 2020-11-20
Attending: INTERNAL MEDICINE
Payer: COMMERCIAL

## 2020-11-20 DIAGNOSIS — N92.0 MENORRHAGIA WITH REGULAR CYCLE: ICD-10-CM

## 2020-11-20 DIAGNOSIS — D50.0 IRON DEFICIENCY ANEMIA DUE TO CHRONIC BLOOD LOSS: ICD-10-CM

## 2020-11-20 DIAGNOSIS — D64.89: ICD-10-CM

## 2020-11-20 LAB
ALBUMIN SERPL BCP-MCNC: 4.4 G/DL (ref 3.5–5.2)
ALP SERPL-CCNC: 98 U/L (ref 55–135)
ALT SERPL W/O P-5'-P-CCNC: 23 U/L (ref 10–44)
ANION GAP SERPL CALC-SCNC: 9 MMOL/L (ref 8–16)
AST SERPL-CCNC: 17 U/L (ref 10–40)
BASOPHILS # BLD AUTO: 0.05 K/UL (ref 0–0.2)
BASOPHILS NFR BLD: 0.8 % (ref 0–1.9)
BILIRUB SERPL-MCNC: 0.2 MG/DL (ref 0.1–1)
BUN SERPL-MCNC: 10 MG/DL (ref 6–20)
CALCIUM SERPL-MCNC: 9.6 MG/DL (ref 8.7–10.5)
CHLORIDE SERPL-SCNC: 104 MMOL/L (ref 95–110)
CO2 SERPL-SCNC: 26 MMOL/L (ref 23–29)
CREAT SERPL-MCNC: 0.8 MG/DL (ref 0.5–1.4)
DIFFERENTIAL METHOD: ABNORMAL
EOSINOPHIL # BLD AUTO: 0.1 K/UL (ref 0–0.5)
EOSINOPHIL NFR BLD: 1.5 % (ref 0–8)
ERYTHROCYTE [DISTWIDTH] IN BLOOD BY AUTOMATED COUNT: 18.3 % (ref 11.5–14.5)
EST. GFR  (AFRICAN AMERICAN): >60 ML/MIN/1.73 M^2
EST. GFR  (NON AFRICAN AMERICAN): >60 ML/MIN/1.73 M^2
FERRITIN SERPL-MCNC: 358 NG/ML (ref 20–300)
GLUCOSE SERPL-MCNC: 89 MG/DL (ref 70–110)
HCT VFR BLD AUTO: 45.2 % (ref 37–48.5)
HGB BLD-MCNC: 13.8 G/DL (ref 12–16)
IMM GRANULOCYTES # BLD AUTO: 0.01 K/UL (ref 0–0.04)
IMM GRANULOCYTES NFR BLD AUTO: 0.2 % (ref 0–0.5)
LYMPHOCYTES # BLD AUTO: 1.9 K/UL (ref 1–4.8)
LYMPHOCYTES NFR BLD: 30.4 % (ref 18–48)
MCH RBC QN AUTO: 26.3 PG (ref 27–31)
MCHC RBC AUTO-ENTMCNC: 30.5 G/DL (ref 32–36)
MCV RBC AUTO: 86 FL (ref 82–98)
MONOCYTES # BLD AUTO: 0.4 K/UL (ref 0.3–1)
MONOCYTES NFR BLD: 7.1 % (ref 4–15)
NEUTROPHILS # BLD AUTO: 3.7 K/UL (ref 1.8–7.7)
NEUTROPHILS NFR BLD: 60 % (ref 38–73)
NRBC BLD-RTO: 0 /100 WBC
PLATELET # BLD AUTO: 350 K/UL (ref 150–350)
PMV BLD AUTO: 9.1 FL (ref 9.2–12.9)
POTASSIUM SERPL-SCNC: 3.7 MMOL/L (ref 3.5–5.1)
PROT SERPL-MCNC: 8.8 G/DL (ref 6–8.4)
RBC # BLD AUTO: 5.24 M/UL (ref 4–5.4)
SODIUM SERPL-SCNC: 139 MMOL/L (ref 136–145)
WBC # BLD AUTO: 6.09 K/UL (ref 3.9–12.7)

## 2020-11-20 PROCEDURE — 82728 ASSAY OF FERRITIN: CPT

## 2020-11-20 PROCEDURE — 85025 COMPLETE CBC W/AUTO DIFF WBC: CPT

## 2020-11-20 PROCEDURE — 36415 COLL VENOUS BLD VENIPUNCTURE: CPT

## 2020-11-20 PROCEDURE — 80053 COMPREHEN METABOLIC PANEL: CPT

## 2020-11-20 PROCEDURE — 83540 ASSAY OF IRON: CPT

## 2020-11-21 LAB
IRON SERPL-MCNC: 90 UG/DL (ref 30–160)
SATURATED IRON: 28 % (ref 20–50)
TOTAL IRON BINDING CAPACITY: 318 UG/DL (ref 250–450)
TRANSFERRIN SERPL-MCNC: 215 MG/DL (ref 200–375)

## 2020-11-24 ENCOUNTER — OFFICE VISIT (OUTPATIENT)
Dept: HEMATOLOGY/ONCOLOGY | Facility: CLINIC | Age: 35
End: 2020-11-24
Payer: COMMERCIAL

## 2020-11-24 VITALS
SYSTOLIC BLOOD PRESSURE: 137 MMHG | WEIGHT: 173.5 LBS | RESPIRATION RATE: 18 BRPM | HEART RATE: 111 BPM | HEIGHT: 66 IN | TEMPERATURE: 98 F | DIASTOLIC BLOOD PRESSURE: 88 MMHG | BODY MASS INDEX: 27.88 KG/M2 | OXYGEN SATURATION: 97 %

## 2020-11-24 DIAGNOSIS — Z86.2 HISTORY OF IRON DEFICIENCY ANEMIA: ICD-10-CM

## 2020-11-24 DIAGNOSIS — D18.03 HEPATIC HEMANGIOMA: Primary | ICD-10-CM

## 2020-11-24 DIAGNOSIS — R76.8 ANA POSITIVE: ICD-10-CM

## 2020-11-24 DIAGNOSIS — M79.7 FIBROMYALGIA: ICD-10-CM

## 2020-11-24 PROCEDURE — 99214 PR OFFICE/OUTPT VISIT, EST, LEVL IV, 30-39 MIN: ICD-10-PCS | Mod: S$GLB,,, | Performed by: INTERNAL MEDICINE

## 2020-11-24 PROCEDURE — 3008F PR BODY MASS INDEX (BMI) DOCUMENTED: ICD-10-PCS | Mod: CPTII,S$GLB,, | Performed by: INTERNAL MEDICINE

## 2020-11-24 PROCEDURE — 99999 PR PBB SHADOW E&M-EST. PATIENT-LVL IV: CPT | Mod: PBBFAC,,, | Performed by: INTERNAL MEDICINE

## 2020-11-24 PROCEDURE — 3008F BODY MASS INDEX DOCD: CPT | Mod: CPTII,S$GLB,, | Performed by: INTERNAL MEDICINE

## 2020-11-24 PROCEDURE — 99999 PR PBB SHADOW E&M-EST. PATIENT-LVL IV: ICD-10-PCS | Mod: PBBFAC,,, | Performed by: INTERNAL MEDICINE

## 2020-11-24 PROCEDURE — 1126F AMNT PAIN NOTED NONE PRSNT: CPT | Mod: S$GLB,,, | Performed by: INTERNAL MEDICINE

## 2020-11-24 PROCEDURE — 1126F PR PAIN SEVERITY QUANTIFIED, NO PAIN PRESENT: ICD-10-PCS | Mod: S$GLB,,, | Performed by: INTERNAL MEDICINE

## 2020-11-24 PROCEDURE — 99214 OFFICE O/P EST MOD 30 MIN: CPT | Mod: S$GLB,,, | Performed by: INTERNAL MEDICINE

## 2020-11-24 NOTE — PROGRESS NOTES
[facility]     Subjective:      Patient: Subha Bach Patient PCP: Mikael Bach DO         :  1985     Sex:  female      MRN:  [id number]          Date of Visit: 10/23/2020      Chief Complaint: F/u Anemia post surgery and Iv iron     Patient ID: Subha Bach is a 35 y.o. female with menorrhagia with regular cycle, iron deficiency anemia due to chronic blood loss who presents to office for injectafer clearance x2 days 1 and 8 for treatment of anemia. Pt sees Gyn Dr. Yonas Walsh who performed a total laparoscopic hysterectomy with bilateral salpingectomy with removal of nexplanon on her left arm on 10/19/2020.  Recovering from surgery post IV iron. On meds for fibromyalgia. Ovaries intact. Positive ramses     Review of Systems   Constitutional: Negative for activity change, appetite change, chills, fatigue, fever and unexpected weight change.   HENT: Negative for mouth sores and trouble swallowing.    Eyes: Negative for photophobia and visual disturbance.   Respiratory: Negative for cough, chest tightness, shortness of breath, wheezing and stridor.    Cardiovascular: Negative for chest pain and leg swelling.   Gastrointestinal: Negative for abdominal pain (s/p hysterectomy), constipation, diarrhea, nausea and vomiting.   Musculoskeletal: Negative for arthralgias, back pain and myalgias.   Skin: Negative for color change, pallor, rash and wound.   Neurological: Negative for syncope, speech difficulty and weakness.   Hematological: Negative for adenopathy. Does not bruise/bleed easily.   Psychiatric/Behavioral: Negative for agitation, behavioral problems, confusion, decreased concentration and dysphoric mood.        Past Medical History:   Diagnosis Date    Asthma     Encounter for blood transfusion     Gestational diabetes     gestational diabetes    Migraine headache      Family History   Problem Relation Age of Onset    Asthma Mother     Migraines Mother     Hypothyroidism Mother      Arthritis Mother     Migraines Father     Allergies Father     Chiari malformation Sister     Migraines Sister     Pancreatitis Maternal Grandmother     Migraines Maternal Grandmother     Thyroid disease Maternal Grandmother     Other Maternal Grandmother         pituitary gland issues    Heart failure Maternal Grandfather     Diabetes Maternal Grandfather     Kidney disease Maternal Grandfather     Alzheimer's disease Paternal Grandmother     Prostate cancer Paternal Grandfather      Past Surgical History:   Procedure Laterality Date    LAPAROSCOPIC SALPINGECTOMY Bilateral 10/19/2020    Procedure: SALPINGECTOMY, LAPAROSCOPIC;  Surgeon: Yonas Walsh MD;  Location: [facility] OR;  Service: OB/GYN;  Laterality: Bilateral;  Possible Overnight  Claudia Cabrera MD to assist    LAPAROSCOPIC TOTAL HYSTERECTOMY N/A 10/19/2020    Procedure: HYSTERECTOMY, TOTAL, LAPAROSCOPIC;  Surgeon: Yonas Walsh MD;  Location: [facility] OR;  Service: OB/GYN;  Laterality: N/A;    REMOVAL OF IMPLANT Left 10/19/2020    Procedure: REMOVAL, IMPLANT;  Surgeon: Yonas Walsh MD;  Location: [facility] OR;  Service: OB/GYN;  Laterality: Left;  Nextplan birthcontrol implant removal    SHOULDER SURGERY       Social History     Socioeconomic History    Marital status: Single     Spouse name: Not on file    Number of children: Not on file    Years of education: Not on file    Highest education level: Not on file   Occupational History    Not on file   Social Needs    Financial resource strain: Somewhat hard    Food insecurity     Worry: Never true     Inability: Never true    Transportation needs     Medical: No     Non-medical: No   Tobacco Use    Smoking status: Never Smoker    Smokeless tobacco: Never Used   Substance and Sexual Activity    Alcohol use: Yes     Frequency: Monthly or less     Drinks per session: 1 or 2     Binge frequency: Never     Comment: Martini every 6 months    Drug use: No    Sexual activity: Yes      Partners: Male   Lifestyle    Physical activity     Days per week: 3 days     Minutes per session: 50 min    Stress: Only a little   Relationships    Social connections     Talks on phone: More than three times a week     Gets together: More than three times a week     Attends [employer]ism service: Not on file     Active member of club or organization: Yes     Attends meetings of clubs or organizations: More than 4 times per year     Relationship status: Never    Other Topics Concern    Not on file   Social History Narrative    Not on file       Current Outpatient Medications:     AJOVY AUTOINJECTOR 225 mg/1.5 mL autoinjector, INJECT 1.5 MG INTO THE SKIN Q MONTH, Disp: , Rfl:     albuterol (PROVENTIL HFA) 90 mcg/actuation inhaler, Inhale 2 puffs into the lungs every 6 (six) hours as needed for Wheezing or Shortness of Breath. Rescue, Disp: 18 g, Rfl: 11    docusate sodium (COLACE) 100 MG capsule, Take 1 capsule (100 mg total) by mouth 2 (two) times daily., Disp: 60 capsule, Rfl: 1    eletriptan (RELPAX) 40 MG tablet, Take 40 mg by mouth as needed. may repeat in 2 hours if necessary, Disp: , Rfl:     folic acid (FOLVITE) 1 MG tablet, Take 1 tablet (1 mg total) by mouth once daily., Disp: 90 tablet, Rfl: 2    gabapentin (NEURONTIN) 600 MG tablet, Take 600 mg by mouth 2 (two) times daily., Disp: , Rfl:     ibuprofen (ADVIL,MOTRIN) 800 MG tablet, Take 1 tablet (800 mg total) by mouth every 8 (eight) hours as needed for Pain., Disp: 30 tablet, Rfl: 1    ondansetron (ZOFRAN-ODT) 4 MG TbDL, Take 2 tablets (8 mg total) by mouth every 8 (eight) hours as needed., Disp: 12 tablet, Rfl: 0    oxyCODONE-acetaminophen (PERCOCET) 5-325 mg per tablet, Take 1 tablet by mouth every 4 (four) hours as needed for Pain., Disp: 14 tablet, Rfl: 0    polyethylene glycol (GLYCOLAX) 17 gram/dose powder, Take 17 g by mouth once daily. In 8 oz of water, Disp: 240 g, Rfl: 1    tiZANidine (ZANAFLEX) 4 MG tablet, Take 4 mg by  "mouth every evening. May take up to 3 tablets, Disp: , Rfl:     topiramate (TOPAMAX) 50 MG tablet, Take 50 mg by mouth every evening. , Disp: , Rfl:     venlafaxine (EFFEXOR-XR) 150 MG Cp24, Take 1 capsule (150 mg total) by mouth once daily., Disp: 30 capsule, Rfl: 4    ferrous sulfate (FEOSOL) 325 mg (65 mg iron) Tab tablet, Take 1 tablet (325 mg total) by mouth 3 (three) times daily. (Patient not taking: Reported on 11/3/2020), Disp: 90 tablet, Rfl: 1    omeprazole (PRILOSEC) 20 MG capsule, Take 1 capsule (20 mg total) by mouth once daily. (Patient taking differently: Take 20 mg by mouth daily as needed (heartburn). ), Disp: 30 capsule, Rfl: 11    Review of patient's allergies indicates:   Allergen Reactions    Nubain [nalbuphine] Hives     All medications, allergies, and past history have been reviewed.  Objective:      Vitals:  Vitals - 1 value per visit 10/16/2020 10/19/2020 10/23/2020   SYSTOLIC - 99 112   DIASTOLIC - 54 63   PULSE - 64 110   TEMPERATURE - 97 98   RESPIRATIONS - 19 18   SPO2 - 95 97   Weight (lb) 176 - 179.23   Weight (kg) 79.833 - 81.3   HEIGHT 5' 6" - -   BODY MASS INDEX - 28.41 28.93   VISIT REPORT - - -   Pain Score  - - 8       Body surface area is 1.91 meters squared.  Weight Readings:  Wt Readings from Last 5 Encounters:   10/23/20 81.3 kg (179 lb 3.7 oz)   10/16/20 79.8 kg (176 lb)   10/13/20 80.1 kg (176 lb 9.4 oz)   09/24/20 77.7 kg (171 lb 4.8 oz)   09/14/20 76.2 kg (167 lb 15.9 oz)      Blood Type:  O POS     Physical Exam  Constitutional:       General: She is not in acute distress.     Appearance: Normal appearance. She is not ill-appearing.   HENT:      Head: Normocephalic and atraumatic.      Right Ear: External ear normal.      Left Ear: External ear normal.      Nose: Nose normal. No congestion or rhinorrhea.   Eyes:      General:         Right eye: No discharge.         Left eye: No discharge.      Extraocular Movements: Extraocular movements intact.      " Conjunctiva/sclera: Conjunctivae normal.      Pupils: Pupils are equal, round, and reactive to light.   Neck:      Musculoskeletal: Normal range of motion and neck supple. No neck rigidity.   Cardiovascular:      Rate and Rhythm: Normal rate and regular rhythm.      Heart sounds: Normal heart sounds. No murmur.   Pulmonary:      Effort: Pulmonary effort is normal. No respiratory distress.      Breath sounds: Normal breath sounds. No stridor. No wheezing, rhonchi or rales.   Abdominal:      General: Bowel sounds are normal. There is no distension.      Palpations: Abdomen is soft.      Tenderness: There is no abdominal tenderness. There is no guarding.   Musculoskeletal: Normal range of motion.         General: No deformity.      Right lower leg: No edema.      Left lower leg: No edema.   Lymphadenopathy:      Cervical: No cervical adenopathy.   Skin:     General: Skin is warm and dry.      Coloration: Skin is not pale.      Findings: No erythema or rash.   Neurological:      General: No focal deficit present.      Mental Status: She is alert and oriented to person, place, and time. Mental status is at baseline.      Cranial Nerves: No cranial nerve deficit.   Psychiatric:         Mood and Affect: Mood normal.         Behavior: Behavior normal.         Thought Content: Thought content normal.         Judgment: Judgment normal.       Labs:    CBC  Lab Results   Component Value Date    WBC 6.09 11/20/2020    RBC 5.24 11/20/2020    HGB 13.8 11/20/2020    HCT 45.2 11/20/2020    MCV 86 11/20/2020    MCH 26.3 (L) 11/20/2020    MCHC 30.5 (L) 11/20/2020    RDW 18.3 (H) 11/20/2020     11/20/2020    MPV 9.1 (L) 11/20/2020    GRAN 3.7 11/20/2020    GRAN 60.0 11/20/2020    LYMPH 1.9 11/20/2020    LYMPH 30.4 11/20/2020    MONO 0.4 11/20/2020    MONO 7.1 11/20/2020    EOS 0.1 11/20/2020    BASO 0.05 11/20/2020    EOSINOPHIL 1.5 11/20/2020    BASOPHIL 0.8 11/20/2020         Imaging:   US abdomen 09/21/2020  FINDINGS:  Solid  hyperechoic mass in right hepatic lobe measures 12 x 17 x 16 mm  with through sound transmission. Liver otherwise maintains normal  echogenicity without intrahepatic bile duct dilation. Hepatopedal flow  in main portal vein.     Gallbladder contains nonmobile 5 mm echogenic nonshadowing focus along  the nondependent wall consistent with an incidental polyp. Negative  for cholelithiasis. Common duct diameter is normal.     Bowel gas obscures the pancreas. Visualized abdominal aorta is  nonaneurysmal. Juxtahepatic IVC is unremarkable. Spleen is normal in  size and echotexture.     Right kidney measures 10.1 cm in length, and left kidney measures 10.9  cm. Bilateral kidneys maintain normal cortical thickness and  echotexture without hydronephrosis.     IMPRESSION:     1. 17 mm hyperechoic mass in right hepatic lobe has sonographic  characteristics suggestive of hemangioma. As no prior imaging of this  is available, three-month follow-up right upper quadrant ultrasound or  hepatic protocol MRI is recommended to document stability and further  characterize.  2. Incidental gallbladder polyp, of no clinical significance.    All lab results and imaging results have been reviewed.    Assessment and Plan:      Problem List Items Addressed This Visit        Orthopedic    Fibromyalgia      Other Visit Diagnoses     Hepatic hemangioma    -  Primary    Relevant Orders    US Abdomen Limited    History of iron deficiency anemia        ANNELISE positive            Recheck US liver : if stable will DC back to pcp to monitor this further and her anemia  Cont meds for fibromyalgia   Cont prilosec for gerd   RTC for US results      Patient Contact Information: [phone]  Patient Address: [address] [place] [id number]  Extended Emergency Contact Information  Primary Emergency Contact: Andrés Bach   [place] [facility]  Mobile Phone: [phone]  Relation: Sister  Pt Insurance: Payor: BLUE CROSS BLUE SHIELD / Plan: BCBS ALL OUT  OF STATE / Product Type: PPO /

## 2020-11-25 ENCOUNTER — TELEPHONE (OUTPATIENT)
Dept: HEMATOLOGY/ONCOLOGY | Facility: CLINIC | Age: 35
End: 2020-11-25

## 2020-11-25 NOTE — TELEPHONE ENCOUNTER
Called and left vm. Portal msg'd pt letting her know her appts for us, labs, ov were in. Encouraged to ack.

## 2020-12-04 ENCOUNTER — HOSPITAL ENCOUNTER (OUTPATIENT)
Dept: RADIOLOGY | Facility: HOSPITAL | Age: 35
Discharge: HOME | End: 2020-12-04
Attending: INTERNAL MEDICINE
Payer: COMMERCIAL

## 2020-12-04 DIAGNOSIS — D18.03 HEPATIC HEMANGIOMA: ICD-10-CM

## 2020-12-04 PROCEDURE — 76705 ECHO EXAM OF ABDOMEN: CPT | Mod: TC

## 2020-12-08 ENCOUNTER — LAB VISIT (OUTPATIENT)
Dept: LAB | Facility: HOSPITAL | Age: 35
End: 2020-12-08
Attending: INTERNAL MEDICINE
Payer: COMMERCIAL

## 2020-12-08 DIAGNOSIS — D64.89: ICD-10-CM

## 2020-12-08 LAB
BASOPHILS # BLD AUTO: 0.06 K/UL (ref 0–0.2)
BASOPHILS NFR BLD: 0.6 % (ref 0–1.9)
DIFFERENTIAL METHOD: ABNORMAL
EOSINOPHIL # BLD AUTO: 0.1 K/UL (ref 0–0.5)
EOSINOPHIL NFR BLD: 1.3 % (ref 0–8)
ERYTHROCYTE [DISTWIDTH] IN BLOOD BY AUTOMATED COUNT: 16.5 % (ref 11.5–14.5)
HCT VFR BLD AUTO: 42.1 % (ref 37–48.5)
HGB BLD-MCNC: 13.7 G/DL (ref 12–16)
IMM GRANULOCYTES # BLD AUTO: 0.02 K/UL (ref 0–0.04)
IMM GRANULOCYTES NFR BLD AUTO: 0.2 % (ref 0–0.5)
LYMPHOCYTES # BLD AUTO: 2.7 K/UL (ref 1–4.8)
LYMPHOCYTES NFR BLD: 27.5 % (ref 18–48)
MCH RBC QN AUTO: 28 PG (ref 27–31)
MCHC RBC AUTO-ENTMCNC: 32.5 G/DL (ref 32–36)
MCV RBC AUTO: 86 FL (ref 82–98)
MONOCYTES # BLD AUTO: 0.6 K/UL (ref 0.3–1)
MONOCYTES NFR BLD: 5.7 % (ref 4–15)
NEUTROPHILS # BLD AUTO: 6.2 K/UL (ref 1.8–7.7)
NEUTROPHILS NFR BLD: 64.7 % (ref 38–73)
NRBC BLD-RTO: 0 /100 WBC
PLATELET # BLD AUTO: 314 K/UL (ref 150–350)
PMV BLD AUTO: 9.8 FL (ref 9.2–12.9)
RBC # BLD AUTO: 4.89 M/UL (ref 4–5.4)
WBC # BLD AUTO: 9.63 K/UL (ref 3.9–12.7)

## 2020-12-08 PROCEDURE — 36415 COLL VENOUS BLD VENIPUNCTURE: CPT

## 2020-12-08 PROCEDURE — 83540 ASSAY OF IRON: CPT

## 2020-12-08 PROCEDURE — 85025 COMPLETE CBC W/AUTO DIFF WBC: CPT

## 2020-12-09 LAB
IRON SERPL-MCNC: 53 UG/DL (ref 30–160)
SATURATED IRON: 18 % (ref 20–50)
TOTAL IRON BINDING CAPACITY: 290 UG/DL (ref 250–450)
TRANSFERRIN SERPL-MCNC: 196 MG/DL (ref 200–375)

## 2020-12-11 ENCOUNTER — OFFICE VISIT (OUTPATIENT)
Dept: HEMATOLOGY/ONCOLOGY | Facility: CLINIC | Age: 35
End: 2020-12-11
Payer: COMMERCIAL

## 2020-12-11 DIAGNOSIS — Z86.2 HISTORY OF IRON DEFICIENCY ANEMIA: Primary | ICD-10-CM

## 2020-12-11 PROCEDURE — 99213 PR OFFICE/OUTPT VISIT, EST, LEVL III, 20-29 MIN: ICD-10-PCS | Mod: 95,,, | Performed by: INTERNAL MEDICINE

## 2020-12-11 PROCEDURE — 1126F AMNT PAIN NOTED NONE PRSNT: CPT | Mod: ,,, | Performed by: INTERNAL MEDICINE

## 2020-12-11 PROCEDURE — 1126F PR PAIN SEVERITY QUANTIFIED, NO PAIN PRESENT: ICD-10-PCS | Mod: ,,, | Performed by: INTERNAL MEDICINE

## 2020-12-11 PROCEDURE — 99213 OFFICE O/P EST LOW 20 MIN: CPT | Mod: 95,,, | Performed by: INTERNAL MEDICINE

## 2020-12-11 NOTE — PROGRESS NOTES
CC: Follow up for anemia    Interval History: Patient presents for follow up of  Anemia and menorrhagia .  The patient has a history of significant  headaches.      Medications reviewed including :    Current Outpatient Medications:     AJOVY AUTOINJECTOR 225 mg/1.5 mL autoinjector, INJECT 1.5 MG INTO THE SKIN Q MONTH, Disp: , Rfl:     albuterol (PROVENTIL HFA) 90 mcg/actuation inhaler, Inhale 2 puffs into the lungs every 6 (six) hours as needed for Wheezing or Shortness of Breath. Rescue, Disp: 18 g, Rfl: 11    docusate sodium (COLACE) 100 MG capsule, Take 1 capsule (100 mg total) by mouth 2 (two) times daily., Disp: 60 capsule, Rfl: 1    eletriptan (RELPAX) 40 MG tablet, Take 40 mg by mouth as needed. may repeat in 2 hours if necessary, Disp: , Rfl:     ferrous sulfate (FEOSOL) 325 mg (65 mg iron) Tab tablet, Take 1 tablet (325 mg total) by mouth 3 (three) times daily. (Patient not taking: Reported on 11/3/2020), Disp: 90 tablet, Rfl: 1    folic acid (FOLVITE) 1 MG tablet, Take 1 tablet (1 mg total) by mouth once daily., Disp: 90 tablet, Rfl: 2    gabapentin (NEURONTIN) 600 MG tablet, Take 600 mg by mouth 2 (two) times daily., Disp: , Rfl:     ibuprofen (ADVIL,MOTRIN) 800 MG tablet, Take 1 tablet (800 mg total) by mouth every 8 (eight) hours as needed for Pain., Disp: 30 tablet, Rfl: 1    omeprazole (PRILOSEC) 20 MG capsule, Take 1 capsule (20 mg total) by mouth once daily. (Patient taking differently: Take 20 mg by mouth daily as needed (heartburn). ), Disp: 30 capsule, Rfl: 11    ondansetron (ZOFRAN-ODT) 4 MG TbDL, Take 2 tablets (8 mg total) by mouth every 8 (eight) hours as needed., Disp: 12 tablet, Rfl: 0    oxyCODONE-acetaminophen (PERCOCET) 5-325 mg per tablet, Take 1 tablet by mouth every 4 (four) hours as needed for Pain., Disp: 14 tablet, Rfl: 0    polyethylene glycol (GLYCOLAX) 17 gram/dose powder, Take 17 g by mouth once daily. In 8 oz of water, Disp: 240 g, Rfl: 1    tiZANidine  (ZANAFLEX) 4 MG tablet, Take 4 mg by mouth every evening. May take up to 3 tablets, Disp: , Rfl:     topiramate (TOPAMAX) 50 MG tablet, Take 50 mg by mouth every evening. , Disp: , Rfl:     venlafaxine (EFFEXOR-XR) 150 MG Cp24, Take 1 capsule (150 mg total) by mouth once daily., Disp: 30 capsule, Rfl: 4  Chart reviewed and other provider encounters reviewed including imaging results, lab results and changes in medication  ROS: 14 point complete review of systems reviewed and otherwise negative except for pertinent positives above       PFSH:  Complete PFSH taken during an earlier encounter was re-examined and reviewed with the patient.  For details please refer to the dictated initial consult note.  Since that time the patient reports nothing new     There were no vitals taken for this visit.  PHYSICAL EXAM:  Height / weight / VS reviewed  GENERAL.: Well-developed, well-nourished, in no acute distress  PSYCH: pleasant affect, no anxiety, no depression  HEENT: Normocephalic, lids intact, conjunctiva pink, sinuses nontender to palpation    normal auricula, nasal septum, dentition, gums and mucosa ,OP clear,no palatal pallor  Endocrine: no palpable thyromegaly, no diaphoresis  NECK: Supple,trachea midline, no palpable abnormalities  LYMPHATICS: No cervical or axillary adenopathy  RESPIRATORY: CTAB, no wheezes, rubs or rhonchi  Good respiratory effort without any retractions or diaphragmatic movement  CARDIOVASCULAR: no JVD, S1 / S2 with RRR, no murmur, no rub,2+ capillary refill  ABDOMEN: NTND, normal bowel sounds, no palpable HSM or mass  EXTREMITIES: No cyanosis, no clubbing, no edema, no joint effusion  NEUROLOGICAL: alert and oriented x 3  SKIN: Warm, dry, no rash or lesions noted, no tenting, no petechiae      Labs reviewed: pertinent findings include  Lab Results   Component Value Date    WBC 9.63 12/08/2020    RBC 4.89 12/08/2020    HGB 13.7 12/08/2020    HCT 42.1 12/08/2020    MCV 86 12/08/2020    MCH 28.0  12/08/2020    MCHC 32.5 12/08/2020    RDW 16.5 (H) 12/08/2020     12/08/2020    MPV 9.8 12/08/2020    GRAN 6.2 12/08/2020    GRAN 64.7 12/08/2020    LYMPH 2.7 12/08/2020    LYMPH 27.5 12/08/2020    MONO 0.6 12/08/2020    MONO 5.7 12/08/2020    EOS 0.1 12/08/2020    BASO 0.06 12/08/2020    EOSINOPHIL 1.3 12/08/2020    BASOPHIL 0.6 12/08/2020       Imaging reviewed     Assessment:    1.history of iron deficiency anemia post IV iron and surgical intervention for menorrrhagia  2.well controlled asthma  3.intermittent headaches       Plan:     DC from heme  Anemia resolved  Continue focusing on increasing iron in her diet naturally.  Now that she has had surgical intervention for menorrhagia hopefully she will not need further IV iron in the future.  She is to follow-up with her primary care physician for routine follow-up of asthma and intermittent migraines      Advance directives and care plan have been reviewed , Patient reports no changes to such  Pt is encouraged to use My Chart and ask the staff for assistance in setting up during wrap up.    Recommend healthy living: no nicotine,  should avoid alcohol, healthy diet and regular exercise aiming for fitness, and weight control  1. Discussed healthy alcohol daily limit of 0.5 oz of pure alcohol in any 24 hours (roughly one 12-oz beers, 4 oz of wine (8%-12% alcohol), or 1.25 oz (half a shot) of liquor (80 proof)), can not save up.  2.   Discussed good exercise program, 4 key elements: 1. Aerobic (walking, swimming, dancing, jogging, biking, etc, 2. Muscle strengthening / resistance exercise, need to do 2-3 times  weekly, 3. Stretching daily, good stretch takes a whole  total minute. 4. Balance exercise daily.  3.   Discussed healthy diet for over 15 minutes with handouts given to the patient     Discussed COVID-19 and social distancing in great detail, avoid all non-essential visits out of the home if possible and avoid sick contacts.

## 2020-12-11 NOTE — PATIENT INSTRUCTIONS
11 Healthy Foods That Are Very High in Iron  Iron is a mineral that serves several important functions, its main being to carry oxygen throughout your body and making red blood cells (1Trusted Source).    Its an essential nutrient, meaning you must get it from food. The recommended daily intake (RDI) is 18 mg.    Interestingly, the amount your body absorbs is partly based on how much you have stored.    A deficiency can occur if your intake is too low to replace the amount you lose every day (2Trusted Source).    Iron deficiency can cause anemia and lead to symptoms like fatigue. Menstruating women who don't consume iron-rich foods are at a particularly high risk of deficiency.    Luckily, there are plenty of good food choices to help you meet your daily iron needs.    Here are 11 healthy foods that are high in iron.    1. Shellfish  Shellfish is tasty and nutritious. All shellfish is high in iron, but clams, oysters and mussels are particularly good sources.    For instance, a 3.5-ounce (100-gram) serving of clams may contain up to 28 mg of iron, which is 155% of the RDI (3).    However, the iron content of clams is highly variable, and some types may contain much lower amounts (4).    The iron in shellfish is heme iron, which your body absorbs more easily than the non-heme iron found in plants.    A serving of clams also provides 26 grams of protein, 37% of the RDI for vitamin C and a whopping 1,648% of the RDI for vitamin B12.    In fact, all shellfish is high in nutrients and has been shown to increase the level of heart-healthy HDL cholesterol in your blood (5Trusted Source).    Although there are legitimate concerns about mercury and toxins in certain types of fish and shellfish, the benefits of consuming seafood far outweigh the risks (6Trusted Source).    SUMMARY  A 3.5-ounce (100-gram) serving of clams provides 155% of the RDI for iron. Shellfish is also rich in many other nutrients and may increase good  HDL cholesterol levels in your blood.  2. Spinach  Spinach provides many health benefits for very few calories.    3.5 ounces (100 grams) of cooked spinach contain 3.6 mg of iron, or 20% of the RDI (7).    Although this is non-heme iron, which isn't absorbed very well, spinach is also rich in vitamin C.    This is important since vitamin C significantly boosts iron absorption (8Trusted Source).    Spinach is also rich in antioxidants called carotenoids that may reduce your risk of cancer, decrease inflammation and protect your eyes from disease (9Trusted Source, 10Trusted Source, 11Trusted Source, 12Trusted Source).    Consuming spinach and other leafy greens with fat helps your body absorb the carotenoids, so make sure to eat a healthy fat like olive oil with your spinach (13Trusted Source).    SUMMARY  Spinach provides 20% of the RDI for iron per serving, along with several vitamins and minerals. It also contains important antioxidants.    3. Liver and Other Organ Meats  Organ meats are extremely nutritious. Popular types include liver, kidneys, brain and heart -- all of which are high in iron.    For example, a 3.5-ounce (100-gram) serving of beef liver contains 6.5 mg of iron, or 36% of the RDI (14).    Organ meats are also high in protein and rich in B vitamins, copper and selenium. Liver is especially high in vitamin A, providing an impressive 634% of the RDI per serving.    Whats more, organ meats are among the best sources of choline, an important nutrient for brain and liver health that many people don't get enough of (15Trusted Source).    SUMMARY  Organ meats are good sources of iron, and liver contains 36% of the RDI per serving. Organ meats are also rich in many other nutrients, such as selenium, vitamin A and choline.  4. Legumes  Legumes are loaded with nutrients.    Some of the most common types of legumes are beans, lentils, chickpeas, peas and soybeans.    They're a great source of iron,  especially for vegetarians. One cup (198 grams) of cooked lentils contains 6.6 mg, which is 37% of the RDI (16).    Legumes are also rich in folate, magnesium and potassium.    What's more, studies have shown that beans and other legumes can reduce inflammation in people with diabetes. Legumes can also decrease heart disease risk for people with metabolic syndrome (17Trusted Source, 18Trusted Source, 19Trusted Source, 20Trusted Source).    Additionally, legumes may help you lose weight. They're very high in soluble fiber, which can increase feelings of fullness and reduce calorie intake (21Trusted Source).    In one study, a high-fiber diet containing beans was shown to be as effective as a low-carb diet for weight loss (22Trusted Source).    In order to maximize iron absorption, consume legumes with foods high in vitamin C, such as tomatoes, greens or citrus fruits.    SUMMARY  One cup (198 grams) of cooked lentils provides 37% of the RDI for iron. Legumes are also high in folate, magnesium, potassium and fiber and may even aid weight loss.  5. Red Meat  Red meat is satisfying and nutritious. A 3.5-ounce (100-gram) serving of ground beef contains 2.7 mg of iron, which is 15% of the RDI (23).    Meat is also rich in protein, zinc, selenium and several B vitamins (24).    Researchers have suggested that iron deficiency may be less likely in people who eat meat, poultry and fish on a regular basis (25Trusted Source).    In fact, red meat is probably the single most easily accessible source of heme iron, potentially making it an important food for people who are prone to anemia.    In one study looking at changes in iron stores after aerobic exercise, women who consumed meat retained iron better than those who took iron supplements (26Trusted Source).    SUMMARY  One serving of ground beef contains 15% of the RDI for iron and is one of the most easily accessible sources of heme iron. Its also rich in B vitamins, zinc,  selenium and high-quality protein.    6. Pumpkin Seeds  Pumpkin seeds are a tasty, portable snack.    A 1-ounce (28-gram) serving of pumpkin seeds contains 4.2 mg of iron, which is 23% of the RDI (27).    In addition, pumpkin seeds are a good source of vitamin K, zinc and manganese. They're also among the best sources of magnesium, which many people are deficient in (28Trusted Source).    A 1-ounce (28-gram) serving contains 37% of the RDI for magnesium, which helps reduce your risk of insulin resistance, diabetes and depression (29Trusted Source, 30Trusted Source, 31Trusted Source).    SUMMARY  Pumpkin seeds provide 26% of the RDI for iron per serving. Theyre also a good source of several other nutrients, particularly magnesium.  7. Quinoa  Quinoa is a popular grain known as a pseudocereal. One cup (185 grams) of cooked quinoa provides 2.8 mg of iron, which is 15% of the RDI (32).    Furthermore, quinoa contains no gluten, making it a good choice for people with celiac disease or other forms of gluten intolerance.    Quinoa is also higher in protein than many other grains, as well as rich in folate, magnesium, copper, manganese and many other nutrients.    In addition, quinoa has more antioxidant activity than many other grains. Antioxidants help protect your cells from the damage done by free radicals, which are formed during metabolism and in response to stress (33Trusted Source, 34Trusted Source).    SUMMARY  Quinoa provides 15% of the RDI for iron per serving. It also contains no gluten and is high in protein, folate, minerals and antioxidants.  8. Turkey  Turkey meat is a healthy and delicious food. It's also a good source of iron -- especially dark turkey meat.    A 3.5-ounce (100-gram) portion of dark turkey meat has 2.3 mg of iron, which is 13% of the RDI (35).    In comparison, the same amount of white turkey meat contains only 1.3 mg (36).    Turkey also packs an impressive 29 grams of protein per  serving and several B vitamins and minerals, including 30% of the RDI for zinc and 58% of the RDI for selenium.    Consuming high-protein foods like turkey may aid weight loss since protein makes you feel full and increases your metabolic rate after a meal (37Trusted Source, 38Trusted Source, 39Trusted Source).    High protein intake can also help prevent the muscle loss that occurs during weight loss and as part of the aging process (40Trusted Source, 41Trusted Source).    SUMMARY  Turkey provides 13% of the RDI for iron and is a good source of several vitamins and minerals. Its high protein content promotes fullness, increases metabolism and prevents muscle loss.  9. Broccoli  Broccoli is incredibly nutritious. A 1-cup (156-gram) serving of cooked broccoli contains 1 mg of iron, which is 6% of the RDI, making it a fairly good source (42).    What's more, a serving of broccoli also packs 168% of the RDI for vitamin C, which helps your body absorb the iron better (8Trusted Source, 43Trusted Source).    The same serving size is also high in folate and provides 6 grams of fiber, as well as some vitamin K.    Broccoli is a member of the cruciferous vegetable family, which also includes cauliflower, [place] sprouts, kale and cabbage.    Cruciferous vegetables contain indole, sulforaphane and glucosinolates, which are plant compounds believed to be protective against cancer (44Trusted Source, 45Trusted Source, 46, 47Trusted Source).    SUMMARY  One serving of broccoli provides 6% of the RDI for iron and is very high in vitamins C, K and folate. It may also help reduce cancer risk.  10. Tofu  Tofu is a soy-based food that's popular among vegetarians and in some  countries.    A half-cup (126-gram) serving provides 3.6 mg of iron, which is 19% of the RDI (48).    Tofu is also a good source of thiamine and several minerals, including calcium, magnesium and selenium. In addition, it provides 20 grams of protein per  serving.    Tofu also contains unique compounds called isoflavones, which have been linked to improved insulin sensitivity, a decreased risk of heart disease and relief from menopausal symptoms (49Trusted Source, 50Trusted Source).    SUMMARY  Tofu provides 19% of the RDI for iron per serving and is rich in protein and minerals. Its isoflavones may improve heart health and relieve menopausal symptoms.  11. Dark Chocolate  Dark chocolate is incredibly delicious and nutritious.    A 1-ounce (28-gram) serving contains 3.3 mg of iron, which is 19% of the RDI (51).    This small serving also packs 25% and 16% of the RDIs for copper and magnesium respectively.    In addition, it contains prebiotic fiber, which nourishes the friendly bacteria in your gut (52Trusted Source).    A study found that cocoa powder and dark chocolate had more antioxidant activity than powders and juices made from acai berries and blueberries (53Trusted Source).    Studies have also shown that chocolate has beneficial effects on cholesterol and may reduce your risk of heart attacks and strokes (54Trusted Source, 55Trusted Source, 56Trusted Source).    However, not all chocolate is created equal. It's believed that compounds called flavanols are responsible for chocolate's benefits, and the flavanol content of dark chocolate is much higher than that of milk chocolate (57).    Therefore, it's best to consume chocolate with a minimum of 70% cocoa to get the maximum benefits.    SUMMARY  A small serving of dark chocolate contains 19% of the RDI for iron along with several minerals and prebiotic fiber that promotes gut health.  The Bottom Line  Iron is an important mineral that must be consumed regularly as your body cannot produce it on its own.    Yet it should be noted that some people need to limit their intake of red meat and other foods high in heme iron.    However, most people are easily able to regulate the amount they absorb from  food.    Remember that if you don't eat meat or fish, you can boost absorption by including a source of vitamin C when eating plant sources of iron.

## 2020-12-12 ENCOUNTER — PATIENT MESSAGE (OUTPATIENT)
Dept: OBSTETRICS AND GYNECOLOGY | Facility: CLINIC | Age: 35
End: 2020-12-12

## 2020-12-15 ENCOUNTER — OFFICE VISIT (OUTPATIENT)
Dept: OBSTETRICS AND GYNECOLOGY | Facility: CLINIC | Age: 35
End: 2020-12-15
Payer: COMMERCIAL

## 2020-12-15 VITALS
HEIGHT: 66 IN | SYSTOLIC BLOOD PRESSURE: 122 MMHG | DIASTOLIC BLOOD PRESSURE: 76 MMHG | WEIGHT: 176.56 LBS | BODY MASS INDEX: 28.38 KG/M2 | RESPIRATION RATE: 14 BRPM

## 2020-12-15 DIAGNOSIS — Z01.818 PRE-OP EVALUATION: Primary | ICD-10-CM

## 2020-12-15 PROCEDURE — 99999 PR PBB SHADOW E&M-EST. PATIENT-LVL IV: ICD-10-PCS | Mod: PBBFAC,,, | Performed by: OBSTETRICS & GYNECOLOGY

## 2020-12-15 PROCEDURE — 99024 PR POST-OP FOLLOW-UP VISIT: ICD-10-PCS | Mod: S$GLB,,, | Performed by: OBSTETRICS & GYNECOLOGY

## 2020-12-15 PROCEDURE — 1126F AMNT PAIN NOTED NONE PRSNT: CPT | Mod: S$GLB,,, | Performed by: OBSTETRICS & GYNECOLOGY

## 2020-12-15 PROCEDURE — 3008F PR BODY MASS INDEX (BMI) DOCUMENTED: ICD-10-PCS | Mod: CPTII,S$GLB,, | Performed by: OBSTETRICS & GYNECOLOGY

## 2020-12-15 PROCEDURE — 99999 PR PBB SHADOW E&M-EST. PATIENT-LVL IV: CPT | Mod: PBBFAC,,, | Performed by: OBSTETRICS & GYNECOLOGY

## 2020-12-15 PROCEDURE — 3008F BODY MASS INDEX DOCD: CPT | Mod: CPTII,S$GLB,, | Performed by: OBSTETRICS & GYNECOLOGY

## 2020-12-15 PROCEDURE — 99024 POSTOP FOLLOW-UP VISIT: CPT | Mod: S$GLB,,, | Performed by: OBSTETRICS & GYNECOLOGY

## 2020-12-15 PROCEDURE — 1126F PR PAIN SEVERITY QUANTIFIED, NO PAIN PRESENT: ICD-10-PCS | Mod: S$GLB,,, | Performed by: OBSTETRICS & GYNECOLOGY

## 2020-12-15 RX ORDER — ERENUMAB-AOOE 140 MG/ML
INJECTION, SOLUTION SUBCUTANEOUS
COMMUNITY
Start: 2020-12-08 | End: 2022-06-07

## 2020-12-15 NOTE — PROGRESS NOTES
"History of Present Illness:   Pateint presents today  6 weeks postop, status post Total laparoscopic Hysterectomy , with complaint of none- feeling well.    Pathology:   Final Pathologic Diagnosis RELIAPATH DIAGNOSIS:   A. UTERUS AND CERVIX, TOTAL LAPAROSCOPIC HYSTERECTOMY AND BILATERAL   SALPINGECTOMY:   -Weakly proliferative phase endometrium with submucosal leiomyoma; negative   for hyperplasia or carcinoma.   -Myometrium, without diagnostic abnormality.   -Cervix and endocervix showing focal HPV effect (LGSIL); no high grade   dysplasia or invasive carcinoma identified.   -Bilateral fallopian tubes, without diagnostic abnormality.   -Negative for malignancy. (D25.9)   B. LEFT ARM EXPLANT, REMOVAL:   -Explant (gross examination only). (T84.9XXA)          Past medical and surgical history reviewed.   I have reviewed the patient's medical history in detail and updated the computerized patient record.    Physical exam:  /76   Resp 14   Ht 5' 6" (1.676 m)   Wt 80.1 kg (176 lb 9.4 oz)   LMP 10/13/2020 Comment: PT INSTRUCTED ON NEED FOR URINE SAMPLE PRIOR TO PROCEDURE AND VERBALIZES UNDERSTANDING  BMI 28.50 kg/m²   Constitutional: She is oriented to person, place, and time. She appears well-developed and well-nourished. No distress.   HENT:   Head: Normocephalic and atraumatic.   Eyes: Conjunctivae and EOM are normal. No scleral icterus.   Neck: Normal range of motion. Neck supple. No tracheal deviation present.   Cardiovascular: Normal rate.    Pulmonary/Chest: Effort normal. No respiratory distress. She exhibits no tenderness.  Breasts: deferred  Abdominal: Soft. She exhibits no distension and no mass.  The incisions are healed well. There is no rebound and no guarding.   Genitourinary:    External rectal exam shows no thrombosed external hemorrhoids.    Pelvic exam was performed with patient supine.   No labial fusion.   There is no rash, lesion or injury on the right labia.   There is no rash, lesion or " injury on the left labia.   No bleeding and no signs of injury around the vaginal introitus, urethra is without lesions and well supported.    No vaginal discharge found. Cuff healed well.   No significant Cystocele, Enterocele or rectocele, and cuff well supported.   Bimanual exam:   The urethra and vaginal are without palpable masses or tenderness.   Uterus and cervix are surgically absents, vaginal cuff is intact and well supported.   Right adnexum displays no mass and no tenderness.   Left adnexum displays no mass and no tenderness.  Musculoskeletal: Normal range of motion.   Lymphadenopathy: No inguinal adenopathy present.   Neurological: She is alert and oriented to person, place, and time. Coordination normal.   Skin: Skin is warm and dry. She is not diaphoretic.   Psychiatric: She has a normal mood and affect.    Assessment:  Postop - 6weeks, doing well    Plan:  Follow up  1 year  Resume normal activity.

## 2021-02-23 ENCOUNTER — PATIENT MESSAGE (OUTPATIENT)
Dept: RHEUMATOLOGY | Facility: CLINIC | Age: 36
End: 2021-02-23

## 2021-05-06 ENCOUNTER — PATIENT MESSAGE (OUTPATIENT)
Dept: RESEARCH | Facility: HOSPITAL | Age: 36
End: 2021-05-06

## 2021-08-09 ENCOUNTER — OFFICE VISIT (OUTPATIENT)
Dept: URGENT CARE | Facility: CLINIC | Age: 36
End: 2021-08-09
Payer: COMMERCIAL

## 2021-08-09 VITALS
SYSTOLIC BLOOD PRESSURE: 119 MMHG | OXYGEN SATURATION: 97 % | HEIGHT: 66 IN | HEART RATE: 96 BPM | TEMPERATURE: 98 F | BODY MASS INDEX: 26.68 KG/M2 | DIASTOLIC BLOOD PRESSURE: 81 MMHG | RESPIRATION RATE: 16 BRPM | WEIGHT: 166 LBS

## 2021-08-09 DIAGNOSIS — Z20.822 SUSPECTED COVID-19 VIRUS INFECTION: ICD-10-CM

## 2021-08-09 DIAGNOSIS — R50.9 FEVER, UNSPECIFIED FEVER CAUSE: Primary | ICD-10-CM

## 2021-08-09 DIAGNOSIS — Z20.822 COVID-19 VIRUS NOT DETECTED: ICD-10-CM

## 2021-08-09 DIAGNOSIS — J02.9 PHARYNGITIS, UNSPECIFIED ETIOLOGY: ICD-10-CM

## 2021-08-09 DIAGNOSIS — J06.9 VIRAL URI: ICD-10-CM

## 2021-08-09 LAB
CTP QC/QA: YES
CTP QC/QA: YES
S PYO RRNA THROAT QL PROBE: NEGATIVE
SARS-COV-2 RDRP RESP QL NAA+PROBE: NEGATIVE

## 2021-08-09 PROCEDURE — 1159F PR MEDICATION LIST DOCUMENTED IN MEDICAL RECORD: ICD-10-PCS | Mod: CPTII,S$GLB,, | Performed by: PHYSICIAN ASSISTANT

## 2021-08-09 PROCEDURE — 3074F SYST BP LT 130 MM HG: CPT | Mod: CPTII,S$GLB,, | Performed by: PHYSICIAN ASSISTANT

## 2021-08-09 PROCEDURE — 99214 PR OFFICE/OUTPT VISIT, EST, LEVL IV, 30-39 MIN: ICD-10-PCS | Mod: S$GLB,,, | Performed by: PHYSICIAN ASSISTANT

## 2021-08-09 PROCEDURE — 3008F PR BODY MASS INDEX (BMI) DOCUMENTED: ICD-10-PCS | Mod: CPTII,S$GLB,, | Performed by: PHYSICIAN ASSISTANT

## 2021-08-09 PROCEDURE — 3079F PR MOST RECENT DIASTOLIC BLOOD PRESSURE 80-89 MM HG: ICD-10-PCS | Mod: CPTII,S$GLB,, | Performed by: PHYSICIAN ASSISTANT

## 2021-08-09 PROCEDURE — 1159F MED LIST DOCD IN RCRD: CPT | Mod: CPTII,S$GLB,, | Performed by: PHYSICIAN ASSISTANT

## 2021-08-09 PROCEDURE — 3079F DIAST BP 80-89 MM HG: CPT | Mod: CPTII,S$GLB,, | Performed by: PHYSICIAN ASSISTANT

## 2021-08-09 PROCEDURE — U0002 COVID-19 LAB TEST NON-CDC: HCPCS | Mod: QW,S$GLB,, | Performed by: PHYSICIAN ASSISTANT

## 2021-08-09 PROCEDURE — 99214 OFFICE O/P EST MOD 30 MIN: CPT | Mod: S$GLB,,, | Performed by: PHYSICIAN ASSISTANT

## 2021-08-09 PROCEDURE — 87880 POCT RAPID STREP A: ICD-10-PCS | Mod: QW,,, | Performed by: PHYSICIAN ASSISTANT

## 2021-08-09 PROCEDURE — 3008F BODY MASS INDEX DOCD: CPT | Mod: CPTII,S$GLB,, | Performed by: PHYSICIAN ASSISTANT

## 2021-08-09 PROCEDURE — 87880 STREP A ASSAY W/OPTIC: CPT | Mod: QW,,, | Performed by: PHYSICIAN ASSISTANT

## 2021-08-09 PROCEDURE — U0002: ICD-10-PCS | Mod: QW,S$GLB,, | Performed by: PHYSICIAN ASSISTANT

## 2021-08-09 PROCEDURE — 3074F PR MOST RECENT SYSTOLIC BLOOD PRESSURE < 130 MM HG: ICD-10-PCS | Mod: CPTII,S$GLB,, | Performed by: PHYSICIAN ASSISTANT

## 2022-03-31 ENCOUNTER — HOSPITAL ENCOUNTER (OUTPATIENT)
Dept: RADIOLOGY | Facility: HOSPITAL | Age: 37
Discharge: HOME | End: 2022-03-31
Attending: ORTHOPAEDIC SURGERY
Payer: COMMERCIAL

## 2022-03-31 ENCOUNTER — OFFICE VISIT (OUTPATIENT)
Dept: ORTHOPEDICS | Facility: CLINIC | Age: 37
End: 2022-03-31
Payer: COMMERCIAL

## 2022-03-31 VITALS — WEIGHT: 166 LBS | BODY MASS INDEX: 26.68 KG/M2 | HEIGHT: 66 IN

## 2022-03-31 DIAGNOSIS — M25.559 HIP PAIN: Primary | ICD-10-CM

## 2022-03-31 DIAGNOSIS — M24.152 DEGENERATIVE TEAR OF ACETABULAR LABRUM OF LEFT HIP: Primary | ICD-10-CM

## 2022-03-31 DIAGNOSIS — M25.559 HIP PAIN: ICD-10-CM

## 2022-03-31 DIAGNOSIS — M25.552 LEFT HIP PAIN: Primary | ICD-10-CM

## 2022-03-31 PROCEDURE — 73502 X-RAY EXAM HIP UNI 2-3 VIEWS: CPT | Mod: 26,LT,, | Performed by: RADIOLOGY

## 2022-03-31 PROCEDURE — 3008F BODY MASS INDEX DOCD: CPT | Mod: CPTII,S$GLB,, | Performed by: ORTHOPAEDIC SURGERY

## 2022-03-31 PROCEDURE — 73502 X-RAY EXAM HIP UNI 2-3 VIEWS: CPT | Mod: TC,PN,LT

## 2022-03-31 PROCEDURE — 1159F PR MEDICATION LIST DOCUMENTED IN MEDICAL RECORD: ICD-10-PCS | Mod: CPTII,S$GLB,, | Performed by: ORTHOPAEDIC SURGERY

## 2022-03-31 PROCEDURE — 99204 PR OFFICE/OUTPT VISIT, NEW, LEVL IV, 45-59 MIN: ICD-10-PCS | Mod: S$GLB,,, | Performed by: ORTHOPAEDIC SURGERY

## 2022-03-31 PROCEDURE — 1160F RVW MEDS BY RX/DR IN RCRD: CPT | Mod: CPTII,S$GLB,, | Performed by: ORTHOPAEDIC SURGERY

## 2022-03-31 PROCEDURE — 99999 PR PBB SHADOW E&M-EST. PATIENT-LVL III: CPT | Mod: PBBFAC,,, | Performed by: ORTHOPAEDIC SURGERY

## 2022-03-31 PROCEDURE — 1159F MED LIST DOCD IN RCRD: CPT | Mod: CPTII,S$GLB,, | Performed by: ORTHOPAEDIC SURGERY

## 2022-03-31 PROCEDURE — 99204 OFFICE O/P NEW MOD 45 MIN: CPT | Mod: S$GLB,,, | Performed by: ORTHOPAEDIC SURGERY

## 2022-03-31 PROCEDURE — 3008F PR BODY MASS INDEX (BMI) DOCUMENTED: ICD-10-PCS | Mod: CPTII,S$GLB,, | Performed by: ORTHOPAEDIC SURGERY

## 2022-03-31 PROCEDURE — 73502 XR HIP WITH PELVIS WHEN PERFORMED, 2 OR 3 VIEWS LEFT: ICD-10-PCS | Mod: 26,LT,, | Performed by: RADIOLOGY

## 2022-03-31 PROCEDURE — 99999 PR PBB SHADOW E&M-EST. PATIENT-LVL III: ICD-10-PCS | Mod: PBBFAC,,, | Performed by: ORTHOPAEDIC SURGERY

## 2022-03-31 PROCEDURE — 1160F PR REVIEW ALL MEDS BY PRESCRIBER/CLIN PHARMACIST DOCUMENTED: ICD-10-PCS | Mod: CPTII,S$GLB,, | Performed by: ORTHOPAEDIC SURGERY

## 2022-03-31 NOTE — PROGRESS NOTES
CC:  36-year-old female presents for evaluation of left hip pain.  The patient states she has had pain in the left hip for about 5 years.  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  She has been unable to do those things over the last 5 years because of the pain in her left hip.  It is worse with activity.  It is also worse getting out of a car or going from a standard seated position or vice versa.  The pain localizes to the left groin.  She rates the pain as a 7/10.  She reports popping and clicking of the hip.  She has had an occasional catching sensation but no giving way.    Past Medical History:   Diagnosis Date    Asthma     Encounter for blood transfusion     Gestational diabetes     gestational diabetes    Migraine headache        Past Surgical History:   Procedure Laterality Date    LAPAROSCOPIC SALPINGECTOMY Bilateral 10/19/2020    Procedure: SALPINGECTOMY, LAPAROSCOPIC;  Surgeon: Yonas Walsh MD;  Location: [facility] OR;  Service: OB/GYN;  Laterality: Bilateral;  Possible Overnight  Claudia Cabrera MD to assist    LAPAROSCOPIC TOTAL HYSTERECTOMY N/A 10/19/2020    Procedure: HYSTERECTOMY, TOTAL, LAPAROSCOPIC;  Surgeon: Yonas Walsh MD;  Location: [facility] OR;  Service: OB/GYN;  Laterality: N/A;    REMOVAL OF IMPLANT Left 10/19/2020    Procedure: REMOVAL, IMPLANT;  Surgeon: Yonas Walsh MD;  Location: [facility] OR;  Service: OB/GYN;  Laterality: Left;  Nextplan birthcontrol implant removal    SHOULDER SURGERY         Current Outpatient Medications on File Prior to Visit   Medication Sig Dispense Refill    AIMOVIG AUTOINJECTOR 140 mg/mL autoinjector       eletriptan (RELPAX) 40 MG tablet Take 40 mg by mouth as needed. may repeat in 2 hours if necessary      gabapentin (NEURONTIN) 600 MG tablet Take 600 mg by mouth 2 (two) times daily.      tiZANidine (ZANAFLEX) 4 MG tablet Take 4 mg by mouth every evening. May take up to 3 tablets      AJOVY AUTOINJECTOR 225 mg/1.5 mL  autoinjector INJECT 1.5 MG INTO THE SKIN Q MONTH      albuterol (PROVENTIL HFA) 90 mcg/actuation inhaler Inhale 2 puffs into the lungs every 6 (six) hours as needed for Wheezing or Shortness of Breath. Rescue 18 g 11    docusate sodium (COLACE) 100 MG capsule Take 1 capsule (100 mg total) by mouth 2 (two) times daily. (Patient not taking: Reported on 12/15/2020) 60 capsule 1    ferrous sulfate (FEOSOL) 325 mg (65 mg iron) Tab tablet Take 1 tablet (325 mg total) by mouth 3 (three) times daily. (Patient not taking: Reported on 11/3/2020) 90 tablet 1    folic acid (FOLVITE) 1 MG tablet Take 1 tablet (1 mg total) by mouth once daily. (Patient not taking: Reported on 12/15/2020) 90 tablet 2    ibuprofen (ADVIL,MOTRIN) 800 MG tablet Take 1 tablet (800 mg total) by mouth every 8 (eight) hours as needed for Pain. (Patient not taking: Reported on 12/15/2020) 30 tablet 1    omeprazole (PRILOSEC) 20 MG capsule Take 1 capsule (20 mg total) by mouth once daily. (Patient taking differently: Take 20 mg by mouth daily as needed (heartburn). ) 30 capsule 11    ondansetron (ZOFRAN-ODT) 4 MG TbDL Take 2 tablets (8 mg total) by mouth every 8 (eight) hours as needed. (Patient not taking: Reported on 12/15/2020) 12 tablet 0    oxyCODONE-acetaminophen (PERCOCET) 5-325 mg per tablet Take 1 tablet by mouth every 4 (four) hours as needed for Pain. (Patient not taking: Reported on 12/15/2020) 14 tablet 0    polyethylene glycol (GLYCOLAX) 17 gram/dose powder Take 17 g by mouth once daily. In 8 oz of water (Patient not taking: Reported on 12/15/2020) 240 g 1    topiramate (TOPAMAX) 50 MG tablet Take 50 mg by mouth every evening.       venlafaxine (EFFEXOR-XR) 150 MG Cp24 Take 1 capsule (150 mg total) by mouth once daily. 30 capsule 4     No current facility-administered medications on file prior to visit.       ROS:    Constitution: Denies chills, fever, and sweats.  HENT: Denies headaches or blurry vision.  Cardiovascular: Denies  chest pain or irregular heart beat.  Respiratory: Denies cough or shortness of breath.  Gastrointestinal: Denies abdominal pain, nausea, or vomiting.  Genitourinary:  Denies urinary incontinence, bladder and kidney issues  Musculoskeletal:  Denies muscle cramps.  Positive for left hip pain  Neurological: Denies dizziness or focal weakness.  Psychiatric/Behavioral: Normal mental status.  Hematologic/Lymphatic: Denies bleeding problem or easy bruising/bleeding.  Skin: Denies rash or suspicious lesions.    Physical examination     Gen - No acute distress, well nourished, well groomed   Eyes - Extraoccular motions intact, pupils equally round and reactive to light and accommodation   ENT - normocephalic, atruamtic, oropharynx clear   Neck - Supple, no abnormal masses   Cardiovascular - regular rate and rhythm   Pulmonary - clear to auscultation bilaterally, no wheezes, ronchi, or rales   Abdomen - soft, non-tender, non-distended, positive bowel sounds   Psych - The patient is alert and oriented x3 with normal mood and affect    Examination of the Left Lower Extremity    Skin is intact throughout  Motor in intact EHL,FHL,TA,naomi  +2 DP/PT  Sensation LT intact D/P/1st    Examination of the Left Hip    C-Sign positive  Logroll negative  Stenchfield negative    Pain with ROM negative    ROM:    Flexion   120  Extension   30  Abduction   45  Adduction   20  External Rotation 45  Internal Rotation 35    Flexion contracture negative    FADIR positive  FADER negative    Tenderness to palpation over lateral and posterolateral greater tochanter negative    X-ray images were examined and personally interpreted by me.  Three views left hip dated 03/31/2022 show the femoral head well reduced in the acetabulum with a well-maintained joint space.  No arthritic changes and no acute fractures.    Dx:  Possible labral tear of the left hip    Plan:  Recommendations for an MRI arthrogram of the left hip.  Follow-up with the study is  complete.    Answers for HPI/ROS submitted by the patient on 3/31/2022  unexpected weight change: No  appetite change : No  sleep disturbance: No  IMMUNOCOMPROMISED: No  nervous/ anxious: No  dysphoric mood: No  rash: No  visual disturbance: No  eye redness: No  eye pain: No  ear pain: No  tinnitus: No  hearing loss: No  sinus pressure : No  nosebleeds: No  enviro allergies: Yes  food allergies: No  cough: No  shortness of breath: No  sweating: No  dysuria: No  frequency: No  difficulty urinating: No  hematuria: No  painful intercourse: No  chest pain: No  palpitations: No  nausea: No  vomiting: No  diarrhea: No  blood in stool: No  constipation: No  headaches: Yes  dizziness: No  numbness: No  seizures: No  joint swelling: No  myalgia: No  weakness: No  back pain: No  Pain Chronicity: chronic  History of trauma: No  Onset: more than 1 year ago  Frequency: daily  Progression since onset: gradually worsening  Injury mechanism: running  injury location: exercising  pain- numeric: 6/10  pain location: left hip  pain quality: aching, tight, throbbing  Radiating Pain: No  Aggravating factors: exercise, walking, lifting, sitting  fever: No  inability to bear weight: No  itching: No  joint locking: No  limited range of motion: No  stiffness: Yes  tingling: No  Treatments tried: acupuncture, cold, heat, exercise, NSAIDs, rest, other  physical therapy: ineffective  Improvement on treatment: no relief

## 2022-05-04 ENCOUNTER — HOSPITAL ENCOUNTER (OUTPATIENT)
Dept: RADIOLOGY | Facility: HOSPITAL | Age: 37
Discharge: HOME | End: 2022-05-04
Attending: ORTHOPAEDIC SURGERY
Payer: COMMERCIAL

## 2022-05-04 DIAGNOSIS — M25.552 LEFT HIP PAIN: ICD-10-CM

## 2022-05-04 PROCEDURE — 27093 XR ARTHROGRAM HIP LEFT, COMPLETE (XPD): ICD-10-PCS | Mod: LT,,, | Performed by: RADIOLOGY

## 2022-05-04 PROCEDURE — 73722 MRI JOINT OF LWR EXTR W/DYE: CPT | Mod: TC,LT

## 2022-05-04 PROCEDURE — 27093 INJECTION FOR HIP X-RAY: CPT | Mod: LT,,, | Performed by: RADIOLOGY

## 2022-05-04 PROCEDURE — 73525 XR ARTHROGRAM HIP LEFT, COMPLETE (XPD): ICD-10-PCS | Mod: 26,LT,, | Performed by: RADIOLOGY

## 2022-05-04 PROCEDURE — 73722 MRI ARTHROGRAM HIP WITH CONTRAST LEFT: ICD-10-PCS | Mod: 26,LT,, | Performed by: RADIOLOGY

## 2022-05-04 PROCEDURE — 25500020 PHARM REV CODE 255: Performed by: ORTHOPAEDIC SURGERY

## 2022-05-04 PROCEDURE — 27093 INJECTION FOR HIP X-RAY: CPT | Mod: LT

## 2022-05-04 PROCEDURE — A9577 INJ MULTIHANCE: HCPCS | Performed by: ORTHOPAEDIC SURGERY

## 2022-05-04 PROCEDURE — 73722 MRI JOINT OF LWR EXTR W/DYE: CPT | Mod: 26,LT,, | Performed by: RADIOLOGY

## 2022-05-04 PROCEDURE — 73525 CONTRAST X-RAY OF HIP: CPT | Mod: 26,LT,, | Performed by: RADIOLOGY

## 2022-05-04 RX ADMIN — IOHEXOL 15 ML: 350 INJECTION, SOLUTION INTRAVENOUS at 01:05

## 2022-05-04 RX ADMIN — GADOBENATE DIMEGLUMINE 0.15 ML: 529 INJECTION, SOLUTION INTRAVENOUS at 01:05

## 2022-05-05 ENCOUNTER — PATIENT MESSAGE (OUTPATIENT)
Dept: SURGERY | Facility: HOSPITAL | Age: 37
End: 2022-05-05
Payer: COMMERCIAL

## 2022-05-05 ENCOUNTER — OFFICE VISIT (OUTPATIENT)
Dept: ORTHOPEDICS | Facility: CLINIC | Age: 37
End: 2022-05-05
Payer: COMMERCIAL

## 2022-05-05 VITALS — WEIGHT: 166 LBS | BODY MASS INDEX: 26.68 KG/M2 | HEIGHT: 66 IN

## 2022-05-05 DIAGNOSIS — Z01.818 PREOP TESTING: ICD-10-CM

## 2022-05-05 DIAGNOSIS — M24.152 DEGENERATIVE TEAR OF ACETABULAR LABRUM OF LEFT HIP: Primary | ICD-10-CM

## 2022-05-05 PROCEDURE — 1160F PR REVIEW ALL MEDS BY PRESCRIBER/CLIN PHARMACIST DOCUMENTED: ICD-10-PCS | Mod: CPTII,S$GLB,, | Performed by: ORTHOPAEDIC SURGERY

## 2022-05-05 PROCEDURE — 99999 PR PBB SHADOW E&M-EST. PATIENT-LVL III: CPT | Mod: PBBFAC,,, | Performed by: ORTHOPAEDIC SURGERY

## 2022-05-05 PROCEDURE — 1159F PR MEDICATION LIST DOCUMENTED IN MEDICAL RECORD: ICD-10-PCS | Mod: CPTII,S$GLB,, | Performed by: ORTHOPAEDIC SURGERY

## 2022-05-05 PROCEDURE — 1159F MED LIST DOCD IN RCRD: CPT | Mod: CPTII,S$GLB,, | Performed by: ORTHOPAEDIC SURGERY

## 2022-05-05 PROCEDURE — 3008F BODY MASS INDEX DOCD: CPT | Mod: CPTII,S$GLB,, | Performed by: ORTHOPAEDIC SURGERY

## 2022-05-05 PROCEDURE — 3008F PR BODY MASS INDEX (BMI) DOCUMENTED: ICD-10-PCS | Mod: CPTII,S$GLB,, | Performed by: ORTHOPAEDIC SURGERY

## 2022-05-05 PROCEDURE — 99999 PR PBB SHADOW E&M-EST. PATIENT-LVL III: ICD-10-PCS | Mod: PBBFAC,,, | Performed by: ORTHOPAEDIC SURGERY

## 2022-05-05 PROCEDURE — 99215 OFFICE O/P EST HI 40 MIN: CPT | Mod: S$GLB,,, | Performed by: ORTHOPAEDIC SURGERY

## 2022-05-05 PROCEDURE — 99215 PR OFFICE/OUTPT VISIT, EST, LEVL V, 40-54 MIN: ICD-10-PCS | Mod: S$GLB,,, | Performed by: ORTHOPAEDIC SURGERY

## 2022-05-05 PROCEDURE — 1160F RVW MEDS BY RX/DR IN RCRD: CPT | Mod: CPTII,S$GLB,, | Performed by: ORTHOPAEDIC SURGERY

## 2022-05-05 RX ORDER — MUPIROCIN 20 MG/G
OINTMENT TOPICAL
Status: CANCELLED | OUTPATIENT
Start: 2022-05-05

## 2022-05-05 NOTE — PROGRESS NOTES
CC:  36-year-old female follows up with left hip pain.  She has pain that localizes to the left groin.  Today she rates as an 8/10.  We were concerned for labral tear so she had an MRI arthrogram done that has been completed and is available for review.  She still continued to have pain on a daily basis and at night.    Past Medical History:   Diagnosis Date    Asthma     Encounter for blood transfusion     Gestational diabetes     gestational diabetes    Migraine headache        Past Surgical History:   Procedure Laterality Date    LAPAROSCOPIC SALPINGECTOMY Bilateral 10/19/2020    Procedure: SALPINGECTOMY, LAPAROSCOPIC;  Surgeon: Yonas Walsh MD;  Location: [facility] OR;  Service: OB/GYN;  Laterality: Bilateral;  Possible Overnight  Claudia Cabrera MD to assist    LAPAROSCOPIC TOTAL HYSTERECTOMY N/A 10/19/2020    Procedure: HYSTERECTOMY, TOTAL, LAPAROSCOPIC;  Surgeon: Yonas Walsh MD;  Location: [facility] OR;  Service: OB/GYN;  Laterality: N/A;    REMOVAL OF IMPLANT Left 10/19/2020    Procedure: REMOVAL, IMPLANT;  Surgeon: Yonas Walsh MD;  Location: [facility] OR;  Service: OB/GYN;  Laterality: Left;  Nextplan birthcontrol implant removal    SHOULDER SURGERY         Current Outpatient Medications on File Prior to Visit   Medication Sig Dispense Refill    AIMOVIG AUTOINJECTOR 140 mg/mL autoinjector       eletriptan (RELPAX) 40 MG tablet Take 40 mg by mouth as needed. may repeat in 2 hours if necessary      gabapentin (NEURONTIN) 600 MG tablet Take 600 mg by mouth 2 (two) times daily.      tiZANidine (ZANAFLEX) 4 MG tablet Take 4 mg by mouth every evening. May take up to 3 tablets      AJOVY AUTOINJECTOR 225 mg/1.5 mL autoinjector INJECT 1.5 MG INTO THE SKIN Q MONTH      albuterol (PROVENTIL HFA) 90 mcg/actuation inhaler Inhale 2 puffs into the lungs every 6 (six) hours as needed for Wheezing or Shortness of Breath. Rescue 18 g 11    docusate sodium (COLACE) 100 MG capsule Take 1 capsule (100 mg total) by  mouth 2 (two) times daily. (Patient not taking: Reported on 12/15/2020) 60 capsule 1    ferrous sulfate (FEOSOL) 325 mg (65 mg iron) Tab tablet Take 1 tablet (325 mg total) by mouth 3 (three) times daily. (Patient not taking: Reported on 11/3/2020) 90 tablet 1    folic acid (FOLVITE) 1 MG tablet Take 1 tablet (1 mg total) by mouth once daily. (Patient not taking: Reported on 12/15/2020) 90 tablet 2    ibuprofen (ADVIL,MOTRIN) 800 MG tablet Take 1 tablet (800 mg total) by mouth every 8 (eight) hours as needed for Pain. (Patient not taking: Reported on 12/15/2020) 30 tablet 1    omeprazole (PRILOSEC) 20 MG capsule Take 1 capsule (20 mg total) by mouth once daily. (Patient taking differently: Take 20 mg by mouth daily as needed (heartburn). ) 30 capsule 11    ondansetron (ZOFRAN-ODT) 4 MG TbDL Take 2 tablets (8 mg total) by mouth every 8 (eight) hours as needed. (Patient not taking: Reported on 12/15/2020) 12 tablet 0    oxyCODONE-acetaminophen (PERCOCET) 5-325 mg per tablet Take 1 tablet by mouth every 4 (four) hours as needed for Pain. (Patient not taking: Reported on 12/15/2020) 14 tablet 0    polyethylene glycol (GLYCOLAX) 17 gram/dose powder Take 17 g by mouth once daily. In 8 oz of water (Patient not taking: Reported on 12/15/2020) 240 g 1    topiramate (TOPAMAX) 50 MG tablet Take 50 mg by mouth every evening.       venlafaxine (EFFEXOR-XR) 150 MG Cp24 Take 1 capsule (150 mg total) by mouth once daily. 30 capsule 4     Current Facility-Administered Medications on File Prior to Visit   Medication Dose Route Frequency Provider Last Rate Last Admin    [COMPLETED] gadobenate dimeglumine (MULTIHANCE) injection 15 mL  15 mL Intra-articular ONCE PRN Joselo Henderson II, MD   0.15 mL at 05/04/22 1316    [COMPLETED] iohexoL (OMNIPAQUE 350) injection 50 mL  50 mL Intravenous ONCE PRN Joselo Henderson II, MD   15 mL at 05/04/22 1317       ROS:    Constitution: Denies chills, fever, and sweats.  HENT: Denies  headaches or blurry vision.  Cardiovascular: Denies chest pain or irregular heart beat.  Respiratory: Denies cough or shortness of breath.  Gastrointestinal: Denies abdominal pain, nausea, or vomiting.  Genitourinary:  Denies urinary incontinence, bladder and kidney issues  Musculoskeletal:  Denies muscle cramps.  Neurological: Denies dizziness or focal weakness.  Psychiatric/Behavioral: Normal mental status.  Hematologic/Lymphatic: Denies bleeding problem or easy bruising/bleeding.  Skin: Denies rash or suspicious lesions.    Physical examination     Gen - No acute distress, well nourished, well groomed   Eyes - Extraoccular motions intact, pupils equally round and reactive to light and accommodation   ENT - normocephalic, atruamtic, oropharynx clear   Neck - Supple, no abnormal masses   Cardiovascular - regular rate and rhythm   Pulmonary - clear to auscultation bilaterally, no wheezes, ronchi, or rales   Abdomen - soft, non-tender, non-distended, positive bowel sounds   Psych - The patient is alert and oriented x3 with normal mood and affect    Examination of the Left Lower Extremity    Skin is intact throughout  Motor in intact EHL,FHL,TA,naomi  +2 DP/PT  Sensation LT intact D/P/1st    Examination of the Left Hip    C-Sign positive  Logroll negative  Stenchfield positive    Pain with ROM positive    ROM:    Flexion   120  Extension   30  Abduction   45  Adduction   20  External Rotation 45  Internal Rotation 35    Flexion contracture negative    FADIR positive  FADER negative    Tenderness to palpation over lateral and posterolateral greater tochanter negative    MRI images were examined and personally interpreted by me.  MRI arthrogram of the left hip dated 05/04/2022 shows a tear in the anterior superior acetabular labrum of the left hip    Dx:  Left hip acetabular labral tear.    Plan:  We discussed the potential morbidity to the left lower extremity with both operative and continue non operative treatment.   Recommendations for left hip arthroscopy with labral repair.  Risks and benefits of the procedure were explained to the patient.  She verbalized understanding and does wish to proceed.  We will schedule that for the next available date that is convenient for her.

## 2022-05-12 ENCOUNTER — OFFICE VISIT (OUTPATIENT)
Dept: FAMILY MEDICINE | Facility: CLINIC | Age: 37
End: 2022-05-12
Payer: COMMERCIAL

## 2022-05-12 DIAGNOSIS — J01.10 ACUTE NON-RECURRENT FRONTAL SINUSITIS: Primary | ICD-10-CM

## 2022-05-12 PROCEDURE — 99214 PR OFFICE/OUTPT VISIT, EST, LEVL IV, 30-39 MIN: ICD-10-PCS | Mod: 95,,, | Performed by: FAMILY MEDICINE

## 2022-05-12 PROCEDURE — 99214 OFFICE O/P EST MOD 30 MIN: CPT | Mod: 95,,, | Performed by: FAMILY MEDICINE

## 2022-05-12 RX ORDER — MINERAL OIL
180 ENEMA (ML) RECTAL DAILY
Qty: 30 TABLET | Refills: 0 | Status: SHIPPED | OUTPATIENT
Start: 2022-05-12 | End: 2022-06-22

## 2022-05-12 RX ORDER — FLUTICASONE PROPIONATE 50 MCG
1 SPRAY, SUSPENSION (ML) NASAL DAILY
Qty: 16 G | Refills: 0 | Status: SHIPPED | OUTPATIENT
Start: 2022-05-12 | End: 2022-06-11

## 2022-05-12 RX ORDER — CEFDINIR 300 MG/1
300 CAPSULE ORAL 2 TIMES DAILY
Qty: 20 CAPSULE | Refills: 0 | Status: SHIPPED | OUTPATIENT
Start: 2022-05-12 | End: 2022-05-22

## 2022-05-12 RX ORDER — FLUCONAZOLE 150 MG/1
150 TABLET ORAL DAILY
Qty: 2 TABLET | Refills: 0 | Status: SHIPPED | OUTPATIENT
Start: 2022-05-12 | End: 2022-05-14

## 2022-05-17 ENCOUNTER — TELEPHONE (OUTPATIENT)
Dept: FAMILY MEDICINE | Facility: CLINIC | Age: 37
End: 2022-05-17
Payer: COMMERCIAL

## 2022-05-17 NOTE — PATIENT INSTRUCTIONS
Gilberto Mascorro,     If you are due for any health screening(s) below please notify me so we can arrange them to be ordered and scheduled to maintain your health. Most healthy patients complete it. Don't lose out on improving your health.     Health Maintenance   Topic Date Due    TETANUS VACCINE  Never done    Hepatitis C Screening  Completed    Lipid Panel  Completed

## 2022-05-17 NOTE — PROGRESS NOTES
The patient location is:  [place]  The chief complaint leading to consultation is: sinusitis  Visit type: Virtual visit with synchronous audio and video  Total time spent with patient:  Less than 30 minutes  Each patient to whom he or she provides medical services by telemedicine is:  (1) informed of the relationship between the physician and patient and the respective role of any other health care provider with respect to management of the patient; and (2) notified that he or she may decline to receive medical services by telemedicine and may withdraw from such care at any time. Vital signs recorded were provided by the patient.    Notes:  See below    Subjective:   Patient ID: Subha Bach is a 36 y.o. female     Chief Complaint: sinusitis    Notes rhinorrhea and congestion for past few days. deneis fever or sob. States feels like sinus infection she has had in past.     Review of Systems   Constitutional: Positive for activity change. Negative for unexpected weight change.   HENT: Positive for rhinorrhea. Negative for hearing loss and trouble swallowing.    Eyes: Negative for discharge and visual disturbance.   Respiratory: Negative for wheezing.    Cardiovascular: Negative for chest pain and palpitations.   Gastrointestinal: Negative for blood in stool, constipation, diarrhea and vomiting.   Endocrine: Negative for polydipsia and polyuria.   Genitourinary: Negative for difficulty urinating, dysuria, hematuria and menstrual problem.   Musculoskeletal: Negative for arthralgias, joint swelling and neck pain.   Neurological: Negative for weakness and headaches.   Psychiatric/Behavioral: Negative for confusion and dysphoric mood.     Past Medical History:   Diagnosis Date    Asthma     Encounter for blood transfusion     Gestational diabetes     gestational diabetes    Migraine headache      Past Surgical History:   Procedure Laterality Date    LAPAROSCOPIC SALPINGECTOMY Bilateral 10/19/2020    Procedure:  SALPINGECTOMY, LAPAROSCOPIC;  Surgeon: Yonas Walsh MD;  Location: [facility] OR;  Service: OB/GYN;  Laterality: Bilateral;  Possible Overnight  Claudia Cabrera MD to assist    LAPAROSCOPIC TOTAL HYSTERECTOMY N/A 10/19/2020    Procedure: HYSTERECTOMY, TOTAL, LAPAROSCOPIC;  Surgeon: Yonas Walsh MD;  Location: [facility] OR;  Service: OB/GYN;  Laterality: N/A;    REMOVAL OF IMPLANT Left 10/19/2020    Procedure: REMOVAL, IMPLANT;  Surgeon: Yonas Walsh MD;  Location: [facility] OR;  Service: OB/GYN;  Laterality: Left;  Nextplan birthcontrol implant removal    SHOULDER SURGERY       Objective:   There were no vitals filed for this visit.  There is no height or weight on file to calculate BMI.  Physical Exam  Vitals and nursing note reviewed.   Constitutional:       Appearance: She is well-developed.   HENT:      Head: Normocephalic and atraumatic.   Eyes:      General: No scleral icterus.     Conjunctiva/sclera: Conjunctivae normal.   Pulmonary:      Effort: Pulmonary effort is normal. No respiratory distress.   Musculoskeletal:         General: No deformity. Normal range of motion.      Cervical back: Normal range of motion and neck supple.   Skin:     Coloration: Skin is not pale.      Findings: No rash.   Neurological:      Mental Status: She is alert and oriented to person, place, and time.   Psychiatric:         Behavior: Behavior normal.         Thought Content: Thought content normal.         Judgment: Judgment normal.       Assessment:     1. Acute non-recurrent frontal sinusitis      Plan:   Acute non-recurrent frontal sinusitis  -     cefdinir (OMNICEF) 300 MG capsule; Take 1 capsule (300 mg total) by mouth 2 (two) times daily. for 10 days  Dispense: 20 capsule; Refill: 0  -     fluconazole (DIFLUCAN) 150 MG Tab; Take 1 tablet (150 mg total) by mouth once daily. for 2 days  Dispense: 2 tablet; Refill: 0  -     fluticasone propionate (FLONASE) 50 mcg/actuation nasal spray; 1 spray (50 mcg total) by Each Nostril  route once daily.  Dispense: 16 g; Refill: 0  -     fexofenadine (ALLEGRA) 180 MG tablet; Take 1 tablet (180 mg total) by mouth once daily.  Dispense: 30 tablet; Refill: 0            Total time spent of Less than 30 minutes minutes on the day of the visit.This includes face to face time and preparing to see the patient, obtaining and reviewing separately obtained history, documenting clinical information in the electronic or other health record, independently interpreting results and communicating results to the patient/family/caregiver, or care coordinator.    Haris Quintero MD  05/17/2022    Portions of this note have been dictated with JUVENTINO Villatoro

## 2022-06-07 ENCOUNTER — OFFICE VISIT (OUTPATIENT)
Dept: PULMONOLOGY | Facility: CLINIC | Age: 37
End: 2022-06-07
Payer: COMMERCIAL

## 2022-06-07 ENCOUNTER — HOSPITAL ENCOUNTER (OUTPATIENT)
Dept: PREADMISSION TESTING | Facility: HOSPITAL | Age: 37
Discharge: HOME | End: 2022-06-07
Attending: ORTHOPAEDIC SURGERY
Payer: COMMERCIAL

## 2022-06-07 ENCOUNTER — HOSPITAL ENCOUNTER (OUTPATIENT)
Dept: RADIOLOGY | Facility: HOSPITAL | Age: 37
Discharge: HOME | End: 2022-06-07
Attending: ORTHOPAEDIC SURGERY
Payer: COMMERCIAL

## 2022-06-07 VITALS — WEIGHT: 181 LBS | BODY MASS INDEX: 29.09 KG/M2 | HEIGHT: 66 IN

## 2022-06-07 VITALS
RESPIRATION RATE: 16 BRPM | WEIGHT: 192.56 LBS | OXYGEN SATURATION: 97 % | BODY MASS INDEX: 30.95 KG/M2 | HEIGHT: 66 IN | HEART RATE: 106 BPM | DIASTOLIC BLOOD PRESSURE: 87 MMHG | SYSTOLIC BLOOD PRESSURE: 136 MMHG

## 2022-06-07 DIAGNOSIS — Z01.818 PREOP TESTING: ICD-10-CM

## 2022-06-07 DIAGNOSIS — R09.89 CHRONIC SINUS COMPLAINTS: ICD-10-CM

## 2022-06-07 DIAGNOSIS — J45.20 MILD INTERMITTENT ASTHMA WITHOUT COMPLICATION: Primary | ICD-10-CM

## 2022-06-07 DIAGNOSIS — M24.152 DEGENERATIVE TEAR OF ACETABULAR LABRUM OF LEFT HIP: ICD-10-CM

## 2022-06-07 LAB
ANION GAP SERPL CALC-SCNC: 12 MMOL/L (ref 8–16)
BASOPHILS # BLD AUTO: 0.05 K/UL (ref 0–0.2)
BASOPHILS NFR BLD: 0.4 % (ref 0–1.9)
BUN SERPL-MCNC: 12 MG/DL (ref 6–20)
CALCIUM SERPL-MCNC: 9.8 MG/DL (ref 8.7–10.5)
CHLORIDE SERPL-SCNC: 105 MMOL/L (ref 95–110)
CO2 SERPL-SCNC: 22 MMOL/L (ref 23–29)
CREAT SERPL-MCNC: 0.8 MG/DL (ref 0.5–1.4)
DIFFERENTIAL METHOD: ABNORMAL
EOSINOPHIL # BLD AUTO: 0.2 K/UL (ref 0–0.5)
EOSINOPHIL NFR BLD: 1.4 % (ref 0–8)
ERYTHROCYTE [DISTWIDTH] IN BLOOD BY AUTOMATED COUNT: 12.7 % (ref 11.5–14.5)
EST. GFR  (AFRICAN AMERICAN): >60 ML/MIN/1.73 M^2
EST. GFR  (NON AFRICAN AMERICAN): >60 ML/MIN/1.73 M^2
GLUCOSE SERPL-MCNC: 72 MG/DL (ref 70–110)
HCT VFR BLD AUTO: 41.4 % (ref 37–48.5)
HGB BLD-MCNC: 13.4 G/DL (ref 12–16)
IMM GRANULOCYTES # BLD AUTO: 0.06 K/UL (ref 0–0.04)
IMM GRANULOCYTES NFR BLD AUTO: 0.5 % (ref 0–0.5)
LYMPHOCYTES # BLD AUTO: 2.8 K/UL (ref 1–4.8)
LYMPHOCYTES NFR BLD: 21.3 % (ref 18–48)
MCH RBC QN AUTO: 28.6 PG (ref 27–31)
MCHC RBC AUTO-ENTMCNC: 32.4 G/DL (ref 32–36)
MCV RBC AUTO: 88 FL (ref 82–98)
MONOCYTES # BLD AUTO: 0.8 K/UL (ref 0.3–1)
MONOCYTES NFR BLD: 6.3 % (ref 4–15)
NEUTROPHILS # BLD AUTO: 9.3 K/UL (ref 1.8–7.7)
NEUTROPHILS NFR BLD: 70.1 % (ref 38–73)
NRBC BLD-RTO: 0 /100 WBC
PLATELET # BLD AUTO: 377 K/UL (ref 150–450)
PMV BLD AUTO: 9.6 FL (ref 9.2–12.9)
POTASSIUM SERPL-SCNC: 3.7 MMOL/L (ref 3.5–5.1)
RBC # BLD AUTO: 4.69 M/UL (ref 4–5.4)
SODIUM SERPL-SCNC: 139 MMOL/L (ref 136–145)
WBC # BLD AUTO: 13.27 K/UL (ref 3.9–12.7)

## 2022-06-07 PROCEDURE — 3008F BODY MASS INDEX DOCD: CPT | Mod: CPTII,S$GLB,, | Performed by: INTERNAL MEDICINE

## 2022-06-07 PROCEDURE — 99999 PR PBB SHADOW E&M-EST. PATIENT-LVL III: ICD-10-PCS | Mod: PBBFAC,,, | Performed by: INTERNAL MEDICINE

## 2022-06-07 PROCEDURE — 93010 ELECTROCARDIOGRAM REPORT: CPT | Mod: ,,, | Performed by: INTERNAL MEDICINE

## 2022-06-07 PROCEDURE — 99900103 DSU ONLY-NO CHARGE-INITIAL HR (STAT)

## 2022-06-07 PROCEDURE — 85025 COMPLETE CBC W/AUTO DIFF WBC: CPT | Performed by: ORTHOPAEDIC SURGERY

## 2022-06-07 PROCEDURE — 93005 ELECTROCARDIOGRAM TRACING: CPT

## 2022-06-07 PROCEDURE — 3008F PR BODY MASS INDEX (BMI) DOCUMENTED: ICD-10-PCS | Mod: CPTII,S$GLB,, | Performed by: INTERNAL MEDICINE

## 2022-06-07 PROCEDURE — 3075F SYST BP GE 130 - 139MM HG: CPT | Mod: CPTII,S$GLB,, | Performed by: INTERNAL MEDICINE

## 2022-06-07 PROCEDURE — 3075F PR MOST RECENT SYSTOLIC BLOOD PRESS GE 130-139MM HG: ICD-10-PCS | Mod: CPTII,S$GLB,, | Performed by: INTERNAL MEDICINE

## 2022-06-07 PROCEDURE — 99214 PR OFFICE/OUTPT VISIT, EST, LEVL IV, 30-39 MIN: ICD-10-PCS | Mod: S$GLB,,, | Performed by: INTERNAL MEDICINE

## 2022-06-07 PROCEDURE — 3079F DIAST BP 80-89 MM HG: CPT | Mod: CPTII,S$GLB,, | Performed by: INTERNAL MEDICINE

## 2022-06-07 PROCEDURE — 99214 OFFICE O/P EST MOD 30 MIN: CPT | Mod: S$GLB,,, | Performed by: INTERNAL MEDICINE

## 2022-06-07 PROCEDURE — 71046 XR CHEST PA AND LATERAL: ICD-10-PCS | Mod: 26,,, | Performed by: RADIOLOGY

## 2022-06-07 PROCEDURE — 71046 X-RAY EXAM CHEST 2 VIEWS: CPT | Mod: 26,,, | Performed by: RADIOLOGY

## 2022-06-07 PROCEDURE — 93010 EKG 12-LEAD: ICD-10-PCS | Mod: ,,, | Performed by: INTERNAL MEDICINE

## 2022-06-07 PROCEDURE — 71046 X-RAY EXAM CHEST 2 VIEWS: CPT | Mod: TC,FY

## 2022-06-07 PROCEDURE — 99900104 DSU ONLY-NO CHARGE-EA ADD'L HR (STAT)

## 2022-06-07 PROCEDURE — 80048 BASIC METABOLIC PNL TOTAL CA: CPT | Performed by: ORTHOPAEDIC SURGERY

## 2022-06-07 PROCEDURE — 3079F PR MOST RECENT DIASTOLIC BLOOD PRESSURE 80-89 MM HG: ICD-10-PCS | Mod: CPTII,S$GLB,, | Performed by: INTERNAL MEDICINE

## 2022-06-07 PROCEDURE — 36415 COLL VENOUS BLD VENIPUNCTURE: CPT | Performed by: ORTHOPAEDIC SURGERY

## 2022-06-07 PROCEDURE — 99999 PR PBB SHADOW E&M-EST. PATIENT-LVL III: CPT | Mod: PBBFAC,,, | Performed by: INTERNAL MEDICINE

## 2022-06-07 RX ORDER — ALBUTEROL SULFATE 90 UG/1
2 AEROSOL, METERED RESPIRATORY (INHALATION) EVERY 4 HOURS PRN
COMMUNITY
Start: 2022-05-10 | End: 2022-08-02 | Stop reason: SDUPTHER

## 2022-06-07 RX ORDER — GALCANEZUMAB 120 MG/ML
120 INJECTION, SOLUTION SUBCUTANEOUS
COMMUNITY

## 2022-06-07 RX ORDER — AZELASTINE 1 MG/ML
1 SPRAY, METERED NASAL 2 TIMES DAILY
Qty: 30 ML | Refills: 11 | Status: SHIPPED | OUTPATIENT
Start: 2022-06-07 | End: 2023-06-07

## 2022-06-07 NOTE — PATIENT INSTRUCTIONS
Continue albuterol as needed  Continue allegra, flonase nasal spray  Add astelin nasal spray as needed for allergies, postnasal drip

## 2022-06-07 NOTE — PROGRESS NOTES
6/7/2022    Subha Bach  Follow up     Chief Complaint   Patient presents with    Follow-up    Asthma       HPI:    06/07/2022- pt feels chest tight occasionally, about 2x/month.   Has allergies- takes allegra (switched from zyrtec) and flonase  Occasional night arousals maybe once/month.  Asthma Notices seasonal worsening and worse w/ heavy exercise  Had sinus infection last month, got inhaler which helps symptoms. Has not had bronchitis in a long time. Had hysterectomy due to severe menorrhagia requiring transfusions. Now SOB is much better.  Has labral tear of hip causing her to be unable to exercise, pending surgery.  FH mother has severe asthma, sister hospitalized w/ H1N1 and subsequent breathing issues  Uncle is a pulmonologist in [place]    9/2/20-   You are very anemic on labs in March- this can explain your symptoms  Go to the lab and get blood tests  Will follow up labs and likely send you to a hematologist for further management  Albuterol inhaler reordered if you need it  Patient is a 35-year-old female with migraines, fibromyalgia and GERD presenting for new evaluation of shortness of breath. Pt reports illness with fatigue, headaches with SOB for about 7-10 days. She was tested for COVID which was negative at the time. She has hx of asthma and used albuterol when she was sick- not using inhalers lately and denies wheezing. Asthma has been well controlled as an adult. Initially dx at age 9 or 10 after a bad episode of bronchitis and tx at home with nebulizers. SOB has persisted since then which is constant, nothing makes it better, walking makes it worse. Feels like someone sitting on her chest when active. She used to be a competitive swimmer. 2 wks ago went to beach with her daughter in [place] and had such SOB walking about 100 yards she almost passed out so this is when she decided to get more evaluation.    She did not know she is very anemic. She has very heavy periods, worse since having daughter 7  yrs ago. Has fibroids. She has nexplanon implant which hasn't really helped.   She follows with neuro for migraines and fibromyalgia. Migraines are very severe, happen daily and better than they were at age 13. She takes an injection monthly which she just started for migraines.    The chief complaint problem varies w/ instability at times    PFSH:  Past Medical History:   Diagnosis Date    Asthma     Encounter for blood transfusion     Gestational diabetes     gestational diabetes    Migraine headache          Past Surgical History:   Procedure Laterality Date    LAPAROSCOPIC SALPINGECTOMY Bilateral 10/19/2020    Procedure: SALPINGECTOMY, LAPAROSCOPIC;  Surgeon: Yonas Walsh MD;  Location: [facility] OR;  Service: OB/GYN;  Laterality: Bilateral;  Possible Overnight  Claudia Cabrera MD to assist    LAPAROSCOPIC TOTAL HYSTERECTOMY N/A 10/19/2020    Procedure: HYSTERECTOMY, TOTAL, LAPAROSCOPIC;  Surgeon: Yonas Walsh MD;  Location: [facility] OR;  Service: OB/GYN;  Laterality: N/A;    REMOVAL OF IMPLANT Left 10/19/2020    Procedure: REMOVAL, IMPLANT;  Surgeon: Yonas Walsh MD;  Location: [facility] OR;  Service: OB/GYN;  Laterality: Left;  Nextplan birthcontrol implant removal    SHOULDER SURGERY       Social History     Tobacco Use    Smoking status: Never Smoker    Smokeless tobacco: Never Used   Substance Use Topics    Alcohol use: Yes     Comment: Martini every 6 months    Drug use: No     Family History   Problem Relation Age of Onset    Asthma Mother     Migraines Mother     Hypothyroidism Mother     Arthritis Mother     Migraines Father     Allergies Father     Chiari malformation Sister     Migraines Sister     Pancreatitis Maternal Grandmother     Migraines Maternal Grandmother     Thyroid disease Maternal Grandmother     Other Maternal Grandmother         pituitary gland issues    Heart failure Maternal Grandfather     Diabetes Maternal Grandfather     Kidney disease Maternal Grandfather   "   Alzheimer's disease Paternal Grandmother     Prostate cancer Paternal Grandfather      Review of patient's allergies indicates:   Allergen Reactions    Nubain [nalbuphine] Hives       Performance Status:The patient's activity level is functions out of house.      Review of Systems:  a review of eleven systems covering constitutional, Eye, HEENT, Psych, Respiratory, Cardiac, GI, , Musculoskeletal, Endocrine, Dermatologic was negative except for pertinent findings as listed ABOVE and below:  All negative with pertinent positives as above       Exam:Comprehensive exam done. /87 (BP Location: Left arm, Patient Position: Sitting, BP Method: Large (Automatic))   Pulse 106   Resp 16   Ht 5' 6" (1.676 m)   Wt 87.4 kg (192 lb 9.2 oz)   LMP 10/13/2020 Comment: PT INSTRUCTED ON NEED FOR URINE SAMPLE PRIOR TO PROCEDURE AND VERBALIZES UNDERSTANDING  SpO2 97% Comment: on room air at rest  BMI 31.08 kg/m²   Exam included Vitals as listed, and patient's appearance and affect and alertness and mood, oral exam for yeast and hygiene and pharynx lesions and Mallapatti (M) score, neck with inspection for jvd and masses and thyroid abnormalities and lymph nodes (supraclavicular and infraclavicular nodes and axillary also examined and noted if abn), chest exam included symmetry and effort and fremitus and percussion and auscultation, cardiac exam included rhythm and gallops and murmur and rubs and jvd and edema, abdominal exam for mass and hepatosplenomegaly and tenderness and hernias and bowel sounds, Musculoskeletal exam with muscle tone and posture and mobility/gait and  strength, and skin for rashes and cyanosis and pallor and turgor, extremity for clubbing.  Findings were normal except for pertinent findings listed below:  M1  No acute distress  Lungs clear  No edema or clubbing    Radiographs (ct chest and cxr) reviewed: view by direct vision   CXR 6/3/22- clear  CXR 3/23/20- clear lungs    Labs reviewed     " Latest Reference Range & Units 11/20/20 11:04 12/08/20 15:41 06/07/22 13:08   Eos # 0.0 - 0.5 K/uL 0.1 0.1 0.2     PFT will be done and results to be reviewed      Plan:  Clinical impression is resonably certain and repeated evaluation prn +/- follow up will be needed as below. Mild intermittent asthma    Subha was seen today for follow-up and asthma.    Diagnoses and all orders for this visit:    Mild intermittent asthma without complication  -     Complete PFT with bronchodilator; Future    Chronic sinus complaints  -     azelastine (ASTELIN) 137 mcg (0.1 %) nasal spray; 1 spray (137 mcg total) by Nasal route 2 (two) times daily.        Follow up in about 1 year (around 6/7/2023).    Discussed with patient above for education the following:      Patient Instructions   Continue albuterol as needed  Continue allegra, flonase nasal spray  Add astelin nasal spray as needed for allergies, postnasal drip

## 2022-06-07 NOTE — DISCHARGE INSTRUCTIONS
To confirm, Your doctor has instructed you that surgery is scheduled for: 6/17/22  LIZ  [phone]    Please report to [facility], [facility] the morning of surgery. You must check-in and receive a wristband before going to your procedure.    Pre-Op will call the afternoon prior to surgery between 1:00 and 6:00 PM with the final arrival time.  Phone number: [phone]    PLEASE NOTE:  The surgery schedule has many variables which may affect the time of your surgery case.  Family members should be available if your surgery time changes.  Plan to be here the day of your procedure between 4-6 hours.    MEDICATIONS:  TAKE ONLY THESE MEDICATIONS WITH A SMALL SIP OF WATER THE MORNING OF YOUR PROCEDURE:    SEE LIST    DO NOT TAKE THESE MEDICATIONS 5-7 DAYS PRIOR to your procedure or per your surgeon's request:   ASPIRIN, ALEVE, ADVIL, IBUPROFEN, FISH OIL VITAMIN E, HERBALS    (May take Tylenol)    ONLY if you are prescribed any types of blood thinners such as:  Aspirin, Coumadin, Plavix, Pradaxa, Xarelto, Aggrenox, Effient, Eliquis, Savasya, Brilinta, or any other, ask your surgeon whether you should stop taking them and how long before surgery you should stop.  You may also need to verify with the prescribing physician if it is ok to stop your medication.      INSTRUCTIONS IMPORTANT!!  Do not eat or drink anything between midnight and the time of your procedure- this includes gum, mints, and candy.  Do not smoke or drink alcoholic beverages 24 hours prior to your procedure.  Shower the night before AND the morning of your procedure with a Chlorhexidine wash such as Hibiclens or Dial antibacterial soap from the neck down.  Do not get it on your face or in your eyes.  You may use your own shampoo and face wash. This helps your skin to be as bacteria free as possible.    If you wear contact lenses, dentures, hearing aids or glasses, bring a container to put them in during surgery and give to a  family member for safe keeping.  Please leave all jewelry, piercing's and valuables at home.   DO NOT remove hair from the surgery site.  Do not shave the incision site unless you are given specific instructions to do so.    ONLY if you have been diagnosed with sleep apnea please bring your C-PAP machine.  ONLY if you wear home oxygen please bring your portable oxygen tank the day of your procedure.  ONLY if you have a history of OPEN HEART SURGERY you will need a clearance from your Cardiologist per Anesthesia.      ONLY for patients requiring bowel prep, written instructions will be given by your doctor's office.  ONLY if you have a neuro stimulator, please bring the controller with you the morning of surgery  ONLY if a type and screen test is needed before surgery, please return:  If your doctor has scheduled you for an overnight stay, bring a small overnight bag with any personal items you need.  Make arrangements in advance for transportation home by a responsible adult.  It is not safe to drive a vehicle during the 24 hours after anesthesia.       [facility] will resume routine visitation for COVID-19 negative patients, including inpatients, outpatients, and  procedural areas. Visitors are still required to practice social distancing in waiting rooms, cafeterias, and common  areas. Visitors and patients should maintain six feet distance between those not in their group. Visitors will be  asked to leave if they exhibit symptoms of respiratory infection, have tested positive for COVID -19 in the last  ten days, have a pending COVID-19 test for symptoms, are unable or refuse to wear a mask OR do not comply  with current policy.       All [facility] facilities and properties are tobacco free.  Smoking is NOT allowed.   If you have any questions about these instructions, call Pre-Op Admit  Nursing at [phone] or the Pre-Op Day Surgery Unit at [phone].                     To confirm, Your doctor has instructed  you that surgery is scheduled for:     Please report to [facility], [facility] the morning of surgery. You must check-in and receive a wristband before going to your procedure.    Pre-Op will call the afternoon prior to surgery between 1:00 and 6:00 PM with the final arrival time.  Phone number: [phone]    PLEASE NOTE:  The surgery schedule has many variables which may affect the time of your surgery case.  Family members should be available if your surgery time changes.  Plan to be here the day of your procedure between 4-6 hours.    MEDICATIONS:  TAKE ONLY THESE MEDICATIONS WITH A SMALL SIP OF WATER THE MORNING OF YOUR PROCEDURE:      DO NOT TAKE THESE MEDICATIONS 5-7 DAYS PRIOR to your procedure or per your surgeon's request:   ASPIRIN, ALEVE, ADVIL, IBUPROFEN, FISH OIL VITAMIN E, HERBALS  (May take Tylenol)    ONLY if you are prescribed any types of blood thinners such as:  Aspirin, Coumadin, Plavix, Pradaxa, Xarelto, Aggrenox, Effient, Eliquis, Savasya, Brilinta, or any other, ask your surgeon whether you should stop taking them and how long before surgery you should stop.  You may also need to verify with the prescribing physician if it is ok to stop your medication.      INSTRUCTIONS IMPORTANT!!  Do not eat or drink anything between midnight and the time of your procedure- this includes gum, mints, and candy.  Do not smoke or drink alcoholic beverages 24 hours prior to your procedure.  Shower the night before AND the morning of your procedure with a Chlorhexidine wash such as Hibiclens or Dial antibacterial soap from the neck down.  Do not get it on your face or in your eyes.  You may use your own shampoo and face wash. This helps your skin to be as bacteria free as possible.    If you wear contact lenses, dentures, hearing aids or glasses, bring a container to put them in during surgery and give to a family member for safe keeping.  Please leave all jewelry, piercing's and  valuables at home.   DO NOT remove hair from the surgery site.  Do not shave the incision site unless you are given specific instructions to do so.    ONLY if you have been diagnosed with sleep apnea please bring your C-PAP machine.  ONLY if you wear home oxygen please bring your portable oxygen tank the day of your procedure.  ONLY if you have a history of OPEN HEART SURGERY you will need a clearance from your Cardiologist per Anesthesia.      ONLY for patients requiring bowel prep, written instructions will be given by your doctor's office.  ONLY if you have a neuro stimulator, please bring the controller with you the morning of surgery  ONLY if a type and screen test is needed before surgery, please return:  If your doctor has scheduled you for an overnight stay, bring a small overnight bag with any personal items you need.  Make arrangements in advance for transportation home by a responsible adult.  It is not safe to drive a vehicle during the 24 hours after anesthesia.       [facility] will resume routine visitation for COVID-19 negative patients, including inpatients, outpatients, and  procedural areas. Visitors are still required to practice social distancing in waiting rooms, cafeterias, and common  areas. Visitors and patients should maintain six feet distance between those not in their group. Visitors will be  asked to leave if they exhibit symptoms of respiratory infection, have tested positive for COVID -19 in the last  ten days, have a pending COVID-19 test for symptoms, are unable or refuse to wear a mask OR do not comply  with current policy.       All [facility] facilities and properties are tobacco free.  Smoking is NOT allowed.   If you have any questions about these instructions, call Pre-Op Admit  Nursing at [phone] or the Pre-Op Day Surgery Unit at [phone].

## 2022-06-10 ENCOUNTER — TELEPHONE (OUTPATIENT)
Dept: ORTHOPEDICS | Facility: CLINIC | Age: 37
End: 2022-06-10
Payer: COMMERCIAL

## 2022-06-10 NOTE — TELEPHONE ENCOUNTER
----- Message from Issa Rosario MA sent at 6/10/2022  3:53 PM CDT -----  Contact: Subha Bach  The patient would like to know if her procedure is an outpatient procedure or inpatient. [phone]

## 2022-06-14 ENCOUNTER — ANESTHESIA EVENT (OUTPATIENT)
Dept: SURGERY | Facility: HOSPITAL | Age: 37
End: 2022-06-14
Payer: COMMERCIAL

## 2022-06-15 DIAGNOSIS — M25.552 LEFT HIP PAIN: Primary | ICD-10-CM

## 2022-06-15 DIAGNOSIS — M24.152 DEGENERATIVE TEAR OF ACETABULAR LABRUM OF LEFT HIP: ICD-10-CM

## 2022-06-16 ENCOUNTER — PATIENT MESSAGE (OUTPATIENT)
Dept: ORTHOPEDICS | Facility: CLINIC | Age: 37
End: 2022-06-16
Payer: COMMERCIAL

## 2022-06-17 ENCOUNTER — ANESTHESIA (OUTPATIENT)
Dept: SURGERY | Facility: HOSPITAL | Age: 37
End: 2022-06-17
Payer: COMMERCIAL

## 2022-06-20 ENCOUNTER — TELEPHONE (OUTPATIENT)
Dept: ORTHOPEDICS | Facility: CLINIC | Age: 37
End: 2022-06-20
Payer: COMMERCIAL

## 2022-06-20 NOTE — TELEPHONE ENCOUNTER
----- Message from Rissa Tanner MA sent at 6/20/2022 10:04 AM CDT -----  Contact: [employer]  [place]   [phone]

## 2022-06-21 RX ORDER — HYDROCODONE BITARTRATE AND ACETAMINOPHEN 7.5; 325 MG/1; MG/1
1 TABLET ORAL EVERY 6 HOURS PRN
Qty: 28 TABLET | Refills: 0 | Status: SHIPPED | OUTPATIENT
Start: 2022-06-21 | End: 2022-06-30 | Stop reason: SDUPTHER

## 2022-06-22 ENCOUNTER — HOSPITAL ENCOUNTER (OUTPATIENT)
Facility: HOSPITAL | Age: 37
Discharge: HOME | End: 2022-06-22
Attending: ORTHOPAEDIC SURGERY | Admitting: ORTHOPAEDIC SURGERY
Payer: COMMERCIAL

## 2022-06-22 VITALS
BODY MASS INDEX: 31.08 KG/M2 | OXYGEN SATURATION: 98 % | HEART RATE: 87 BPM | SYSTOLIC BLOOD PRESSURE: 112 MMHG | RESPIRATION RATE: 14 BRPM | TEMPERATURE: 98 F | DIASTOLIC BLOOD PRESSURE: 72 MMHG | HEIGHT: 66 IN

## 2022-06-22 DIAGNOSIS — M24.152 DEGENERATIVE TEAR OF ACETABULAR LABRUM OF LEFT HIP: Primary | ICD-10-CM

## 2022-06-22 DIAGNOSIS — Z01.818 PREOP TESTING: ICD-10-CM

## 2022-06-22 PROCEDURE — 64450 NJX AA&/STRD OTHER PN/BRANCH: CPT | Performed by: ANESTHESIOLOGY

## 2022-06-22 PROCEDURE — D9220A PRA ANESTHESIA: Mod: ANES,,, | Performed by: ANESTHESIOLOGY

## 2022-06-22 PROCEDURE — 71000033 HC RECOVERY, INTIAL HOUR: Performed by: ORTHOPAEDIC SURGERY

## 2022-06-22 PROCEDURE — 25000003 PHARM REV CODE 250: Performed by: ANESTHESIOLOGY

## 2022-06-22 PROCEDURE — 36000710: Performed by: ORTHOPAEDIC SURGERY

## 2022-06-22 PROCEDURE — 64450 PR NERVE BLOCK INJ, ANES/STEROID, OTHER PERIPHERAL: ICD-10-PCS | Mod: 59,LT,, | Performed by: ANESTHESIOLOGY

## 2022-06-22 PROCEDURE — 71000039 HC RECOVERY, EACH ADD'L HOUR: Performed by: ORTHOPAEDIC SURGERY

## 2022-06-22 PROCEDURE — 36000711: Performed by: ORTHOPAEDIC SURGERY

## 2022-06-22 PROCEDURE — 63600175 PHARM REV CODE 636 W HCPCS: Performed by: ANESTHESIOLOGY

## 2022-06-22 PROCEDURE — 63600175 PHARM REV CODE 636 W HCPCS: Performed by: ORTHOPAEDIC SURGERY

## 2022-06-22 PROCEDURE — 27201423 OPTIME MED/SURG SUP & DEVICES STERILE SUPPLY: Performed by: ORTHOPAEDIC SURGERY

## 2022-06-22 PROCEDURE — 99900104 DSU ONLY-NO CHARGE-EA ADD'L HR (STAT): Performed by: ORTHOPAEDIC SURGERY

## 2022-06-22 PROCEDURE — 97116 GAIT TRAINING THERAPY: CPT

## 2022-06-22 PROCEDURE — 97161 PT EVAL LOW COMPLEX 20 MIN: CPT

## 2022-06-22 PROCEDURE — 99900103 DSU ONLY-NO CHARGE-INITIAL HR (STAT): Performed by: ORTHOPAEDIC SURGERY

## 2022-06-22 PROCEDURE — 25000003 PHARM REV CODE 250: Performed by: NURSE ANESTHETIST, CERTIFIED REGISTERED

## 2022-06-22 PROCEDURE — 29862 PR HIP SCOPE/REMV BODY,PLASTY/RESECTN: ICD-10-PCS | Mod: LT,,, | Performed by: ORTHOPAEDIC SURGERY

## 2022-06-22 PROCEDURE — 71000015 HC POSTOP RECOV 1ST HR: Performed by: ORTHOPAEDIC SURGERY

## 2022-06-22 PROCEDURE — D9220A PRA ANESTHESIA: ICD-10-PCS | Mod: ANES,,, | Performed by: ANESTHESIOLOGY

## 2022-06-22 PROCEDURE — 76942 ECHO GUIDE FOR BIOPSY: CPT | Mod: 26,,, | Performed by: ANESTHESIOLOGY

## 2022-06-22 PROCEDURE — D9220A PRA ANESTHESIA: ICD-10-PCS | Mod: CRNA,,, | Performed by: NURSE ANESTHETIST, CERTIFIED REGISTERED

## 2022-06-22 PROCEDURE — 63600175 PHARM REV CODE 636 W HCPCS: Performed by: NURSE ANESTHETIST, CERTIFIED REGISTERED

## 2022-06-22 PROCEDURE — 63600175 PHARM REV CODE 636 W HCPCS

## 2022-06-22 PROCEDURE — D9220A PRA ANESTHESIA: Mod: CRNA,,, | Performed by: NURSE ANESTHETIST, CERTIFIED REGISTERED

## 2022-06-22 PROCEDURE — 25000003 PHARM REV CODE 250: Performed by: ORTHOPAEDIC SURGERY

## 2022-06-22 PROCEDURE — 37000009 HC ANESTHESIA EA ADD 15 MINS: Performed by: ORTHOPAEDIC SURGERY

## 2022-06-22 PROCEDURE — 29862 HIP ARTHR0 W/DEBRIDEMENT: CPT | Mod: LT,,, | Performed by: ORTHOPAEDIC SURGERY

## 2022-06-22 PROCEDURE — 76942 PR U/S GUIDANCE FOR NEEDLE GUIDANCE: ICD-10-PCS | Mod: 26,,, | Performed by: ANESTHESIOLOGY

## 2022-06-22 PROCEDURE — 64450 NJX AA&/STRD OTHER PN/BRANCH: CPT | Mod: 59,LT,, | Performed by: ANESTHESIOLOGY

## 2022-06-22 PROCEDURE — 37000008 HC ANESTHESIA 1ST 15 MINUTES: Performed by: ORTHOPAEDIC SURGERY

## 2022-06-22 RX ORDER — FENTANYL CITRATE 50 UG/ML
INJECTION, SOLUTION INTRAMUSCULAR; INTRAVENOUS
Status: DISCONTINUED | OUTPATIENT
Start: 2022-06-22 | End: 2022-06-22

## 2022-06-22 RX ORDER — SODIUM CHLORIDE, SODIUM LACTATE, POTASSIUM CHLORIDE, CALCIUM CHLORIDE 600; 310; 30; 20 MG/100ML; MG/100ML; MG/100ML; MG/100ML
INJECTION, SOLUTION INTRAVENOUS CONTINUOUS
Status: DISCONTINUED | OUTPATIENT
Start: 2022-06-22 | End: 2022-11-04

## 2022-06-22 RX ORDER — SCOLOPAMINE TRANSDERMAL SYSTEM 1 MG/1
1 PATCH, EXTENDED RELEASE TRANSDERMAL
Status: DISCONTINUED | OUTPATIENT
Start: 2022-06-22 | End: 2022-06-22 | Stop reason: HOSPADM

## 2022-06-22 RX ORDER — NEOSTIGMINE METHYLSULFATE 1 MG/ML
INJECTION, SOLUTION INTRAVENOUS
Status: DISCONTINUED | OUTPATIENT
Start: 2022-06-22 | End: 2022-06-22

## 2022-06-22 RX ORDER — METOCLOPRAMIDE HYDROCHLORIDE 5 MG/ML
10 INJECTION INTRAMUSCULAR; INTRAVENOUS EVERY 10 MIN PRN
Status: DISCONTINUED | OUTPATIENT
Start: 2022-06-22 | End: 2022-11-04

## 2022-06-22 RX ORDER — SODIUM CHLORIDE 0.9 % (FLUSH) 0.9 %
10 SYRINGE (ML) INJECTION
Status: DISCONTINUED | OUTPATIENT
Start: 2022-06-22 | End: 2022-11-04

## 2022-06-22 RX ORDER — EPINEPHRINE 1 MG/ML
INJECTION, SOLUTION INTRACARDIAC; INTRAMUSCULAR; INTRAVENOUS; SUBCUTANEOUS
Status: DISCONTINUED | OUTPATIENT
Start: 2022-06-22 | End: 2022-06-22 | Stop reason: HOSPADM

## 2022-06-22 RX ORDER — LIDOCAINE HYDROCHLORIDE 10 MG/ML
1 INJECTION, SOLUTION EPIDURAL; INFILTRATION; INTRACAUDAL; PERINEURAL ONCE
Status: DISCONTINUED | OUTPATIENT
Start: 2022-06-22 | End: 2022-11-04

## 2022-06-22 RX ORDER — ONDANSETRON HYDROCHLORIDE 2 MG/ML
INJECTION, SOLUTION INTRAMUSCULAR; INTRAVENOUS
Status: DISCONTINUED | OUTPATIENT
Start: 2022-06-22 | End: 2022-06-22

## 2022-06-22 RX ORDER — CEFAZOLIN SODIUM 2 G/50ML
2 SOLUTION INTRAVENOUS
Status: COMPLETED | OUTPATIENT
Start: 2022-06-22 | End: 2022-06-22

## 2022-06-22 RX ORDER — MIDAZOLAM HYDROCHLORIDE 1 MG/ML
INJECTION INTRAMUSCULAR; INTRAVENOUS
Status: DISCONTINUED | OUTPATIENT
Start: 2022-06-22 | End: 2022-06-22

## 2022-06-22 RX ORDER — FENTANYL CITRATE 50 UG/ML
25 INJECTION, SOLUTION INTRAMUSCULAR; INTRAVENOUS EVERY 5 MIN PRN
Status: COMPLETED | OUTPATIENT
Start: 2022-06-22 | End: 2022-06-22

## 2022-06-22 RX ORDER — PHENYLEPHRINE HYDROCHLORIDE 10 MG/ML
INJECTION INTRAVENOUS
Status: DISCONTINUED | OUTPATIENT
Start: 2022-06-22 | End: 2022-06-22

## 2022-06-22 RX ORDER — ACETAMINOPHEN 10 MG/ML
INJECTION, SOLUTION INTRAVENOUS
Status: DISCONTINUED | OUTPATIENT
Start: 2022-06-22 | End: 2022-06-22

## 2022-06-22 RX ORDER — ROCURONIUM BROMIDE 10 MG/ML
INJECTION, SOLUTION INTRAVENOUS
Status: DISCONTINUED | OUTPATIENT
Start: 2022-06-22 | End: 2022-06-22

## 2022-06-22 RX ORDER — MUPIROCIN 20 MG/G
OINTMENT TOPICAL
Status: DISCONTINUED | OUTPATIENT
Start: 2022-06-22 | End: 2022-06-22 | Stop reason: HOSPADM

## 2022-06-22 RX ORDER — DEXAMETHASONE SODIUM PHOSPHATE 4 MG/ML
INJECTION, SOLUTION INTRA-ARTICULAR; INTRALESIONAL; INTRAMUSCULAR; INTRAVENOUS; SOFT TISSUE
Status: DISCONTINUED | OUTPATIENT
Start: 2022-06-22 | End: 2022-06-22

## 2022-06-22 RX ORDER — LIDOCAINE HCL/PF 100 MG/5ML
SYRINGE (ML) INTRAVENOUS
Status: DISCONTINUED | OUTPATIENT
Start: 2022-06-22 | End: 2022-06-22

## 2022-06-22 RX ORDER — PROPOFOL 10 MG/ML
VIAL (ML) INTRAVENOUS
Status: DISCONTINUED | OUTPATIENT
Start: 2022-06-22 | End: 2022-06-22

## 2022-06-22 RX ORDER — OXYCODONE HYDROCHLORIDE 5 MG/1
5 TABLET ORAL
Status: DISCONTINUED | OUTPATIENT
Start: 2022-06-22 | End: 2022-11-04

## 2022-06-22 RX ORDER — ROPIVACAINE HYDROCHLORIDE 5 MG/ML
INJECTION, SOLUTION EPIDURAL; INFILTRATION; PERINEURAL
Status: DISCONTINUED | OUTPATIENT
Start: 2022-06-22 | End: 2022-06-22 | Stop reason: HOSPADM

## 2022-06-22 RX ORDER — BUPIVACAINE HYDROCHLORIDE AND EPINEPHRINE 5; 5 MG/ML; UG/ML
INJECTION, SOLUTION EPIDURAL; INTRACAUDAL; PERINEURAL
Status: DISCONTINUED | OUTPATIENT
Start: 2022-06-22 | End: 2022-06-22

## 2022-06-22 RX ORDER — HYDROMORPHONE HYDROCHLORIDE 2 MG/ML
0.2 INJECTION, SOLUTION INTRAMUSCULAR; INTRAVENOUS; SUBCUTANEOUS EVERY 5 MIN PRN
Status: DISCONTINUED | OUTPATIENT
Start: 2022-06-22 | End: 2022-10-06

## 2022-06-22 RX ORDER — MIDAZOLAM HYDROCHLORIDE 1 MG/ML
INJECTION, SOLUTION INTRAMUSCULAR; INTRAVENOUS
Status: DISCONTINUED | OUTPATIENT
Start: 2022-06-22 | End: 2022-06-22

## 2022-06-22 RX ORDER — EPHEDRINE SULFATE 50 MG/ML
INJECTION, SOLUTION INTRAVENOUS
Status: DISCONTINUED | OUTPATIENT
Start: 2022-06-22 | End: 2022-06-22

## 2022-06-22 RX ORDER — OXYCODONE HYDROCHLORIDE 5 MG/1
5 TABLET ORAL EVERY 6 HOURS PRN
Status: DISCONTINUED | OUTPATIENT
Start: 2022-06-22 | End: 2022-06-22 | Stop reason: HOSPADM

## 2022-06-22 RX ADMIN — MUPIROCIN: 20 OINTMENT TOPICAL at 08:06

## 2022-06-22 RX ADMIN — MIDAZOLAM 1 MG: 1 INJECTION INTRAMUSCULAR; INTRAVENOUS at 11:06

## 2022-06-22 RX ADMIN — ROCURONIUM BROMIDE 30 MG: 10 INJECTION, SOLUTION INTRAVENOUS at 12:06

## 2022-06-22 RX ADMIN — MIDAZOLAM 1 MG: 1 INJECTION INTRAMUSCULAR; INTRAVENOUS at 12:06

## 2022-06-22 RX ADMIN — FENTANYL CITRATE 100 MCG: 50 INJECTION, SOLUTION INTRAMUSCULAR; INTRAVENOUS at 11:06

## 2022-06-22 RX ADMIN — ONDANSETRON 4 MG: 2 INJECTION, SOLUTION INTRAMUSCULAR; INTRAVENOUS at 12:06

## 2022-06-22 RX ADMIN — SODIUM CHLORIDE, SODIUM GLUCONATE, SODIUM ACETATE, POTASSIUM CHLORIDE, MAGNESIUM CHLORIDE, SODIUM PHOSPHATE, DIBASIC, AND POTASSIUM PHOSPHATE: .53; .5; .37; .037; .03; .012; .00082 INJECTION, SOLUTION INTRAVENOUS at 12:06

## 2022-06-22 RX ADMIN — PROPOFOL 150 MG: 10 INJECTION, EMULSION INTRAVENOUS at 12:06

## 2022-06-22 RX ADMIN — OXYCODONE 5 MG: 5 TABLET ORAL at 01:06

## 2022-06-22 RX ADMIN — FENTANYL CITRATE 25 MCG: 50 INJECTION INTRAMUSCULAR; INTRAVENOUS at 02:06

## 2022-06-22 RX ADMIN — SCOPALAMINE 1 PATCH: 1 PATCH, EXTENDED RELEASE TRANSDERMAL at 08:06

## 2022-06-22 RX ADMIN — PROPOFOL 50 MG: 10 INJECTION, EMULSION INTRAVENOUS at 12:06

## 2022-06-22 RX ADMIN — MIDAZOLAM 2 MG: 1 INJECTION INTRAMUSCULAR; INTRAVENOUS at 12:06

## 2022-06-22 RX ADMIN — SODIUM CHLORIDE, SODIUM GLUCONATE, SODIUM ACETATE, POTASSIUM CHLORIDE, MAGNESIUM CHLORIDE, SODIUM PHOSPHATE, DIBASIC, AND POTASSIUM PHOSPHATE: .53; .5; .37; .037; .03; .012; .00082 INJECTION, SOLUTION INTRAVENOUS at 11:06

## 2022-06-22 RX ADMIN — NEOSTIGMINE METHYLSULFATE 3 MG: 1 INJECTION INTRAVENOUS at 01:06

## 2022-06-22 RX ADMIN — BUPIVACAINE HYDROCHLORIDE AND EPINEPHRINE 20 ML: 5; 5 INJECTION, SOLUTION EPIDURAL; INTRACAUDAL; PERINEURAL at 11:06

## 2022-06-22 RX ADMIN — LIDOCAINE HYDROCHLORIDE 100 MG: 20 INJECTION INTRAVENOUS at 12:06

## 2022-06-22 RX ADMIN — ACETAMINOPHEN 1000 MG: 10 INJECTION, SOLUTION INTRAVENOUS at 12:06

## 2022-06-22 RX ADMIN — DEXAMETHASONE SODIUM PHOSPHATE 4 MG: 4 INJECTION, SOLUTION INTRA-ARTICULAR; INTRALESIONAL; INTRAMUSCULAR; INTRAVENOUS; SOFT TISSUE at 12:06

## 2022-06-22 RX ADMIN — GLYCOPYRROLATE 0.4 MG: 0.2 INJECTION, SOLUTION INTRAMUSCULAR; INTRAVENOUS at 01:06

## 2022-06-22 RX ADMIN — DEXAMETHASONE SODIUM PHOSPHATE 4 MG: 4 INJECTION, SOLUTION INTRA-ARTICULAR; INTRALESIONAL; INTRAMUSCULAR; INTRAVENOUS; SOFT TISSUE at 11:06

## 2022-06-22 RX ADMIN — FENTANYL CITRATE 25 MCG: 50 INJECTION INTRAMUSCULAR; INTRAVENOUS at 01:06

## 2022-06-22 RX ADMIN — OXYCODONE 5 MG: 5 TABLET ORAL at 02:06

## 2022-06-22 RX ADMIN — PHENYLEPHRINE HYDROCHLORIDE 100 MCG: 10 INJECTION INTRAVENOUS at 12:06

## 2022-06-22 RX ADMIN — EPHEDRINE SULFATE 25 MG: 50 INJECTION, SOLUTION INTRAMUSCULAR; INTRAVENOUS; SUBCUTANEOUS at 12:06

## 2022-06-22 RX ADMIN — FENTANYL CITRATE 100 MCG: 50 INJECTION, SOLUTION INTRAMUSCULAR; INTRAVENOUS at 12:06

## 2022-06-22 RX ADMIN — CEFAZOLIN SODIUM 2 G: 2 SOLUTION INTRAVENOUS at 12:06

## 2022-06-22 NOTE — ANESTHESIA PROCEDURE NOTES
Intubation    Date/Time: 6/22/2022 12:24 PM  Performed by: Nir Louis CRNA  Authorized by: Nii Astudillo MD     Intubation:     Induction:  Intravenous    Intubated:  Postinduction    Mask Ventilation:  Easy mask    Attempts:  1    Attempted By:  CRNA    Method of Intubation:  Direct    Blade:  Rodolfo 3    Laryngeal View Grade: Grade I - full view of cords      Difficult Airway Encountered?: No      Complications:  None    Airway Device:  Oral endotracheal tube    Airway Device Size:  7.5    Style/Cuff Inflation:  Cuffed (inflated to minimal occlusive pressure)    Inflation Amount (mL):  5    Tube secured:  22    Secured at:  The lips    Placement Verified By:  Capnometry    Complicating Factors:  None    Findings Post-Intubation:  Atraumatic/condition of teeth unchanged and BS equal bilateral

## 2022-06-22 NOTE — DISCHARGE INSTRUCTIONS
"Discharge Instructions: After Your Surgery/Procedure  Youve just had surgery. During surgery you were given medicine called anesthesia to keep you relaxed and free of pain. After surgery you may have some pain or nausea. This is common. Here are some tips for feeling better and getting well after surgery.     Stay on schedule with your medication.   Going home  Your doctor or nurse will show you how to take care of yourself when you go home. He or she will also answer your questions. Have an adult family member or friend drive you home.      For your safety we recommend these precaution for the first 24 hours after your procedure:  Do not drive or use heavy equipment.  Do not make important decisions or sign legal papers.  Do not drink alcohol.  Have someone stay with you, if needed. He or she can watch for problems and help keep you safe.  Your concentration, balance, coordination, and judgement may be impaired for many hours after anesthesia.  Use caution when ambulating or standing up.     You may feel weak and "washed out" after anesthesia and surgery.      Subtle residual effects of general anesthesia or sedation with regional / local anesthesia can last more than 24 hours.  Rest for the remainder of the day or longer if your Doctor/Surgeon has advised you to do so.  Although you may feel normal within the first 24 hours, your reflexes and mental ability may be impaired without you realizing it.  You may feel dizzy, lightheaded or sleepy for 24 hours or longer.      Be sure to go to all follow-up visits with your doctor. And rest after your surgery for as long as your doctor tells you to.  Coping with pain  If you have pain after surgery, pain medicine will help you feel better. Take it as told, before pain becomes severe. Also, ask your doctor or pharmacist about other ways to control pain. This might be with heat, ice, or relaxation. And follow any other instructions your surgeon or nurse gives you.  Tips " for taking pain medicine  To get the best relief possible, remember these points:  Pain medicines can upset your stomach. Taking them with a little food may help.  Most pain relievers taken by mouth need at least 20 to 30 minutes to start to work.  Taking medicine on a schedule can help you remember to take it. Try to time your medicine so that you can take it before starting an activity. This might be before you get dressed, go for a walk, or sit down for dinner.  Constipation is a common side effect of pain medicines. Call your doctor before taking any medicines such as laxatives or stool softeners to help ease constipation. Also ask if you should skip any foods. Drinking lots of fluids and eating foods such as fruits and vegetables that are high in fiber can also help. Remember, do not take laxatives unless your surgeon has prescribed them.  Drinking alcohol and taking pain medicine can cause dizziness and slow your breathing. It can even be deadly. Do not drink alcohol while taking pain medicine.  Pain medicine can make you react more slowly to things. Do not drive or run machinery while taking pain medicine.  Your health care provider may tell you to take acetaminophen to help ease your pain. Ask him or her how much you are supposed to take each day. Acetaminophen or other pain relievers may interact with your prescription medicines or other over-the-counter (OTC) drugs. Some prescription medicines have acetaminophen and other ingredients. Using both prescription and OTC acetaminophen for pain can cause you to overdose. Read the labels on your OTC medicines with care. This will help you to clearly know the list of ingredients, how much to take, and any warnings. It may also help you not take too much acetaminophen. If you have questions or do not understand the information, ask your pharmacist or health care provider to explain it to you before you take the OTC medicine.  Managing nausea  Some people have an  upset stomach after surgery. This is often because of anesthesia, pain, or pain medicine, or the stress of surgery. These tips will help you handle nausea and eat healthy foods as you get better. If you were on a special food plan before surgery, ask your doctor if you should follow it while you get better. These tips may help:  Do not push yourself to eat. Your body will tell you when to eat and how much.  Start off with clear liquids and soup. They are easier to digest.  Next try semi-solid foods, such as mashed potatoes, applesauce, and gelatin, as you feel ready.  Slowly move to solid foods. Dont eat fatty, rich, or spicy foods at first.  Do not force yourself to have 3 large meals a day. Instead eat smaller amounts more often.  Take pain medicines with a small amount of solid food, such as crackers or toast, to avoid nausea.     Call your surgeon if  You still have pain an hour after taking medicine. The medicine may not be strong enough.  You feel too sleepy, dizzy, or groggy. The medicine may be too strong.  You have side effects like nausea, vomiting, or skin changes, such as rash, itching, or hives.       If you have obstructive sleep apnea  You were given anesthesia medicine during surgery to keep you comfortable and free of pain. After surgery, you may have more apnea spells because of this medicine and other medicines you were given. The spells may last longer than usual.   At home:  Keep using the continuous positive airway pressure (CPAP) device when you sleep. Unless your health care provider tells you not to, use it when you sleep, day or night. CPAP is a common device used to treat obstructive sleep apnea.  Talk with your provider before taking any pain medicine, muscle relaxants, or sedatives. Your provider will tell you about the possible dangers of taking these medicines.  © 1198-3656 [employer]. [address] [place] [address], [place] [address]. All rights reserved. This information is  not intended as a substitute for professional medical care. Always follow your healthcare professional's instructions.     Post op instructions for prevention of DVT  What is deep vein thrombosis?  Deep vein thrombosis (DVT) is the medical term for blood clots in the deep veins of the leg.  These blood clots can be dangerous.  A DVT can block a blood vessel and keep blood from getting where it needs to go.  Another problem is that the clot can travel to other parts of the body such as the lungs.  A clot that travels to the lungs is called a pulmonary embolus (PE) and can cause serious problems with breathing which can lead to death.  Am I at risk for DVT/PE?  If you are not very active, you are at risk of DVT.  Anyone confined to bed, sitting for long periods of time, recovering from surgery, etc. increases the risk of DVT.  Other risk factors are cancer diagnosis, certain medications, estrogen replacement in any form,older age, obesity, pregnancy, smoking, history of clotting disorders, and dehydration.  How will I know if I have a DVT?  Swelling in the lower leg  Pain  Warmth, redness, hardness or bulging of the vein  If you have any of these symptoms, call your doctors office right away.  Some people will not have any symptoms until the clot moves to the lungs.  What are the symptoms of a PE?  Panting, shortness of breath, or trouble breathing  Sharp, knife-like chest pain when you breathe  Coughing or coughing up blood  Rapid heartbeat  If you have any of these symptoms or get worse quickly, call 911 for emergency treatment.  How can I prevent a DVT?  Avoid long periods of inactivity and dont cross your legs--get up and walk around every hour or so.  Stay active--walking after surgery is highly encouraged.  This means you should get out of the house and walk in the neighborhood.  Going up and down stairs will not impair healing (unless advised against such activity by your doctor).    Drink plenty of  noncaffeinated, nonalcoholic fluids each day to prevent dehydration.  Wear special support stockings, if they have been advised by your doctor.  If you travel, stop at least once an hour and walk around.  Avoid smoking (assistance with stopping is available through your healthcare provider)    Always notify your doctor if you are not able to follow the post operative instructions that are given to you at the time of discharge.  It may be necessary to prescribe one of the medications available to prevent DVT. We hope your stay was comfortable as you heal now, mend and rest.    For we have enjoyed taking care of you by giving your our best.    And as you get better, by regaining your health and strength;   We count it as a privilege to have served you and hope your time at [facility] was well spent.      Thank  You!!!

## 2022-06-22 NOTE — H&P
CC:  36-year-old female follows up with left hip pain.  She has pain that localizes to the left groin.  Today she rates as an 8/10.  We were concerned for labral tear so she had an MRI arthrogram done that has been completed and is available for review.  She still continued to have pain on a daily basis and at night.          Past Medical History:   Diagnosis Date    Asthma      Encounter for blood transfusion      Gestational diabetes       gestational diabetes    Migraine headache                 Past Surgical History:   Procedure Laterality Date    LAPAROSCOPIC SALPINGECTOMY Bilateral 10/19/2020     Procedure: SALPINGECTOMY, LAPAROSCOPIC;  Surgeon: Yonas Walsh MD;  Location: [facility] OR;  Service: OB/GYN;  Laterality: Bilateral;  Possible Overnight  Claudia Cabrera MD to assist    LAPAROSCOPIC TOTAL HYSTERECTOMY N/A 10/19/2020     Procedure: HYSTERECTOMY, TOTAL, LAPAROSCOPIC;  Surgeon: Yonas Walsh MD;  Location: [facility] OR;  Service: OB/GYN;  Laterality: N/A;    REMOVAL OF IMPLANT Left 10/19/2020     Procedure: REMOVAL, IMPLANT;  Surgeon: Yonas Walsh MD;  Location: [facility] OR;  Service: OB/GYN;  Laterality: Left;  Nextplan birthcontrol implant removal    SHOULDER SURGERY                    Current Outpatient Medications on File Prior to Visit   Medication Sig Dispense Refill    AIMOVIG AUTOINJECTOR 140 mg/mL autoinjector          eletriptan (RELPAX) 40 MG tablet Take 40 mg by mouth as needed. may repeat in 2 hours if necessary        gabapentin (NEURONTIN) 600 MG tablet Take 600 mg by mouth 2 (two) times daily.        tiZANidine (ZANAFLEX) 4 MG tablet Take 4 mg by mouth every evening. May take up to 3 tablets        AJOVY AUTOINJECTOR 225 mg/1.5 mL autoinjector INJECT 1.5 MG INTO THE SKIN Q MONTH        albuterol (PROVENTIL HFA) 90 mcg/actuation inhaler Inhale 2 puffs into the lungs every 6 (six) hours as needed for Wheezing or Shortness of Breath. Rescue 18 g 11    docusate sodium (COLACE) 100  MG capsule Take 1 capsule (100 mg total) by mouth 2 (two) times daily. (Patient not taking: Reported on 12/15/2020) 60 capsule 1    ferrous sulfate (FEOSOL) 325 mg (65 mg iron) Tab tablet Take 1 tablet (325 mg total) by mouth 3 (three) times daily. (Patient not taking: Reported on 11/3/2020) 90 tablet 1    folic acid (FOLVITE) 1 MG tablet Take 1 tablet (1 mg total) by mouth once daily. (Patient not taking: Reported on 12/15/2020) 90 tablet 2    ibuprofen (ADVIL,MOTRIN) 800 MG tablet Take 1 tablet (800 mg total) by mouth every 8 (eight) hours as needed for Pain. (Patient not taking: Reported on 12/15/2020) 30 tablet 1    omeprazole (PRILOSEC) 20 MG capsule Take 1 capsule (20 mg total) by mouth once daily. (Patient taking differently: Take 20 mg by mouth daily as needed (heartburn). ) 30 capsule 11    ondansetron (ZOFRAN-ODT) 4 MG TbDL Take 2 tablets (8 mg total) by mouth every 8 (eight) hours as needed. (Patient not taking: Reported on 12/15/2020) 12 tablet 0    oxyCODONE-acetaminophen (PERCOCET) 5-325 mg per tablet Take 1 tablet by mouth every 4 (four) hours as needed for Pain. (Patient not taking: Reported on 12/15/2020) 14 tablet 0    polyethylene glycol (GLYCOLAX) 17 gram/dose powder Take 17 g by mouth once daily. In 8 oz of water (Patient not taking: Reported on 12/15/2020) 240 g 1    topiramate (TOPAMAX) 50 MG tablet Take 50 mg by mouth every evening.         venlafaxine (EFFEXOR-XR) 150 MG Cp24 Take 1 capsule (150 mg total) by mouth once daily. 30 capsule 4                Current Facility-Administered Medications on File Prior to Visit   Medication Dose Route Frequency Provider Last Rate Last Admin    [COMPLETED] gadobenate dimeglumine (MULTIHANCE) injection 15 mL  15 mL Intra-articular ONCE PRN Joselo Henderson II, MD   0.15 mL at 05/04/22 1316    [COMPLETED] iohexoL (OMNIPAQUE 350) injection 50 mL  50 mL Intravenous ONCE PRN Joselo Henderson II, MD   15 mL at 05/04/22 5359          ROS:     Constitution: Denies chills, fever, and sweats.  HENT: Denies headaches or blurry vision.  Cardiovascular: Denies chest pain or irregular heart beat.  Respiratory: Denies cough or shortness of breath.  Gastrointestinal: Denies abdominal pain, nausea, or vomiting.  Genitourinary:  Denies urinary incontinence, bladder and kidney issues  Musculoskeletal:  Denies muscle cramps.  Neurological: Denies dizziness or focal weakness.  Psychiatric/Behavioral: Normal mental status.  Hematologic/Lymphatic: Denies bleeding problem or easy bruising/bleeding.  Skin: Denies rash or suspicious lesions.     Physical examination     Gen - No acute distress, well nourished, well groomed   Eyes - Extraoccular motions intact, pupils equally round and reactive to light and accommodation   ENT - normocephalic, atruamtic, oropharynx clear   Neck - Supple, no abnormal masses   Cardiovascular - regular rate and rhythm   Pulmonary - clear to auscultation bilaterally, no wheezes, ronchi, or rales   Abdomen - soft, non-tender, non-distended, positive bowel sounds   Psych - The patient is alert and oriented x3 with normal mood and affect     Examination of the Left Lower Extremity     Skin is intact throughout  Motor in intact EHL,FHL,TA,naomi  +2 DP/PT  Sensation LT intact D/P/1st     Examination of the Left Hip     C-Sign positive  Logroll negative  Stenchfield positive     Pain with ROM positive     ROM:     Flexion                        120  Extension                    30  Abduction                    45  Adduction                    20  External Rotation        45  Internal Rotation         35     Flexion contracture negative     FADIR positive  FADER negative     Tenderness to palpation over lateral and posterolateral greater tochanter negative     MRI images were examined and personally interpreted by me.  MRI arthrogram of the left hip dated 05/04/2022 shows a tear in the anterior superior acetabular labrum of the left  hip     Dx:  Left hip acetabular labral tear.     Plan:  We discussed the potential morbidity to the left lower extremity with both operative and continue non operative treatment.  Recommendations for left hip arthroscopy with labral repair.  Risks and benefits of the procedure were explained to the patient.  She verbalized understanding and does wish to proceed.

## 2022-06-22 NOTE — ANESTHESIA POSTPROCEDURE EVALUATION
Anesthesia Post Evaluation    Patient: Subha Bach    Procedure(s) Performed: Procedure(s) (LRB):  ARTHROSCOPY, HIP, WITH LABRUM DEBRIDEMENT (Left)    Final Anesthesia Type: general      Patient location during evaluation: PACU  Patient participation: Yes- Able to Participate  Level of consciousness: sedated and awake  Post-procedure vital signs: reviewed and stable  Pain management: adequate  Airway patency: patent    PONV status at discharge: No PONV  Anesthetic complications: no      Cardiovascular status: blood pressure returned to baseline  Respiratory status: spontaneous ventilation  Hydration status: euvolemic  Follow-up not needed.          Vitals Value Taken Time   /70 06/22/22 1359   Temp 36.2 °C (97.2 °F) 06/22/22 1330   Pulse 88 06/22/22 1400   Resp 17 06/22/22 1345   SpO2 100 % 06/22/22 1400   Vitals shown include unvalidated device data.      No case tracking events are documented in the log.      Pain/Deonte Score: Pain Rating Prior to Med Admin: 6 (6/22/2022  1:45 PM)  Deonte Score: 8 (6/22/2022  1:50 PM)

## 2022-06-22 NOTE — TRANSFER OF CARE
"Anesthesia Transfer of Care Note    Patient: Subha Bach    Procedure(s) Performed: Procedure(s) (LRB):  ARTHROSCOPY, HIP, WITH LABRUM DEBRIDEMENT (Left)    Patient location: PACU    Anesthesia Type: general    Transport from OR: Transported from OR on 2-3 L/min O2 by NC with adequate spontaneous ventilation    Post pain: adequate analgesia    Post assessment: no apparent anesthetic complications and tolerated procedure well    Post vital signs: stable    Level of consciousness: awake, alert and oriented    Nausea/Vomiting: no nausea/vomiting    Complications: none    Transfer of care protocol was followed      Last vitals:   Visit Vitals  /82 (BP Location: Left arm, Patient Position: Lying)   Pulse 99   Temp 37.1 °C (98.8 °F) (Skin)   Resp 20   Ht 5' 6" (1.676 m)   LMP 10/13/2020   SpO2 97%   Breastfeeding No   BMI 31.08 kg/m²     "

## 2022-06-22 NOTE — ANESTHESIA PROCEDURE NOTES
Peripheral Block    Patient location during procedure: pre-op   Block not for primary anesthetic.  Reason for block: at surgeon's request and post-op pain management   Post-op Pain Location: left hip  Start time: 6/22/2022 11:06 AM  Timeout: 6/22/2022 11:06 AM   End time: 6/22/2022 11:16 AM    Staffing  Authorizing Provider: Nii Astudillo MD  Performing Provider: Nii Astudillo MD    Staffing  Other anesthesia staff: Nii Astudillo MD  Preanesthetic Checklist  Completed: patient identified, IV checked, site marked, risks and benefits discussed, surgical consent, monitors and equipment checked, pre-op evaluation and timeout performed  Peripheral Block  Patient position: supine  Prep: ChloraPrep  Patient monitoring: heart rate, continuous pulse ox and cardiac monitor  Block type: PENG  Laterality: left  Injection technique: single shot  Needle  Needle type: Stimuplex   Needle gauge: 21 G  Needle length: 4 in  Needle localization: ultrasound guidance   -ultrasound image captured on disc.  Assessment  Injection assessment: negative aspiration and negative parasthesia  Paresthesia pain: none  Heart rate change: no  Slow fractionated injection: yes  Pain Tolerance: comfortable throughout block and no complaints

## 2022-06-22 NOTE — PT/OT/SLP EVAL
Physical Therapy Evaluation and Discharge Note    Patient Name:  Subha Bach   MRN:  [id number]    Recommendations:     Discharge Recommendations:  outpatient PT (once cleared by orthopedic surgeon)   Discharge Equipment Recommendations: crutches, axillary   Barriers to discharge: None    Assessment:     Subha Bach is a 36 y.o. female admitted with a medical diagnosis of Degenerative tear of acetabular labrum of left hip. Patient is agreeable to participation with PT evaluation for crutch training. She lives alone, but her mother will be staying with her upon D/C. She is independent at baseline and works from home. She already purchased a toilet riser and shower chair. RN obtained axillary crutches for patient. She requires SBA for supine to sit and CGA for sit to stand with axillary crutches and VC for sequencing. She ambulated 100' with axillary crutches, LLE TTWB, and CGA. She reports some nausea and returned to bed with all questions answered and needs met.     Recent Surgery: Procedure(s) (LRB):  ARTHROSCOPY, HIP, WITH LABRUM DEBRIDEMENT (Left) Day of Surgery    Plan:     During this hospitalization, patient does not require further acute PT services.  Please re-consult if situation changes.      Subjective     Chief Complaint: nausea  Patient/Family Comments/goals: home  Pain/Comfort:  · Pain Rating 1:  (not rated)  · Location - Side 1: Left  · Location 1: hip  · Pain Addressed 1: Reposition, Distraction, Cessation of Activity    Patients cultural, spiritual, Holiness conflicts given the current situation:      Living Environment:  Patient lives alone in a [facility] with ramp to enter. Mother will be staying with her upon D/C.  Prior to admission, patients level of function was independent. She denies any recent falls or PT. She works from home.  Equipment used at home: none.  DME owned (not currently used): shower chair and toilet riser.  Upon discharge, patient will have assistance from mother and  OPPT.    Objective:     Communicated with [facility] u[facility]rt prior to session.  Patient found HOB elevated with blood pressure cuff, pulse ox (continuous) upon PT entry to room.    General Precautions: Standard, fall   Orthopedic Precautions:LLE toe touch weight bearing   Braces: N/A   Respiratory Status: Room air    Exams:  · RLE Strength: WFL  · LLE Strength: 2-/5    Functional Mobility:  · Bed Mobility:     · Supine to Sit: stand by assistance  · Sit to Supine: moderate assistance to lift LLE into bed   · Transfers:     · Sit to Stand:  contact guard assistance with axillary crutches  · Gait: 100' with axillary crutches, LLE TTWB, and CGA    AM-PAC 6 CLICK MOBILITY  Total Score:19       Therapeutic Activities and Exercises:   Patient was educated on the importance of OOB activity and functional mobility to negate negative effects of prolonged bed rest during hospitalization, safe transfers and ambulation, axillary crutch use, OPPT once cleared by ortho, and D/C planning     AM-PAC 6 CLICK MOBILITY  Total Score:19     Patient left HOB elevated with RN notified and mother present.    GOALS:   Multidisciplinary Problems     Physical Therapy Goals     Not on file                History:     Past Medical History:   Diagnosis Date    Asthma     Encounter for blood transfusion     Gestational diabetes     gestational diabetes    Migraine headache        Past Surgical History:   Procedure Laterality Date    LAPAROSCOPIC SALPINGECTOMY Bilateral 10/19/2020    Procedure: SALPINGECTOMY, LAPAROSCOPIC;  Surgeon: Yonas Walsh MD;  Location: [facility] OR;  Service: OB/GYN;  Laterality: Bilateral;  Possible Overnight  Claudia Cabrera MD to assist    LAPAROSCOPIC TOTAL HYSTERECTOMY N/A 10/19/2020    Procedure: HYSTERECTOMY, TOTAL, LAPAROSCOPIC;  Surgeon: Yonas Walsh MD;  Location: [facility] OR;  Service: OB/GYN;  Laterality: N/A;    REMOVAL OF IMPLANT Left 10/19/2020    Procedure: REMOVAL, IMPLANT;  Surgeon: Yonas Walsh MD;   Location: [facility];  Service: OB/GYN;  Laterality: Left;  Nextplan birthcontrol implant removal    SHOULDER SURGERY         Time Tracking:     PT Received On: 06/22/22  PT Start Time: 1435     PT Stop Time: 1454  PT Total Time (min): 19 min     Billable Minutes: Evaluation 9 and Gait Training 10      06/22/2022

## 2022-06-22 NOTE — OR NURSING
Received ok per physical therapy to discharge the patient. All valuables returned, IV removed, and handouts provided. Crutches for discharge at bedside, and all stated readiness for discharge home.

## 2022-06-22 NOTE — ANESTHESIA PREPROCEDURE EVALUATION
06/22/2022  Subha Bach is a 36 y.o., female.    Pre-op Assessment    I have reviewed the Patient Summary Reports.    I have reviewed the Nursing Notes. I have reviewed the NPO Status.   I have reviewed the Medications.     Review of Systems  Anesthesia Hx:  Hx of Anesthetic complications PONV   Social:  Non-Smoker    Cardiovascular:   Exercise tolerance: good Denies MI.  Denies CAD.    Denies CABG/stent.  CHICAS ECG has been reviewed.    Pulmonary:   Asthma Shortness of breath    Renal/:  Renal/ Normal     Hepatic/GI:   GERD, well controlled    Neurological:   Denies CVA. Neuromuscular Disease,  Headaches Denies Seizures. Fibromyalgai  Chronic Pain Syndrome   Endocrine:  Endocrine Normal        Physical Exam  General:  Well nourished      Airway/Jaw/Neck:  Airway Findings: Mouth Opening: Normal   Tongue: Normal   General Airway Assessment: Adult Mallampati: II  Improves to I with phonation.  TM Distance: Normal, at least 6 cm   Neck ROM: Normal ROM       Dental:  Intact     Chest/Lungs:  Chest/Lungs Clear    Heart/Vascular:  Heart Findings: Normal            Anesthesia Plan  Type of Anesthesia, risks & benefits discussed:  Anesthesia Type:  general    Patient's Preference:   Plan Factors:          Intra-op Monitoring Plan: standard ASA monitors and Standard ASA Monitors  Intra-op Monitoring Plan Comments:   Post Op Pain Control Plan: multimodal analgesia, IV/PO Opioids PRN and peripheral nerve block  Post Op Pain Control Plan Comments:     Induction:   IV  Beta Blocker:  Patient is not currently on a Beta-Blocker (No further documentation required).       Informed Consent: Informed consent signed with the Patient and all parties understand the risks and agree with anesthesia plan.  All questions answered.  Anesthesia consent signed with patient.  ASA Score: 2     Day of Surgery Review of History &  Physical:        Anesthesia Plan Notes: I have personally evaluated the patient and discussed risk/benefits/alternatives of general anesthesia.        Ready For Surgery From Anesthesia Perspective.           Physical Exam  General: Well nourished    Airway:  Mallampati: II / I  Mouth Opening: Normal  TM Distance: Normal, at least 6 cm  Tongue: Normal  Neck ROM: Normal ROM    Dental:  Intact          Anesthesia Plan  Type of Anesthesia, risks & benefits discussed:    Anesthesia Type: general  Intra-op Monitoring Plan: standard ASA monitors and Standard ASA Monitors  Post Op Pain Control Plan: multimodal analgesia, IV/PO Opioids PRN and peripheral nerve block  Induction:  IV  Informed Consent: Informed consent signed with the Patient and all parties understand the risks and agree with anesthesia plan.  All questions answered.   ASA Score: 2  Anesthesia Plan Notes: I have personally evaluated the patient and discussed risk/benefits/alternatives of general anesthesia.    Ready For Surgery From Anesthesia Perspective.       .

## 2022-06-22 NOTE — DISCHARGE SUMMARY
[facility]  Department of Orthopedic Surgery  Discharge Note  Short Stay    Admit Date: 6/22/2022    Discharge Date and Time: No discharge date for patient encounter.     Attending Physician: Joselo Henderson II, MD     Discharge Provider: Joselo Henderson II    Diagnoses:  Active Hospital Problems    Diagnosis  POA    *Degenerative tear of acetabular labrum of left hip [M24.152]  Yes      Resolved Hospital Problems   No resolved problems to display.       Discharged Condition: good    Hospital Course: Patient was admitted for an outpatient procedure and tolerated the procedure well with no complications.    Final Diagnoses: Same as principal problem.    Disposition: Home or Self Care    Follow up/Patient Instructions:    Medications:  Reconciled Home Medications:      Medication List      CONTINUE taking these medications    albuterol 90 mcg/actuation inhaler  Commonly known as: PROVENTIL/VENTOLIN HFA  Inhale 2 puffs into the lungs every 4 (four) hours as needed.     azelastine 137 mcg (0.1 %) nasal spray  Commonly known as: ASTELIN  1 spray (137 mcg total) by Nasal route 2 (two) times daily.     eletriptan 40 MG tablet  Commonly known as: RELPAX  Take 40 mg by mouth as needed. may repeat in 2 hours if necessary     EMGALITY  mg/mL Pnij  Generic drug: galcanezumab-gnlm  Inject 120 mg into the skin every 28 days.     fexofenadine 180 MG tablet  Commonly known as: ALLEGRA  Take 1 tablet (180 mg total) by mouth once daily.     gabapentin 600 MG tablet  Commonly known as: NEURONTIN  Take 600 mg by mouth 2 (two) times daily.     HYDROcodone-acetaminophen 7.5-325 mg per tablet  Commonly known as: NORCO  Take 1 tablet by mouth every 6 (six) hours as needed for Pain.     tiZANidine 4 MG tablet  Commonly known as: ZANAFLEX  Take 4 mg by mouth every evening. May take up to 3 tablets     venlafaxine 150 MG Cp24  Commonly known as: EFFEXOR-XR  Take 1 capsule (150 mg total) by mouth once daily.       "    Discharge Procedure Orders   CRUTCHES FOR HOME USE     Order Specific Question Answer Comments   Type: Axillary    Height: 5' 6" (1.676 m)    Weight: 192 lbs    Length of need (1-99 months): 99      Diet Adult Regular     Change dressing (specify)   Order Comments: Dressing change: One time per day beginning 72 hours post op.     Ice to affected area     Notify your health care provider if you experience any of the following:  increased confusion or weakness     Notify your health care provider if you experience any of the following:  persistent dizziness, light-headedness, or visual disturbances     Notify your health care provider if you experience any of the following:  worsening rash     Notify your health care provider if you experience any of the following:  severe persistent headache     Notify your health care provider if you experience any of the following:  difficulty breathing or increased cough     Notify your health care provider if you experience any of the following:  redness, tenderness, or signs of infection (pain, swelling, redness, odor or green/yellow discharge around incision site)     Notify your health care provider if you experience any of the following:  severe uncontrolled pain     Notify your health care provider if you experience any of the following:  persistent nausea and vomiting or diarrhea     Notify your health care provider if you experience any of the following:  temperature >100.4     Activity as tolerated     Weight bearing restrictions (specify):   Order Comments: TTWB LLE      Follow-up Information     Joselo BRUCE Henderson II, MD Follow up in 1 week(s).    Specialty: Orthopedic Surgery  Contact information:  [address] [place] [address] DR Marlys JUAREZ [address]  [phone]                         Discharge Procedure Orders (must include Diet, Follow-up, Activity):   Discharge Procedure Orders (must include Diet, Follow-up, Activity)   CRUTCHES FOR HOME USE     Order Specific Question " "Answer Comments   Type: Axillary    Height: 5' 6" (1.676 m)    Weight: 192 lbs    Length of need (1-99 months): 99      Diet Adult Regular     Change dressing (specify)   Order Comments: Dressing change: One time per day beginning 72 hours post op.     Ice to affected area     Notify your health care provider if you experience any of the following:  increased confusion or weakness     Notify your health care provider if you experience any of the following:  persistent dizziness, light-headedness, or visual disturbances     Notify your health care provider if you experience any of the following:  worsening rash     Notify your health care provider if you experience any of the following:  severe persistent headache     Notify your health care provider if you experience any of the following:  difficulty breathing or increased cough     Notify your health care provider if you experience any of the following:  redness, tenderness, or signs of infection (pain, swelling, redness, odor or green/yellow discharge around incision site)     Notify your health care provider if you experience any of the following:  severe uncontrolled pain     Notify your health care provider if you experience any of the following:  persistent nausea and vomiting or diarrhea     Notify your health care provider if you experience any of the following:  temperature >100.4     Activity as tolerated     Weight bearing restrictions (specify):   Order Comments: ADALGISA MORGAN      "

## 2022-06-22 NOTE — OP NOTE
[facility]  Orthopedic Surgery Department  Operative Note    SUMMARY     Date of Procedure: 6/22/2022     Procedure: Procedure(s) (LRB):  ARTHROSCOPY, HIP, WITH LABRUM DEBRIDEMENT (Left)     Surgeon(s) and Role:     * Joselo Henderson II, MD - Primary    Assisting Surgeon: None    First Assist:  PONCE Jones    Pre-Operative Diagnosis: Degenerative tear of acetabular labrum of left hip [M24.152]  Preop testing [Z01.818]    Post-Operative Diagnosis: Post-Op Diagnosis Codes:     * Degenerative tear of acetabular labrum of left hip [M24.152]     * Preop testing [Z01.818]    Anesthesia: General    Technical Procedures Used:  Left hip arthroscopy with labral debridement    Description of the Findings of the Procedure:  Dictated    Significant Surgical Tasks Conducted by the Assistant(s), if Applicable:  Positioning and prepping the patient, assistance with instrumentation during arthroscopy, portal site closure and bandage application.    Complications: No    Estimated Blood Loss (EBL): * No values recorded between 6/22/2022 12:43 PM and 6/22/2022  1:20 PM *           Implants: * No implants in log *    Specimens:   Specimen (24h ago, onward)            None                  Condition: Good    Disposition: PACU - hemodynamically stable.    Attestation: I was present for the entire procedure.    Procedure In Detail:  The patient is brought to the operating room and placed on the table in the supine position.  The patient underwent anesthesia with the anesthesia service.  A well-padded perineal post was placed and the patient was pulled down on the post and both feet were placed in traction boots on the [place] table.  Under direct fluoroscopic visualization traction was applied to the operative hip to assure that with distract.  Once we assured distraction, the traction was removed.  The left hip was then prepped and draped in the normal sterile fashion and traction was reapplied.  Under  fluoroscopic visualization the anterior lateral portal was created and the scope was introduced into the hip.  The femoral head, acetabulum, labrum, and anterior capsule were all readily visualized.  Under direct arthroscopic visualization the anterior portal was created.  A small capsulotomy was performed to facilitate instrumentation and visualization.  The labrum was inspected and there was noted to be a tear at the anterior superior edge of the labrum.  The labrum was not torn off of the rim of the acetabulum however.  A shaver was brought in and the labral tear was debrided to a stable rim.  Cartilage on the femoral head and acetabulum were noted be normal.  After completing the debridement of labrum the scope was removed from the hip and the traction was released.  The portal sites closed with 3-0 nylon.  A sterile intraoperative dressing was applied.  The patient was then awakened from anesthesia and taken recovery where she was noted to be stable postoperatively.  Needle and lap counts were correct at the end of the case.

## 2022-06-30 ENCOUNTER — PATIENT MESSAGE (OUTPATIENT)
Dept: ORTHOPEDICS | Facility: CLINIC | Age: 37
End: 2022-06-30

## 2022-06-30 ENCOUNTER — OFFICE VISIT (OUTPATIENT)
Dept: ORTHOPEDICS | Facility: CLINIC | Age: 37
End: 2022-06-30
Payer: COMMERCIAL

## 2022-06-30 VITALS — BODY MASS INDEX: 30.86 KG/M2 | WEIGHT: 192 LBS | HEIGHT: 66 IN | RESPIRATION RATE: 18 BRPM

## 2022-06-30 DIAGNOSIS — M24.152 DEGENERATIVE TEAR OF ACETABULAR LABRUM OF LEFT HIP: Primary | ICD-10-CM

## 2022-06-30 PROCEDURE — 1159F PR MEDICATION LIST DOCUMENTED IN MEDICAL RECORD: ICD-10-PCS | Mod: CPTII,S$GLB,, | Performed by: ORTHOPAEDIC SURGERY

## 2022-06-30 PROCEDURE — 99024 PR POST-OP FOLLOW-UP VISIT: ICD-10-PCS | Mod: S$GLB,,, | Performed by: ORTHOPAEDIC SURGERY

## 2022-06-30 PROCEDURE — 99999 PR PBB SHADOW E&M-EST. PATIENT-LVL III: CPT | Mod: PBBFAC,,, | Performed by: ORTHOPAEDIC SURGERY

## 2022-06-30 PROCEDURE — 99999 PR PBB SHADOW E&M-EST. PATIENT-LVL III: ICD-10-PCS | Mod: PBBFAC,,, | Performed by: ORTHOPAEDIC SURGERY

## 2022-06-30 PROCEDURE — 3008F PR BODY MASS INDEX (BMI) DOCUMENTED: ICD-10-PCS | Mod: CPTII,S$GLB,, | Performed by: ORTHOPAEDIC SURGERY

## 2022-06-30 PROCEDURE — 99024 POSTOP FOLLOW-UP VISIT: CPT | Mod: S$GLB,,, | Performed by: ORTHOPAEDIC SURGERY

## 2022-06-30 PROCEDURE — 3008F BODY MASS INDEX DOCD: CPT | Mod: CPTII,S$GLB,, | Performed by: ORTHOPAEDIC SURGERY

## 2022-06-30 PROCEDURE — 1159F MED LIST DOCD IN RCRD: CPT | Mod: CPTII,S$GLB,, | Performed by: ORTHOPAEDIC SURGERY

## 2022-06-30 PROCEDURE — 1160F RVW MEDS BY RX/DR IN RCRD: CPT | Mod: CPTII,S$GLB,, | Performed by: ORTHOPAEDIC SURGERY

## 2022-06-30 PROCEDURE — 1160F PR REVIEW ALL MEDS BY PRESCRIBER/CLIN PHARMACIST DOCUMENTED: ICD-10-PCS | Mod: CPTII,S$GLB,, | Performed by: ORTHOPAEDIC SURGERY

## 2022-06-30 RX ORDER — HYDROCODONE BITARTRATE AND ACETAMINOPHEN 7.5; 325 MG/1; MG/1
1 TABLET ORAL EVERY 6 HOURS PRN
Qty: 28 TABLET | Refills: 0 | Status: SHIPPED | OUTPATIENT
Start: 2022-06-30 | End: 2022-11-04

## 2022-06-30 NOTE — PROGRESS NOTES
CC:  36-year-old female follows up status post left hip arthroscopy with labral debridement.  Date of surgery is 06/22/2022.  She is about 1 week postop.    LLE:  Portal sites are clean, dry, and intact.  Healing well with no sign of infection  Grossly intact motor sensory function distally  Plus 2 distal pulses    Dx:  Sutures removed and Steri-Strips applied    Plan:  Start physical therapy  Being weight-bearing as tolerated  Follow-up in 4 weeks for re-evaluation.

## 2022-07-05 ENCOUNTER — CLINICAL SUPPORT (OUTPATIENT)
Dept: REHABILITATION | Facility: HOSPITAL | Age: 37
End: 2022-07-05
Attending: ORTHOPAEDIC SURGERY
Payer: COMMERCIAL

## 2022-07-05 DIAGNOSIS — M25.652 DECREASED RANGE OF LEFT HIP MOVEMENT: ICD-10-CM

## 2022-07-05 DIAGNOSIS — R29.898 WEAKNESS OF BOTH HIPS: ICD-10-CM

## 2022-07-05 DIAGNOSIS — Z74.09 IMPAIRED FUNCTIONAL MOBILITY, BALANCE, GAIT, AND ENDURANCE: ICD-10-CM

## 2022-07-05 DIAGNOSIS — M24.152 DEGENERATIVE TEAR OF ACETABULAR LABRUM OF LEFT HIP: ICD-10-CM

## 2022-07-05 PROCEDURE — 97161 PT EVAL LOW COMPLEX 20 MIN: CPT | Mod: PN

## 2022-07-05 PROCEDURE — 97110 THERAPEUTIC EXERCISES: CPT | Mod: PN

## 2022-07-07 PROBLEM — R29.898 WEAKNESS OF BOTH HIPS: Status: ACTIVE | Noted: 2022-07-07

## 2022-07-07 PROBLEM — M25.652 DECREASED RANGE OF LEFT HIP MOVEMENT: Status: ACTIVE | Noted: 2022-07-07

## 2022-07-07 PROBLEM — Z74.09 IMPAIRED FUNCTIONAL MOBILITY, BALANCE, GAIT, AND ENDURANCE: Status: ACTIVE | Noted: 2022-07-07

## 2022-07-07 NOTE — PLAN OF CARE
[facility]   Physical Therapy Initial Evaluation     Date: 7/5/2022   Name: Subha Bach  Clinic Number: [id number]    Therapy Diagnosis:   Encounter Diagnoses   Name Primary?    Degenerative tear of acetabular labrum of left hip     Decreased range of left hip movement     Weakness of both hips     Impaired functional mobility, balance, gait, and endurance      Physician: Joselo Henderson II, MD    Physician Orders: PT Eval and Treat   Medical Diagnosis from Referral: M24.152 (ICD-10-CM) - Degenerative tear of acetabular labrum of left hip  Evaluation Date: 7/5/2022  Authorization Period Expiration: 12/31/2022  Plan of Care Expiration: 10/05/2022  Progress Note Due: 08/05/2022  Visit # / Visits authorized: 1/ 1   FOTO: 1/ 3     Date of Surgery: 06/22/2022  Return to MD date:     Precautions: Standard, weight bearing as tolerated     Time In: 1303  Time Out: 1333  Total Appointment Time (timed & untimed codes): 30 minutes      SUBJECTIVE   Date of onset: 06/22/2022    History of current condition - Subha reports: she has been struggling with pain in her Left hip for probably about 6 years. Her pain gradually worsened, leading to her significantly limiting her activity. Her pain was affecting her quality of life which led her to her having a hip arthroscopy. Prior to 6 years ago she was very active competing in sprint triathlons and 2 hour workouts. She ended up stopping all exercise. She has been doing well since surgery, however she thinks she may have overdone it a little this weekend and she is sore today.    Falls: no    Imaging, MRI studies:      Impression:     Anterior superior labral tear       Prior Therapy: yes, not post operatively  Social History:  lives with their daughter  Occupation: Procurement    Prior Level of Function: Very active prior to hip pain which has been bothering her for around 6 years   Current Level of Function: 1 week 6 days      Pain:  Current 7/10, worst 7/10, best 2/10   Location: left glute    Description: Aching  Aggravating Factors: prolonged weight bearing  Easing Factors: pain medication and rest    Patients goals: Return to prior level of function, would like to return to working out and competing in triathlons if possible.      Medical History:   Past Medical History:   Diagnosis Date    Asthma     Encounter for blood transfusion     Gestational diabetes     gestational diabetes    Migraine headache    Pt reports diagnosis of fibromyalgia    Surgical History:   Subha Bach  has a past surgical history that includes Shoulder surgery; Laparoscopic total hysterectomy (N/A, 10/19/2020); Laparoscopic salpingectomy (Bilateral, 10/19/2020); Removal of implant (Left, 10/19/2020); and arthroscopy, hip (Left, 6/22/2022).    Medications:   Subha has a current medication list which includes the following prescription(s): albuterol, azelastine, eletriptan, fexofenadine, gabapentin, emgality pen, hydrocodone-acetaminophen, tizanidine, and venlafaxine, and the following Facility-Administered Medications: electrolyte-s (ph 7.4), hydromorphone (pf), lactated ringers, lidocaine (pf) 10 mg/ml (1%), metoclopramide hcl, oxycodone, sodium chloride 0.9%, and sodium chloride 0.9%.    Allergies:   Review of patient's allergies indicates:   Allergen Reactions    Nubain [nalbuphine] Hives          OBJECTIVE     Observation:     Hip Range of Motion:   Right Passive Left Passive   Flexion 120 90   Extension 10 10   Ext. Rotation 45 45   Int. Rotation 35 15       Lower Extremity Strength   Right LE  Left LE    Quadriceps: 5/5 Quadriceps: 5/5   Hamstrings: 5/5 Hamstrings: 5/5   Iliopsoas: 4/5 Iliopsoas: 4-/5   Hip extension:  4/5 Hip extension: 4/5   PGM: 3-/5 PGM: 3-/5   Hip ER: 4-/5 Hip ER: 4-/5   Hip IR: 5/5 Hip IR: 4/5     Functional Tests:  30s chair stand: 9 reps     Special Tests:   NT secondary to surgery    Joint Mobility: limited inferior  glide on Left     Palpation: no TTP         CMS Impairment/Limitation/Restriction for FOTO Hip Survey  Status Limitation G-Code CMS Severity Modifier  Intake 56% 44% Current Status CK - At least 40 percent but less than 60 percent  Predicted 81% 19% Goal Status+ CI - At least 1 percent but less than 20 percent         TREATMENT     Total Treatment time (time-based codes) separate from Evaluation: 10 minutes      Subha received the treatments listed below:      therapeutic exercises to develop strength and endurance for 10 minutes including:  PT educated pt on condition, prognosis, and plan of care.     PT educated pt on and provided pt HEP consisting of:     Bridges x10   Clamshells x10 each side   Sidelying Hip External rotation x10       PATIENT EDUCATION AND HOME EXERCISES     Education provided:   - Home Exercise Program to be performed twice daily     Written Home Exercises Provided: yes. Exercises were reviewed and Subha was able to demonstrate them prior to the end of the session.  Subha demonstrated good  understanding of the education provided. See EMR under Patient Instructions for exercises provided during therapy sessions.    ASSESSMENT     Subha is a 36 y.o. female referred to outpatient Physical Therapy with a medical diagnosis of degenerate acetabular labral tear of Left hip. Patient presents with 2 weeks status post Left hip arthroscopy and demonstrates impaired gait, mobility, balance, decreased Left hip range, and bilateral hip weakness. Pt's symptoms and deficits are limiting her ability to perform activities of daily living, household chores, lifting/carrying activities and recreation/leisure activities.    Patient prognosis is Good.   Patientt will benefit from skilled outpatient Physical Therapy to address the deficits stated above and in the chart below, provide patient /family education, and to maximize patientt's level of independence.     Plan of care discussed with patient: Yes  Patient's  spiritual, cultural and educational needs considered and patient is agreeable to the plan of care and goals as stated below:     Anticipated Barriers for therapy: dx of fibromyalgia     Medical Necessity is demonstrated by the following  History  Co-morbidities and personal factors that may impact the plan of care Co-morbidities:   Migraines, asthma, fibromyalgia    Personal Factors:   no deficits     moderate   Examination  Body Structures and Functions, activity limitations and participation restrictions that may impact the plan of care Body Regions:   lower extremities    Body Systems:    ROM  strength  balance  gait  motor control    Participation Restrictions:   activities of daily living, household chores, lifting/carrying activities and recreation/leisure activities    Activity limitations:   Learning and applying knowledge  no deficits    General Tasks and Commands  no deficits    Communication  no deficits    Mobility  lifting and carrying objects  walking    Self care  toileting  dressing    Domestic Life  shopping  cooking  doing house work (cleaning house, washing dishes, laundry)    Interactions/Relationships  no deficits    Life Areas  no deficits    Community and Social Life  community life  recreation and leisure         moderate   Clinical Presentation stable and uncomplicated low   Decision Making/ Complexity Score: low     Goals:  Short Term Goals: 4 weeks   1. Pt will be IND with initial HEP to manage symptoms outside of PT.   2. Pt will report Left hip pain </= 4/10 at rest to demonstrate improved condition.   3. Pt will improve MMT of Bilateral hips to >/= 4/5 to improve tolerance for household chores.   4. Pt will improve Left hip ROM deficits by >= 10 degrees to improve tolerance for squatting activities.     Long Term Goals: 12 weeks   1. Pt will improve FOTO score to </= 19% limited to demonstrate improved functional mobility.  2. Pt will be IND with final HEP to maintain/improve strength  and mobility gained in PT.   3. Pt will report Left hip pain </= 0/10 with hopping to demonstrate improved condition.   4. Pt will improve MMT of Bilateral hips to >/= 4+/ 5 to improve tolerance for lifting/carrying activities.   5. Pt will improve Left hip ROM = to uninvolved side to improve tolerance for recreation/leisure activities.   6. Pt goal: Pt will report ability to return to working out and training for future competitions without increase in Left hip pain.      PLAN   Plan of care Certification: 7/5/2022 to 10/05/2022.    Outpatient Physical Therapy 1-2 times weekly for 12 weeks to include the following interventions: Gait Training, Manual Therapy, Moist Heat/ Ice, Neuromuscular Re-ed, Patient Education, Therapeutic Activities and Therapeutic Exercise.     Dimas Calabrese, PT      I CERTIFY THE NEED FOR THESE SERVICES FURNISHED UNDER THIS PLAN OF TREATMENT AND WHILE UNDER MY CARE   Physician's comments:     Physician's Signature: ___________________________________________________

## 2022-07-08 ENCOUNTER — CLINICAL SUPPORT (OUTPATIENT)
Dept: REHABILITATION | Facility: HOSPITAL | Age: 37
End: 2022-07-08
Payer: COMMERCIAL

## 2022-07-08 DIAGNOSIS — M25.652 DECREASED RANGE OF LEFT HIP MOVEMENT: Primary | ICD-10-CM

## 2022-07-08 DIAGNOSIS — Z74.09 IMPAIRED FUNCTIONAL MOBILITY, BALANCE, GAIT, AND ENDURANCE: ICD-10-CM

## 2022-07-08 DIAGNOSIS — R29.898 WEAKNESS OF BOTH HIPS: ICD-10-CM

## 2022-07-08 PROCEDURE — 97110 THERAPEUTIC EXERCISES: CPT | Mod: PN,CQ

## 2022-07-08 PROCEDURE — 97140 MANUAL THERAPY 1/> REGIONS: CPT | Mod: PN,CQ

## 2022-07-08 PROCEDURE — 97112 NEUROMUSCULAR REEDUCATION: CPT | Mod: PN,CQ

## 2022-07-08 PROCEDURE — 97530 THERAPEUTIC ACTIVITIES: CPT | Mod: PN,CQ

## 2022-07-08 NOTE — PROGRESS NOTES
"[facility]   Physical Therapy Treatment Note     Name: Subha Bach  Clinic Number: [id number]    Therapy Diagnosis:   Encounter Diagnoses   Name Primary?    Decreased range of left hip movement Yes    Weakness of both hips     Impaired functional mobility, balance, gait, and endurance      Physician: Joselo Henderson II, MD    Visit Date: 7/8/2022    Physician Orders: PT Eval and Treat   Medical Diagnosis from Referral: M24.152 (ICD-10-CM) - Degenerative tear of acetabular labrum of left hip  Evaluation Date: 7/5/2022  Authorization Period Expiration: 12/31/2022  Plan of Care Expiration: 10/05/2022  Progress Note Due: 08/05/2022  Visit # / Visits authorized: 1/ 20   FOTO: 1/ 3      Date of Surgery: 06/22/2022  Return to MD date:      Precautions: Standard, weight bearing as tolerated     PTA Visit #: 1/5     Time In: 0900  Time Out: 1010  Total Billable Time: 66 minutes    SUBJECTIVE     Pt reports: Reports the Home Exercise Program "they hurt like hell." But denied issues performing them. Reports the clams and external rotation caused pain. Reports she has had pain the past couple of days averaging about an 8 or a 9/10. She reports that as the day went on her pain increased. She also noticed her fibro was acting up for the first time yesterday.     She was compliant with home exercise program.  Response to previous treatment: positive  Functional change: non-remarkable    Pain: 2/10  Location: left hip      OBJECTIVE     Objective Measures updated at progress report unless specified.     Treatment     Subha received the treatments listed below:      therapeutic exercises to develop strength, endurance, ROM, flexibility, posture and core stabilization for 10 minutes including:    Sidelying hip abduction 3 x 10 repetitions  Supine straight leg raises 3 x 10 repetitions     manual therapy techniques: Joint mobilizations were applied to the: left hip for 15 minutes, including:    Long " axis distraction grade I- II   Prone anterior hip mobs grade I- II   Lateral hip mob grade I- II        neuromuscular re-education activities to improve: Balance, Coordination, Kinesthetic, Sense and Proprioception for 26 minutes. The following activities were included:    Bridges with glute set 3 x 10 repetitions   Sidelying hip external rotation 2 x 15 repetitions   Sidelying clams with red theraband 2 x 15 repetitions   Prone hip extension with glute set 2 x 15 repetitions   Prone hip extension with knee flexion 2 x 15 repetitions     therapeutic activities to improve functional performance for 15  minutes, including:    Hip hinge with dowel x 20 repetitions    Significant cueing to improve execution  Forward step ups x 15 repetitions   Lateral step ups x 15 repetitions bilaterally             Patient Education and Home Exercises     Home Exercises Provided and Patient Education Provided     Education provided:   - Home Exercise Program  -Activity tolerance      Written Home Exercises Provided: yes. Exercises were reviewed and Subha was able to demonstrate them prior to the end of the session.  Subha demonstrated good  understanding of the education provided. See EMR under Patient Instructions for exercises provided during therapy sessions    ASSESSMENT     Patient tolerated treatment well without onset of incident. She requires cueing to improve form and execution for most exercises as expected. Responds well to all cueing with improvement achieved. Manual was performed as indicated above for pain modulation with improvement verbalized post. She will continue to benefit from continued training with proper hip hinge. Physical Therapy will continue to benefit from skilled Physical Therapy to improve left hip strength to allow her to return to all recreational activities without pain or limitations.     Subha Is progressing well towards her goals.   Pt prognosis is Good.     Pt will continue to benefit from skilled  outpatient physical therapy to address the deficits listed in the problem list box on initial evaluation, provide pt/family education and to maximize pt's level of independence in the home and community environment.     Pt's spiritual, cultural and educational needs considered and pt agreeable to plan of care and goals.     Anticipated barriers to physical therapy: dx of fibromyalgia    Goals:     Short Term Goals: 4 weeks In progress 7/8/2022  1. Pt will be IND with initial HEP to manage symptoms outside of PT.   2. Pt will report Left hip pain </= 4/10 at rest to demonstrate improved condition.   3. Pt will improve MMT of Bilateral hips to >/= 4/5 to improve tolerance for household chores.   4. Pt will improve Left hip ROM deficits by >= 10 degrees to improve tolerance for squatting activities.      Long Term Goals: 12 weeks   1. Pt will improve FOTO score to </= 19% limited to demonstrate improved functional mobility.  2. Pt will be IND with final HEP to maintain/improve strength and mobility gained in PT.   3. Pt will report Left hip pain </= 0/10 with hopping to demonstrate improved condition.   4. Pt will improve MMT of Bilateral hips to >/= 4+/ 5 to improve tolerance for lifting/carrying activities.   5. Pt will improve Left hip ROM = to uninvolved side to improve tolerance for recreation/leisure activities.   6. Pt goal: Pt will report ability to return to working out and training for future competitions without increase in Left hip pain.      PLAN     Continued hip hinge training.    Em Garcia, PTA

## 2022-07-11 ENCOUNTER — PATIENT MESSAGE (OUTPATIENT)
Dept: REHABILITATION | Facility: HOSPITAL | Age: 37
End: 2022-07-11
Payer: COMMERCIAL

## 2022-07-11 ENCOUNTER — NURSE TRIAGE (OUTPATIENT)
Dept: ADMINISTRATIVE | Facility: CLINIC | Age: 37
End: 2022-07-11
Payer: COMMERCIAL

## 2022-07-11 ENCOUNTER — PATIENT MESSAGE (OUTPATIENT)
Dept: PULMONOLOGY | Facility: CLINIC | Age: 37
End: 2022-07-11
Payer: COMMERCIAL

## 2022-07-11 ENCOUNTER — PATIENT MESSAGE (OUTPATIENT)
Dept: ORTHOPEDICS | Facility: CLINIC | Age: 37
End: 2022-07-11
Payer: COMMERCIAL

## 2022-07-12 ENCOUNTER — CLINICAL SUPPORT (OUTPATIENT)
Dept: REHABILITATION | Facility: HOSPITAL | Age: 37
End: 2022-07-12
Payer: COMMERCIAL

## 2022-07-12 ENCOUNTER — HOSPITAL ENCOUNTER (OUTPATIENT)
Dept: RADIOLOGY | Facility: HOSPITAL | Age: 37
Discharge: HOME | End: 2022-07-12
Attending: ORTHOPAEDIC SURGERY
Payer: COMMERCIAL

## 2022-07-12 DIAGNOSIS — M25.652 DECREASED RANGE OF LEFT HIP MOVEMENT: Primary | ICD-10-CM

## 2022-07-12 DIAGNOSIS — M24.152 DEGENERATIVE TEAR OF ACETABULAR LABRUM OF LEFT HIP: Primary | ICD-10-CM

## 2022-07-12 DIAGNOSIS — R29.898 WEAKNESS OF BOTH HIPS: ICD-10-CM

## 2022-07-12 DIAGNOSIS — M79.89 LEFT LEG SWELLING: Primary | ICD-10-CM

## 2022-07-12 DIAGNOSIS — Z74.09 IMPAIRED FUNCTIONAL MOBILITY, BALANCE, GAIT, AND ENDURANCE: ICD-10-CM

## 2022-07-12 DIAGNOSIS — M79.89 LEFT LEG SWELLING: ICD-10-CM

## 2022-07-12 PROCEDURE — 93971 EXTREMITY STUDY: CPT | Mod: 26,LT,, | Performed by: RADIOLOGY

## 2022-07-12 PROCEDURE — 97110 THERAPEUTIC EXERCISES: CPT | Mod: PN

## 2022-07-12 PROCEDURE — 93971 EXTREMITY STUDY: CPT | Mod: TC,LT

## 2022-07-12 PROCEDURE — 97140 MANUAL THERAPY 1/> REGIONS: CPT | Mod: PN

## 2022-07-12 PROCEDURE — 93971 US LOWER EXTREMITY VEINS LEFT: ICD-10-PCS | Mod: 26,LT,, | Performed by: RADIOLOGY

## 2022-07-12 PROCEDURE — 97112 NEUROMUSCULAR REEDUCATION: CPT | Mod: PN

## 2022-07-12 RX ORDER — HYDROCODONE BITARTRATE AND ACETAMINOPHEN 7.5; 325 MG/1; MG/1
1 TABLET ORAL EVERY 6 HOURS PRN
Qty: 28 TABLET | Refills: 0 | Status: SHIPPED | OUTPATIENT
Start: 2022-07-12 | End: 2022-07-28 | Stop reason: SDUPTHER

## 2022-07-12 NOTE — TELEPHONE ENCOUNTER
Pt reports 3 weeks post op for a leg surgery, has been one week weight bearing, but tonight below left knee having some mild swelling and tender to palpate, denies feeling any hard knots or poor circulation below the knee. Pt states she has been icing it. Pt advised home care per protocol, and does have an appointment with her therapist tomorrow. Pt informed that a message will be routed to MD office in regard to the swelling. Pt encouraged to call back with any worsening symptoms or questions and verbalized understanding.    Reason for Disposition   Other post-op symptom or question    Additional Information   Negative: Sounds like a life-threatening emergency to the triager   Negative: [1] Widespread rash AND [2] bright red, sunburn-like   Negative: [1] SEVERE headache AND [2] after spinal (epidural) anesthesia   Negative: [1] Vomiting AND [2] persists > 4 hours   Negative: [1] Vomiting AND [2] abdomen looks much more swollen than usual   Negative: [1] Drinking very little AND [2] dehydration suspected (e.g., no urine > 12 hours, very dry mouth, very lightheaded)   Negative: Patient sounds very sick or weak to the triager   Negative: Sounds like a serious complication to the triager   Negative: Fever > 100.4 F (38.0 C)   Negative: [1] SEVERE post-op pain (e.g., excruciating, pain scale 8-10) AND [2] not controlled with pain medications   Negative: [1] Caller has URGENT question AND [2] triager unable to answer question   Negative: [1] Headache AND [2] after spinal (epidural) anesthesia AND [3] not severe   Negative: Fever present > 3 days (72 hours)   Negative: [1] MILD-MODERATE post-op pain (e.g., pain scale 1-7) AND [2] not controlled with pain medications   Negative: [1] Caller has NON-URGENT question AND [2] triager unable to answer question   Negative: SEVERE difficulty breathing (e.g., struggling for each breath, speaks in single words)   Negative: Looks like a broken bone or dislocated  joint (e.g., crooked or deformed)   Negative: Sounds like a life-threatening emergency to the triager   Negative: Difficulty breathing at rest   Negative: Entire foot is cool or blue in comparison to other side   Negative: [1] Can't walk or can barely walk AND [2] new-onset   Negative: [1] Difficulty breathing with exertion (e.g., walking) AND [2] new-onset or worsening   Negative: [1] Red area or streak AND [2] fever   Negative: [1] Swelling is painful to touch AND [2] fever   Negative: [1] Cast on leg or ankle AND [2] now increased pain   Negative: Patient sounds very sick or weak to the triager   Negative: SEVERE leg swelling (e.g., swelling extends above knee, entire leg is swollen, weeping fluid)   Negative: [1] Red area or streak [2] large (> 2 in. or 5 cm)   Negative: [1] Thigh or calf pain AND [2] only 1 side AND [3] present > 1 hour   Negative: [1] Thigh, calf, or ankle swelling AND [2] only 1 side   Negative: [1] Thigh, calf, or ankle swelling AND [2] bilateral AND [3] 1 side is more swollen   Negative: [1] MODERATE leg swelling (e.g., swelling extends up to knees) AND [2] new-onset or worsening   Negative: Swelling of face, arm or hands  (Exception: slight puffiness of fingers occurring during hot weather)   Negative: Looks like a boil, infected sore, deep ulcer or other infected rash (spreading redness, pus)   Negative: [1] MILD swelling of both ankles (i.e., pedal edema) AND [2] new-onset or worsening   Negative: [1] MILD swelling of both ankles (i.e., pedal edema) AND [2] varicose veins   Negative: [1] MILD swelling of both ankles (i.e., pedal edema) AND [2] caused by hot weather   Negative: [1] MILD swelling of both ankles (i.e., pedal edema) AND [2] is a chronic symptom (recurrent or ongoing AND present > 4 weeks)   Negative: [1] Small area of LOCALIZED swelling AND [2] itchy    Protocols used: POST-OP SYMPTOMS AND [id number], LEG SWELLING AND EDEMA-A-AH

## 2022-07-12 NOTE — PROGRESS NOTES
[facility]   Physical Therapy Treatment Note     Name: Subha Bach  [facility] Number: [id number]    Therapy Diagnosis:   Encounter Diagnoses   Name Primary?    Decreased range of left hip movement Yes    Weakness of both hips     Impaired functional mobility, balance, gait, and endurance      Physician: Joselo Henderson II, MD    Visit Date: 7/12/2022    Physician Orders: PT Eval and Treat   Medical Diagnosis from Referral: M24.152 (ICD-10-CM) - Degenerative tear of acetabular labrum of left hip  Evaluation Date: 7/5/2022  Authorization Period Expiration: 12/31/2022  Plan of Care Expiration: 10/05/2022  Progress Note Due: 08/05/2022  Visit # / Visits authorized: 1/ 20   FOTO: 1/ 3      Date of Surgery: 06/22/2022  Return to MD date:      Precautions: Standard, weight bearing as tolerated     PTA Visit #: 1/5     Time In: 1000  Time Out: 1100  Total Billable Time: 55 minutes    SUBJECTIVE     Pt reports: doing well, was concerned due to some medial knee pain and swelling. Dr. Henderson has scheduled her an ultrasound to rule out DVT.     She was compliant with home exercise program.  Response to previous treatment: positive  Functional change: non-remarkable    Pain: 2/10  Location: left hip      OBJECTIVE     Objective Measures updated at progress report unless specified.     Treatment     Subha received the treatments listed below:      therapeutic exercises to develop strength, endurance, ROM, flexibility, posture and core stabilization for 27 minutes including:    Half kneling hip flexion stretch 3x30s each side   Shuttle leg press with 75# 3x12   Single leg press on shuttle with 50# 3x12 each side   Clamshells with Green Theraband 3x12 each side   Bridges with Green Theraband 3x12     Not today:   Sidelying hip abduction 3 x 10 repetitions  Supine straight leg raises 3 x 10 repetitions     manual therapy techniques: Joint mobilizations were applied to the: left hip for 10 minutes,  "including:    Long axis distraction grade I- II   Lateral hip mob grade I- II          neuromuscular re-education activities to improve: Balance, Coordination, Kinesthetic, Sense and Proprioception for 18 minutes. The following activities were included:    Hip hinge with dowel 2x15   Box squat with dowel to 24" box 3x12  Sidelying hip external rotation 2 x 15 repetitions   Step ups on 6" step 3x12 each side     Not today:   Bridges with glute set 3 x 10 repetitions   Sidelying clams with red theraband 2 x 15 repetitions   Prone hip extension with glute set 2 x 15 repetitions   Prone hip extension with knee flexion 2 x 15 repetitions     therapeutic activities to improve functional performance for 0 minutes, including:    Hip hinge with dowel x 20 repetitions    Significant cueing to improve execution  Forward step ups x 15 repetitions   Lateral step ups x 15 repetitions bilaterally             Patient Education and Home Exercises     Home Exercises Provided and Patient Education Provided     Education provided:   - Home Exercise Program  -Activity tolerance      Written Home Exercises Provided: yes. Exercises were reviewed and Subha was able to demonstrate them prior to the end of the session.  Subha demonstrated good  understanding of the education provided. See EMR under Patient Instructions for exercises provided during therapy sessions    ASSESSMENT     Subha tolerated treatment well with continued focus on improving hip mobility, weight bearing tolerance, and lower extremity strength/endurance. She has difficulty performing hip dominant squat movement. Will progress as able.     Subha Is progressing well towards her goals.   Pt prognosis is Good.     Pt will continue to benefit from skilled outpatient physical therapy to address the deficits listed in the problem list box on initial evaluation, provide pt/family education and to maximize pt's level of independence in the home and community environment.     Pt's " spiritual, cultural and educational needs considered and pt agreeable to plan of care and goals.     Anticipated barriers to physical therapy: dx of fibromyalgia    Goals:     Short Term Goals: 4 weeks In progress 7/12/2022  1. Pt will be IND with initial HEP to manage symptoms outside of PT.   2. Pt will report Left hip pain </= 4/10 at rest to demonstrate improved condition.   3. Pt will improve MMT of Bilateral hips to >/= 4/5 to improve tolerance for household chores.   4. Pt will improve Left hip ROM deficits by >= 10 degrees to improve tolerance for squatting activities.      Long Term Goals: 12 weeks   1. Pt will improve FOTO score to </= 19% limited to demonstrate improved functional mobility.  2. Pt will be IND with final HEP to maintain/improve strength and mobility gained in PT.   3. Pt will report Left hip pain </= 0/10 with hopping to demonstrate improved condition.   4. Pt will improve MMT of Bilateral hips to >/= 4+/ 5 to improve tolerance for lifting/carrying activities.   5. Pt will improve Left hip ROM = to uninvolved side to improve tolerance for recreation/leisure activities.   6. Pt goal: Pt will report ability to return to working out and training for future competitions without increase in Left hip pain.      PLAN     Continued hip hinge training.    Dimas Calabrese, PT

## 2022-07-14 ENCOUNTER — CLINICAL SUPPORT (OUTPATIENT)
Dept: REHABILITATION | Facility: HOSPITAL | Age: 37
End: 2022-07-14
Payer: COMMERCIAL

## 2022-07-14 DIAGNOSIS — M25.652 DECREASED RANGE OF LEFT HIP MOVEMENT: Primary | ICD-10-CM

## 2022-07-14 DIAGNOSIS — Z74.09 IMPAIRED FUNCTIONAL MOBILITY, BALANCE, GAIT, AND ENDURANCE: ICD-10-CM

## 2022-07-14 DIAGNOSIS — R29.898 WEAKNESS OF BOTH HIPS: ICD-10-CM

## 2022-07-14 PROCEDURE — 97112 NEUROMUSCULAR REEDUCATION: CPT | Mod: PN

## 2022-07-14 PROCEDURE — 97140 MANUAL THERAPY 1/> REGIONS: CPT | Mod: PN

## 2022-07-14 PROCEDURE — 97110 THERAPEUTIC EXERCISES: CPT | Mod: PN

## 2022-07-14 NOTE — PROGRESS NOTES
[facility]   Physical Therapy Treatment Note     Name: Subha Bach  [facility] Number: [id number]    Therapy Diagnosis:   Encounter Diagnoses   Name Primary?   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 Says she is having some discomfort that doesn't feel like muscular discomfort doing well. She had the ultrasound of her left knee and results were negative. She has been doing her home exercise program but mostly only once a day because she is sometimes in too much discomfort to do it twice.     She was compliant with home exercise program.  Response to previous treatment: positive  Functional change: non-remarkable    Pain: 3/10  Location: left hip      OBJECTIVE     Objective Measures updated at progress report unless specified.     Treatment     Subha received the treatments listed below:    therapeutic exercises to develop strength, endurance, ROM, flexibility, posture and core stabilization for 37 minutes including:    Half kneling hip flexion stretch 3x30s each side   Shuttle leg press with 75# 3x12   Single leg press on shuttle with 50# 3x12 each side   Clamshells with Green Theraband 3x12 each side   Bridges with Green Theraband 3x12     Not today:  "  Sidelying hip abduction 3 x 10 repetitions  Supine straight leg raises 3 x 10 repetitions     manual therapy techniques: Joint mobilizations were applied to the: left hip for 10 minutes, including:  Done today:  Long axis distraction grade I- II   Lateral hip mob grade I- II    neuromuscular re-education activities to improve: Balance, Coordination, Kinesthetic, Sense and Proprioception for 10 minutes. The following activities were included:  Done today:   Hip hinge with dowel 2x15   Box squat with dowel to 24" box 3x12      Not today:   Bridges with glute set 3 x 10 repetitions   Sidelying clams with red theraband 2 x 15 repetitions   Prone hip extension with glute set 2 x 15 repetitions   Prone hip extension with knee flexion 2 x 15 repetitions   Sidelying hip external rotation 2 x 15 repetitions   Step ups on 6" step 3x12 each side       therapeutic activities to improve functional performance for 0 minutes, including:  Not today:  Hip hinge with dowel x 20 repetitions    Significant cueing to improve execution  Forward step ups x 15 repetitions   Lateral step ups x 15 repetitions bilaterally       Patient Education and Home Exercises     Home Exercises Provided and Patient Education Provided     Education provided:   - Home Exercise Program  -Activity tolerance      Written Home Exercises Provided: yes. Exercises were reviewed and Subha was able to demonstrate them prior to the end of the session.  Subha demonstrated good  understanding of the education provided. See EMR under Patient Instructions for exercises provided during therapy sessions    ASSESSMENT     Subha tolerated treatment well with her only complaints being of lower extremity fatigue following exercises. Treatment focused on improving hip mobility and lower extremity strengthening with an emphasis on glute strengthening. Will continue to progress patient's range of motion, flexibility, and strength exercises as she tolerates.     Subha Is " progressing well towards her goals.   Pt prognosis is Good.     Pt will continue to benefit from skilled outpatient physical therapy to address the deficits listed in the problem list box on initial evaluation, provide pt/family education and to maximize pt's level of independence in the home and community environment.     Pt's spiritual, cultural and educational needs considered and pt agreeable to plan of care and goals.     Anticipated barriers to physical therapy: dx of fibromyalgia    Goals:     Short Term Goals: 4 weeks In progress 7/14/2022  1. Pt will be IND with initial HEP to manage symptoms outside of PT.   2. Pt will report Left hip pain </= 4/10 at rest to demonstrate improved condition.   3. Pt will improve MMT of Bilateral hips to >/= 4/5 to improve tolerance for household chores.   4. Pt will improve Left hip ROM deficits by >= 10 degrees to improve tolerance for squatting activities.      Long Term Goals: 12 weeks   1. Pt will improve FOTO score to </= 19% limited to demonstrate improved functional mobility.  2. Pt will be IND with final HEP to maintain/improve strength and mobility gained in PT.   3. Pt will report Left hip pain </= 0/10 with hopping to demonstrate improved condition.   4. Pt will improve MMT of Bilateral hips to >/= 4+/ 5 to improve tolerance for lifting/carrying activities.   5. Pt will improve Left hip ROM = to uninvolved side to improve tolerance for recreation/leisure activities.   6. Pt goal: Pt will report ability to return to working out and training for future competitions without increase in Left hip pain.      PLAN     Continued hip hinge training.    Bimal Stewart, PT

## 2022-07-19 ENCOUNTER — CLINICAL SUPPORT (OUTPATIENT)
Dept: REHABILITATION | Facility: HOSPITAL | Age: 37
End: 2022-07-19
Payer: COMMERCIAL

## 2022-07-19 DIAGNOSIS — R29.898 WEAKNESS OF BOTH HIPS: ICD-10-CM

## 2022-07-19 DIAGNOSIS — M25.652 DECREASED RANGE OF LEFT HIP MOVEMENT: Primary | ICD-10-CM

## 2022-07-19 DIAGNOSIS — Z74.09 IMPAIRED FUNCTIONAL MOBILITY, BALANCE, GAIT, AND ENDURANCE: ICD-10-CM

## 2022-07-19 PROCEDURE — 97112 NEUROMUSCULAR REEDUCATION: CPT | Mod: PN

## 2022-07-19 PROCEDURE — 97140 MANUAL THERAPY 1/> REGIONS: CPT | Mod: PN

## 2022-07-19 PROCEDURE — 97110 THERAPEUTIC EXERCISES: CPT | Mod: PN

## 2022-07-19 NOTE — PROGRESS NOTES
[facility]   Physical Therapy Treatment Note     Name: Subha Bach  [facility] Number: [id number]    Therapy Diagnosis:   Encounter Diagnoses   Name Primary?    Decreased range of left hip movement Yes    Weakness of both hips     Impaired functional mobility, balance, gait, and endurance      Physician: oJselo Henderson II, MD    Visit Date: 7/19/2022    Physician Orders: PT Eval and Treat   Medical Diagnosis from Referral: M24.152 (ICD-10-CM) - Degenerative tear of acetabular labrum of left hip  Evaluation Date: 7/5/2022  Authorization Period Expiration: 12/31/2022  Plan of Care Expiration: 10/05/2022  Progress Note Due: 08/05/2022  Visit # / Visits authorized: 4/20   FOTO: 1/ 3      Date of Surgery: 06/22/2022  Return to MD date:      Precautions: Standard, weight bearing as tolerated     PTA Visit #: 0/5     Time In: 1100  Time Out: 1154  Total Billable Time: 54 minutes    SUBJECTIVE     Pt reports: she took a step last Thursday and felt a sharp pain in her groin. She had to get back on her crutches for the rest of the night. It has been getting better since then.   She was compliant with home exercise program.  Response to previous treatment: positive  Functional change: non-remarkable    Pain: 3/10  Location: left hip      OBJECTIVE     Objective Measures updated at progress report unless specified.     Treatment     Subha received the treatments listed below:    therapeutic exercises to develop strength, endurance, ROM, flexibility, posture and core stabilization for 34 minutes including:    Half kneling hip flexion stretch 3x30s each side with purple band for posterior glide   Clamshells with Green Theraband 3x12 each side   Bridges with Green Theraband 3x12   Shuttle leg press with 87# 3x12   Single leg press on shuttle with 50# 3x12 each side     Not today:   Sidelying hip abduction 3 x 10 repetitions  Supine straight leg raises 3 x 10 repetitions     manual therapy techniques:  "Joint mobilizations were applied to the: left hip for 10 minutes, including:      Long axis distraction grade I- II   Lateral hip mob grade I- II    neuromuscular re-education activities to improve: Balance, Coordination, Kinesthetic, Sense and Proprioception for 10 minutes. The following activities were included:      Hip hinge with dowel 3x15   Box squat with dowel to 18" box with airex on top 3x15      Not today:   Bridges with glute set 3 x 10 repetitions   Sidelying clams with red theraband 2 x 15 repetitions   Prone hip extension with glute set 2 x 15 repetitions   Prone hip extension with knee flexion 2 x 15 repetitions   Sidelying hip external rotation 2 x 15 repetitions   Step ups on 6" step 3x12 each side       therapeutic activities to improve functional performance for 0 minutes, including:  Not today:  Hip hinge with dowel x 20 repetitions    Significant cueing to improve execution  Forward step ups x 15 repetitions   Lateral step ups x 15 repetitions bilaterally       Patient Education and Home Exercises     Home Exercises Provided and Patient Education Provided     Education provided:   - Home Exercise Program  -Activity tolerance      Written Home Exercises Provided: yes. Exercises were reviewed and Subha was able to demonstrate them prior to the end of the session.  Subha demonstrated good  understanding of the education provided. See EMR under Patient Instructions for exercises provided during therapy sessions    ASSESSMENT     Subha presents with decreased Left joint mobility that improves following manual therapy. Continued focus on hip strength/endurance. Will progress as able.     Subha Is progressing well towards her goals.   Pt prognosis is Good.     Pt will continue to benefit from skilled outpatient physical therapy to address the deficits listed in the problem list box on initial evaluation, provide pt/family education and to maximize pt's level of independence in the home and community " environment.     Pt's spiritual, cultural and educational needs considered and pt agreeable to plan of care and goals.     Anticipated barriers to physical therapy: dx of fibromyalgia    Goals:     Short Term Goals: 4 weeks In progress 7/19/2022  1. Pt will be IND with initial HEP to manage symptoms outside of PT.   2. Pt will report Left hip pain </= 4/10 at rest to demonstrate improved condition.   3. Pt will improve MMT of Bilateral hips to >/= 4/5 to improve tolerance for household chores.   4. Pt will improve Left hip ROM deficits by >= 10 degrees to improve tolerance for squatting activities.      Long Term Goals: 12 weeks   1. Pt will improve FOTO score to </= 19% limited to demonstrate improved functional mobility.  2. Pt will be IND with final HEP to maintain/improve strength and mobility gained in PT.   3. Pt will report Left hip pain </= 0/10 with hopping to demonstrate improved condition.   4. Pt will improve MMT of Bilateral hips to >/= 4+/ 5 to improve tolerance for lifting/carrying activities.   5. Pt will improve Left hip ROM = to uninvolved side to improve tolerance for recreation/leisure activities.   6. Pt goal: Pt will report ability to return to working out and training for future competitions without increase in Left hip pain.      PLAN     Continued hip hinge training.    Dimas Calabrese, PT

## 2022-07-21 ENCOUNTER — DOCUMENTATION ONLY (OUTPATIENT)
Dept: REHABILITATION | Facility: HOSPITAL | Age: 37
End: 2022-07-21

## 2022-07-21 ENCOUNTER — CLINICAL SUPPORT (OUTPATIENT)
Dept: REHABILITATION | Facility: HOSPITAL | Age: 37
End: 2022-07-21
Payer: COMMERCIAL

## 2022-07-21 DIAGNOSIS — R29.898 WEAKNESS OF BOTH HIPS: ICD-10-CM

## 2022-07-21 DIAGNOSIS — M25.652 DECREASED RANGE OF LEFT HIP MOVEMENT: Primary | ICD-10-CM

## 2022-07-21 DIAGNOSIS — Z74.09 IMPAIRED FUNCTIONAL MOBILITY, BALANCE, GAIT, AND ENDURANCE: ICD-10-CM

## 2022-07-21 PROCEDURE — 97110 THERAPEUTIC EXERCISES: CPT | Mod: PN,CQ

## 2022-07-21 PROCEDURE — 97140 MANUAL THERAPY 1/> REGIONS: CPT | Mod: PN,CQ

## 2022-07-21 PROCEDURE — 97112 NEUROMUSCULAR REEDUCATION: CPT | Mod: PN,CQ

## 2022-07-21 NOTE — PROGRESS NOTES
[facility]   Physical Therapy Treatment Note     Name: Subha Bach  [facility] Number: [id number]    Therapy Diagnosis:   Encounter Diagnoses   Name Primary?    Decreased range of left hip movement Yes    Weakness of both hips     Impaired functional mobility, balance, gait, and endurance      Physician: Joselo Henderson II, MD    Visit Date: 7/21/2022    Physician Orders: PT Eval and Treat   Medical Diagnosis from Referral: M24.152 (ICD-10-CM) - Degenerative tear of acetabular labrum of left hip  Evaluation Date: 7/5/2022  Authorization Period Expiration: 12/31/2022  Plan of Care Expiration: 10/05/2022  Progress Note Due: 08/05/2022  Visit # / Visits authorized: 5/20   FOTO: 1/ 3      Date of Surgery: 06/22/2022  Return to MD date:      Precautions: Standard, weight bearing as tolerated     PTA Visit #: 1/5     Time In: 1100  Time Out: 1154  Total Billable Time: 54 minutes    SUBJECTIVE     Pt reports: She is doing well and is without new complaints this date.     She was compliant with home exercise program.  Response to previous treatment: positive  Functional change: non-remarkable    Pain: 3/10  Location: left hip      OBJECTIVE     Objective Measures updated at progress report unless specified.     Treatment     Subha received the treatments listed below:    therapeutic exercises to develop strength, endurance, ROM, flexibility, posture and core stabilization for 34 minutes including:    Half kneling hip flexion stretch 3x30s each side with purple band for posterior glide   Clamshells with Green Theraband 3x12 each side   Bridges with Green Theraband 3x12   Shuttle leg press with 87# 3x12   Single leg press on shuttle with 50# 3x12 each side     Not today:   Sidelying hip abduction 3 x 10 repetitions  Supine straight leg raises 3 x 10 repetitions     manual therapy techniques: Joint mobilizations were applied to the: left hip for 10 minutes, including:      Long axis distraction  "grade I- II   Lateral hip mob grade I- II    neuromuscular re-education activities to improve: Balance, Coordination, Kinesthetic, Sense and Proprioception for 10 minutes. The following activities were included:      Hip hinge with dowel 3x15   Box squat with dowel to 18" box with airex on top 3x15  Sidelying hip external rotation 2 x 15 repetitions    Not today:   Bridges with glute set 3 x 10 repetitions   Sidelying clams with red theraband 2 x 15 repetitions   Prone hip extension with glute set 2 x 15 repetitions   Prone hip extension with knee flexion 2 x 15 repetitions      Step ups on 6" step 3x12 each side       therapeutic activities to improve functional performance for 0 minutes, including:  Not today:  Hip hinge with dowel x 20 repetitions    Significant cueing to improve execution  Forward step ups x 15 repetitions   Lateral step ups x 15 repetitions bilaterally       Patient Education and Home Exercises     Home Exercises Provided and Patient Education Provided     Education provided:   - Home Exercise Program  -Activity tolerance      Written Home Exercises Provided: yes. Exercises were reviewed and Subha was able to demonstrate them prior to the end of the session.  Subha demonstrated good  understanding of the education provided. See EMR under Patient Instructions for exercises provided during therapy sessions    ASSESSMENT     Improved hip range of motion noted post manual therapy as indicated above. Requires cueing to improve form and execution of box squats to ensure proper hip hinge. Responds well to all cueing with improvement noted post. Patient will continue to benefit from skilled Physical Therapy to improve left hip strength and range of motion to allow her to return to all recreational activities without pain or limitations.     Subha Is progressing well towards her goals.   Pt prognosis is Good.     Pt will continue to benefit from skilled outpatient physical therapy to address the deficits " listed in the problem list box on initial evaluation, provide pt/family education and to maximize pt's level of independence in the home and community environment.     Pt's spiritual, cultural and educational needs considered and pt agreeable to plan of care and goals.     Anticipated barriers to physical therapy: dx of fibromyalgia    Goals:     Short Term Goals: 4 weeks In progress 7/21/2022  1. Pt will be IND with initial HEP to manage symptoms outside of PT.   2. Pt will report Left hip pain </= 4/10 at rest to demonstrate improved condition.   3. Pt will improve MMT of Bilateral hips to >/= 4/5 to improve tolerance for household chores.   4. Pt will improve Left hip ROM deficits by >= 10 degrees to improve tolerance for squatting activities.      Long Term Goals: 12 weeks   1. Pt will improve FOTO score to </= 19% limited to demonstrate improved functional mobility.  2. Pt will be IND with final HEP to maintain/improve strength and mobility gained in PT.   3. Pt will report Left hip pain </= 0/10 with hopping to demonstrate improved condition.   4. Pt will improve MMT of Bilateral hips to >/= 4+/ 5 to improve tolerance for lifting/carrying activities.   5. Pt will improve Left hip ROM = to uninvolved side to improve tolerance for recreation/leisure activities.   6. Pt goal: Pt will report ability to return to working out and training for future competitions without increase in Left hip pain.      PLAN     Continued hip hinge training.    Em Garcia, PTA

## 2022-07-26 ENCOUNTER — CLINICAL SUPPORT (OUTPATIENT)
Dept: REHABILITATION | Facility: HOSPITAL | Age: 37
End: 2022-07-26
Payer: COMMERCIAL

## 2022-07-26 DIAGNOSIS — Z74.09 IMPAIRED FUNCTIONAL MOBILITY, BALANCE, GAIT, AND ENDURANCE: ICD-10-CM

## 2022-07-26 DIAGNOSIS — M25.652 DECREASED RANGE OF LEFT HIP MOVEMENT: Primary | ICD-10-CM

## 2022-07-26 DIAGNOSIS — R29.898 WEAKNESS OF BOTH HIPS: ICD-10-CM

## 2022-07-26 PROCEDURE — 97140 MANUAL THERAPY 1/> REGIONS: CPT | Mod: PN,CQ

## 2022-07-26 PROCEDURE — 97110 THERAPEUTIC EXERCISES: CPT | Mod: PN,CQ

## 2022-07-26 PROCEDURE — 97112 NEUROMUSCULAR REEDUCATION: CPT | Mod: PN,CQ

## 2022-07-26 NOTE — PROGRESS NOTES
[facility]   Physical Therapy Treatment Note     Name: Subha Bach  [facility] Number: [id number]    Therapy Diagnosis:   Encounter Diagnoses   Name Primary?    Decreased range of left hip movement Yes    Weakness of both hips     Impaired functional mobility, balance, gait, and endurance      Physician: Joselo Henderson II, MD    Visit Date: 7/26/2022    Physician Orders: PT Eval and Treat   Medical Diagnosis from Referral: M24.152 (ICD-10-CM) - Degenerative tear of acetabular labrum of left hip  Evaluation Date: 7/5/2022  Authorization Period Expiration: 12/31/2022  Plan of Care Expiration: 10/05/2022  Progress Note Due: 08/05/2022  Visit # / Visits authorized: 6/20   FOTO: 1/ 3      Date of Surgery: 06/22/2022  Return to MD date:      Precautions: Standard, weight bearing as tolerated     PTA Visit #: 2/5     Time In: 1100  Time Out: 1158  Total Billable Time: 54 minutes    SUBJECTIVE     Pt reports: Increased pain since Saturday. Reports having a fibro flare up yesterday causing significant pain.      She was compliant with home exercise program.  Response to previous treatment: positive  Functional change: non-remarkable    Pain: 7/10  Location: left hip      OBJECTIVE     Objective Measures updated at progress report unless specified.     Treatment     Physical Therapy technician assisted with parts of today's treatment under the supervision of the PTA     Subha received the treatments listed below:    therapeutic exercises to develop strength, endurance, ROM, flexibility, posture and core stabilization for 34 minutes including:    Half kneling hip flexion stretch 3x30s each side with purple band for posterior glide   Clamshells with Green Theraband 3x12 each side   Bridges with Green Theraband 3x12   Shuttle leg press with 87# 3x12   Single leg press on shuttle with 50# 3x12 each side     Not today:   Sidelying hip abduction 3 x 10 repetitions  Supine straight leg raises 3 x 10  "repetitions     manual therapy techniques: Joint mobilizations were applied to the: left hip for 10 minutes, including:      Long axis distraction grade I- II   Lateral hip mob grade I- II    neuromuscular re-education activities to improve: Balance, Coordination, Kinesthetic, Sense and Proprioception for 15 minutes. The following activities were included:      Hip hinge with dowel 3x15   Box squat with dowel to 18" box with airex on top 3x15  Sidelying hip external rotation 2 x 15 repetitions  +RDL's with dowel 3 x 10 repetitions     Not today:   Bridges with glute set 3 x 10 repetitions   Sidelying clams with red theraband 2 x 15 repetitions   Prone hip extension with glute set 2 x 15 repetitions   Prone hip extension with knee flexion 2 x 15 repetitions      Step ups on 6" step 3x12 each side       therapeutic activities to improve functional performance for 0 minutes, including:  Not today:  Hip hinge with dowel x 20 repetitions    Significant cueing to improve execution  Forward step ups x 15 repetitions   Lateral step ups x 15 repetitions bilaterally       Patient Education and Home Exercises     Home Exercises Provided and Patient Education Provided     Education provided:   - Home Exercise Program  -Activity tolerance      Written Home Exercises Provided: yes. Exercises were reviewed and Subha was able to demonstrate them prior to the end of the session.  Subha demonstrated good  understanding of the education provided. See EMR under Patient Instructions for exercises provided during therapy sessions    ASSESSMENT     Patient arrived to clinic with increased symptomology this date as a result exercises were not progressed. She did well with hip hinges this date therefore incorporated RDLs. She did require cueing to improve form and execution with good response noted. Patient will continue to benefit from skilled Physical Therapy to improve functional mobility to allow her to return to recreational activities " without pain or limitations.     Subha Is progressing well towards her goals.   Pt prognosis is Good.     Pt will continue to benefit from skilled outpatient physical therapy to address the deficits listed in the problem list box on initial evaluation, provide pt/family education and to maximize pt's level of independence in the home and community environment.     Pt's spiritual, cultural and educational needs considered and pt agreeable to plan of care and goals.     Anticipated barriers to physical therapy: dx of fibromyalgia    Goals:     Short Term Goals: 4 weeks In progress 7/26/2022  1. Pt will be IND with initial HEP to manage symptoms outside of PT.   2. Pt will report Left hip pain </= 4/10 at rest to demonstrate improved condition.   3. Pt will improve MMT of Bilateral hips to >/= 4/5 to improve tolerance for household chores.   4. Pt will improve Left hip ROM deficits by >= 10 degrees to improve tolerance for squatting activities.      Long Term Goals: 12 weeks   1. Pt will improve FOTO score to </= 19% limited to demonstrate improved functional mobility.  2. Pt will be IND with final HEP to maintain/improve strength and mobility gained in PT.   3. Pt will report Left hip pain </= 0/10 with hopping to demonstrate improved condition.   4. Pt will improve MMT of Bilateral hips to >/= 4+/ 5 to improve tolerance for lifting/carrying activities.   5. Pt will improve Left hip ROM = to uninvolved side to improve tolerance for recreation/leisure activities.   6. Pt goal: Pt will report ability to return to working out and training for future competitions without increase in Left hip pain.      PLAN     Continued hip hinge training.    Em Garcia, PTA

## 2022-07-28 ENCOUNTER — OFFICE VISIT (OUTPATIENT)
Dept: ORTHOPEDICS | Facility: CLINIC | Age: 37
End: 2022-07-28
Payer: COMMERCIAL

## 2022-07-28 ENCOUNTER — PATIENT MESSAGE (OUTPATIENT)
Dept: ORTHOPEDICS | Facility: CLINIC | Age: 37
End: 2022-07-28

## 2022-07-28 ENCOUNTER — PATIENT MESSAGE (OUTPATIENT)
Dept: REHABILITATION | Facility: HOSPITAL | Age: 37
End: 2022-07-28

## 2022-07-28 ENCOUNTER — CLINICAL SUPPORT (OUTPATIENT)
Dept: REHABILITATION | Facility: HOSPITAL | Age: 37
End: 2022-07-28
Payer: COMMERCIAL

## 2022-07-28 VITALS — WEIGHT: 192 LBS | HEIGHT: 66 IN | BODY MASS INDEX: 30.86 KG/M2

## 2022-07-28 DIAGNOSIS — M25.652 DECREASED RANGE OF LEFT HIP MOVEMENT: Primary | ICD-10-CM

## 2022-07-28 DIAGNOSIS — Z74.09 IMPAIRED FUNCTIONAL MOBILITY, BALANCE, GAIT, AND ENDURANCE: ICD-10-CM

## 2022-07-28 DIAGNOSIS — R29.898 WEAKNESS OF BOTH HIPS: ICD-10-CM

## 2022-07-28 DIAGNOSIS — M25.552 LEFT HIP PAIN: Primary | ICD-10-CM

## 2022-07-28 PROCEDURE — 1159F MED LIST DOCD IN RCRD: CPT | Mod: CPTII,S$GLB,, | Performed by: ORTHOPAEDIC SURGERY

## 2022-07-28 PROCEDURE — 1160F PR REVIEW ALL MEDS BY PRESCRIBER/CLIN PHARMACIST DOCUMENTED: ICD-10-PCS | Mod: CPTII,S$GLB,, | Performed by: ORTHOPAEDIC SURGERY

## 2022-07-28 PROCEDURE — 99024 PR POST-OP FOLLOW-UP VISIT: ICD-10-PCS | Mod: S$GLB,,, | Performed by: ORTHOPAEDIC SURGERY

## 2022-07-28 PROCEDURE — 3008F PR BODY MASS INDEX (BMI) DOCUMENTED: ICD-10-PCS | Mod: CPTII,S$GLB,, | Performed by: ORTHOPAEDIC SURGERY

## 2022-07-28 PROCEDURE — 1159F PR MEDICATION LIST DOCUMENTED IN MEDICAL RECORD: ICD-10-PCS | Mod: CPTII,S$GLB,, | Performed by: ORTHOPAEDIC SURGERY

## 2022-07-28 PROCEDURE — 97110 THERAPEUTIC EXERCISES: CPT | Mod: PN

## 2022-07-28 PROCEDURE — 1160F RVW MEDS BY RX/DR IN RCRD: CPT | Mod: CPTII,S$GLB,, | Performed by: ORTHOPAEDIC SURGERY

## 2022-07-28 PROCEDURE — 99999 PR PBB SHADOW E&M-EST. PATIENT-LVL III: ICD-10-PCS | Mod: PBBFAC,,, | Performed by: ORTHOPAEDIC SURGERY

## 2022-07-28 PROCEDURE — 97140 MANUAL THERAPY 1/> REGIONS: CPT | Mod: PN

## 2022-07-28 PROCEDURE — 3008F BODY MASS INDEX DOCD: CPT | Mod: CPTII,S$GLB,, | Performed by: ORTHOPAEDIC SURGERY

## 2022-07-28 PROCEDURE — 99024 POSTOP FOLLOW-UP VISIT: CPT | Mod: S$GLB,,, | Performed by: ORTHOPAEDIC SURGERY

## 2022-07-28 PROCEDURE — 99999 PR PBB SHADOW E&M-EST. PATIENT-LVL III: CPT | Mod: PBBFAC,,, | Performed by: ORTHOPAEDIC SURGERY

## 2022-07-28 RX ORDER — FREMANEZUMAB-VFRM 225 MG/1.5ML
INJECTION SUBCUTANEOUS
COMMUNITY
Start: 2021-12-09

## 2022-07-28 NOTE — PROGRESS NOTES
"[facility]   Physical Therapy Treatment Note     Name: Subha Bach  Clinic Number: [id number]    Therapy Diagnosis:   Encounter Diagnoses   Name Primary?   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 She had her follow up appt with Dr. Henderson and he said everything is looking good.      She was compliant with home exercise program.  Response to previous treatment: positive  Functional change: non-remarkable     Pain: 3/10  Location: left hip      OBJECTIVE     Objective Measures updated at progress report unless specified.     Treatment     Physical Therapy technician assisted with parts of today's treatment under the supervision of the PTA     Subha received the treatments listed below:    therapeutic exercises to develop strength, endurance, ROM, flexibility, posture and core stabilization for 45 minutes including:    Hand heel rock x20 with 3-5s hold   Half kneling hip flexion stretch 3x30s each side with purple band for posterior glide   Clamshells with Green Theraband 3x12 each side   Bridges with Green Theraband 3x12   Shuttle leg press with 100# 3x12   Single leg press on shuttle with 50# 3x12 each side   Box squat with dowel to 18" box with airex on top " "3x12  Prowler sled push with 70# [id number] there and back   Standing hip external rotation 2 x 15 repetitions     Not today:   Sidelying hip abduction 3 x 10 repetitions  Supine straight leg raises 3 x 10 repetitions     manual therapy techniques: Joint mobilizations were applied to the: left hip for 10 minutes, including:    Long axis distraction grade I- II   Lateral hip mob grade I- II    neuromuscular re-education activities to improve: Balance, Coordination, Kinesthetic, Sense and Proprioception for 0 minutes. The following activities were included:        Not today:   +RDL's with dowel 3 x 10 repetitions   Hip hinge with dowel 3x15   Bridges with glute set 3 x 10 repetitions   Sidelying clams with red theraband 2 x 15 repetitions   Prone hip extension with glute set 2 x 15 repetitions   Prone hip extension with knee flexion 2 x 15 repetitions      Step ups on 6" step 3x12 each side       therapeutic activities to improve functional performance for 0 minutes, including:  Not today:  Hip hinge with dowel x 20 repetitions    Significant cueing to improve execution  Forward step ups x 15 repetitions   Lateral step ups x 15 repetitions bilaterally       Patient Education and Home Exercises     Home Exercises Provided and Patient Education Provided     Education provided:   - Home Exercise Program  -Activity tolerance      Written Home Exercises Provided: yes. Exercises were reviewed and Subha was able to demonstrate them prior to the end of the session.  Subha demonstrated good  understanding of the education provided. See EMR under Patient Instructions for exercises provided during therapy sessions    ASSESSMENT     Subha tolerated treatment well we were able to increase the intensity and resistance for therex without increase in symptoms. Will continue to progress as able.     Subha Is progressing well towards her goals.   Pt prognosis is Good.     Pt will continue to benefit from skilled outpatient physical therapy to " address the deficits listed in the problem list box on initial evaluation, provide pt/family education and to maximize pt's level of independence in the home and community environment.     Pt's spiritual, cultural and educational needs considered and pt agreeable to plan of care and goals.     Anticipated barriers to physical therapy: dx of fibromyalgia    Goals:     Short Term Goals: 4 weeks In progress 7/28/2022  1. Pt will be IND with initial HEP to manage symptoms outside of PT.   2. Pt will report Left hip pain </= 4/10 at rest to demonstrate improved condition.   3. Pt will improve MMT of Bilateral hips to >/= 4/5 to improve tolerance for household chores.   4. Pt will improve Left hip ROM deficits by >= 10 degrees to improve tolerance for squatting activities.      Long Term Goals: 12 weeks   1. Pt will improve FOTO score to </= 19% limited to demonstrate improved functional mobility.  2. Pt will be IND with final HEP to maintain/improve strength and mobility gained in PT.   3. Pt will report Left hip pain </= 0/10 with hopping to demonstrate improved condition.   4. Pt will improve MMT of Bilateral hips to >/= 4+/ 5 to improve tolerance for lifting/carrying activities.   5. Pt will improve Left hip ROM = to uninvolved side to improve tolerance for recreation/leisure activities.   6. Pt goal: Pt will report ability to return to working out and training for future competitions without increase in Left hip pain.      PLAN     Continued hip hinge training.    Dimas Calabrese, PT

## 2022-07-28 NOTE — LETTER
July 28, 2022    Subha Bach  [id number] [place] [id number]         [facility]  [place] [address]  Phone: [phone] July 28, 2022     Patient: Subha Bach   YOB: 1985   Date of Visit: 7/28/2022       To Whom It May Concern:    It is my medical opinion that Subha Bach can return to work on 08/08/2022 and  should reduce work hours to 4 hours per day until 09/08/2022.    If you have any questions or concerns, please don't hesitate to call.    Sincerely,        Joselo Henderson II, MD

## 2022-07-28 NOTE — PROGRESS NOTES
CC:  37-year-old female follows up status post left hip arthroscopy with labral debridement.  Date of surgery was 06/22/2022.  She is just at 5 weeks out and still has some dull aching deep in her hip especially after therapy.    LLE:  Grossly intact motor and sensory function distally  Positive C sign    Dx:  Status post left hip arthroscopy with labral debridement, stable    Plan:  I discussed with the patient it can take up to 3-4 months for the dull ache in the hip go away.  She can continue to advance physical therapy as tolerated.  We will release her back to work part-time and she can follow up in 6 weeks for re-evaluation.

## 2022-08-02 ENCOUNTER — CLINICAL SUPPORT (OUTPATIENT)
Dept: REHABILITATION | Facility: HOSPITAL | Age: 37
End: 2022-08-02
Payer: COMMERCIAL

## 2022-08-02 ENCOUNTER — PATIENT MESSAGE (OUTPATIENT)
Dept: PULMONOLOGY | Facility: CLINIC | Age: 37
End: 2022-08-02
Payer: COMMERCIAL

## 2022-08-02 DIAGNOSIS — J45.20 MILD INTERMITTENT ASTHMA WITHOUT COMPLICATION: Primary | ICD-10-CM

## 2022-08-02 DIAGNOSIS — M25.652 DECREASED RANGE OF LEFT HIP MOVEMENT: Primary | ICD-10-CM

## 2022-08-02 DIAGNOSIS — R29.898 WEAKNESS OF BOTH HIPS: ICD-10-CM

## 2022-08-02 DIAGNOSIS — Z74.09 IMPAIRED FUNCTIONAL MOBILITY, BALANCE, GAIT, AND ENDURANCE: ICD-10-CM

## 2022-08-02 PROCEDURE — 97110 THERAPEUTIC EXERCISES: CPT | Mod: PN

## 2022-08-02 PROCEDURE — 97140 MANUAL THERAPY 1/> REGIONS: CPT | Mod: PN

## 2022-08-02 NOTE — PROGRESS NOTES
" [facility]   Physical Therapy Treatment Note     Name: Subha Bach  Clinic Number: [id number]    Therapy Diagnosis:   Encounter Diagnoses   Name Primary?   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  Response to previous treatment: positive  Functional change: non-remarkable     Pain: 3/10  Location: left hip      OBJECTIVE     Objective Measures updated at progress report unless specified.     Treatment     Physical Therapy technician assisted with parts of today's treatment under the supervision of the PTA     Subha received the treatments listed below:    therapeutic exercises to develop strength, endurance, ROM, flexibility, posture and core stabilization for 45 minutes including:    Hand heel rock x20 with 3-5s hold with purple band for inferior glide   Half kneling hip flexion stretch 3x30s each side with purple band for posterior glide   Clamshells with Green Theraband 3x12 each side   Bridges with Green Theraband 3x12   Shuttle leg press with 100# 3x12   Single leg press on shuttle with 50# 3x12 each side   Box squat with dowel to 18" box with airex on top 3x12  Prowler " "sled push with 70# [id number] there and back   Standing hip external rotation 2 x 15 repetitions     Not today:   Sidelying hip abduction 3 x 10 repetitions  Supine straight leg raises 3 x 10 repetitions     manual therapy techniques: Joint mobilizations were applied to the: left hip for 10 minutes, including:    Long axis distraction grade I- II   Lateral hip mob grade I- II    neuromuscular re-education activities to improve: Balance, Coordination, Kinesthetic, Sense and Proprioception for 0 minutes. The following activities were included:        Not today:   +RDL's with dowel 3 x 10 repetitions   Hip hinge with dowel 3x15   Bridges with glute set 3 x 10 repetitions   Sidelying clams with red theraband 2 x 15 repetitions   Prone hip extension with glute set 2 x 15 repetitions   Prone hip extension with knee flexion 2 x 15 repetitions      Step ups on 6" step 3x12 each side       therapeutic activities to improve functional performance for 0 minutes, including:  Not today:  Hip hinge with dowel x 20 repetitions    Significant cueing to improve execution  Forward step ups x 15 repetitions   Lateral step ups x 15 repetitions bilaterally       Patient Education and Home Exercises     Home Exercises Provided and Patient Education Provided     Education provided:   - Home Exercise Program  -Activity tolerance      Written Home Exercises Provided: yes. Exercises were reviewed and Subha was able to demonstrate them prior to the end of the session.  Subha demonstrated good  understanding of the education provided. See EMR under Patient Instructions for exercises provided during therapy sessions    ASSESSMENT     Subha continues to tolerate increased load to Left hip. She is progressing well. Will re-assess next visit.     Subha Is progressing well towards her goals.   Pt prognosis is Good.     Pt will continue to benefit from skilled outpatient physical therapy to address the deficits listed in the problem list box on initial " evaluation, provide pt/family education and to maximize pt's level of independence in the home and community environment.     Pt's spiritual, cultural and educational needs considered and pt agreeable to plan of care and goals.     Anticipated barriers to physical therapy: dx of fibromyalgia    Goals:     Short Term Goals: 4 weeks In progress 8/2/2022  1. Pt will be IND with initial HEP to manage symptoms outside of PT.   2. Pt will report Left hip pain </= 4/10 at rest to demonstrate improved condition.   3. Pt will improve MMT of Bilateral hips to >/= 4/5 to improve tolerance for household chores.   4. Pt will improve Left hip ROM deficits by >= 10 degrees to improve tolerance for squatting activities.      Long Term Goals: 12 weeks   1. Pt will improve FOTO score to </= 19% limited to demonstrate improved functional mobility.  2. Pt will be IND with final HEP to maintain/improve strength and mobility gained in PT.   3. Pt will report Left hip pain </= 0/10 with hopping to demonstrate improved condition.   4. Pt will improve MMT of Bilateral hips to >/= 4+/ 5 to improve tolerance for lifting/carrying activities.   5. Pt will improve Left hip ROM = to uninvolved side to improve tolerance for recreation/leisure activities.   6. Pt goal: Pt will report ability to return to working out and training for future competitions without increase in Left hip pain.      PLAN     Continued hip hinge training.    Dimas Calabrese, PT

## 2022-08-03 ENCOUNTER — PATIENT MESSAGE (OUTPATIENT)
Dept: PULMONOLOGY | Facility: CLINIC | Age: 37
End: 2022-08-03
Payer: COMMERCIAL

## 2022-08-03 RX ORDER — ALBUTEROL SULFATE 90 UG/1
2 AEROSOL, METERED RESPIRATORY (INHALATION) EVERY 4 HOURS PRN
Qty: 18 G | Refills: 11 | Status: SHIPPED | OUTPATIENT
Start: 2022-08-03 | End: 2022-10-06 | Stop reason: SDUPTHER

## 2022-08-05 ENCOUNTER — CLINICAL SUPPORT (OUTPATIENT)
Dept: REHABILITATION | Facility: HOSPITAL | Age: 37
End: 2022-08-05
Payer: COMMERCIAL

## 2022-08-05 DIAGNOSIS — R29.898 WEAKNESS OF BOTH HIPS: ICD-10-CM

## 2022-08-05 DIAGNOSIS — Z74.09 IMPAIRED FUNCTIONAL MOBILITY, BALANCE, GAIT, AND ENDURANCE: ICD-10-CM

## 2022-08-05 DIAGNOSIS — M25.652 DECREASED RANGE OF LEFT HIP MOVEMENT: Primary | ICD-10-CM

## 2022-08-05 PROCEDURE — 97110 THERAPEUTIC EXERCISES: CPT | Mod: PN

## 2022-08-05 PROCEDURE — 97140 MANUAL THERAPY 1/> REGIONS: CPT | Mod: PN

## 2022-08-05 NOTE — PROGRESS NOTES
"  [facility]   Physical Therapy Treatment Note     Name: Subha Bach  Clinic Number: [id number]    Therapy Diagnosis:   No diagnosis found.  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  Response to previous treatment: positive  Functional change: non-remarkable     Pain: 3/10  Location: left hip      OBJECTIVE     Objective Measures updated at progress report unless specified.     Treatment     Physical Therapy technician assisted with parts of today's treatment under the supervision of the PTA     Subha received the treatments listed below:    therapeutic exercises to develop strength, endurance, ROM, flexibility, posture and core stabilization for 43 minutes including:    Hand heel rock x20 with 3-5s hold with purple band for inferior glide   Half kneling hip flexion stretch 3x30s each side with purple band for posterior glide   Clamshells with Green Theraband 3x12 each side   Bridges with Green Theraband 3x12   Shuttle leg press with 100# 3x12   Single leg press on shuttle with 50# 3x12 each side   Box squat with dowel to 18" box with airex on top 3x12  Prowler sled push with 70# [id number] there and back   Standing hip external rotation 2 x 15 repetitions     Not today:   Sidelying hip abduction 3 x 10 " "repetitions  Supine straight leg raises 3 x 10 repetitions     manual therapy techniques: Joint mobilizations were applied to the: left hip for 10 minutes, including:    Long axis distraction grade I- II   Lateral hip mob grade I- II    neuromuscular re-education activities to improve: Balance, Coordination, Kinesthetic, Sense and Proprioception for 0 minutes. The following activities were included:        Not today:   +RDL's with dowel 3 x 10 repetitions   Hip hinge with dowel 3x15   Bridges with glute set 3 x 10 repetitions   Sidelying clams with red theraband 2 x 15 repetitions   Prone hip extension with glute set 2 x 15 repetitions   Prone hip extension with knee flexion 2 x 15 repetitions      Step ups on 6" step 3x12 each side       therapeutic activities to improve functional performance for 0 minutes, including:  Not today:  Hip hinge with dowel x 20 repetitions    Significant cueing to improve execution  Forward step ups x 15 repetitions   Lateral step ups x 15 repetitions bilaterally       Patient Education and Home Exercises     Home Exercises Provided and Patient Education Provided     Education provided:   - Home Exercise Program  -Activity tolerance      Written Home Exercises Provided: yes. Exercises were reviewed and Subha was able to demonstrate them prior to the end of the session.  Subha demonstrated good  understanding of the education provided. See EMR under Patient Instructions for exercises provided during therapy sessions    ASSESSMENT   Subha tolerated treatment well, continued focus on improving hip mobility and glute strength/enduranc. 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 Will continue to increase load to Left hip as able and progress to PLOF.     Subha Is progressing well towards her goals.   Pt prognosis is Good.     Pt will continue to benefit from skilled outpatient physical therapy to address the deficits listed in the problem list box on initial evaluation, provide pt/family " education and to maximize pt's level of independence in the home and community environment.     Pt's spiritual, cultural and educational needs considered and pt agreeable to plan of care and goals.     Anticipated barriers to physical therapy: dx of fibromyalgia    Goals:     Short Term Goals: 4 weeks In progress 8/5/2022  1. Pt will be IND with initial HEP to manage symptoms outside of PT.   2. Pt will report Left hip pain </= 4/10 at rest to demonstrate improved condition.   3. Pt will improve MMT of Bilateral hips to >/= 4/5 to improve tolerance for household chores.   4. Pt will improve Left hip ROM deficits by >= 10 degrees to improve tolerance for squatting activities.      Long Term Goals: 12 weeks   1. Pt will improve FOTO score to </= 19% limited to demonstrate improved functional mobility.  2. Pt will be IND with final HEP to maintain/improve strength and mobility gained in PT.   3. Pt will report Left hip pain </= 0/10 with hopping to demonstrate improved condition.   4. Pt will improve MMT of Bilateral hips to >/= 4+/ 5 to improve tolerance for lifting/carrying activities.   5. Pt will improve Left hip ROM = to uninvolved side to improve tolerance for recreation/leisure activities.   6. Pt goal: Pt will report ability to return to working out and training for future competitions without increase in Left hip pain.      PLAN     Continue plan of care with focus on improving hip mobility and glute strength/endurance.     Dimas Calabrese, PT

## 2022-08-09 ENCOUNTER — CLINICAL SUPPORT (OUTPATIENT)
Dept: REHABILITATION | Facility: HOSPITAL | Age: 37
End: 2022-08-09
Payer: COMMERCIAL

## 2022-08-09 DIAGNOSIS — Z74.09 IMPAIRED FUNCTIONAL MOBILITY, BALANCE, GAIT, AND ENDURANCE: ICD-10-CM

## 2022-08-09 DIAGNOSIS — M25.652 DECREASED RANGE OF LEFT HIP MOVEMENT: Primary | ICD-10-CM

## 2022-08-09 DIAGNOSIS — R29.898 WEAKNESS OF BOTH HIPS: ICD-10-CM

## 2022-08-09 PROCEDURE — 97140 MANUAL THERAPY 1/> REGIONS: CPT | Mod: PN

## 2022-08-09 PROCEDURE — 97110 THERAPEUTIC EXERCISES: CPT | Mod: PN

## 2022-08-09 NOTE — PROGRESS NOTES
"  [facility]   Physical Therapy Treatment Note     Name: Subha Bach  Clinic Number: [id number]    Therapy Diagnosis:   Encounter Diagnoses   Name Primary?   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  Response to previous treatment: positive  Functional change: non-remarkable     Pain: 3/10  Location: left hip      OBJECTIVE     Objective Measures updated at progress report unless specified.     Treatment       Subha received the treatments listed below:    therapeutic exercises to develop strength, endurance, ROM, flexibility, posture and core stabilization for 43 minutes including:    Hand heel rock x20 with 3-5s hold with purple band for inferior glide   Half kneeling hip flexion stretch 3x30s each side with purple band for posterior glide   Clamshells with Green Theraband 3x12 each side   Bridges with Green Theraband 3x12   Shuttle leg press with 112# 3x12   Single leg press on shuttle with 50# 3x12 each side   Lateral walks with Green Theraband [id number] there and back    Not today:   Box squat with dowel to 18" box with airex on top 3x12  Prowler sled push with 70# [id number] there and " "back   Standing hip external rotation 2 x 15 repetitions         manual therapy techniques: Joint mobilizations were applied to the: left hip for 10 minutes, including:    Long axis distraction grade III-IV  Lateral hip mob grade III-IV     neuromuscular re-education activities to improve: Balance, Coordination, Kinesthetic, Sense and Proprioception for 0 minutes. The following activities were included:          Not today:   +RDL's with dowel 3 x 10 repetitions   Hip hinge with dowel 3x15   Bridges with glute set 3 x 10 repetitions   Sidelying clams with red theraband 2 x 15 repetitions   Prone hip extension with glute set 2 x 15 repetitions   Prone hip extension with knee flexion 2 x 15 repetitions   Step ups on 6" step 3x12 each side       therapeutic activities to improve functional performance for 0 minutes, including:  Not today:  Hip hinge with dowel x 20 repetitions    Significant cueing to improve execution  Forward step ups x 15 repetitions   Lateral step ups x 15 repetitions bilaterally       Patient Education and Home Exercises     Home Exercises Provided and Patient Education Provided     Education provided:   - Home Exercise Program  -Activity tolerance      Written Home Exercises Provided: yes. Exercises were reviewed and Subha was able to demonstrate them prior to the end of the session.  Subha demonstrated good  understanding of the education provided. See EMR under Patient Instructions for exercises provided during therapy sessions    ASSESSMENT   Subha continues to tolerate treatment well. We were able to increase resistance on leg press and initiate lateral walks for hip strengthening. Will progress as able.     Subha Is progressing well towards her goals.   Pt prognosis is Good.     Pt will continue to benefit from skilled outpatient physical therapy to address the deficits listed in the problem list box on initial evaluation, provide pt/family education and to maximize pt's level of independence in " the home and community environment.     Pt's spiritual, cultural and educational needs considered and pt agreeable to plan of care and goals.     Anticipated barriers to physical therapy: dx of fibromyalgia    Goals:     Short Term Goals: 4 weeks In progress 8/9/2022  1. Pt will be IND with initial HEP to manage symptoms outside of PT.   2. Pt will report Left hip pain </= 4/10 at rest to demonstrate improved condition.   3. Pt will improve MMT of Bilateral hips to >/= 4/5 to improve tolerance for household chores.   4. Pt will improve Left hip ROM deficits by >= 10 degrees to improve tolerance for squatting activities.      Long Term Goals: 12 weeks   1. Pt will improve FOTO score to </= 19% limited to demonstrate improved functional mobility.  2. Pt will be IND with final HEP to maintain/improve strength and mobility gained in PT.   3. Pt will report Left hip pain </= 0/10 with hopping to demonstrate improved condition.   4. Pt will improve MMT of Bilateral hips to >/= 4+/ 5 to improve tolerance for lifting/carrying activities.   5. Pt will improve Left hip ROM = to uninvolved side to improve tolerance for recreation/leisure activities.   6. Pt goal: Pt will report ability to return to working out and training for future competitions without increase in Left hip pain.      PLAN     Continue plan of care with focus on improving hip mobility and glute strength/endurance.     Dimas Calabrese, PT

## 2022-08-10 ENCOUNTER — HOSPITAL ENCOUNTER (OUTPATIENT)
Dept: PULMONOLOGY | Facility: HOSPITAL | Age: 37
Discharge: HOME | End: 2022-08-10
Attending: INTERNAL MEDICINE
Payer: COMMERCIAL

## 2022-08-10 DIAGNOSIS — J45.20 MILD INTERMITTENT ASTHMA WITHOUT COMPLICATION: ICD-10-CM

## 2022-08-10 LAB
BRPFT: NORMAL
DLCO ADJ PRE: 30.59 ML/(MIN*MMHG)
DLCO SINGLE BREATH LLN: 21.41
DLCO SINGLE BREATH PRE REF: 112.7 %
DLCO SINGLE BREATH REF: 27.15
DLCOC SBVA LLN: 3.68
DLCOC SBVA PRE REF: 92.3 %
DLCOC SBVA REF: 5.12
DLCOC SINGLE BREATH LLN: 21.41
DLCOC SINGLE BREATH PRE REF: 112.7 %
DLCOC SINGLE BREATH REF: 27.15
DLCOVA LLN: 3.68
DLCOVA PRE REF: 92.3 %
DLCOVA PRE: 4.73 ML/(MIN*MMHG*L)
DLCOVA REF: 5.12
DLVAADJ PRE: 4.73 ML/(MIN*MMHG*L)
ERVN2 LLN: -16448.86
ERVN2 PRE REF: 101.5 %
ERVN2 PRE: 1.16 L
ERVN2 REF: 1.14
FEF 25 75 CHG: 1.9 %
FEF 25 75 LLN: 2.07
FEF 25 75 POST REF: 111.4 %
FEF 25 75 PRE REF: 109.4 %
FEF 25 75 REF: 3.37
FET100 CHG: 11.5 %
FEV1 CHG: 4.3 %
FEV1 FVC CHG: 5.5 %
FEV1 FVC LLN: 71
FEV1 FVC POST REF: 102.1 %
FEV1 FVC PRE REF: 96.8 %
FEV1 FVC REF: 82
FEV1 LLN: 2.6
FEV1 POST REF: 121.2 %
FEV1 PRE REF: 116.2 %
FEV1 REF: 3.26
FRCN2 LLN: 1.98
FRCN2 PRE REF: 90.6 %
FRCN2 REF: 2.8
FVC CHG: -1.1 %
FVC LLN: 3.18
FVC POST REF: 117.9 %
FVC PRE REF: 119.3 %
FVC REF: 3.99
IVC PRE: 4.71 L
IVC SINGLE BREATH LLN: 3.18
IVC SINGLE BREATH PRE REF: 118.1 %
IVC SINGLE BREATH REF: 3.99
MVV LLN: 104
MVV PRE REF: 84.9 %
MVV REF: 122
PEF CHG: -1.2 %
PEF LLN: 5.49
PEF POST REF: 101 %
PEF PRE REF: 102.3 %
PEF REF: 7.32
POST FEF 25 75: 3.76 L/S
POST FET 100: 8.78 SEC
POST FEV1 FVC: 84.02 %
POST FEV1: 3.95 L
POST FVC: 4.7 L
POST PEF: 7.39 L/S
PRE DLCO: 30.59 ML/(MIN*MMHG)
PRE FEF 25 75: 3.69 L/S
PRE FET 100: 7.87 SEC
PRE FEV1 FVC: 79.66 %
PRE FEV1: 3.79 L
PRE FRC N2: 2.54 L
PRE FVC: 4.75 L
PRE MVV: 104 L/MIN
PRE PEF: 7.48 L/S
RVN2 LLN: 1.09
RVN2 PRE REF: 87.2 %
RVN2 PRE: 1.45 L
RVN2 REF: 1.66
RVN2TLCN2 LLN: 22.63
RVN2TLCN2 PRE REF: 71.1 %
RVN2TLCN2 PRE: 22.9 %
RVN2TLCN2 REF: 32.22
TLCN2 LLN: 4.31
TLCN2 PRE REF: 119.7 %
TLCN2 PRE: 6.34 L
TLCN2 REF: 5.3
VA PRE: 6.47 L
VA SINGLE BREATH LLN: 5.15
VA SINGLE BREATH PRE REF: 125.8 %
VA SINGLE BREATH REF: 5.15
VCMAXN2 LLN: 3.18
VCMAXN2 PRE REF: 122.7 %
VCMAXN2 PRE: 4.89 L
VCMAXN2 REF: 3.99

## 2022-08-10 PROCEDURE — 94727 GAS DIL/WSHOT DETER LNG VOL: CPT

## 2022-08-10 PROCEDURE — 94729 DIFFUSING CAPACITY: CPT | Mod: 26,,, | Performed by: INTERNAL MEDICINE

## 2022-08-10 PROCEDURE — 94060 PR EVAL OF BRONCHOSPASM: ICD-10-PCS | Mod: 26,,, | Performed by: INTERNAL MEDICINE

## 2022-08-10 PROCEDURE — 94729 PR C02/MEMBANE DIFFUSE CAPACITY: ICD-10-PCS | Mod: 26,,, | Performed by: INTERNAL MEDICINE

## 2022-08-10 PROCEDURE — 94060 EVALUATION OF WHEEZING: CPT

## 2022-08-10 PROCEDURE — 94727 GAS DIL/WSHOT DETER LNG VOL: CPT | Mod: 26,,, | Performed by: INTERNAL MEDICINE

## 2022-08-10 PROCEDURE — 94727 PR PULM FUNCTION TEST BY GAS: ICD-10-PCS | Mod: 26,,, | Performed by: INTERNAL MEDICINE

## 2022-08-10 PROCEDURE — 94729 DIFFUSING CAPACITY: CPT

## 2022-08-10 PROCEDURE — 94060 EVALUATION OF WHEEZING: CPT | Mod: 26,,, | Performed by: INTERNAL MEDICINE

## 2022-08-12 ENCOUNTER — PATIENT MESSAGE (OUTPATIENT)
Dept: PULMONOLOGY | Facility: CLINIC | Age: 37
End: 2022-08-12
Payer: COMMERCIAL

## 2022-08-12 ENCOUNTER — TELEPHONE (OUTPATIENT)
Dept: PULMONOLOGY | Facility: CLINIC | Age: 37
End: 2022-08-12
Payer: COMMERCIAL

## 2022-08-12 NOTE — TELEPHONE ENCOUNTER
Sent message via portal    ----- Message from Em Lee MD sent at 8/11/2022  4:14 PM CDT -----  MsJane Long,  Your PFT shows good lung function.  Dr. Lee

## 2022-08-16 ENCOUNTER — CLINICAL SUPPORT (OUTPATIENT)
Dept: REHABILITATION | Facility: HOSPITAL | Age: 37
End: 2022-08-16
Payer: COMMERCIAL

## 2022-08-16 DIAGNOSIS — R29.898 WEAKNESS OF BOTH HIPS: ICD-10-CM

## 2022-08-16 DIAGNOSIS — M25.652 DECREASED RANGE OF LEFT HIP MOVEMENT: Primary | ICD-10-CM

## 2022-08-16 DIAGNOSIS — Z74.09 IMPAIRED FUNCTIONAL MOBILITY, BALANCE, GAIT, AND ENDURANCE: ICD-10-CM

## 2022-08-16 PROCEDURE — 97112 NEUROMUSCULAR REEDUCATION: CPT | Mod: PN

## 2022-08-16 PROCEDURE — 97140 MANUAL THERAPY 1/> REGIONS: CPT | Mod: PN

## 2022-08-16 PROCEDURE — 97110 THERAPEUTIC EXERCISES: CPT | Mod: PN

## 2022-08-16 NOTE — PROGRESS NOTES
[facility]   Physical Therapy Treatment Note/Re-Assessment    Name: Subha Bach  Clinic Number: [id number]    Therapy Diagnosis:   Encounter Diagnoses   Name Primary?   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  Response to previous treatment: positive  Functional change: non-remarkable     Pain: 3/10  Location: left hip      OBJECTIVE     Hip Range of Motion:    Right Passive Left Passive   Flexion 120 110   Extension 10 10   Ext. Rotation 45 45   Int. Rotation 35 20         Lower Extremity Strength   Right LE   Left LE     Quadriceps: 5/5 Quadriceps: 5/5   Hamstrings: 5/5 Hamstrings: 5/5   Iliopsoas: 5/5 Iliopsoas: 4/5   Hip extension:  4/5 Hip extension: 4/5   PGM: 4-/5 PGM: 3-/5   Hip ER: 4-/5 Hip ER: 4-/5   Hip IR: 5/5 Hip IR: 4+/5       CMS Impairment/Limitation/Restriction for FOTO Hip Survey  Status Limitation G-Code CMS Severity Modifier  Intake 56% 44%  Predicted 81% 19% Goal Status+ CI - At least 1 percent but less than 20 percent  8/16/2022 53% 47% Current Status CK - At least 40 percent but less than 60 percent  Treatment       Subha received the treatments listed below:    therapeutic exercises  "to develop strength, endurance, ROM, flexibility, posture and core stabilization for 37 minutes including:    Assessment as above  Hand heel rock x20 with 3-5s hold with purple band for inferior glide   Half kneeling hip flexion stretch 3x30s each side with purple band for posterior glide   Clamshells with Green Theraband 3x12 each side   Single leg bridges 3x8 each side   Shuttle leg press with 112# 3x12   Single leg press on shuttle with 75# 3x12 each side   Lateral walks with Green Theraband [id number] there and back    Not today:   Box squat with dowel to 18" box with airex on top 3x12  Prowler sled push with 70# [id number] there and back   Standing hip external rotation 2 x 15 repetitions         manual therapy techniques: Joint mobilizations were applied to the: left hip for 10 minutes, including:    Long axis distraction grade III-IV  Lateral hip mob grade III-IV     neuromuscular re-education activities to improve: Balance, Coordination, Kinesthetic, Sense and Proprioception for 8 minutes. The following activities were included:    Split squats 3x8 each side   Single leg Grenadian Deadlift 3x8 each side     Not today:   +RDL's with dowel 3 x 10 repetitions   Hip hinge with dowel 3x15   Bridges with glute set 3 x 10 repetitions   Sidelying clams with red theraband 2 x 15 repetitions   Prone hip extension with glute set 2 x 15 repetitions   Prone hip extension with knee flexion 2 x 15 repetitions   Step ups on 6" step 3x12 each side       therapeutic activities to improve functional performance for 0 minutes, including:  Not today:  Hip hinge with dowel x 20 repetitions    Significant cueing to improve execution  Forward step ups x 15 repetitions   Lateral step ups x 15 repetitions bilaterally       Patient Education and Home Exercises     Home Exercises Provided and Patient Education Provided     Education provided:   - Home Exercise Program  -Activity tolerance      Written Home Exercises Provided: yes. Exercises " were reviewed and Subha was able to demonstrate them prior to the end of the session.  Subha demonstrated good  understanding of the education provided. See EMR under Patient Instructions for exercises provided during therapy sessions    Re-ASSESSMENT   Subha is 8 weeks status post Left hip arthroscopy and demonstrates significant improvement in Left hip range of motion and strength. Although improving she continues with limitation in Left hip internal rotation as well as hip weakness. She remains a good candidate for skilled outpatient PT to address the above deficits. We were able to increase resistance on lower extremity therex today.     Subha Is progressing well towards her goals.   Pt prognosis is Good.     Pt will continue to benefit from skilled outpatient physical therapy to address the deficits listed in the problem list box on initial evaluation, provide pt/family education and to maximize pt's level of independence in the home and community environment.     Pt's spiritual, cultural and educational needs considered and pt agreeable to plan of care and goals.     Anticipated barriers to physical therapy: dx of fibromyalgia    Goals:     Short Term Goals: 4 weeks  8/16/2022  1. Pt will be IND with initial HEP to manage symptoms outside of PT. MET  2. Pt will report Left hip pain </= 4/10 at rest to demonstrate improved condition. MET  3. Pt will improve MMT of Bilateral hips to >/= 4/5 to improve tolerance for household chores. progressing  4. Pt will improve Left hip ROM deficits by >= 10 degrees to improve tolerance for squatting activities. MET     Long Term Goals: 12 weeks progressing  1. Pt will improve FOTO score to </= 19% limited to demonstrate improved functional mobility.  2. Pt will be IND with final HEP to maintain/improve strength and mobility gained in PT.   3. Pt will report Left hip pain </= 0/10 with hopping to demonstrate improved condition.   4. Pt will improve MMT of Bilateral hips to >/=  4+/ 5 to improve tolerance for lifting/carrying activities.   5. Pt will improve Left hip ROM = to uninvolved side to improve tolerance for recreation/leisure activities.   6. Pt goal: Pt will report ability to return to working out and training for future competitions without increase in Left hip pain.      PLAN     Continue plan of care with focus on improving hip mobility and glute strength/endurance.     Dimas Calabrese, PT

## 2022-08-23 ENCOUNTER — PATIENT MESSAGE (OUTPATIENT)
Dept: REHABILITATION | Facility: HOSPITAL | Age: 37
End: 2022-08-23

## 2022-09-08 ENCOUNTER — OFFICE VISIT (OUTPATIENT)
Dept: ORTHOPEDICS | Facility: CLINIC | Age: 37
End: 2022-09-08
Payer: COMMERCIAL

## 2022-09-08 VITALS — WEIGHT: 192 LBS | BODY MASS INDEX: 30.86 KG/M2 | HEIGHT: 66 IN

## 2022-09-08 DIAGNOSIS — M25.552 LEFT HIP PAIN: Primary | ICD-10-CM

## 2022-09-08 PROCEDURE — 3008F BODY MASS INDEX DOCD: CPT | Mod: CPTII,S$GLB,, | Performed by: ORTHOPAEDIC SURGERY

## 2022-09-08 PROCEDURE — 1159F PR MEDICATION LIST DOCUMENTED IN MEDICAL RECORD: ICD-10-PCS | Mod: CPTII,S$GLB,, | Performed by: ORTHOPAEDIC SURGERY

## 2022-09-08 PROCEDURE — 1159F MED LIST DOCD IN RCRD: CPT | Mod: CPTII,S$GLB,, | Performed by: ORTHOPAEDIC SURGERY

## 2022-09-08 PROCEDURE — 1160F PR REVIEW ALL MEDS BY PRESCRIBER/CLIN PHARMACIST DOCUMENTED: ICD-10-PCS | Mod: CPTII,S$GLB,, | Performed by: ORTHOPAEDIC SURGERY

## 2022-09-08 PROCEDURE — 99999 PR PBB SHADOW E&M-EST. PATIENT-LVL III: CPT | Mod: PBBFAC,,, | Performed by: ORTHOPAEDIC SURGERY

## 2022-09-08 PROCEDURE — 99024 PR POST-OP FOLLOW-UP VISIT: ICD-10-PCS | Mod: S$GLB,,, | Performed by: ORTHOPAEDIC SURGERY

## 2022-09-08 PROCEDURE — 1160F RVW MEDS BY RX/DR IN RCRD: CPT | Mod: CPTII,S$GLB,, | Performed by: ORTHOPAEDIC SURGERY

## 2022-09-08 PROCEDURE — 99024 POSTOP FOLLOW-UP VISIT: CPT | Mod: S$GLB,,, | Performed by: ORTHOPAEDIC SURGERY

## 2022-09-08 PROCEDURE — 3008F PR BODY MASS INDEX (BMI) DOCUMENTED: ICD-10-PCS | Mod: CPTII,S$GLB,, | Performed by: ORTHOPAEDIC SURGERY

## 2022-09-08 PROCEDURE — 99999 PR PBB SHADOW E&M-EST. PATIENT-LVL III: ICD-10-PCS | Mod: PBBFAC,,, | Performed by: ORTHOPAEDIC SURGERY

## 2022-09-08 NOTE — PROGRESS NOTES
CC:  37-year-old female follows up status post left hip arthroscopy with labral debridement.  Date of surgery was 06/22/2022.  She is about 3 months out.  She has finished physical therapy.  She states her pain is dramatically improved from before surgery.  She does still have some intermittent mild pain in the left hip she rates as a 2/10.  She states it is more like a pulling sensation.    Examination of the Left Lower Extremity    Skin is intact throughout  Motor in intact EHL,FHL,TA,naomi  +2 DP/PT  Sensation LT intact D/P/1st    Examination of the Left Hip    C-Sign positive  Logroll negative  Stenchfield negative    Pain with ROM negative    ROM:    Flexion   120  Extension   30  Abduction   45  Adduction   20  External Rotation 45  Internal Rotation 35    Flexion contracture negative    FADIR positive, mild  FADER negative    Tenderness to palpation over lateral and posterolateral greater tochanter negative    Dx:  Status post left hip arthroscopy, stable    Plan:  I discussed with the patient the maybe another 3-6 months till she has completely recovered.  She can continue progress activity as tolerated.  When she has been pain-free for 2-3 weeks that she can ease back into jogging and running.  Follow-up p.r.n..

## 2022-09-08 NOTE — LETTER
September 8, 2022    Subha Bach  [address] [facility] [address]         [address]  [facility] [address]  Phone: [phone] September 8, 2022     Patient: Subha Bach   YOB: 1985   Date of Visit: 9/8/2022       To Whom It May Concern:    It is my medical opinion that Subha Bach may return to full duty immediately with no restrictions.    If you have any questions or concerns, please don't hesitate to call.    Sincerely,      RICHARD Castellano II, MD

## 2022-10-06 ENCOUNTER — OFFICE VISIT (OUTPATIENT)
Dept: PULMONOLOGY | Facility: CLINIC | Age: 37
End: 2022-10-06
Payer: COMMERCIAL

## 2022-10-06 ENCOUNTER — PATIENT MESSAGE (OUTPATIENT)
Dept: PULMONOLOGY | Facility: CLINIC | Age: 37
End: 2022-10-06
Payer: COMMERCIAL

## 2022-10-06 DIAGNOSIS — J45.21 MILD INTERMITTENT ASTHMA WITH ACUTE EXACERBATION: ICD-10-CM

## 2022-10-06 DIAGNOSIS — R09.89 CHRONIC SINUS COMPLAINTS: Primary | ICD-10-CM

## 2022-10-06 PROCEDURE — 99213 OFFICE O/P EST LOW 20 MIN: CPT | Mod: 95,,, | Performed by: NURSE PRACTITIONER

## 2022-10-06 PROCEDURE — 99213 PR OFFICE/OUTPT VISIT, EST, LEVL III, 20-29 MIN: ICD-10-PCS | Mod: 95,,, | Performed by: NURSE PRACTITIONER

## 2022-10-06 RX ORDER — ALBUTEROL SULFATE 90 UG/1
2 AEROSOL, METERED RESPIRATORY (INHALATION) EVERY 4 HOURS PRN
Qty: 18 G | Refills: 11 | Status: SHIPPED | OUTPATIENT
Start: 2022-10-06 | End: 2024-02-02 | Stop reason: SDUPTHER

## 2022-10-06 RX ORDER — PREDNISONE 20 MG/1
TABLET ORAL
Qty: 18 TABLET | Refills: 0 | Status: SHIPPED | OUTPATIENT
Start: 2022-10-06

## 2022-10-06 RX ORDER — ALBUTEROL SULFATE 1.25 MG/3ML
1.25 SOLUTION RESPIRATORY (INHALATION) EVERY 6 HOURS PRN
Qty: 75 ML | Refills: 11 | Status: SHIPPED | OUTPATIENT
Start: 2022-10-06 | End: 2023-10-06

## 2022-10-06 NOTE — PROGRESS NOTES
Established Patient - Audio Only Telehealth Visit     The patient location is: [place]  The chief complaint leading to consultation is: Cough, Chest tightness  Visit type: Virtual visit with audio only (telephone)  Total time spent with patient: 11 minutes        The reason for the audio only service rather than synchronous audio and video virtual visit was related to technical difficulties or patient preference/necessity.  Each patient to whom I provide medical services by telemedicine is:  (1) informed of the relationship between the physician and patient and the respective role of any other health care provider with respect to management of the patient; and (2) notified that they may decline to receive medical services by telemedicine and may withdraw from such care at any time. Patient verbally consented to receive this service via voice-only telephone call.  This service was not originating from a related E/M service provided within the previous 7 days nor will  to an E/M service or procedure within the next 24 hours or my soonest available appointment.  Prevailing standard of care was able to be met in this audio-only visit.          HPI:    10/6/2022:  Onset of chest tightness and shortness of breath over the last two weeks - consistent, no improvement. Denies wheezing, however cough is present at night time. Nonproductive mostly, however does endorse small amount of clear mucous in the morning. Took Albuterol inhaler and Albuterol nebulized treatment without benefit. Completed Prednisone regiment approx one month ago for migraines (dose pack) - states typically takes two regiments per year.  Not currently on maintenance therapy. Uses only rescue inhaler as asthma flares have mostly been exercise induced in the past.   Prior hysterectomy.       06/07/2022- pt feels chest tight occasionally, about 2x/month.   Has allergies- takes allegra (switched from zyrtec) and flonase  Occasional night arousals  maybe once/month.  Asthma Notices seasonal worsening and worse w/ heavy exercise  Had sinus infection last month, got inhaler which helps symptoms. Has not had bronchitis in a long time. Had hysterectomy due to severe menorrhagia requiring transfusions. Now SOB is much better.  Has labral tear of hip causing her to be unable to exercise, pending surgery.  FH mother has severe asthma, sister hospitalized w/ H1N1 and subsequent breathing issues  Uncle is a pulmonologist in [place]  Patient Instructions   Continue albuterol as needed  Continue allegra, flonase nasal spray  Add astelin nasal spray as needed for allergies, postnasal drip    9/2/20-   You are very anemic on labs in March- this can explain your symptoms  Go to the lab and get blood tests  Will follow up labs and likely send you to a hematologist for further management  Albuterol inhaler reordered if you need it  Patient is a 35-year-old female with migraines, fibromyalgia and GERD presenting for new evaluation of shortness of breath. Pt reports illness with fatigue, headaches with SOB for about 7-10 days. She was tested for COVID which was negative at the time. She has hx of asthma and used albuterol when she was sick- not using inhalers lately and denies wheezing. Asthma has been well controlled as an adult. Initially dx at age 9 or 10 after a bad episode of bronchitis and tx at home with nebulizers. SOB has persisted since then which is constant, nothing makes it better, walking makes it worse. Feels like someone sitting on her chest when active. She used to be a competitive swimmer. 2 wks ago went to beach with her daughter in [place] and had such SOB walking about 100 yards she almost passed out so this is when she decided to get more evaluation.  She did not know she is very anemic. She has very heavy periods, worse since having daughter 7 yrs ago. Has fibroids. She has nexplanon implant which hasn't really helped.   She follows with neuro for migraines and  fibromyalgia. Migraines are very severe, happen daily and better than they were at age 13. She takes an injection monthly which she just started for migraines.    The chief complaint problem is new to me; previously seen per Dr. Lee    [facility]:  Past Medical History:   Diagnosis Date    Asthma     Encounter for blood transfusion     Gestational diabetes     gestational diabetes    Migraine headache          Past Surgical History:   Procedure Laterality Date    ARTHROSCOPY, HIP Left 6/22/2022    Procedure: ARTHROSCOPY, HIP, WITH LABRUM DEBRIDEMENT;  Surgeon: Joselo Henderson II, MD;  Location: [facility] OR;  Service: Orthopedics;  Laterality: Left;    LAPAROSCOPIC SALPINGECTOMY Bilateral 10/19/2020    Procedure: SALPINGECTOMY, LAPAROSCOPIC;  Surgeon: Yonas Walsh MD;  Location: [facility] OR;  Service: OB/GYN;  Laterality: Bilateral;  Possible Overnight  Claudia Cabrera MD to assist    LAPAROSCOPIC TOTAL HYSTERECTOMY N/A 10/19/2020    Procedure: HYSTERECTOMY, TOTAL, LAPAROSCOPIC;  Surgeon: Yonas Walsh MD;  Location: [facility] OR;  Service: OB/GYN;  Laterality: N/A;    REMOVAL OF IMPLANT Left 10/19/2020    Procedure: REMOVAL, IMPLANT;  Surgeon: Yonas Walsh MD;  Location: [facility] OR;  Service: OB/GYN;  Laterality: Left;  Nextplan birthcontrol implant removal    SHOULDER SURGERY       Social History     Tobacco Use    Smoking status: Never    Smokeless tobacco: Never   Substance Use Topics    Alcohol use: Yes     Comment: Martini every 6 months    Drug use: No     Family History   Problem Relation Age of Onset    Asthma Mother     Migraines Mother     Hypothyroidism Mother     Arthritis Mother     Migraines Father     Allergies Father     Chiari malformation Sister     Migraines Sister     Pancreatitis Maternal Grandmother     Migraines Maternal Grandmother     Thyroid disease Maternal Grandmother     Other Maternal Grandmother         pituitary gland issues    Heart failure Maternal Grandfather     Diabetes Maternal  Grandfather     Kidney disease Maternal Grandfather     Alzheimer's disease Paternal Grandmother     Prostate cancer Paternal Grandfather      Review of patient's allergies indicates:   Allergen Reactions    Nubain [nalbuphine] Hives       Performance Status:The patient's activity level is functions out of house.      Review of Systems:  a review of eleven systems covering constitutional, Eye, HEENT, Psych, Respiratory, Cardiac, GI, , Musculoskeletal, Endocrine, Dermatologic was negative except for pertinent findings as listed ABOVE and below:  All negative with pertinent positives as above       Exam:Physical Exam limited as appointment virtual, audio only.     Radiographs (ct chest and cxr) reviewed: view by direct vision     CXR 6/3/22- clear  CXR 3/23/20- clear lungs    Lab Results   Component Value Date    WBC 13.27 (H) 06/07/2022    HGB 13.4 06/07/2022    HCT 41.4 06/07/2022    MCV 88 06/07/2022     06/07/2022       CMP  Sodium   Date Value Ref Range Status   06/07/2022 139 136 - 145 mmol/L Final     Potassium   Date Value Ref Range Status   06/07/2022 3.7 3.5 - 5.1 mmol/L Final     Chloride   Date Value Ref Range Status   06/07/2022 105 95 - 110 mmol/L Final     CO2   Date Value Ref Range Status   06/07/2022 22 (L) 23 - 29 mmol/L Final     Glucose   Date Value Ref Range Status   06/07/2022 72 70 - 110 mg/dL Final     BUN   Date Value Ref Range Status   06/07/2022 12 6 - 20 mg/dL Final     Creatinine   Date Value Ref Range Status   06/07/2022 0.8 0.5 - 1.4 mg/dL Final     Calcium   Date Value Ref Range Status   06/07/2022 9.8 8.7 - 10.5 mg/dL Final     Total Protein   Date Value Ref Range Status   11/20/2020 8.8 (H) 6.0 - 8.4 g/dL Final     Albumin   Date Value Ref Range Status   11/20/2020 4.4 3.5 - 5.2 g/dL Final     Total Bilirubin   Date Value Ref Range Status   11/20/2020 0.2 0.1 - 1.0 mg/dL Final     Comment:     For infants and newborns, interpretation of results should be based  on gestational  age, weight and in agreement with clinical  observations.  Premature Infant recommended reference ranges:  Up to 24 hours.............<8.0 mg/dL  Up to 48 hours............<12.0 mg/dL  3-5 days..................<15.0 mg/dL  6-29 days.................<15.0 mg/dL       Alkaline Phosphatase   Date Value Ref Range Status   11/20/2020 98 55 - 135 U/L Final     AST   Date Value Ref Range Status   11/20/2020 17 10 - 40 U/L Final     ALT   Date Value Ref Range Status   11/20/2020 23 10 - 44 U/L Final     Anion Gap   Date Value Ref Range Status   06/07/2022 12 8 - 16 mmol/L Final     eGFR if    Date Value Ref Range Status   06/07/2022 >60 >60 mL/min/1.73 m^2 Final     eGFR if non    Date Value Ref Range Status   06/07/2022 >60 >60 mL/min/1.73 m^2 Final     Comment:     Calculation used to obtain the estimated glomerular filtration  rate (eGFR) is the CKD-EPI equation.              PFT results reviewed 10/8/2022  Normal spirometry. Airflow is not clearly improved after bronchodilator. Clinical improvement following bronchodilator therapy may occur in the absence of spirometric improvement. Normal lung volumes. The diffusing capacity for carbon monoxide is normal (unadjusted for hemoglobin)    Plan:  Clinical impression is resonably certain and repeated evaluation prn +/- follow up will be needed as below. Mild intermittent asthma    Diagnoses and all orders for this visit:    Chronic sinus complaints    Mild intermittent asthma with acute exacerbation  -     predniSONE (DELTASONE) 20 MG tablet; Take three pills for three days, two pills for three days, one pill for three days.  -     X-Ray Chest PA And Lateral; Future  -     albuterol (PROVENTIL/VENTOLIN HFA) 90 mcg/actuation inhaler; Inhale 2 puffs into the lungs every 4 (four) hours as needed for Wheezing or Shortness of Breath.  -     albuterol (ACCUNEB) 1.25 mg/3 mL Nebu; Take 3 mLs (1.25 mg total) by nebulization every 6 (six) hours as  needed. Rescue        Follow up in about 6 weeks (around 11/17/2022), or if symptoms worsen or fail to improve.    Discussed with patient above for education the following:      Patient Instructions   Needs stricter asthma control at this time.    Would likely benefit from ICS - will give sample of Trelegy. This inhaler contains an inhaled steroid component. Rinse mouth after each use due to risk for thrush development. If mouth or tongue develops white sores please contact the clinic and I will order a prescription mouth wash.     Continue to use albuterol as needed for shortness of breath, wheezing, cough.    Prednisone - take as prescribed.    Chest xray if symptoms not improving.    Continue current medication regiment. Keep follow up appointment as scheduled. Please call the office if you have any questions or concerns.

## 2022-10-06 NOTE — PATIENT INSTRUCTIONS
Needs stricter asthma control at this time.    Would likely benefit from ICS - will give sample of Trelegy. This inhaler contains an inhaled steroid component. Rinse mouth after each use due to risk for thrush development. If mouth or tongue develops white sores please contact the clinic and I will order a prescription mouth wash.     Continue to use albuterol as needed for shortness of breath, wheezing, cough.    Prednisone - take as prescribed.    Chest xray if symptoms not improving.    Continue current medication regiment. Keep follow up appointment as scheduled. Please call the office if you have any questions or concerns.

## 2022-11-04 ENCOUNTER — OFFICE VISIT (OUTPATIENT)
Dept: FAMILY MEDICINE | Facility: CLINIC | Age: 37
End: 2022-11-04
Payer: COMMERCIAL

## 2022-11-04 VITALS
OXYGEN SATURATION: 96 % | HEART RATE: 96 BPM | BODY MASS INDEX: 30.93 KG/M2 | HEIGHT: 66 IN | WEIGHT: 192.44 LBS | RESPIRATION RATE: 18 BRPM | DIASTOLIC BLOOD PRESSURE: 78 MMHG | SYSTOLIC BLOOD PRESSURE: 100 MMHG

## 2022-11-04 DIAGNOSIS — Z00.00 ROUTINE GENERAL MEDICAL EXAMINATION AT A HEALTH CARE FACILITY: Primary | ICD-10-CM

## 2022-11-04 PROCEDURE — 3008F PR BODY MASS INDEX (BMI) DOCUMENTED: ICD-10-PCS | Mod: CPTII,S$GLB,, | Performed by: STUDENT IN AN ORGANIZED HEALTH CARE EDUCATION/TRAINING PROGRAM

## 2022-11-04 PROCEDURE — 1160F PR REVIEW ALL MEDS BY PRESCRIBER/CLIN PHARMACIST DOCUMENTED: ICD-10-PCS | Mod: CPTII,S$GLB,, | Performed by: STUDENT IN AN ORGANIZED HEALTH CARE EDUCATION/TRAINING PROGRAM

## 2022-11-04 PROCEDURE — 99999 PR PBB SHADOW E&M-EST. PATIENT-LVL IV: ICD-10-PCS | Mod: PBBFAC,,, | Performed by: STUDENT IN AN ORGANIZED HEALTH CARE EDUCATION/TRAINING PROGRAM

## 2022-11-04 PROCEDURE — 3008F BODY MASS INDEX DOCD: CPT | Mod: CPTII,S$GLB,, | Performed by: STUDENT IN AN ORGANIZED HEALTH CARE EDUCATION/TRAINING PROGRAM

## 2022-11-04 PROCEDURE — 99395 PR PREVENTIVE VISIT,EST,18-39: ICD-10-PCS | Mod: S$GLB,,, | Performed by: STUDENT IN AN ORGANIZED HEALTH CARE EDUCATION/TRAINING PROGRAM

## 2022-11-04 PROCEDURE — 3074F PR MOST RECENT SYSTOLIC BLOOD PRESSURE < 130 MM HG: ICD-10-PCS | Mod: CPTII,S$GLB,, | Performed by: STUDENT IN AN ORGANIZED HEALTH CARE EDUCATION/TRAINING PROGRAM

## 2022-11-04 PROCEDURE — 3078F PR MOST RECENT DIASTOLIC BLOOD PRESSURE < 80 MM HG: ICD-10-PCS | Mod: CPTII,S$GLB,, | Performed by: STUDENT IN AN ORGANIZED HEALTH CARE EDUCATION/TRAINING PROGRAM

## 2022-11-04 PROCEDURE — 1160F RVW MEDS BY RX/DR IN RCRD: CPT | Mod: CPTII,S$GLB,, | Performed by: STUDENT IN AN ORGANIZED HEALTH CARE EDUCATION/TRAINING PROGRAM

## 2022-11-04 PROCEDURE — 3074F SYST BP LT 130 MM HG: CPT | Mod: CPTII,S$GLB,, | Performed by: STUDENT IN AN ORGANIZED HEALTH CARE EDUCATION/TRAINING PROGRAM

## 2022-11-04 PROCEDURE — 1159F PR MEDICATION LIST DOCUMENTED IN MEDICAL RECORD: ICD-10-PCS | Mod: CPTII,S$GLB,, | Performed by: STUDENT IN AN ORGANIZED HEALTH CARE EDUCATION/TRAINING PROGRAM

## 2022-11-04 PROCEDURE — 3078F DIAST BP <80 MM HG: CPT | Mod: CPTII,S$GLB,, | Performed by: STUDENT IN AN ORGANIZED HEALTH CARE EDUCATION/TRAINING PROGRAM

## 2022-11-04 PROCEDURE — 99395 PREV VISIT EST AGE 18-39: CPT | Mod: S$GLB,,, | Performed by: STUDENT IN AN ORGANIZED HEALTH CARE EDUCATION/TRAINING PROGRAM

## 2022-11-04 PROCEDURE — 1159F MED LIST DOCD IN RCRD: CPT | Mod: CPTII,S$GLB,, | Performed by: STUDENT IN AN ORGANIZED HEALTH CARE EDUCATION/TRAINING PROGRAM

## 2022-11-04 PROCEDURE — 99999 PR PBB SHADOW E&M-EST. PATIENT-LVL IV: CPT | Mod: PBBFAC,,, | Performed by: STUDENT IN AN ORGANIZED HEALTH CARE EDUCATION/TRAINING PROGRAM

## 2022-11-04 RX ORDER — ONDANSETRON 4 MG/1
TABLET, ORALLY DISINTEGRATING ORAL
COMMUNITY
Start: 2022-10-17

## 2022-11-04 NOTE — PATIENT INSTRUCTIONS
"Generic Health Advice:  I recommend a heart healthy diet rich in fiber, fresh vegetables and fruit and low in saturated fats (fried foods, red meat, etc.).  The Mediterranean diet is probably the best for your health with a plant based diet high in nuts, beans, olive oil and a few servings a week of fish. You do not need to eat a meat at every meal to be healthy!    I also recommend regular exercise. There are targets set by the national cardiology society including a minimum of 150 minutes of cardio exercise per week. Strength exercises are also important.  2-30 minute workouts of strength training like light weights, pilates, etc.  You should work toward a body mass index of < 25.    If you are not doing any exercise right now, don't start out with these high amounts. Start slow and avoid soreness. The goal is consistency not overdoing it once. Try some body squats, pushups, brisk walking and work your way up. I am a fan of "micro workouts" spread throughout the day. These consist of doing just a few minutes of exercise periodically throughout the day when you have time. An example would be doing some half standing pushups on a stable counter or other service during a break at work.     Flexibility and balance training are often overlooked. I recommended stretching at least 3-4 times/week to prevent injuries, especially in your back. I also recommend training your balance. Yoga often has balance training included, but you can do it without a formal program. While on a soft surface with something you can sit back on if you fall like a couch, try standing on 1 leg with your eyes fixed on 1 point on the floor. Switch legs and repeat. Try doing this at least several times a week.               Gilberto Mascorro,     If you are due for any health screening(s) below please notify me so we can arrange them to be ordered and scheduled to maintain your health. Most healthy patients complete it. Don't lose out on improving your " health.     All of your core healthy metrics are met.

## 2022-11-04 NOTE — PROGRESS NOTES
"[facility] CLINIC NOTE    Patient Name: Subha Bach  YOB: 1985    PRESENTING HISTORY     History of Present Illness:  Ms. Subha Bach is a 37 y.o. female     PMHx: fibromyalgia, chronic migraine. Exercise induced asthma.   Surg: hysterectomy for menorrhagia, ovaries spared. Hip surgery in June for labral tear. 2 L shoulder surgeries.   Fhx: no breast cancer. No colon cancer.    Social: no tobacco.    Exercise: prior to hip surgery was heavy weight , triathlete. Former competitive swimmer.     ROS      OBJECTIVE:   Vital Signs:  Vitals:    11/04/22 1117   BP: 100/78   Pulse: 96   Resp: 18   SpO2: 96%   Weight: 87.3 kg (192 lb 7.4 oz)   Height: 5' 6" (1.676 m)         Physical Exam  Vitals and nursing note reviewed.   Constitutional:       General: She is not in acute distress.     Appearance: She is not toxic-appearing or diaphoretic.   HENT:      Head: Normocephalic and atraumatic.      Right Ear: External ear normal.      Left Ear: External ear normal.   Eyes:      General: No scleral icterus.     Conjunctiva/sclera: Conjunctivae normal.      Pupils: Pupils are equal, round, and reactive to light.   Neck:      Thyroid: No thyromegaly.   Cardiovascular:      Rate and Rhythm: Normal rate and regular rhythm.      Heart sounds: Normal heart sounds. No murmur heard.  Pulmonary:      Effort: Pulmonary effort is normal. No respiratory distress.      Breath sounds: Normal breath sounds. No wheezing or rales.   Musculoskeletal:         General: No tenderness or deformity. Normal range of motion.      Cervical back: Normal range of motion and neck supple.      Right lower leg: No edema.      Left lower leg: No edema.   Lymphadenopathy:      Cervical: No cervical adenopathy.   Skin:     General: Skin is warm and dry.      Findings: No erythema or rash.   Neurological:      Mental Status: She is alert and oriented to person, place, and time.      Gait: Gait is intact.   Psychiatric:    "      Mood and Affect: Mood and affect normal.         Cognition and Memory: Memory normal.         Judgment: Judgment normal.       ASSESSMENT & PLAN:     Routine general medical examination at a health care facility      37 F here to establish care.     She has had a number of health problems over the last few years but now her health is fairly good.     Asthma is controlled, anemia has resolved, migraines are controlled.   Orthopedic issues are healing.     She already got her flu shot.     Routine CV and cancer screenings.     Resume exercise cautiously to moderate level.         Sathish Hamilton MD   Internal Medicine

## 2022-12-09 ENCOUNTER — PATIENT MESSAGE (OUTPATIENT)
Dept: ORTHOPEDICS | Facility: CLINIC | Age: 37
End: 2022-12-09
Payer: COMMERCIAL

## 2022-12-12 ENCOUNTER — PATIENT MESSAGE (OUTPATIENT)
Dept: FAMILY MEDICINE | Facility: CLINIC | Age: 37
End: 2022-12-12
Payer: COMMERCIAL

## 2022-12-12 NOTE — TELEPHONE ENCOUNTER
Call placed to patient to assist with scheduling an appointment related to spot on wrist. Offered appointment on Wednesday 12-14-22, patient declined. States earliest she will be available to come in for an appointment is on 12-16-22 depending on the migraine she is currently experiencing. Appointment scheduled for 12-19-22. Patient agreed to appointment date, time, and location.

## 2023-01-16 ENCOUNTER — PATIENT MESSAGE (OUTPATIENT)
Dept: PULMONOLOGY | Facility: CLINIC | Age: 38
End: 2023-01-16
Payer: COMMERCIAL

## 2023-01-20 DIAGNOSIS — J45.50 SEVERE PERSISTENT ASTHMA WITHOUT COMPLICATION: Primary | ICD-10-CM

## 2023-01-20 RX ORDER — FLUTICASONE FUROATE, UMECLIDINIUM BROMIDE AND VILANTEROL TRIFENATATE 200; 62.5; 25 UG/1; UG/1; UG/1
1 POWDER RESPIRATORY (INHALATION) DAILY
Qty: 60 EACH | Refills: 11 | Status: SHIPPED | OUTPATIENT
Start: 2023-01-20 | End: 2024-02-02 | Stop reason: SDUPTHER

## 2023-05-09 ENCOUNTER — HOSPITAL ENCOUNTER (OUTPATIENT)
Dept: RADIOLOGY | Facility: HOSPITAL | Age: 38
Discharge: HOME | End: 2023-05-09
Attending: ORTHOPAEDIC SURGERY
Payer: COMMERCIAL

## 2023-05-09 ENCOUNTER — OFFICE VISIT (OUTPATIENT)
Dept: ORTHOPEDICS | Facility: CLINIC | Age: 38
End: 2023-05-09
Payer: COMMERCIAL

## 2023-05-09 VITALS — BODY MASS INDEX: 30.86 KG/M2 | WEIGHT: 192 LBS | HEIGHT: 66 IN | RESPIRATION RATE: 16 BRPM

## 2023-05-09 DIAGNOSIS — S99.929A INJURY OF NAIL BED OF TOE: Primary | ICD-10-CM

## 2023-05-09 DIAGNOSIS — S99.921A INJURY OF RIGHT GREAT TOE, INITIAL ENCOUNTER: Primary | ICD-10-CM

## 2023-05-09 DIAGNOSIS — S99.921A INJURY OF RIGHT GREAT TOE, INITIAL ENCOUNTER: ICD-10-CM

## 2023-05-09 PROCEDURE — 73660 X-RAY EXAM OF TOE(S): CPT | Mod: TC,PO,RT

## 2023-05-09 PROCEDURE — 1159F MED LIST DOCD IN RCRD: CPT | Mod: CPTII,S$GLB,, | Performed by: ORTHOPAEDIC SURGERY

## 2023-05-09 PROCEDURE — 3008F PR BODY MASS INDEX (BMI) DOCUMENTED: ICD-10-PCS | Mod: CPTII,S$GLB,, | Performed by: ORTHOPAEDIC SURGERY

## 2023-05-09 PROCEDURE — 99999 PR PBB SHADOW E&M-EST. PATIENT-LVL III: CPT | Mod: PBBFAC,,, | Performed by: ORTHOPAEDIC SURGERY

## 2023-05-09 PROCEDURE — 99213 PR OFFICE/OUTPT VISIT, EST, LEVL III, 20-29 MIN: ICD-10-PCS | Mod: S$GLB,,, | Performed by: ORTHOPAEDIC SURGERY

## 2023-05-09 PROCEDURE — 3008F BODY MASS INDEX DOCD: CPT | Mod: CPTII,S$GLB,, | Performed by: ORTHOPAEDIC SURGERY

## 2023-05-09 PROCEDURE — 99213 OFFICE O/P EST LOW 20 MIN: CPT | Mod: S$GLB,,, | Performed by: ORTHOPAEDIC SURGERY

## 2023-05-09 PROCEDURE — 73660 XR TOE 2 OR MORE VIEWS RIGHT: ICD-10-PCS | Mod: 26,RT,, | Performed by: RADIOLOGY

## 2023-05-09 PROCEDURE — 99999 PR PBB SHADOW E&M-EST. PATIENT-LVL III: ICD-10-PCS | Mod: PBBFAC,,, | Performed by: ORTHOPAEDIC SURGERY

## 2023-05-09 PROCEDURE — 1159F PR MEDICATION LIST DOCUMENTED IN MEDICAL RECORD: ICD-10-PCS | Mod: CPTII,S$GLB,, | Performed by: ORTHOPAEDIC SURGERY

## 2023-05-09 PROCEDURE — 73660 X-RAY EXAM OF TOE(S): CPT | Mod: 26,RT,, | Performed by: RADIOLOGY

## 2023-05-09 RX ORDER — ERENUMAB-AOOE 140 MG/ML
INJECTION, SOLUTION SUBCUTANEOUS
COMMUNITY
Start: 2023-04-20

## 2023-05-09 NOTE — PROGRESS NOTES
Patient ID: Subha Bach is a 37 y.o. female    Chief Complaint:   Chief Complaint   Patient presents with    Right Foot - Injury, Pain       History of Present Illness:    Pleasant 37-year-old female here for evaluation of right big toe.  She yesterday was closing a door and jammed her toe in the door.  She had immediate pain.  She had some serous drainage from her toe.  Her main complaint is pain as well as numbness and tingling sensation in the toe.  She is been doing wraps with Coban.  She is wearing sandals today.    PAST MEDICAL HISTORY:   Past Medical History:   Diagnosis Date    Asthma     Encounter for blood transfusion     Gestational diabetes     gestational diabetes    Migraine headache      PAST SURGICAL HISTORY:   Past Surgical History:   Procedure Laterality Date    ARTHROSCOPY, HIP Left 6/22/2022    Procedure: ARTHROSCOPY, HIP, WITH LABRUM DEBRIDEMENT;  Surgeon: Joselo Henderson II, MD;  Location: [facility] OR;  Service: Orthopedics;  Laterality: Left;    LAPAROSCOPIC SALPINGECTOMY Bilateral 10/19/2020    Procedure: SALPINGECTOMY, LAPAROSCOPIC;  Surgeon: Yonas Walsh MD;  Location: [facility] OR;  Service: OB/GYN;  Laterality: Bilateral;  Possible Overnight  Claudia Cabrera MD to assist    LAPAROSCOPIC TOTAL HYSTERECTOMY N/A 10/19/2020    Procedure: HYSTERECTOMY, TOTAL, LAPAROSCOPIC;  Surgeon: Yonas Walsh MD;  Location: [facility] OR;  Service: OB/GYN;  Laterality: N/A;    REMOVAL OF IMPLANT Left 10/19/2020    Procedure: REMOVAL, IMPLANT;  Surgeon: Yonas Walsh MD;  Location: [facility] OR;  Service: OB/GYN;  Laterality: Left;  Nextplan birthcontrol implant removal    SHOULDER SURGERY       FAMILY HISTORY:   Family History   Problem Relation Age of Onset    Asthma Mother     Migraines Mother     Hypothyroidism Mother     Arthritis Mother     Migraines Father     Allergies Father     Chiari malformation Sister     Migraines Sister     Pancreatitis Maternal Grandmother     Migraines Maternal Grandmother      Thyroid disease Maternal Grandmother     Other Maternal Grandmother         pituitary gland issues    Heart failure Maternal Grandfather     Diabetes Maternal Grandfather     Kidney disease Maternal Grandfather     Alzheimer's disease Paternal Grandmother     Prostate cancer Paternal Grandfather      SOCIAL HISTORY:   Social History     Occupational History    Not on file   Tobacco Use    Smoking status: Never    Smokeless tobacco: Never   Substance and Sexual Activity    Alcohol use: Yes     Comment: Martini every 6 months    Drug use: No    Sexual activity: Yes     Partners: Male        MEDICATIONS:   Current Outpatient Medications:     AIMOVIG AUTOINJECTOR 140 mg/mL autoinjector, Inject into the skin., Disp: , Rfl:     albuterol (ACCUNEB) 1.25 mg/3 mL Nebu, Take 3 mLs (1.25 mg total) by nebulization every 6 (six) hours as needed. Rescue, Disp: 75 mL, Rfl: 11    albuterol (PROVENTIL/VENTOLIN HFA) 90 mcg/actuation inhaler, Inhale 2 puffs into the lungs every 4 (four) hours as needed for Wheezing or Shortness of Breath., Disp: 18 g, Rfl: 11    azelastine (ASTELIN) 137 mcg (0.1 %) nasal spray, 1 spray (137 mcg total) by Nasal route 2 (two) times daily., Disp: 30 mL, Rfl: 11    eletriptan (RELPAX) 40 MG tablet, Take 40 mg by mouth as needed. may repeat in 2 hours if necessary, Disp: , Rfl:     fluticasone-umeclidin-vilanter (TRELEGY ELLIPTA) 200-62.5-25 mcg inhaler, Inhale 1 puff into the lungs once daily., Disp: 60 each, Rfl: 11    fremanezumab-vfrm (AJOVY AUTOINJECTOR) 225 mg/1.5 mL autoinjector, ADMINISTER 1.5 ML UNDER THE SKIN EVERY MONTH, Disp: , Rfl:     gabapentin (NEURONTIN) 600 MG tablet, Take 600 mg by mouth 2 (two) times daily., Disp: , Rfl:     galcanezumab-gnlm (EMGALITY PEN) 120 mg/mL PnIj, Inject 120 mg into the skin every 28 days., Disp: , Rfl:     ondansetron (ZOFRAN-ODT) 4 MG TbDL, Take by mouth., Disp: , Rfl:     predniSONE (DELTASONE) 20 MG tablet, Take three pills for three days, two pills for  three days, one pill for three days., Disp: 18 tablet, Rfl: 0    tiZANidine (ZANAFLEX) 4 MG tablet, Take 4 mg by mouth every evening. May take up to 3 tablets, Disp: , Rfl:     fexofenadine (ALLEGRA) 180 MG tablet, Take 1 tablet (180 mg total) by mouth once daily., Disp: 30 tablet, Rfl: 0    venlafaxine (EFFEXOR-XR) 150 MG Cp24, Take 1 capsule (150 mg total) by mouth once daily., Disp: 30 capsule, Rfl: 4  ALLERGIES:   Review of patient's allergies indicates:   Allergen Reactions    Nubain [nalbuphine] Hives         Physical Exam     Vitals:    05/09/23 0947   Resp: 16     Alert and oriented to person, place and time. No acute distress. Well-groomed, not ill appearing. Pupils round and reactive, normal respiratory effort, no audible wheezing.     On exam she has mild swelling of the right toe.  She has pain to toenail so unable to assess for subungual hematoma but there is some mild drainage laterally underneath the nail plate.  There is no signs or symptoms of infection.  There is no plantar ecchymosis.  There is global tenderness to palpation along the distal phalanx        Imaging:       X-Ray: I have reviewed all pertinent results/findings and my personal findings are:  Right foot x-rays negative for fracture.      Assessment & Plan    Injury of nail bed of toe         Treatment options discussed with her in detail.  She has negative x-rays.  She has what appears to be a nail bed injury of the toe.  We discussed compression with Coban wraps as well as servando taping.  We discussed hard-soled shoes.  I offered her a postop shoe however she declined.  We discussed that this will take a few days/weeks to improve.  We also discussed soaks.  No concern for infection at this time  Swelling and numbness is certainly normal based on the trauma.  She is going to let us know she does not improve.

## 2023-08-03 DIAGNOSIS — Z12.31 OTHER SCREENING MAMMOGRAM: ICD-10-CM

## 2023-08-10 ENCOUNTER — PATIENT MESSAGE (OUTPATIENT)
Dept: ADMINISTRATIVE | Facility: HOSPITAL | Age: 38
End: 2023-08-10
Payer: COMMERCIAL

## 2024-02-02 ENCOUNTER — OFFICE VISIT (OUTPATIENT)
Dept: FAMILY MEDICINE | Facility: CLINIC | Age: 39
End: 2024-02-02
Payer: MEDICAID

## 2024-02-02 VITALS
SYSTOLIC BLOOD PRESSURE: 125 MMHG | TEMPERATURE: 98 F | WEIGHT: 194 LBS | DIASTOLIC BLOOD PRESSURE: 71 MMHG | RESPIRATION RATE: 16 BRPM | HEIGHT: 66 IN | BODY MASS INDEX: 31.18 KG/M2

## 2024-02-02 DIAGNOSIS — Z00.00 ROUTINE GENERAL MEDICAL EXAMINATION AT A HEALTH CARE FACILITY: Primary | ICD-10-CM

## 2024-02-02 DIAGNOSIS — J45.50 SEVERE PERSISTENT ASTHMA WITHOUT COMPLICATION: ICD-10-CM

## 2024-02-02 DIAGNOSIS — K21.9 GASTROESOPHAGEAL REFLUX DISEASE, UNSPECIFIED WHETHER ESOPHAGITIS PRESENT: ICD-10-CM

## 2024-02-02 DIAGNOSIS — J45.21 MILD INTERMITTENT ASTHMA WITH ACUTE EXACERBATION: ICD-10-CM

## 2024-02-02 DIAGNOSIS — M79.7 FIBROMYALGIA: ICD-10-CM

## 2024-02-02 PROCEDURE — 3074F SYST BP LT 130 MM HG: CPT | Mod: CPTII,S$GLB,, | Performed by: STUDENT IN AN ORGANIZED HEALTH CARE EDUCATION/TRAINING PROGRAM

## 2024-02-02 PROCEDURE — 1159F MED LIST DOCD IN RCRD: CPT | Mod: CPTII,S$GLB,, | Performed by: STUDENT IN AN ORGANIZED HEALTH CARE EDUCATION/TRAINING PROGRAM

## 2024-02-02 PROCEDURE — 99999 PR PBB SHADOW E&M-EST. PATIENT-LVL IV: CPT | Mod: PBBFAC,,, | Performed by: STUDENT IN AN ORGANIZED HEALTH CARE EDUCATION/TRAINING PROGRAM

## 2024-02-02 PROCEDURE — 99214 OFFICE O/P EST MOD 30 MIN: CPT | Mod: PBBFAC,PO | Performed by: STUDENT IN AN ORGANIZED HEALTH CARE EDUCATION/TRAINING PROGRAM

## 2024-02-02 PROCEDURE — 3008F BODY MASS INDEX DOCD: CPT | Mod: CPTII,S$GLB,, | Performed by: STUDENT IN AN ORGANIZED HEALTH CARE EDUCATION/TRAINING PROGRAM

## 2024-02-02 PROCEDURE — 99396 PREV VISIT EST AGE 40-64: CPT | Mod: S$GLB,,, | Performed by: STUDENT IN AN ORGANIZED HEALTH CARE EDUCATION/TRAINING PROGRAM

## 2024-02-02 PROCEDURE — 1160F RVW MEDS BY RX/DR IN RCRD: CPT | Mod: CPTII,S$GLB,, | Performed by: STUDENT IN AN ORGANIZED HEALTH CARE EDUCATION/TRAINING PROGRAM

## 2024-02-02 PROCEDURE — 3078F DIAST BP <80 MM HG: CPT | Mod: CPTII,S$GLB,, | Performed by: STUDENT IN AN ORGANIZED HEALTH CARE EDUCATION/TRAINING PROGRAM

## 2024-02-02 RX ORDER — ALBUTEROL SULFATE 90 UG/1
2 AEROSOL, METERED RESPIRATORY (INHALATION) EVERY 4 HOURS PRN
Qty: 18 G | Refills: 11 | Status: SHIPPED | OUTPATIENT
Start: 2024-02-02

## 2024-02-02 RX ORDER — SUCRALFATE 1 G/1
1 TABLET ORAL 4 TIMES DAILY
Qty: 40 TABLET | Refills: 0 | Status: SHIPPED | OUTPATIENT
Start: 2024-02-02 | End: 2024-02-12

## 2024-02-02 RX ORDER — FLUTICASONE FUROATE, UMECLIDINIUM BROMIDE AND VILANTEROL TRIFENATATE 200; 62.5; 25 UG/1; UG/1; UG/1
1 POWDER RESPIRATORY (INHALATION) DAILY
Qty: 60 EACH | Refills: 11 | Status: SHIPPED | OUTPATIENT
Start: 2024-02-02

## 2024-02-02 RX ORDER — PANTOPRAZOLE SODIUM 40 MG/1
40 TABLET, DELAYED RELEASE ORAL DAILY
Qty: 30 TABLET | Refills: 0 | Status: SHIPPED | OUTPATIENT
Start: 2024-02-02 | End: 2024-03-06

## 2024-02-02 RX ORDER — MECLIZINE HYDROCHLORIDE 25 MG/1
25 TABLET ORAL
COMMUNITY
Start: 2023-08-14

## 2024-02-02 RX ORDER — DIHYDROERGOTAMINE MESYLATE 4 MG/ML
SPRAY, METERED NASAL
COMMUNITY

## 2024-02-02 NOTE — PATIENT INSTRUCTIONS
Take fiber supplement daily. I recommend benefiber or generic (green top) equivalent. This is colorless, tasteless and dissolves well in liquid.     Drink plenty of water.     If having constipation on fiber, add senna and docusate twice daily (available over the counter). This is safe to take every day if needed.   If still having problems add Miralax or Magnesium Citrate.

## 2024-02-02 NOTE — PROGRESS NOTES
"[facility] MEDICINE CLINIC NOTE    Patient Name: Subha Bach  YOB: 1985    PRESENTING HISTORY     History of Present Illness:  Ms. Subha Bach is a 38 y.o. female here for annual.     Got laid off in the fall    Increase in migraine frequency. Not responding well to medication.   Following with neurology.   But fibromyalgia is improved with emgality.     Asthma- some increase in issues.    Requiring rescue more frequently in summer.     GERD is worsened. Worst since she was age 13.   No recent prednisone.   Her mother is also having GERD symptoms.   Currently takes tums as needed.     No indication for early cancer screenings.   BP at target  Weight is grossly stable since 2022.     ROS      OBJECTIVE:   Vital Signs:  Vitals:    02/02/24 1613   BP: 125/71   Resp: 16   Temp: 98.4 °F (36.9 °C)   TempSrc: Oral   Weight: 88 kg (194 lb 0.1 oz)   Height: 5' 6" (1.676 m)         Physical Exam  Vitals and nursing note reviewed.   Constitutional:       Appearance: She is obese. She is not diaphoretic.   HENT:      Head: Normocephalic and atraumatic.      Right Ear: External ear normal.      Left Ear: External ear normal.   Eyes:      General: No scleral icterus.     Conjunctiva/sclera: Conjunctivae normal.      Pupils: Pupils are equal, round, and reactive to light.   Neck:      Thyroid: No thyromegaly.   Cardiovascular:      Rate and Rhythm: Normal rate and regular rhythm.      Heart sounds: Normal heart sounds. No murmur heard.  Pulmonary:      Effort: Pulmonary effort is normal. No respiratory distress.      Breath sounds: Normal breath sounds. No wheezing or rales.   Abdominal:      General: Abdomen is flat. There is no distension.      Palpations: Abdomen is soft.      Tenderness: There is no abdominal tenderness. There is no guarding or rebound.   Musculoskeletal:         General: No tenderness or deformity. Normal range of motion.      Cervical back: Normal range of motion and neck supple.    "   Right lower leg: No edema.      Left lower leg: No edema.   Lymphadenopathy:      Cervical: No cervical adenopathy.   Skin:     General: Skin is warm and dry.      Findings: No erythema or rash.   Neurological:      Mental Status: She is alert and oriented to person, place, and time.      Gait: Gait is intact.   Psychiatric:         Mood and Affect: Mood and affect normal.         Cognition and Memory: Memory normal.         Judgment: Judgment normal.         ASSESSMENT & PLAN:     Routine general medical examination at a health care facility  -     Lipid Panel; Future; Expected date: 02/02/2024  -     Hemoglobin A1C; Future; Expected date: 02/02/2024  -     Comprehensive Metabolic Panel; Future; Expected date: 02/02/2024  -     CBC Auto Differential; Future; Expected date: 02/02/2024  -     TSH; Future; Expected date: 02/02/2024    Gastroesophageal reflux disease, unspecified whether esophagitis present  -     H. pylori antigen, stool; Future; Expected date: 02/02/2024  -     pantoprazole (PROTONIX) 40 MG tablet; Take 1 tablet (40 mg total) by mouth once daily.  Dispense: 30 tablet; Refill: 0  -     sucralfate (CARAFATE) 1 gram tablet; Take 1 tablet (1 g total) by mouth 4 (four) times daily. for 10 days  Dispense: 40 tablet; Refill: 0    Fibromyalgia  -     C-REACTIVE PROTEIN; Future; Expected date: 02/02/2024  -     Sedimentation rate; Future; Expected date: 02/02/2024    Severe persistent asthma without complication  -     fluticasone-umeclidin-vilanter (TRELEGY ELLIPTA) 200-62.5-25 mcg inhaler; Inhale 1 puff into the lungs once daily.  Dispense: 60 each; Refill: 11    Mild intermittent asthma with acute exacerbation  -     albuterol (PROVENTIL/VENTOLIN HFA) 90 mcg/actuation inhaler; Inhale 2 puffs into the lungs every 4 (four) hours as needed for Wheezing or Shortness of Breath.  Dispense: 18 g; Refill: 11             Sathish Hamilton  Internal Medicine

## 2024-02-05 ENCOUNTER — PATIENT MESSAGE (OUTPATIENT)
Dept: FAMILY MEDICINE | Facility: CLINIC | Age: 39
End: 2024-02-05
Payer: COMMERCIAL

## 2024-02-05 NOTE — TELEPHONE ENCOUNTER
Please see portal message.    [FreeTextEntry1] : Patient is status post an upper endoscopy that revealed mild antritis, small hiatus hernia, and evidence of celiac disease.  He is fairly compliant with his gluten-free diet.  Biopsies revealed preserved villous architecture. He had a colonoscopy in 7/2022.  He did have moderate diverticulosis of the sigmoid colon.  Otherwise the colonoscopy was normal. He does have a mildly elevated PSA and is seeing his urologist.  He is scheduled for an MRI of the prostate.

## 2024-02-08 ENCOUNTER — TELEPHONE (OUTPATIENT)
Dept: FAMILY MEDICINE | Facility: CLINIC | Age: 39
End: 2024-02-08
Payer: COMMERCIAL

## 2024-02-08 NOTE — TELEPHONE ENCOUNTER
Writer received prior authorization notice in Epic for Albuterol Inhaler.   Call placed to [facility], spoke to pharmacist (Sourav) who confirmed patient's insurance covered Albuterol (Venolin) Inhaler with $0 copay.  Patient picked up prescription on 2-5-24.  No prior authorization needed.

## 2024-02-12 ENCOUNTER — LAB VISIT (OUTPATIENT)
Dept: LAB | Facility: HOSPITAL | Age: 39
End: 2024-02-12
Attending: STUDENT IN AN ORGANIZED HEALTH CARE EDUCATION/TRAINING PROGRAM
Payer: COMMERCIAL

## 2024-02-12 DIAGNOSIS — M79.7 FIBROMYALGIA: ICD-10-CM

## 2024-02-12 DIAGNOSIS — Z00.00 ROUTINE GENERAL MEDICAL EXAMINATION AT A HEALTH CARE FACILITY: ICD-10-CM

## 2024-02-12 LAB
ALBUMIN SERPL BCP-MCNC: 3.9 G/DL (ref 3.5–5.2)
ALP SERPL-CCNC: 86 U/L (ref 55–135)
ALT SERPL W/O P-5'-P-CCNC: 29 U/L (ref 10–44)
ANION GAP SERPL CALC-SCNC: 10 MMOL/L (ref 8–16)
AST SERPL-CCNC: 24 U/L (ref 10–40)
BASOPHILS # BLD AUTO: 0.02 K/UL (ref 0–0.2)
BASOPHILS NFR BLD: 0.2 % (ref 0–1.9)
BILIRUB SERPL-MCNC: 0.3 MG/DL (ref 0.1–1)
BUN SERPL-MCNC: 11 MG/DL (ref 6–20)
CALCIUM SERPL-MCNC: 9.9 MG/DL (ref 8.7–10.5)
CHLORIDE SERPL-SCNC: 104 MMOL/L (ref 95–110)
CHOLEST SERPL-MCNC: 215 MG/DL (ref 120–199)
CHOLEST/HDLC SERPL: 5.1 {RATIO} (ref 2–5)
CO2 SERPL-SCNC: 23 MMOL/L (ref 23–29)
CREAT SERPL-MCNC: 1 MG/DL (ref 0.5–1.4)
CRP SERPL-MCNC: 9 MG/L (ref 0–8.2)
DIFFERENTIAL METHOD BLD: NORMAL
EOSINOPHIL # BLD AUTO: 0.1 K/UL (ref 0–0.5)
EOSINOPHIL NFR BLD: 1.6 % (ref 0–8)
ERYTHROCYTE [DISTWIDTH] IN BLOOD BY AUTOMATED COUNT: 13.1 % (ref 11.5–14.5)
ERYTHROCYTE [SEDIMENTATION RATE] IN BLOOD BY WESTERGREN METHOD: 21 MM/HR (ref 0–20)
EST. GFR  (NO RACE VARIABLE): >60 ML/MIN/1.73 M^2
ESTIMATED AVG GLUCOSE: 103 MG/DL (ref 68–131)
GLUCOSE SERPL-MCNC: 93 MG/DL (ref 70–110)
HBA1C MFR BLD: 5.2 % (ref 4–5.6)
HCT VFR BLD AUTO: 41.5 % (ref 37–48.5)
HDLC SERPL-MCNC: 42 MG/DL (ref 40–75)
HDLC SERPL: 19.5 % (ref 20–50)
HGB BLD-MCNC: 13.5 G/DL (ref 12–16)
IMM GRANULOCYTES # BLD AUTO: 0.02 K/UL (ref 0–0.04)
IMM GRANULOCYTES NFR BLD AUTO: 0.2 % (ref 0–0.5)
LDLC SERPL CALC-MCNC: 143 MG/DL (ref 63–159)
LYMPHOCYTES # BLD AUTO: 2.3 K/UL (ref 1–4.8)
LYMPHOCYTES NFR BLD: 28.7 % (ref 18–48)
MCH RBC QN AUTO: 29.6 PG (ref 27–31)
MCHC RBC AUTO-ENTMCNC: 32.5 G/DL (ref 32–36)
MCV RBC AUTO: 91 FL (ref 82–98)
MONOCYTES # BLD AUTO: 0.5 K/UL (ref 0.3–1)
MONOCYTES NFR BLD: 6.7 % (ref 4–15)
NEUTROPHILS # BLD AUTO: 5 K/UL (ref 1.8–7.7)
NEUTROPHILS NFR BLD: 62.6 % (ref 38–73)
NONHDLC SERPL-MCNC: 173 MG/DL
NRBC BLD-RTO: 0 /100 WBC
PLATELET # BLD AUTO: 446 K/UL (ref 150–450)
PMV BLD AUTO: 10.2 FL (ref 9.2–12.9)
POTASSIUM SERPL-SCNC: 4 MMOL/L (ref 3.5–5.1)
PROT SERPL-MCNC: 7.7 G/DL (ref 6–8.4)
RBC # BLD AUTO: 4.56 M/UL (ref 4–5.4)
SODIUM SERPL-SCNC: 137 MMOL/L (ref 136–145)
TRIGL SERPL-MCNC: 150 MG/DL (ref 30–150)
TSH SERPL DL<=0.005 MIU/L-ACNC: 1.06 UIU/ML (ref 0.4–4)
WBC # BLD AUTO: 8.06 K/UL (ref 3.9–12.7)

## 2024-02-12 PROCEDURE — 80061 LIPID PANEL: CPT | Performed by: STUDENT IN AN ORGANIZED HEALTH CARE EDUCATION/TRAINING PROGRAM

## 2024-02-12 PROCEDURE — 83036 HEMOGLOBIN GLYCOSYLATED A1C: CPT | Performed by: STUDENT IN AN ORGANIZED HEALTH CARE EDUCATION/TRAINING PROGRAM

## 2024-02-12 PROCEDURE — 85025 COMPLETE CBC W/AUTO DIFF WBC: CPT | Performed by: STUDENT IN AN ORGANIZED HEALTH CARE EDUCATION/TRAINING PROGRAM

## 2024-02-12 PROCEDURE — 86140 C-REACTIVE PROTEIN: CPT | Performed by: STUDENT IN AN ORGANIZED HEALTH CARE EDUCATION/TRAINING PROGRAM

## 2024-02-12 PROCEDURE — 36415 COLL VENOUS BLD VENIPUNCTURE: CPT | Mod: PO | Performed by: STUDENT IN AN ORGANIZED HEALTH CARE EDUCATION/TRAINING PROGRAM

## 2024-02-12 PROCEDURE — 85651 RBC SED RATE NONAUTOMATED: CPT | Mod: PO | Performed by: STUDENT IN AN ORGANIZED HEALTH CARE EDUCATION/TRAINING PROGRAM

## 2024-02-12 PROCEDURE — 80053 COMPREHEN METABOLIC PANEL: CPT | Performed by: STUDENT IN AN ORGANIZED HEALTH CARE EDUCATION/TRAINING PROGRAM

## 2024-02-12 PROCEDURE — 84443 ASSAY THYROID STIM HORMONE: CPT | Performed by: STUDENT IN AN ORGANIZED HEALTH CARE EDUCATION/TRAINING PROGRAM

## 2024-03-05 DIAGNOSIS — K21.9 GASTROESOPHAGEAL REFLUX DISEASE, UNSPECIFIED WHETHER ESOPHAGITIS PRESENT: ICD-10-CM

## 2024-03-06 RX ORDER — PANTOPRAZOLE SODIUM 40 MG/1
40 TABLET, DELAYED RELEASE ORAL DAILY
Qty: 90 TABLET | Refills: 3 | Status: SHIPPED | OUTPATIENT
Start: 2024-03-06

## 2024-03-06 NOTE — TELEPHONE ENCOUNTER
Refill Routing Note   Medication(s) are not appropriate for processing by [facility] for the following reason(s):        New or recently adjusted medication    [facility] action(s):  Defer               Appointments  past 12m or future 3m with PCP    Date Provider   Last Visit   2/2/2024 Sathish Hamilton MD   Next Visit   Visit date not found Sathish Hamilton MD   ED visits in past 90 days: 0        Note composed:9:58 PM 03/05/2024

## 2024-03-06 NOTE — TELEPHONE ENCOUNTER
No care due was identified.  [facility] Embedded Care Due Messages. Reference number: [id number].   3/05/2024 6:41:28 PM CST

## 2024-07-26 ENCOUNTER — PATIENT MESSAGE (OUTPATIENT)
Dept: FAMILY MEDICINE | Facility: CLINIC | Age: 39
End: 2024-07-26
Payer: COMMERCIAL

## 2024-07-26 DIAGNOSIS — G43.009 MIGRAINE WITHOUT AURA AND WITHOUT STATUS MIGRAINOSUS, NOT INTRACTABLE: Primary | ICD-10-CM

## 2024-08-02 ENCOUNTER — OFFICE VISIT (OUTPATIENT)
Dept: NEUROLOGY | Facility: CLINIC | Age: 39
End: 2024-08-02
Payer: MEDICAID

## 2024-08-02 VITALS
HEART RATE: 106 BPM | SYSTOLIC BLOOD PRESSURE: 114 MMHG | DIASTOLIC BLOOD PRESSURE: 78 MMHG | BODY MASS INDEX: 30.22 KG/M2 | WEIGHT: 188.06 LBS | RESPIRATION RATE: 18 BRPM | HEIGHT: 66 IN

## 2024-08-02 DIAGNOSIS — G43.709 CHRONIC MIGRAINE WITHOUT AURA WITHOUT STATUS MIGRAINOSUS, NOT INTRACTABLE: Primary | ICD-10-CM

## 2024-08-02 DIAGNOSIS — G43.009 MIGRAINE WITHOUT AURA AND WITHOUT STATUS MIGRAINOSUS, NOT INTRACTABLE: ICD-10-CM

## 2024-08-02 DIAGNOSIS — M79.18 MYOFASCIAL PAIN: ICD-10-CM

## 2024-08-02 PROCEDURE — 99215 OFFICE O/P EST HI 40 MIN: CPT | Mod: PBBFAC,PO | Performed by: PHYSICIAN ASSISTANT

## 2024-08-02 PROCEDURE — 99999 PR PBB SHADOW E&M-EST. PATIENT-LVL V: CPT | Mod: PBBFAC,,, | Performed by: PHYSICIAN ASSISTANT

## 2024-08-02 RX ORDER — UBROGEPANT 100 MG/1
TABLET ORAL
Qty: 12 TABLET | Refills: 6 | Status: SHIPPED | OUTPATIENT
Start: 2024-08-02

## 2024-08-02 RX ORDER — RIBOFLAVIN (VITAMIN B2) 400 MG
400 TABLET ORAL DAILY
COMMUNITY

## 2024-08-02 RX ORDER — MAGNESIUM GLUCONATE 27.5 (500)
TABLET ORAL ONCE
COMMUNITY

## 2024-08-02 RX ORDER — VENLAFAXINE HYDROCHLORIDE 150 MG/1
150 CAPSULE, EXTENDED RELEASE ORAL DAILY
Qty: 90 CAPSULE | Refills: 1 | Status: SHIPPED | OUTPATIENT
Start: 2024-08-02 | End: 2025-08-02

## 2024-08-02 NOTE — PROGRESS NOTES
[facility]  [address] [facility] [address]  [place] [address]  Phone:[phone]  Fax: [phone]   New Patient Consultation    Patient Name: Subha Bach  : 1985  MRN:  [id number]  Today: 2024   chief complaint: Headache    PCP: Sathish Hamilton MD.       Assessment:   Subha Bach is a 39 y.o. right handed female with a PMHx of: asthma, FMG, HA and GERD   whom presents solo at the request of the PCP for HA. Appears to have chronic migraine and resistant to a multitude of therapies. Myofascial pain/hx of FMG likely contributes.       Review:    ICD-10-CM ICD-9-CM   1. Chronic migraine without aura without status migrainosus, not intractable  G43.709 346.70   2. Myofascial pain  M79.18 729.1   3. Migraine without aura and without status migrainosus, not intractable  G43.009 346.10         Plan:   Discussed realistic goals of care with patient at length. Discussed medication options, need for lifestyle adjustment. Discussed treatment will take time. Goal will be to reduce frequency/intensity/quantity of HA, not to be completely HA free. Gave copy of [facility] triggers for migraine informational sheet (N.b., a standard I give to patients who come to seek my care in HA clinic, regardless if they have migraines or not) and discussed clinic's non narcotic policy re: HA. Patient voiced understanding and agreement.               -will have patient work on lifestyle           -if HA change in quality/nature, will get updated imaging study             -  potential for teratogenicity with treatment-she has had hysterectomy     For HA Prevention:  1 no BB d/t PMHx of asthma   2 refilled effexor (came to me on this medicine, using for her HA), re discussed adv effects/dosing  3 gabapentin per other providers/zanaflex per other providers-We don't manage FMG here in this clinic  4  Patient meets the criteria for chronic migraine. In summary, She has  migraines >15 days per  month  and last >4 hours if untreated. Specifics of duration, frequency and strength are listed in the HPI (please refer to this section).  This pattern has continued for >3 months.  She has failed at least three preventive medications (full list of medications is listed below in the HPI under Therapies tried in past, but for ease of reference effexor, topamax, zanaflex, MgOx, emgality, B2, gabapentin, wellbutrin, keppra, aimovig, emgality, ajovy, baclofen, etc)  I have therefore recommended a trial of Botox via the PREEMPT protocol for migraine prophylaxis.   We discussed what to expect on procedure day at length, wear old or loose fitting clothing (if possible, merely to keep work clothes from getting any blood or being wrinkled), no make up (if applicable), eat meals/stay well hydrated and secure a ride if necessary. Also, discussed it can take up to 2-3 sessions of botox to get results desired. Lastly, we discussed procedure at length, 31 injections done in the office, potential complications not limited to muscle weakness, respiratory issues, or worst case scenario-death. The patient voiced understanding and wished to move forward.  Muscles to be injected:   10 units divided in 2 sites  Procerus 5 units in 1 site  Frontalis 20 units divided in 4 sites  Temporalis 40 units divided in 8 sites  Occipitalis 30 units divided in 6 sites  Cervical paraspinal 20 units divided in 4 sites  Trapezius 30 units divided in 6 sites  A total dose of 155 units of botox to be used with  45 units to be discarded/wasted (unavoidable).    5 Consider the cefaly device, www.cefaly.us, please research, this is TENs unit designed in [place] and was FDA approved in the early s for migraines.   6 PT ordered          For HA :  Stop triptans (failed imitrex, relpax and maxalt)   Try ubrelvy, discussed adv effects/dosing, she agreed    To break up Headaches:  N/A    Other:  N/A           All test results as well as any  necessary instructions were reviewed and discussed with patient.    Review:  Orders Placed This Encounter    Ambulatory referral/consult to Physical/Occupational Therapy    Prior authorization Order    ubrogepant (UBRELVY) 100 mg tablet    venlafaxine (EFFEXOR-XR) 150 MG Cp24         Patient to return to PCP/other specialists for all other problems  Patient to continue on all medications as Rx'd   A detailed AVS was provided to the patient with patient readback   RTO- for botox 1   The patient indicates understanding of these issues and agrees to the plan.    HPI:   Subha Bach is a 39 y.o.right handed, female with a PMHx of: asthma, FMG, HA and GERD   whom presents solo at the request of the PCP for HA.     HA HPI:  Start:HA since 13 years old, strong Fhx of migraines, has been seen by many providers over the years   History of trauma (no), History of CNS infection (no), History of Stroke (no)  Location:right occipitalis radiates to whole right hemicrania, rarely on the LEFT  Severity: range: 1-10/10  Duration:>4 hours a day   Frequency:25+ migraine days a month   Associated factors (bolded positive) WITH HA ( or migraine): Nausea, vomiting, photophobia, phonophobia, tinnitus, scalp pain, vision loss, diplopia, scintillations, eye pain, jaw pain, weakness?    Tried:effexor, relpax, trudhesa, cannabis (gabapentin for FMG)   Triggers (in bold): stress, lack of sleep, hormonal changes, too much caffeine, too little caffeine, weather change, seasonal change, strong odours, bright lights, sunlight, food    Currently having a HA?:yes   Positives in bold: Hx of Kidney Stones, asthma, GI bleed, osteoporosis, CAD/MI, CVA/TIA, DM    Imaging on file: no imaging in many years   Therapies tried in past: (failures to be marked, if known---why did it fail?)  Effexor  Topamax  Zanaflex  Zofran  MgOx  Emgality  Gabapentin  Relpax  Wellbutrin  keppra  Aimovig  Ajovy  imitex  Baclofen  B2  Trudhesa   Maxalt     Medication  Reconciliation:   Current Outpatient Medications   Medication Sig Dispense Refill    albuterol (PROVENTIL/VENTOLIN HFA) 90 mcg/actuation inhaler Inhale 2 puffs into the lungs every 4 (four) hours as needed for Wheezing or Shortness of Breath. 18 g 11    dihydroergotamine (TRUDHESA) 0.725 mg/pump act. (4 mg/mL) Spry       eletriptan (RELPAX) 40 MG tablet Take 40 mg by mouth as needed. may repeat in 2 hours if necessary      fluticasone-umeclidin-vilanter (TRELEGY ELLIPTA) 200-62.5-25 mcg inhaler Inhale 1 puff into the lungs once daily. 60 each 11    gabapentin (NEURONTIN) 600 MG tablet Take 600 mg by mouth 3 (three) times daily.      magnesium gluconate 27.5 mg magne- sium (500 mg) Tab Take by mouth once.      meclizine (ANTIVERT) 25 mg tablet Take 25 mg by mouth.      ondansetron (ZOFRAN-ODT) 4 MG TbDL Take by mouth.      pantoprazole (PROTONIX) 40 MG tablet Take 1 tablet (40 mg total) by mouth once daily. 90 tablet 3    predniSONE (DELTASONE) 20 MG tablet Take three pills for three days, two pills for three days, one pill for three days. 18 tablet 0    riboflavin, vitamin B2, 400 mg Tab Take 400 mg by mouth once daily.      tiZANidine (ZANAFLEX) 4 MG tablet Take 4 mg by mouth every evening. May take up to 3 tablets      azelastine (ASTELIN) 137 mcg (0.1 %) nasal spray 1 spray (137 mcg total) by Nasal route 2 (two) times daily. 30 mL 11    dietary supplement Cap Take by mouth. Mg, Vitamin D3, Riboflavin      fexofenadine (ALLEGRA) 180 MG tablet Take 1 tablet (180 mg total) by mouth once daily. 30 tablet 0    ubrogepant (UBRELVY) 100 mg tablet 1 pill at onset headache, second pill 2 hours later if needed, no more than 2 pills day/3 days use in week 12 tablet 6    venlafaxine (EFFEXOR-XR) 150 MG Cp24 Take 1 capsule (150 mg total) by mouth once daily. 90 capsule 1     No current facility-administered medications for this visit.     Review of patient's allergies indicates:   Allergen Reactions    Nubain [nalbuphine]  Hives       PMHx:  Past Medical History:   Diagnosis Date    Asthma     Encounter for blood transfusion     Gestational diabetes     gestational diabetes    Migraine headache      Past Surgical History:   Procedure Laterality Date    ARTHROSCOPY, HIP Left 6/22/2022    Procedure: ARTHROSCOPY, HIP, WITH LABRUM DEBRIDEMENT;  Surgeon: Joselo Henderson II, MD;  Location: [facility] OR;  Service: Orthopedics;  Laterality: Left;    LAPAROSCOPIC SALPINGECTOMY Bilateral 10/19/2020    Procedure: SALPINGECTOMY, LAPAROSCOPIC;  Surgeon: Yonas Walsh MD;  Location: [facility] OR;  Service: OB/GYN;  Laterality: Bilateral;  Possible Overnight  Claudia Cabrera MD to assist    LAPAROSCOPIC TOTAL HYSTERECTOMY N/A 10/19/2020    Procedure: HYSTERECTOMY, TOTAL, LAPAROSCOPIC;  Surgeon: Yonas Walsh MD;  Location: [facility] OR;  Service: OB/GYN;  Laterality: N/A;    REMOVAL OF IMPLANT Left 10/19/2020    Procedure: REMOVAL, IMPLANT;  Surgeon: Yonas Walsh MD;  Location: [facility] OR;  Service: OB/GYN;  Laterality: Left;  Nextplan birthcontrol implant removal    SHOULDER SURGERY         Fhx:  Family History   Problem Relation Name Age of Onset    Asthma Mother      Migraines Mother      Hypothyroidism Mother      Arthritis Mother      Migraines Father      Allergies Father      Chiari malformation Sister      Migraines Sister      Pancreatitis Maternal Grandmother      Migraines Maternal Grandmother      Thyroid disease Maternal Grandmother      Other Maternal Grandmother          pituitary gland issues    Heart failure Maternal Grandfather      Diabetes Maternal Grandfather      Kidney disease Maternal Grandfather      Alzheimer's disease Paternal Grandmother      Prostate cancer Paternal Grandfather         Shx:   Social History     Socioeconomic History    Marital status: Single   Tobacco Use    Smoking status: Never    Smokeless tobacco: Never   Substance and Sexual Activity    Alcohol use: Yes     Comment: Martini every 6 months    Drug use: No     Sexual activity: Yes     Partners: Male     Social Determinants of Health     Financial Resource Strain: Medium Risk (2/2/2024)    Overall Financial Resource Strain (CARDIA)     Difficulty of Paying Living Expenses: Somewhat hard   Food Insecurity: Food Insecurity Present (2/2/2024)    Hunger Vital Sign     Worried About Running Out of Food in the Last Year: Sometimes true     Ran Out of Food in the Last Year: Never true   Transportation Needs: No Transportation Needs (2/2/2024)    PRAPARE - Transportation     Lack of Transportation (Medical): No     Lack of Transportation (Non-Medical): No   Physical Activity: Insufficiently Active (2/2/2024)    Exercise Vital Sign     Days of Exercise per Week: 1 day     Minutes of Exercise per Session: 20 min   Stress: Stress Concern Present (2/2/2024)    [employer] [place] [employer] - Occupational Stress Questionnaire     Feeling of Stress : Very much   Housing Stability: Low Risk  (2/2/2024)    Housing Stability Vital Sign     Unable to Pay for Housing in the Last Year: No     Number of Places Lived in the Last Year: 1     Unstable Housing in the Last Year: No           Labs:   Reviewed in chart     Imaging:   Reviewed in chart       Other testing:  Reviewed in chart     Note: I have independently reviewed any/all imaging/labs/tests and agree with the report (s) as documented.  Any discrepancies will be as noted/demarcated by free text.  ANIKET GREENFIELD 8/2/2024                     ROS:   Review Of Systems (questions asked, positive or additions in BOLD)  Gen: Weight change, fatigue/malaise, pyrexia   HEENT: Tinnitus, headache,  blurred vision, eye pain, diplopia, photophobia,  nose bleeds, congestion, sore throat, jaw pain, scalp pain, neck stiffness   Card: Palpitations, CP   Pulm: SOB, cough   Vas: Easy bruising, easy bleeding   GI: N/V/D/C, incontinence, hematemesis, hematochezia    : incontinence, hematuria   Endocrine: Temp intolerance, polyuria, polydipsia   M/S:  "Neck pain, myalgia, back pain, joint pain, falls    Neuro: PER HPI   PSY: Memory loss, confusion, depression, anxiety, trouble in sleep, hallucinations          EXAM:   /78 (BP Location: Left arm, Patient Position: Sitting, BP Method: Medium (Automatic))   Pulse 106   Resp 18   Ht 5' 6" (1.676 m)   Wt 85.3 kg (188 lb 0.8 oz)   LMP 10/13/2020 Comment: PT INSTRUCTED ON NEED FOR URINE SAMPLE PRIOR TO PROCEDURE AND VERBALIZES UNDERSTANDING  BMI 30.35 kg/m²    GEN:  NAD  HEENT: NC/AT, Frontalis was TTP, temporalis was TTP,  nares patent, dentition appropriate,   neck supple, trachea midline, Occiput and trapezius  TTP, sits with head forward posture      EXTREM:   no edema present.    NEURO:  Mental Status:  Awake, alert and appropriately oriented to time, place, and person.  Normal attention and concentration.  Speech is fluent and appropriate language function (I.e., comprehension).     Cranial Nerves:    Pupils are equal and reactive to light.  Extraocular movements are intact and without nystagmus.  Visual fields are full to confrontation testing.   Facial movement is symmetric.  Facial sensation is intact.  Hearing is normal. Uvula in midline. FROM of neck in all (6) directions, shoulder shrug symmetrical. Tongue in midline without fasiculation.     Motor:  RUE:appropriate against gravity and medium force as tested 5/5              LUE: appropriate against gravity and medium force as tested 5/5              RLE:appropriate against gravity and medium force as tested 5/5              LLE: appropriate against gravity and medium force as tested 5/5  Tremor/pronator drift not apparent.     finger extension strength was symmetrical     Sensory:  RUE  intact light touch, proprioception, and temperature  LUE intact light touch, proprioception, and temperature    RLE intact light touch  LLE intact light touch      DTR's:                                            R              L  biceps 2+ 2+       "   brachioradialis 2+ 2+   Knee jerk 2+ 2+        Coordination:  FTN-WNL.      Gait and Stance: Normal manner of stance and gait function testing. Romberg was negative.          This document has been electronically signed by Mr. Jackson Pratt MPA, PA-C on 8/2/2024, I have personally typed this message using the EMR.       Dr Javier MD  was available during today's visit.     Personal Protective Equipment:    Personal Protective Equipment was used during this encounter including:   non latex gloves.          CC: Sathish Hamilton MD

## 2024-08-02 NOTE — PATIENT INSTRUCTIONS
Botox for migraines ordered  PT ordered   Consider the cefaly device, www.cefaly.us, please research, this is TENs unit designed in [place] and was FDA approved in the early 2010s for migraines.       Ubrelvy for migraine abortive, 1 at onset headache, second pill 2 hours later if needed, no more 2 in a day, 3 days use in week

## 2024-08-05 ENCOUNTER — TELEPHONE (OUTPATIENT)
Dept: NEUROLOGY | Facility: CLINIC | Age: 39
End: 2024-08-05
Payer: MEDICAID

## 2024-08-06 ENCOUNTER — PATIENT MESSAGE (OUTPATIENT)
Dept: FAMILY MEDICINE | Facility: CLINIC | Age: 39
End: 2024-08-06
Payer: MEDICAID

## 2024-08-06 DIAGNOSIS — J30.2 SEASONAL ALLERGIES: Primary | ICD-10-CM

## 2024-08-06 RX ORDER — LORATADINE 10 MG/1
10 TABLET ORAL DAILY
Qty: 30 TABLET | Refills: 11 | Status: SHIPPED | OUTPATIENT
Start: 2024-08-06 | End: 2025-08-06

## 2024-08-14 ENCOUNTER — CLINICAL SUPPORT (OUTPATIENT)
Dept: REHABILITATION | Facility: HOSPITAL | Age: 39
End: 2024-08-14
Payer: MEDICAID

## 2024-08-14 DIAGNOSIS — M62.89 MUSCLE TIGHTNESS: ICD-10-CM

## 2024-08-14 DIAGNOSIS — G43.709 CHRONIC MIGRAINE WITHOUT AURA WITHOUT STATUS MIGRAINOSUS, NOT INTRACTABLE: ICD-10-CM

## 2024-08-14 DIAGNOSIS — R29.3 POSTURE ABNORMALITY: ICD-10-CM

## 2024-08-14 DIAGNOSIS — R53.1 DECREASED STRENGTH: Primary | ICD-10-CM

## 2024-08-14 DIAGNOSIS — M79.18 MYOFASCIAL PAIN: ICD-10-CM

## 2024-08-14 PROCEDURE — 97110 THERAPEUTIC EXERCISES: CPT | Mod: PN

## 2024-08-14 PROCEDURE — 97161 PT EVAL LOW COMPLEX 20 MIN: CPT | Mod: PN

## 2024-08-14 NOTE — PLAN OF CARE
[facility]  Physical Therapy Initial Evaluation    Name: Subha Bach  Clinic Number: [id number]    Therapy Diagnosis:  Encounter Diagnoses   Name Primary?    Chronic migraine without aura without status migrainosus, not intractable     Myofascial pain     Decreased strength Yes    Muscle tightness     Posture abnormality       Physician: Jackson Pratt, JEAN PIERRE    Physician Orders: PT Eval and Treat  Medical Diagnosis from Referral: G43.709 (ICD-10-CM) - Chronic migraine without aura without status migrainosus, not intractable M79.18 (ICD-10-CM) - Myofascial pain  Evaluation Date: 8/14/2024  Authorization Period Expiration: 08/02/2025  Plan of Care Expiration: 11/30/2024    Progress Update: 9/14/2024  FOTO: 1 / 3    Visit # / Visits authorized: 1 / 1      PRECAUTIONS: Standard Precautions     Time In: 0900  Time Out: 0950  Total Appointment Time (timed & untimed codes): 50 minutes    SUBJECTIVE     Chief complaint:  Central neck pain    History of current condition:  Pain started about 20 years ago.  Denies specific incident.   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  States that she gets migaraines and occipital headaches daily.   Pain intensity and frequency has improved since onset.  States that she has also been diagnosed with fibromyalgia.    Dominant Extremity: Right    Aggravating Factors:   migraines  Easing Factors:  medication    Imaging:  See epic.    Prior Therapy: Yes  Social History: Pt lives alone  Occupation: Pt is   Prior Level of Function: Independent with all ADLs  Current Level of Function: 70% of PLOF    Pain:  Current 0 /10, worst 10 /10, best 0 /10   Description: Aching and Sharp    Pts goals: Pt reported goal is to be able move without pain.    _______________________________________________________  Medical History:   Past Medical History:   Diagnosis Date    Asthma      Encounter for blood transfusion     Gestational diabetes     gestational diabetes    Migraine headache        Surgical History:   Subha Bach  has a past surgical history that includes Shoulder surgery; Laparoscopic total hysterectomy (N/A, 10/19/2020); Laparoscopic salpingectomy (Bilateral, 10/19/2020); Removal of implant (Left, 10/19/2020); and arthroscopy, hip (Left, 6/22/2022).    Medications:   Subha has a current medication list which includes the following prescription(s): albuterol, azelastine, dietary supplement, trudhesa, eletriptan, fexofenadine, trelegy ellipta, gabapentin, loratadine, magnesium gluconate, meclizine, ondansetron, pantoprazole, prednisone, riboflavin (vitamin b2), tizanidine, ubrelvy, and venlafaxine.    Allergies:   Review of patient's allergies indicates:   Allergen Reactions    Nubain [nalbuphine] Hives        OBJECTIVE   (x = not tested due to pain and/or inability to obtain test position)    RANGE OF MOTION:    Cervical Right   (spine)  8/14/2024 Left     8/14/2024 Goal   Cervical Flexion (60) wfl --- 45     Cervical Extension (90) wfl --- 45     Cervical Side Bending (45) Wfl c pain wfl 45     Cervical Rotation (75) Wfl c pain wfl 60       Shoulder AROM/PROM Right  8/14/2024 Left  8/14/2024 Goal   Forward Flexion (180) wfl wfl 180     ER at 90 degrees (90) wfl wfl 90     ER at 0 degrees (45)  wfl wfl 45     Functional ER (C7) wfl wfl C7     Functional IR (T10) wfl wfl T10        STRENGTH:    U/E MMT Right  8/14/2024 Goal   Shoulder Flexion 4/5 5/5 B   Shoulder Abduction 4-/5 5/5 B   Shoulder IR 4-/5 5/5 B   Shoulder ER 4-/5 5/5 B   Elbow Flexion  4/5 5/5 B   Elbow Extension 4/5 5/5 B     U/E MMT Left  8/14/2024 Goal   Shoulder Flexion 4/5 5/5 B   Shoulder Abduction 4-/5 5/5 B   Shoulder IR 4-/5 5/5 B   Shoulder ER 4-/5 5/5 B   Elbow Flexion  4/5 5/5 B   Elbow Extension 4/5 5/5 B     MUSCLE LENGTH:     Muscle Tested  Right  8/14/2024 Left   8/14/2024 Goal   Upper Trapezius   decreased decreased Normal B    Levator Scapulae  normal normal Normal B   Sternocleidomastoid normal normal Normal B   Scalenes  normal normal Normal B    Pectoralis Minor  decreased decreased Normal B   Pectoralis Major decreased decreased Normal B     SPECIAL TESTS:     Right  8/14/2024 Left   8/14/2024 Goal   Spurlings Negative Negative Negative B    Quadrant Negative Negative Negative B    Stephens James Negative Negative Negative B    Neer Negative Negative Negative B        Sensation:  Sensation is intact to light touch    Palpation: Increased tone and tenderness noted with palpation from C2-C5 spinous processes and B upper traps.    Posture:  Pt presents with postural abnormalities which include: forward head and rounded shoulders    Gait: N/A    Movement Analysis: N/A    Balance: N/A      FUNCTION:         TREATMENT     Total Treatment time separate from Evaluation: (10) minutes    Subha received therapeutic exercises to develop strength, endurance, ROM, flexibility, and posture for (10) minutes including: x = exercises performed     TherEx 8/14/2024    Upper trap stretch x 3x30 secs   Scapular squeezes x 3x10   Doorway stretch x 3x30 secs          Plan for Next Visit:     UBE 3/3, level 2   Scapular squeezes 3x10     Upper trap stretch x30 secs B     ER with red tb  2x15    Green theraband Bilateral External Rotation 2x10      Scaption with red tb  3x10     Cable column: shoulder rows 3# 3 x 10    Cable column: shoulder extension 3# 3 x 10   Cable column: shoulder adduction 3# 3 x 10   PNF D1/D2 with red tb  2x10 B     Prone:  2# rows, x 30   Prone  2# extension x 30      Doorway stretch 3x30 secs      Red Ball on wall  ClockWise/Counter Clockwise x 30 each direction   Supine flexion with dowel 1# 3x10  Sidelying External Rotation with scapular setting  3x10      Soft tissue massage to B upper traps         Home Exercises and Patient Education Provided    Education/Self-Care provided:  Patient educated on  the impairments noted above and the effects of physical therapy intervention to improve overall condition and QOL.   Patient was educated on all the above exercise prior/during/after for proper posture, positioning, and execution for safe performance with home exercise program.    Written Home Exercises Provided: yes. Prefers: Electronic Copies  Exercises were reviewed and Subha was able to demonstrate them prior to the end of the session.  Subha demonstrated good understanding of the education provided.     See EMR under Patient Instructions for exercises provided during therapy sessions.    ASSESSMENT     Subha is a 39 y.o. female referred to outpatient Physical Therapy with a medical diagnosis of G43.709 (ICD-10-CM) - Chronic migraine without aura without status migrainosus, not intractable M79.18 (ICD-10-CM) - Myofascial pain.  Subha presents with clinical signs and symptoms that support this diagnosis with decreased upper extremity strength, Cervical joint(s) hypomobility, upper extremity neural tension, and impaired functional mobility. Radicular symptoms are not present.    decreased shoulder girdle ROM, decreased scapular and shoulder strength, Glenohumeral joint hypomobility, and impaired functional mobility.    The above impairments will be addressed through manual therapy techniques, therapeutic exercises, functional training, and modalities as necessary. Patient was treated and educated on exercises for home program, progression of therapy, and benefits of therapy to achieve full functional mobility.     Pt prognosis is Good.   Pt will benefit from skilled outpatient Physical Therapy to address the deficits stated above and in the chart below, provide pt/family education, and to maximize pt's level of independence.     Plan of care discussed with patient: Yes  Pt's spiritual, cultural and educational needs considered and patient is agreeable to the plan of care and goals as stated below:     Anticipated  "Barriers for therapy: none    Medical Necessity is demonstrated by the following  History  Co-morbidities and personal factors that may impact the plan of care Co-morbidities:   high BMI    Personal Factors:   no deficits     low   Examination  Body Structures and Functions, activity limitations and participation restrictions that may impact the plan of care Body Regions:   neck    Body Systems:    gross symmetry  strength  gross coordinated movement  motor control  motor learning    Participation Restrictions:   See above in "Current Level of Function"     Activity limitations:   Learning and applying knowledge  no deficits    General Tasks and Commands  no deficits    Communication  no deficits    Mobility  no deficits    Self care  no deficits    Domestic Life  no deficits    Interactions/Relationships  no deficits    Life Areas  no deficits    Community and Social Life  community life  recreation and leisure  no deficits         low   Clinical Presentation stable and uncomplicated low   Decision Making/ Complexity Score: low       GOALS:  SHORT TERM GOALS: 4 weeks 8/14/2024   Recent signs and systems trend is improving in order to progress towards LTG's.    Patient will be independent with HEP in order to further progress and return to maximal function.    Pain rating at Worst: 5/10 in order to progress towards increased independence with activity.    Patient will be able to correct postural deviations in sitting and standing, to decrease pain and promote postural awareness for injury prevention.       LONG TERM GOALS: 8 weeks 8/14/2024   Patient will return to normal ADL, recreational, and work related activities with less pain and limitation.     Patient will improve AROM to stated goals in order to return to maximal functional potential.     Patient will improve Strength to stated goals of appropriate musculature in order to improve functional independence.     Pain Rating at Best: 1/10 to improve Quality of " Life.     Patient will meet predicted functional outcome (FOTO) score: 80% to increase self-worth & perceived functional ability.    Patient will have met/partially met personal goal of being able to move with no pain.        PLAN   Plan of care Certification: 8/14/2024 to 11/30/2024    Outpatient Physical Therapy 2 times weekly for 6 weeks to include any combination of the following interventions: virtual visits, dry needling, modalities, electrical stimulation (IFC, Pre-Mod, Attended with Functional Dry Needling), Manual Therapy, Moist Heat/ Ice, Neuromuscular Re-ed, Patient Education, Self Care, Therapeutic Exercise, Functional Training, and Therapeutic Activites     Thank you for this referral.    These services are reasonable and necessary for the conditions set forth above while under my care.

## 2024-08-19 ENCOUNTER — CLINICAL SUPPORT (OUTPATIENT)
Dept: REHABILITATION | Facility: HOSPITAL | Age: 39
End: 2024-08-19
Payer: MEDICAID

## 2024-08-19 ENCOUNTER — DOCUMENTATION ONLY (OUTPATIENT)
Dept: REHABILITATION | Facility: HOSPITAL | Age: 39
End: 2024-08-19

## 2024-08-19 DIAGNOSIS — R53.1 DECREASED STRENGTH: Primary | ICD-10-CM

## 2024-08-19 PROCEDURE — 97110 THERAPEUTIC EXERCISES: CPT | Mod: KX,PN,CQ

## 2024-08-19 NOTE — PROGRESS NOTES
[facility]   Physical Therapy Treatment Note     Name: Subha Bach  Clinic Number: [id number]    Therapy Diagnosis:   Encounter Diagnosis   Name Primary?    Decreased strength Yes     Physician: Jackson Pratt PA-C    Visit Date: 8/19/2024    Physician: Jackson Pratt PA-C    Physician Orders: PT Eval and Treat  Medical Diagnosis from Referral: G43.709 (ICD-10-CM) - Chronic migraine without aura without status migrainosus, not intractable M79.18 (ICD-10-CM) - Myofascial pain  Evaluation Date: 8/14/2024  Authorization Period Expiration: 08/02/2025  Plan of Care Expiration: 11/30/2024                          Progress Update: 9/14/2024             FOTO: 1 / 3    Visit # / Visits authorized: 1 / 12  ([id number])      Precautions: Standard     Time In: 0901  Time Out: 0956  Total Billable Time: 55 minutes      SUBJECTIVE     Pt reports: pain in R cervical region.  She was compliant with home exercise program.  Response to previous treatment: first visit after eval  Functional change: ongoing    Pain: 4/10  Location: right cervical region      OBJECTIVE     Objective Measures updated at progress report unless specified.     Treatment   Central neck pain  Subha received the treatments listed below:      therapeutic exercises to develop strength, ROM, flexibility, and posture for 55 MEDICAID minutes including:    UBE 3/3, level 2   Scapular squeezes 3x10   NOT PERFORMED      Upper trap stretch 2 x 30 secs Bilateral      Cervical Range of Motion x 15 Bilateral    Green theraband Bilateral External Rotation 2x10   Scaption with red tb  x10  PNF D1/D2 with red tb  2x10 Bilateral - D2 only today    ER with red tb  2x15  Bilateral   NOT PERFORMED   Red Ball on wall  ClockWise/Counter Clockwise x 30 each direction - 2# on wrist, small basketball today    Cable column: shoulder rows 3# 3 x 10 - Green Theraband today  Cable column: shoulder extension 7# 3 x 10   Cable column: shoulder  adduction 7# 3 x 10     Prone:  2# rows, x 30   Prone  2# extension x 30   Supine chin tucks x 20  Supine flexion with dowel 1# 3x10  NOT PERFORMED   Sidelying External Rotation with scapular setting  3x10   NOT PERFORMED     Doorway stretch 3x30 secs     Soft tissue massage to B upper trap  NOT PERFORMED       Patient Education and Home Exercises     Home Exercises Provided and Patient Education Provided     Education provided:   - cont HOME EXERCISE PROGRAM     Written Home Exercises Provided: Patient instructed to cont prior HEP. Exercises were reviewed and Subha was able to demonstrate them prior to the end of the session.  Subha demonstrated good  understanding of the education provided. See EMR under Patient Instructions for exercises provided during therapy sessions    ASSESSMENT     Subha present with pain in her R cervical region.  She has good cervical Range of Motion.  Overall, good tolerance for PRE's.  3/10 pain level at conclusion of therapy.    Subha Is progressing well towards her goals.   Pt prognosis is Good.     Pt will continue to benefit from skilled outpatient physical therapy to address the deficits listed in the problem list box on initial evaluation, provide pt/family education and to maximize pt's level of independence in the home and community environment.     Pt's spiritual, cultural and educational needs considered and pt agreeable to plan of care and goals.     Anticipated barriers to physical therapy: none    GOALS:  SHORT TERM GOALS: 4 weeks 8/19/2024   Recent signs and systems trend is improving in order to progress towards LTG's. ongoing    Patient will be independent with HEP in order to further progress and return to maximal function.  ongoing   Pain rating at Worst: 5/10 in order to progress towards increased independence with activity.  ongoing   Patient will be able to correct postural deviations in sitting and standing, to decrease pain and promote postural awareness for injury  prevention.   ongoing      LONG TERM GOALS: 8 weeks 8/14/2024   Patient will return to normal ADL, recreational, and work related activities with less pain and limitation.   ongoing   Patient will improve AROM to stated goals in order to return to maximal functional potential.   ongoing   Patient will improve Strength to stated goals of appropriate musculature in order to improve functional independence.   ongoing   Pain Rating at Best: 1/10 to improve Quality of Life.   ongoing   Patient will meet predicted functional outcome (FOTO) score: 80% to increase self-worth & perceived functional ability.  ongoing   Patient will have met/partially met personal goal of being able to move with no pain. ongoing       PLAN     Cont per Plan of Care, posture, strengthening     Namita Haley, PTA

## 2024-08-19 NOTE — PROGRESS NOTES
PT/PTA met face to face to discuss pt's treatment plan and progress towards established goals. Pt will be seen by a physical therapist minimally every 6th visit or every 30 days.    Namita Haley PTA

## 2024-08-20 ENCOUNTER — PATIENT MESSAGE (OUTPATIENT)
Dept: NEUROLOGY | Facility: CLINIC | Age: 39
End: 2024-08-20
Payer: MEDICAID

## 2024-08-20 DIAGNOSIS — R11.0 NAUSEA: Primary | ICD-10-CM

## 2024-08-20 RX ORDER — ONDANSETRON 4 MG/1
4 TABLET, ORALLY DISINTEGRATING ORAL EVERY 8 HOURS PRN
Qty: 20 TABLET | Refills: 3 | Status: SHIPPED | OUTPATIENT
Start: 2024-08-20

## 2024-08-26 ENCOUNTER — CLINICAL SUPPORT (OUTPATIENT)
Dept: REHABILITATION | Facility: HOSPITAL | Age: 39
End: 2024-08-26
Payer: MEDICAID

## 2024-08-26 DIAGNOSIS — R53.1 DECREASED STRENGTH: Primary | ICD-10-CM

## 2024-08-26 PROCEDURE — 97110 THERAPEUTIC EXERCISES: CPT | Mod: KX,PN,CQ

## 2024-08-26 NOTE — PROGRESS NOTES
[facility]   Physical Therapy Treatment Note     Name: Subha Bach  [facility] Number: [id number]    Therapy Diagnosis:   Encounter Diagnosis   Name Primary?    Decreased strength Yes     Physician: Jackson Pratt PA-C    Visit Date: 8/26/2024    Physician: Jackson Pratt PA-C    Physician Orders: PT Eval and Treat  Medical Diagnosis from Referral: G43.709 (ICD-10-CM) - Chronic migraine without aura without status migrainosus, not intractable M79.18 (ICD-10-CM) - Myofascial pain  Evaluation Date: 8/14/2024  Authorization Period Expiration: 08/02/2025  Plan of Care Expiration: 11/30/2024                          Progress Update: 9/14/2024             FOTO: 1 / 3    Visit # / Visits authorized: 2 / 12  ([id number])      Precautions: Standard     Time In: 0753  Time Out: 0853  Total Billable Time: 60 minutes      SUBJECTIVE     Pt reports: increased pain today, does not know why  She was compliant with home exercise program.  Response to previous treatment: good  Functional change: ongoing    Pain: 8/10  Location: right cervical region      OBJECTIVE     Objective Measures updated at progress report unless specified.     Treatment   Rhab tech assisted with treatment  Central neck pain  Subha received the treatments listed below:      therapeutic exercises to develop strength, ROM, flexibility, and posture for 60 MEDICAID minutes including:    UBE 3/3, level 2   Scapular squeezes 3x10   NOT PERFORMED      Upper trap stretch 2 x 30 secs Bilateral      Cervical Range of Motion x 15 Bilateral    Green theraband Bilateral External Rotation 2x10   Scaption with red tb  x10  PNF D1/D2 with red tb  2x10 Bilateral - D2 only today  Seated thoracic extension with small half bolster 2 x 10    ER with red tb  2x15  Bilateral   NOT PERFORMED   Red Ball on wall  ClockWise/Counter Clockwise x 30 each direction - 2# on wrist, small basketball today    Cable column: shoulder rows 3# 3 x 10  Cable  column: shoulder extension 7# 3 x 10   Cable column: shoulder adduction 7# 3 x 10     Prone:  2# rows, x 30 Bilateral   Prone  2# extension x 30  Bilateral   Supine chin tucks x 20  Supine flexion with dowel 1# 3x10   Sidelying External Rotation with scapular setting  3x10   NOT PERFORMED     Doorway stretch 3x30 secs   NOT PERFORMED     Soft tissue massage to B upper trap  NOT PERFORMED       Patient Education and Home Exercises     Home Exercises Provided and Patient Education Provided     Education provided:   - cont HOME EXERCISE PROGRAM     Written Home Exercises Provided: Patient instructed to cont prior HEP. Exercises were reviewed and Subha was able to demonstrate them prior to the end of the session.  Subha demonstrated good  understanding of the education provided. See EMR under Patient Instructions for exercises provided during therapy sessions    ASSESSMENT     Subha arrived with increased pain today, no known origin.  Pain decreased to 4/10 at conclusion of therapy.  Pt continues with decreased cervical and fabiola scapular musculature, which to contributes to her pain and dysfunction.     Subha Is progressing well towards her goals.   Pt prognosis is Good.     Pt will continue to benefit from skilled outpatient physical therapy to address the deficits listed in the problem list box on initial evaluation, provide pt/family education and to maximize pt's level of independence in the home and community environment.     Pt's spiritual, cultural and educational needs considered and pt agreeable to plan of care and goals.     Anticipated barriers to physical therapy: none    GOALS:  SHORT TERM GOALS: 4 weeks 8/26/2024   Recent signs and systems trend is improving in order to progress towards LTG's. ongoing    Patient will be independent with HEP in order to further progress and return to maximal function.  ongoing   Pain rating at Worst: 5/10 in order to progress towards increased independence with activity.   ongoing   Patient will be able to correct postural deviations in sitting and standing, to decrease pain and promote postural awareness for injury prevention.   ongoing      LONG TERM GOALS: 8 weeks 8/14/2024   Patient will return to normal ADL, recreational, and work related activities with less pain and limitation.   ongoing   Patient will improve AROM to stated goals in order to return to maximal functional potential.   ongoing   Patient will improve Strength to stated goals of appropriate musculature in order to improve functional independence.   ongoing   Pain Rating at Best: 1/10 to improve Quality of Life.   ongoing   Patient will meet predicted functional outcome (FOTO) score: 80% to increase self-worth & perceived functional ability.  ongoing   Patient will have met/partially met personal goal of being able to move with no pain. ongoing       PLAN     Cont per Plan of Care, posture, strengthening     Namita Haley, PTA

## 2024-08-30 ENCOUNTER — CLINICAL SUPPORT (OUTPATIENT)
Dept: REHABILITATION | Facility: HOSPITAL | Age: 39
End: 2024-08-30
Payer: MEDICAID

## 2024-08-30 DIAGNOSIS — R53.1 DECREASED STRENGTH: Primary | ICD-10-CM

## 2024-08-30 PROCEDURE — 97110 THERAPEUTIC EXERCISES: CPT | Mod: KX,PN,CQ

## 2024-08-30 NOTE — PROGRESS NOTES
[facility]   Physical Therapy Treatment Note     Name: Subha Bach  Clinic Number: [id number]    Therapy Diagnosis:   Encounter Diagnosis   Name Primary?    Decreased strength Yes     Physician: Jackson Pratt PA-C    Visit Date: 8/30/2024    Physician: Jackson Pratt PA-C    Physician Orders: PT Eval and Treat  Medical Diagnosis from Referral: G43.709 (ICD-10-CM) - Chronic migraine without aura without status migrainosus, not intractable M79.18 (ICD-10-CM) - Myofascial pain  Evaluation Date: 8/14/2024  Authorization Period Expiration: 08/02/2025  Plan of Care Expiration: 11/30/2024                          Progress Update: 9/14/2024             FOTO: 1 / 3    Visit # / Visits authorized: 3 / 12  ([id number])      Precautions: Standard     Time In: 0758  Time Out: 0855  Total Billable Time: 58 minutes      SUBJECTIVE     Pt reports:  no complaints today  She was compliant with home exercise program.  Response to previous treatment: good  Functional change: ongoing    Pain: 3/10  Location: right cervical region      OBJECTIVE     Objective Measures updated at progress report unless specified.     Treatment   Central neck pain  Subha received the treatments listed below:      therapeutic exercises to develop strength, ROM, flexibility, and posture for 58 MEDICAID minutes including:    UBE 3/3, level 2   Scapular squeezes 3x10   NOT PERFORMED      Upper trap stretch 3 x 30 secs Bilateral      Cervical Range of Motion x 15 Bilateral    Green theraband Bilateral External Rotation 3x10   Scaption with red tb  x10  NP  PNF D2 with red tb  2x10 Bilateral   Seated thoracic extension with small half bolster 2 x 10    ER with red tb  2x15  Bilateral      Red Ball on wall  ClockWise/Counter Clockwise x 30 each direction    Cable column: shoulder rows 7# 3 x 10  Cable column: shoulder extension 7# 3 x 10   Cable column: shoulder adduction 7# 3 x 10     Prone:  2# rows, x 30 Bilateral    Prone  2# extension x 30  Bilateral   Supine chin tucks x 20  Supine flexion with dowel 1# 3x10  NOT PERFORMED   Sidelying External Rotation with scapular setting  3x10   NOT PERFORMED     Doorway stretch 3x30 secs   NOT PERFORMED     Soft tissue massage to B upper trap  NOT PERFORMED       Patient Education and Home Exercises     Home Exercises Provided and Patient Education Provided     Education provided:   - cont HOME EXERCISE PROGRAM     Written Home Exercises Provided: Patient instructed to cont prior HEP. Exercises were reviewed and Subha was able to demonstrate them prior to the end of the session.  Subha demonstrated good  understanding of the education provided. See EMR under Patient Instructions for exercises provided during therapy sessions    ASSESSMENT     Subha continues to report pain in cervical region.  She has full cervical Range of Motion, rounded shoulder posture.  Good tolerance for PRE's.      Subha Is progressing well towards her goals.   Pt prognosis is Good.     Pt will continue to benefit from skilled outpatient physical therapy to address the deficits listed in the problem list box on initial evaluation, provide pt/family education and to maximize pt's level of independence in the home and community environment.     Pt's spiritual, cultural and educational needs considered and pt agreeable to plan of care and goals.     Anticipated barriers to physical therapy: none    GOALS:  SHORT TERM GOALS: 4 weeks 8/30/2024   Recent signs and systems trend is improving in order to progress towards LTG's. ongoing    Patient will be independent with HEP in order to further progress and return to maximal function.  ongoing   Pain rating at Worst: 5/10 in order to progress towards increased independence with activity.  ongoing   Patient will be able to correct postural deviations in sitting and standing, to decrease pain and promote postural awareness for injury prevention.   ongoing      LONG TERM GOALS:  8 weeks 8/14/2024   Patient will return to normal ADL, recreational, and work related activities with less pain and limitation.   ongoing   Patient will improve AROM to stated goals in order to return to maximal functional potential.   ongoing   Patient will improve Strength to stated goals of appropriate musculature in order to improve functional independence.   ongoing   Pain Rating at Best: 1/10 to improve Quality of Life.   ongoing   Patient will meet predicted functional outcome (FOTO) score: 80% to increase self-worth & perceived functional ability.  ongoing   Patient will have met/partially met personal goal of being able to move with no pain. ongoing       PLAN     Cont per Plan of Care, posture, strengthening     Namita Haley, PTA

## 2024-09-05 ENCOUNTER — PROCEDURE VISIT (OUTPATIENT)
Dept: NEUROLOGY | Facility: CLINIC | Age: 39
End: 2024-09-05
Payer: MEDICAID

## 2024-09-05 VITALS
HEART RATE: 86 BPM | WEIGHT: 188.06 LBS | HEIGHT: 66 IN | DIASTOLIC BLOOD PRESSURE: 86 MMHG | BODY MASS INDEX: 30.22 KG/M2 | SYSTOLIC BLOOD PRESSURE: 124 MMHG | RESPIRATION RATE: 17 BRPM | TEMPERATURE: 97 F

## 2024-09-05 DIAGNOSIS — G43.709 CHRONIC MIGRAINE WITHOUT AURA WITHOUT STATUS MIGRAINOSUS, NOT INTRACTABLE: Primary | ICD-10-CM

## 2024-09-05 PROCEDURE — 64615 CHEMODENERV MUSC MIGRAINE: CPT | Mod: PBBFAC,PO | Performed by: PHYSICIAN ASSISTANT

## 2024-09-05 PROCEDURE — 64615 CHEMODENERV MUSC MIGRAINE: CPT | Mod: S$PBB,,, | Performed by: PHYSICIAN ASSISTANT

## 2024-09-05 PROCEDURE — 99999PBSHW PR PBB SHADOW TECHNICAL ONLY FILED TO HB: Mod: JW,PBBFAC,,

## 2024-09-05 NOTE — PROCEDURES
[facility]  [address] [place] [address]  Phone:[phone]  Fax: [phone]  Botox Visit, #1    Chief Complaint   Patient presents with    Botulinum Toxin Injection         A/P:        ICD-10-CM ICD-9-CM   1. Chronic migraine without aura without status migrainosus, not intractable  G43.709 346.70     Botox for migraine  At baseline right eyebrow higher than left eyebrow       Historically: Patient meets the criteria for chronic migraine. In summary, She has headaches/migraines >15 days per month  and last >4 hours if untreated. Specifics of duration, frequency and strength are listed in office visit HPI's.  This pattern has continued for >3 months.  She has failed at least three preventive medications (full list of medications is listed in office notes of Therapies tried in past)  I have therefore recommended a trial of Botox via the PREEMPT protocol for migraine prophylaxis.We schedule regular follow up intervals to check on status.     PROCEDURE NOTE:  BOTOX injection is indicated for the prophylaxis of headaches in adult patients with chronic  migraine. Patient meets indications for BOTOX therapy.  Potential risks and benefits were reviewed. Side effects including, but not limited to, potential  systemic allergic reactions of the anaphylactic type as well as local injection site reactions of  blepharoptosis, diplopia, infection, bleeding, pain, redness and bruising were reviewed. The  potential for headaches and/or neck pain post procedure were reviewed.  The patient's questions were answered. The patient signed a consent form. Patient  understands that depending on their insurance carrier, there may be a copay for this treatment.  BOTOX was reconstituted using  two 100 unit vials and diluted with 4 mL of sterile saline.  BOTOX was injected as per the PREEMPT trial injection paradigm with dose administered as 5  unit intramuscular (IM) injections  per site using a sterile, 30-gauge 0.5 inch needle as follows:  Muscle Dose, # of Sites   10 units divided in 2 sites  Procerus 5 units in 1 site  Frontalis 20 units divided in 4 sites  Temporalis 40 units divided in 8 sites  Occipitalis 30 units divided in 6 sites  Cervical paraspinal 20 units divided in 4 sites  Trapezius 30 units divided in 6 sites  Each site was cleaned with alcohol prior to injection. A total dose of 155 units were injected. 45  units were discarded/wasted.      The patient tolerated the procedure well with no immediate complications.  MEDICATION INFO:  NDC 7384-7904-28   Lot # [id number]  Exp 08/2026         Follow up in 3 months for repeat injection, we also have interspersed office visits scheduled to ensure efficacy of botox sessions/check on patient.       Jackson Pratt MPA, PA-C  Attending available-Dr Javier MD           Personal Protective Equipment:    Personal Protective Equipment was used  non latex gloves.     09/05/2024 9:11 AM    CC: Sathish Hamilton MD

## 2024-09-09 ENCOUNTER — CLINICAL SUPPORT (OUTPATIENT)
Dept: REHABILITATION | Facility: HOSPITAL | Age: 39
End: 2024-09-09
Payer: MEDICAID

## 2024-09-09 DIAGNOSIS — R53.1 DECREASED STRENGTH: Primary | ICD-10-CM

## 2024-09-09 PROCEDURE — 97110 THERAPEUTIC EXERCISES: CPT | Mod: PN,CQ

## 2024-09-09 NOTE — PROGRESS NOTES
[facility]   Physical Therapy Treatment Note     Name: Subha Bach  Clinic Number: [id number]    Therapy Diagnosis:   Encounter Diagnosis   Name Primary?    Decreased strength Yes     Physician: Jackson Pratt PA-C    Visit Date: 9/9/2024    Physician: Jackosn Pratt PA-C    Physician Orders: PT Eval and Treat  Medical Diagnosis from Referral: G43.709 (ICD-10-CM) - Chronic migraine without aura without status migrainosus, not intractable M79.18 (ICD-10-CM) - Myofascial pain  Evaluation Date: 8/14/2024  Authorization Period Expiration: 08/02/2025  Plan of Care Expiration: 11/30/2024                          Progress Update: 9/14/2024             FOTO: 1 / 3    Visit # / Visits authorized: 4 / 12  ([id number])      Precautions: Standard     Time In: 1100  Time Out: 1158  Total Billable Time: 58 minutes      SUBJECTIVE     Pt reports:  no complaints today  She was compliant with home exercise program.  Response to previous treatment: good  Functional change: ongoing    Pain: 3/10  Location: right cervical region      OBJECTIVE     Objective Measures updated at progress report unless specified.     Treatment     Subha received the treatments listed below:      therapeutic exercises to develop strength, ROM, flexibility, and posture for 58 MEDICAID minutes including:    UBE 3/3, level 2     Upper trap stretch 3 x 30 secs Bilateral      Cervical Range of Motion x 15 Bilateral    Green theraband Bilateral External Rotation 3x10   PNF D2 with red tb  2x10 Bilateral   Seated thoracic extension with half bolster 2 x 10    ER with red tb  2x15  Bilateral      Red Ball on wall  ClockWise/Counter Clockwise x 30 each direction    Cable column: shoulder rows 7# 3 x 10  Cable column: shoulder extension 7# 3 x 10   Cable column: shoulder adduction 7# 3 x 10     Prone:  2# rows, x 30 Bilateral   Prone  2# extension x 30  Bilateral   Supine chin tucks x 30      Patient Education and Home  Exercises     Home Exercises Provided and Patient Education Provided     Education provided:   - cont HOME EXERCISE PROGRAM     Written Home Exercises Provided: Patient instructed to cont prior HEP. Exercises were reviewed and Subha was able to demonstrate them prior to the end of the session.  Subha demonstrated good  understanding of the education provided. See EMR under Patient Instructions for exercises provided during therapy sessions    ASSESSMENT     Subha continues to report pain in cervical region, less tightness in upper trap region today due to botox injection prior week. Tolerated session well with focus on cervical endurance and postural strength.     Subha Is progressing well towards her goals.   Pt prognosis is Good.     Pt will continue to benefit from skilled outpatient physical therapy to address the deficits listed in the problem list box on initial evaluation, provide pt/family education and to maximize pt's level of independence in the home and community environment.     Pt's spiritual, cultural and educational needs considered and pt agreeable to plan of care and goals.     Anticipated barriers to physical therapy: none    GOALS:  SHORT TERM GOALS: 4 weeks 9/9/2024   Recent signs and systems trend is improving in order to progress towards LTG's. ongoing    Patient will be independent with HEP in order to further progress and return to maximal function.  ongoing   Pain rating at Worst: 5/10 in order to progress towards increased independence with activity.  ongoing   Patient will be able to correct postural deviations in sitting and standing, to decrease pain and promote postural awareness for injury prevention.   ongoing      LONG TERM GOALS: 8 weeks 8/14/2024   Patient will return to normal ADL, recreational, and work related activities with less pain and limitation.   ongoing   Patient will improve AROM to stated goals in order to return to maximal functional potential.   ongoing   Patient will  improve Strength to stated goals of appropriate musculature in order to improve functional independence.   ongoing   Pain Rating at Best: 1/10 to improve Quality of Life.   ongoing   Patient will meet predicted functional outcome (FOTO) score: 80% to increase self-worth & perceived functional ability.  ongoing   Patient will have met/partially met personal goal of being able to move with no pain. ongoing       PLAN     Cont per Plan of Care, posture, strengthening     Fara Randall, PTA

## 2024-09-16 ENCOUNTER — CLINICAL SUPPORT (OUTPATIENT)
Dept: REHABILITATION | Facility: HOSPITAL | Age: 39
End: 2024-09-16
Payer: MEDICAID

## 2024-09-16 DIAGNOSIS — R53.1 DECREASED STRENGTH: Primary | ICD-10-CM

## 2024-09-16 PROCEDURE — 97110 THERAPEUTIC EXERCISES: CPT | Mod: PN

## 2024-09-16 NOTE — PROGRESS NOTES
[facility]   Physical Therapy Treatment Note     Name: Subha Bach  [facility] Number: [id number]    Therapy Diagnosis:   Encounter Diagnosis   Name Primary?    Decreased strength Yes     Physician: Jackson Pratt PA-C    Visit Date: 9/16/2024    Physician: Jackson Pratt PA-C    Physician Orders: PT Eval and Treat  Medical Diagnosis from Referral: G43.709 (ICD-10-CM) - Chronic migraine without aura without status migrainosus, not intractable M79.18 (ICD-10-CM) - Myofascial pain  Evaluation Date: 8/14/2024  Authorization Period Expiration: 08/02/2025  Plan of Care Expiration: 11/30/2024                          Progress Update: 9/14/2024             FOTO: 1 / 3    Visit # / Visits authorized: 5 / 12  ([email or web address])      Precautions: Standard     Time In: 900  Time Out: 958  Total Billable Time: 58 minutes      SUBJECTIVE     Pt reports:  some increased Upper Trap and cervical discomfort after last visit   She was compliant with home exercise program.  Response to previous treatment: good  Functional change: ongoing    Pain: 3/10  Location: right cervical region      OBJECTIVE     Objective Measures updated at progress report unless specified.     Treatment     Subha received the treatments listed below:      therapeutic exercises to develop strength, ROM, flexibility, and posture for 58 MEDICAID minutes including:    UBE 3/3, level 2     Upper trap stretch 3 x 30 secs Bilateral      Cervical Range of Motion x 15 Bilateral    Green theraband Bilateral External Rotation 3x10   PNF D2 with red tb  2x10 Bilateral   Seated thoracic extension with half bolster 2 x 10    ER with red tb  2x15  Bilateral      Red Ball on wall  ClockWise/Counter Clockwise x 30 each direction    Cable column: shoulder rows 7# 3 x 10  Cable column: shoulder extension 7# 3 x 10   Cable column: shoulder adduction 7# 3 x 10     Prone:  2# rows, x 30 Bilateral   Prone  2# extension x 30  Bilateral   Supine chin  tucks x 30       Patient Education and Home Exercises     Home Exercises Provided and Patient Education Provided     Education provided:   - cont HOME EXERCISE PROGRAM     Written Home Exercises Provided: Patient instructed to cont prior HEP. Exercises were reviewed and Subha was able to demonstrate them prior to the end of the session.  Subha demonstrated good  understanding of the education provided. See EMR under Patient Instructions for exercises provided during therapy sessions    ASSESSMENT     Subha reports she had an increase in her muscle pain after last visit.  She is progressing with her strength and motion, feel her increased muscle pain was from adding resistance on exercise, which she was ready for . Tolerated session well with focus on cervical endurance and postural strength.     Subha Is progressing well towards her goals.   Pt prognosis is Good.     Pt will continue to benefit from skilled outpatient physical therapy to address the deficits listed in the problem list box on initial evaluation, provide pt/family education and to maximize pt's level of independence in the home and community environment.     Pt's spiritual, cultural and educational needs considered and pt agreeable to plan of care and goals.     Anticipated barriers to physical therapy: none    GOALS:  SHORT TERM GOALS: 4 weeks 9/16/2024   Recent signs and systems trend is improving in order to progress towards LTG's. ongoing    Patient will be independent with HEP in order to further progress and return to maximal function.  ongoing   Pain rating at Worst: 5/10 in order to progress towards increased independence with activity.  ongoing   Patient will be able to correct postural deviations in sitting and standing, to decrease pain and promote postural awareness for injury prevention.   ongoing      LONG TERM GOALS: 8 weeks 8/14/2024   Patient will return to normal ADL, recreational, and work related activities with less pain and  limitation.   ongoing   Patient will improve AROM to stated goals in order to return to maximal functional potential.   ongoing   Patient will improve Strength to stated goals of appropriate musculature in order to improve functional independence.   ongoing   Pain Rating at Best: 1/10 to improve Quality of Life.   ongoing   Patient will meet predicted functional outcome (FOTO) score: 80% to increase self-worth & perceived functional ability.  ongoing   Patient will have met/partially met personal goal of being able to move with no pain. ongoing       PLAN     Cont per Plan of Care, posture, strengthening     Conor Gaytan, PT

## 2024-09-20 ENCOUNTER — CLINICAL SUPPORT (OUTPATIENT)
Dept: REHABILITATION | Facility: HOSPITAL | Age: 39
End: 2024-09-20
Payer: MEDICAID

## 2024-09-20 DIAGNOSIS — R53.1 DECREASED STRENGTH: Primary | ICD-10-CM

## 2024-09-20 PROCEDURE — 97110 THERAPEUTIC EXERCISES: CPT | Mod: PN

## 2024-09-20 NOTE — PROGRESS NOTES
[facility]   Physical Therapy Treatment Note     Name: Subha Bach  Clinic Number: [id number]    Therapy Diagnosis:   Encounter Diagnosis   Name Primary?    Decreased strength Yes     Physician: Jackson Pratt PA-C    Visit Date: 9/20/2024    Physician: Jackson Pratt PA-C    Physician Orders: PT Eval and Treat  Medical Diagnosis from Referral: G43.709 (ICD-10-CM) - Chronic migraine without aura without status migrainosus, not intractable M79.18 (ICD-10-CM) - Myofascial pain  Evaluation Date: 8/14/2024  Authorization Period Expiration: 08/02/2025  Plan of Care Expiration: 11/30/2024                          Progress Update: 9/14/2024             FOTO: 1 / 3    Visit # / Visits authorized: 6 / 12  ([email or web address])      Precautions: Standard     Time In: 900  Time Out: 958  Total Billable Time: 58 minutes      SUBJECTIVE     Pt reports:  No new issues    She was compliant with home exercise program.  Response to previous treatment: good  Functional change: ongoing    Pain: 3/10  Location: right cervical region      OBJECTIVE     Objective Measures updated at progress report unless specified.     Treatment     Subha received the treatments listed below:      therapeutic exercises to develop strength, ROM, flexibility, and posture for 58 MEDICAID minutes including:    UBE 3/3, level 2     Upper trap stretch 3 x 30 secs Bilateral      Cervical Range of Motion x 15 Bilateral    Green theraband Bilateral External Rotation 3x10   PNF D2 with red tb  2x10 Bilateral   Seated thoracic extension with half bolster 2 x 10    ER with red tb  2x15  Bilateral      Red Ball on wall  ClockWise/Counter Clockwise x 30 each direction    Cable column: shoulder rows 7# 3 x 10  Cable column: shoulder extension 7# 3 x 10   Cable column: shoulder adduction 7# 3 x 10     Prone:  2# rows, x 30 Bilateral   Prone  2# extension x 30  Bilateral   Supine chin tucks x 30       Patient Education and Home Exercises      Home Exercises Provided and Patient Education Provided     Education provided:   - cont HOME EXERCISE PROGRAM     Written Home Exercises Provided: Patient instructed to cont prior HEP. Exercises were reviewed and Subha was able to demonstrate them prior to the end of the session.  Subha demonstrated good  understanding of the education provided. See EMR under Patient Instructions for exercises provided during therapy sessions    ASSESSMENT     Subha reports she had an increase in her muscle pain after last visit.  She is progressing with her strength and motion, feel her increased muscle pain was from adding resistance on exercise, which she was ready for . Tolerated session well with focus on cervical endurance and postural strength.     Subha Is progressing well towards her goals.   Pt prognosis is Good.     Pt will continue to benefit from skilled outpatient physical therapy to address the deficits listed in the problem list box on initial evaluation, provide pt/family education and to maximize pt's level of independence in the home and community environment.     Pt's spiritual, cultural and educational needs considered and pt agreeable to plan of care and goals.     Anticipated barriers to physical therapy: none    GOALS:  SHORT TERM GOALS: 4 weeks 9/20/2024   Recent signs and systems trend is improving in order to progress towards LTG's. ongoing    Patient will be independent with HEP in order to further progress and return to maximal function.  ongoing   Pain rating at Worst: 5/10 in order to progress towards increased independence with activity.  ongoing   Patient will be able to correct postural deviations in sitting and standing, to decrease pain and promote postural awareness for injury prevention.   ongoing      LONG TERM GOALS: 8 weeks 8/14/2024   Patient will return to normal ADL, recreational, and work related activities with less pain and limitation.   ongoing   Patient will improve AROM to stated  goals in order to return to maximal functional potential.   ongoing   Patient will improve Strength to stated goals of appropriate musculature in order to improve functional independence.   ongoing   Pain Rating at Best: 1/10 to improve Quality of Life.   ongoing   Patient will meet predicted functional outcome (FOTO) score: 80% to increase self-worth & perceived functional ability.  ongoing   Patient will have met/partially met personal goal of being able to move with no pain. ongoing       PLAN     Cont per Plan of Care, posture, strengthening     Conor Gaytan, PT

## 2024-10-04 DIAGNOSIS — R11.0 NAUSEA: ICD-10-CM

## 2024-10-04 RX ORDER — ONDANSETRON 4 MG/1
4 TABLET, ORALLY DISINTEGRATING ORAL EVERY 8 HOURS PRN
Qty: 20 TABLET | Refills: 3 | Status: SHIPPED | OUTPATIENT
Start: 2024-10-04

## 2024-10-09 DIAGNOSIS — K21.9 GASTROESOPHAGEAL REFLUX DISEASE, UNSPECIFIED WHETHER ESOPHAGITIS PRESENT: ICD-10-CM

## 2024-10-10 RX ORDER — SUCRALFATE 1 G/1
1 TABLET ORAL 4 TIMES DAILY
Qty: 40 TABLET | Refills: 0 | Status: SHIPPED | OUTPATIENT
Start: 2024-10-10 | End: 2024-10-20

## 2024-10-17 ENCOUNTER — OFFICE VISIT (OUTPATIENT)
Dept: NEUROLOGY | Facility: CLINIC | Age: 39
End: 2024-10-17
Payer: MEDICAID

## 2024-10-17 VITALS
SYSTOLIC BLOOD PRESSURE: 131 MMHG | HEIGHT: 66 IN | TEMPERATURE: 96 F | BODY MASS INDEX: 31.02 KG/M2 | HEART RATE: 74 BPM | WEIGHT: 193 LBS | RESPIRATION RATE: 17 BRPM | DIASTOLIC BLOOD PRESSURE: 85 MMHG

## 2024-10-17 DIAGNOSIS — G43.709 CHRONIC MIGRAINE WITHOUT AURA WITHOUT STATUS MIGRAINOSUS, NOT INTRACTABLE: Primary | ICD-10-CM

## 2024-10-17 DIAGNOSIS — M79.18 MYOFASCIAL PAIN: ICD-10-CM

## 2024-10-17 PROCEDURE — 99214 OFFICE O/P EST MOD 30 MIN: CPT | Mod: PBBFAC,PO | Performed by: PHYSICIAN ASSISTANT

## 2024-10-17 PROCEDURE — 3008F BODY MASS INDEX DOCD: CPT | Mod: CPTII,,, | Performed by: PHYSICIAN ASSISTANT

## 2024-10-17 PROCEDURE — 1159F MED LIST DOCD IN RCRD: CPT | Mod: CPTII,,, | Performed by: PHYSICIAN ASSISTANT

## 2024-10-17 PROCEDURE — 3044F HG A1C LEVEL LT 7.0%: CPT | Mod: CPTII,,, | Performed by: PHYSICIAN ASSISTANT

## 2024-10-17 PROCEDURE — 1160F RVW MEDS BY RX/DR IN RCRD: CPT | Mod: CPTII,,, | Performed by: PHYSICIAN ASSISTANT

## 2024-10-17 PROCEDURE — 3075F SYST BP GE 130 - 139MM HG: CPT | Mod: CPTII,,, | Performed by: PHYSICIAN ASSISTANT

## 2024-10-17 PROCEDURE — 3079F DIAST BP 80-89 MM HG: CPT | Mod: CPTII,,, | Performed by: PHYSICIAN ASSISTANT

## 2024-10-17 PROCEDURE — 99999 PR PBB SHADOW E&M-EST. PATIENT-LVL IV: CPT | Mod: PBBFAC,,, | Performed by: PHYSICIAN ASSISTANT

## 2024-10-17 PROCEDURE — 99213 OFFICE O/P EST LOW 20 MIN: CPT | Mod: S$PBB,,, | Performed by: PHYSICIAN ASSISTANT

## 2024-10-17 RX ORDER — GABAPENTIN 300 MG/1
CAPSULE ORAL
Qty: 30 CAPSULE | Refills: 6 | Status: SHIPPED | OUTPATIENT
Start: 2024-10-17

## 2024-10-17 NOTE — PROGRESS NOTES
[facility]  [address] [facility] [address]  [place] [address]  Phone:[phone]  Fax: [phone]  Follow up visit     Patient Name: Subha Bach  : 1985  MRN:  [id number]  Today: 10/17/2024   LOV: 2024 for botox 1   chief complaint: Headache    PCP: Sathish Hamilton MD.       Assessment:   Subha Bach is a 39 y.o. right handed female with a PMHx of: asthma, FMG, HA and GERD   whom presents solo at the request of the PCP for HA. Appears to have chronic migraine and resistant to a multitude of therapies. Myofascial pain/hx of FMG likely contributes. With botox, helping HA.       Review:    ICD-10-CM ICD-9-CM   1. Chronic migraine without aura without status migrainosus, not intractable  G43.709 346.70   2. Myofascial pain  M79.18 729.1           Plan:               -if HA change in quality/nature, will get updated imaging study        For HA Prevention:  1 no BB d/t PMHx of asthma   2 refilled effexor (came to me on this medicine, using for her HA), re discussed adv effects/dosing  3  zanaflex per other providers-We don't manage FMG here in this clinic  4  Patient meets the criteria for chronic migraine. In summary, She has  migraines >15 days per month  and last >4 hours if untreated. Specifics of duration, frequency and strength are listed in the HPI (please refer to this section).  This pattern has continued for >3 months.  She has failed at least three preventive medications (full list of medications is listed below in the HPI under Therapies tried in past, but for ease of reference effexor, topamax, zanaflex, MgOx, emgality, B2, gabapentin, wellbutrin, keppra, aimovig, emgality, ajovy, baclofen, etc)  I have therefore recommended a trial of Botox via the PREEMPT protocol for migraine prophylaxis.   We discussed what to expect on procedure day at length, wear old or loose fitting clothing (if possible, merely to keep work clothes from getting any blood  or being wrinkled), no make up (if applicable), eat meals/stay well hydrated and secure a ride if necessary. Also, discussed it can take up to 2-3 sessions of botox to get results desired. Lastly, we discussed procedure at length, 31 injections done in the office, potential complications not limited to muscle weakness, respiratory issues, or worst case scenario-death. The patient voiced understanding and wished to move forward.  Muscles to be injected:   10 units divided in 2 sites  Procerus 5 units in 1 site  Frontalis 20 units divided in 4 sites  Temporalis 40 units divided in 8 sites  Occipitalis 30 units divided in 6 sites  Cervical paraspinal 20 units divided in 4 sites  Trapezius 30 units divided in 6 sites  A total dose of 155 units of botox to be used with  45 units to be discarded/wasted (unavoidable).       -The Botox injections have achieved well over 50%  improvement in the patient's symptoms. Migraines have been reduced at least 7 days per month and the number of cumulative hours suffering with headaches has been reduced at least 100 total hours per month. Today she does have a headache indicating that the Botox has worn off. Frequency of treatment is every 3 months unless no response to the treatments, at which time we will discontinue the injections.  5 was taking gabapentin 600 mg PRN, (at most 2x a week, in evening), start gabapentin 300 mg Q2h before bed (nightly), discussed adv effects/dosing, she agreed          -The  was reviewed, no aberrant behaviour was noted. JEAN PIERRE MARCIAL. Checked at today's visit- 10/17/2024         For HA :  Ubrelvy     To break up Headaches:  N/A    Other:  N/A           All test results as well as any necessary instructions were reviewed and discussed with patient.    Review:  Orders Placed This Encounter    gabapentin (NEURONTIN) 300 MG capsule           Patient to return to PCP/other specialists for all other problems  Patient to continue on all  medications as Rx'd  Full office note available in EMR   RTO- for botox 2   The patient indicates understanding of these issues and agrees to the plan.    HPI:   Subha Bach is a 39 y.o.right handed, female with a PMHx of: asthma, FMG, HA and GERD   whom presents solo in follow up for HA.     At last visit: Botox 1 given. Previously has effexor and ubrelvy. Steroids helped.   16 migraine days a month at this juncture (historically 25 HD a month)  No side effects from botox  Currently has HA, pain in back of the head on right   Ubrelvy works but slow acting   Has been taking gabapentin 600 mg PRN as opposed to scheduled, makes her feel tired         From previous visit:   Start:HA since 13 years old, strong Fhx of migraines, has been seen by many providers over the years   History of trauma (no), History of CNS infection (no), History of Stroke (no)  Location:right occipitalis radiates to whole right hemicrania, rarely on the LEFT  Severity: range: 1-10/10  Duration:>4 hours a day   Frequency:25+ migraine days a month   Associated factors (bolded positive) WITH HA ( or migraine): Nausea, vomiting, photophobia, phonophobia, tinnitus, scalp pain, vision loss, diplopia, scintillations, eye pain, jaw pain, weakness?    Tried:effexor, relpax, trudhesa, cannabis (gabapentin for FMG)   Triggers (in bold): stress, lack of sleep, hormonal changes, too much caffeine, too little caffeine, weather change, seasonal change, strong odours, bright lights, sunlight, food    Currently having a HA?:yes   Positives in bold: Hx of Kidney Stones, asthma, GI bleed, osteoporosis, CAD/MI, CVA/TIA, DM    Imaging on file: no imaging in many years   Therapies tried in past: (failures to be marked, if known---why did it fail?)  Effexor  Topamax  Zanaflex  Zofran  MgOx  Emgality  Gabapentin  Relpax  Wellbutrin  keppra  Aimovig  Ajovy  imitex  Baclofen  B2  Trudhesa   Maxalt   Botox  Ubrelvy     Medication Reconciliation:   Current  Outpatient Medications   Medication Sig Dispense Refill    albuterol (PROVENTIL/VENTOLIN HFA) 90 mcg/actuation inhaler Inhale 2 puffs into the lungs every 4 (four) hours as needed for Wheezing or Shortness of Breath. 18 g 11    dietary supplement Cap Take by mouth. Mg, Vitamin D3, Riboflavin      dihydroergotamine (TRUDHESA) 0.725 mg/pump act. (4 mg/mL) Spry       eletriptan (RELPAX) 40 MG tablet Take 40 mg by mouth as needed. may repeat in 2 hours if necessary      fluticasone-umeclidin-vilanter (TRELEGY ELLIPTA) 200-62.5-25 mcg inhaler Inhale 1 puff into the lungs once daily. 60 each 11    loratadine (CLARITIN) 10 mg tablet Take 1 tablet (10 mg total) by mouth once daily. 30 tablet 11    magnesium gluconate 27.5 mg magne- sium (500 mg) Tab Take by mouth once.      meclizine (ANTIVERT) 25 mg tablet Take 25 mg by mouth.      ondansetron (ZOFRAN-ODT) 4 MG TbDL Take 1 tablet (4 mg total) by mouth every 8 (eight) hours as needed (nausea). 20 tablet 3    pantoprazole (PROTONIX) 40 MG tablet Take 1 tablet (40 mg total) by mouth once daily. 90 tablet 3    predniSONE (DELTASONE) 20 MG tablet Take three pills for three days, two pills for three days, one pill for three days. 18 tablet 0    riboflavin, vitamin B2, 400 mg Tab Take 400 mg by mouth once daily.      sucralfate (CARAFATE) 1 gram tablet Take 1 tablet (1 g total) by mouth 4 (four) times daily for 10 days 40 tablet 0    tiZANidine (ZANAFLEX) 4 MG tablet Take 4 mg by mouth every evening. May take up to 3 tablets      ubrogepant (UBRELVY) 100 mg tablet 1 pill at onset headache, second pill 2 hours later if needed, no more than 2 pills day/3 days use in week 12 tablet 6    venlafaxine (EFFEXOR-XR) 150 MG Cp24 Take 1 capsule (150 mg total) by mouth once daily. 90 capsule 1    azelastine (ASTELIN) 137 mcg (0.1 %) nasal spray 1 spray (137 mcg total) by Nasal route 2 (two) times daily. 30 mL 11    fexofenadine (ALLEGRA) 180 MG tablet Take 1 tablet (180 mg total) by mouth  once daily. 30 tablet 0     No current facility-administered medications for this visit.     Review of patient's allergies indicates:   Allergen Reactions    Nubain [nalbuphine] Hives       PMHx:  Past Medical History:   Diagnosis Date    Asthma     Encounter for blood transfusion     Gestational diabetes     gestational diabetes    Migraine headache      Past Surgical History:   Procedure Laterality Date    ARTHROSCOPY, HIP Left 6/22/2022    Procedure: ARTHROSCOPY, HIP, WITH LABRUM DEBRIDEMENT;  Surgeon: Joselo Henderson II, MD;  Location: [facility] OR;  Service: Orthopedics;  Laterality: Left;    LAPAROSCOPIC SALPINGECTOMY Bilateral 10/19/2020    Procedure: SALPINGECTOMY, LAPAROSCOPIC;  Surgeon: Yonas Walsh MD;  Location: [facility] OR;  Service: OB/GYN;  Laterality: Bilateral;  Possible Overnight  Claudia Cabrera MD to assist    LAPAROSCOPIC TOTAL HYSTERECTOMY N/A 10/19/2020    Procedure: HYSTERECTOMY, TOTAL, LAPAROSCOPIC;  Surgeon: Yonas Walsh MD;  Location: [facility] OR;  Service: OB/GYN;  Laterality: N/A;    REMOVAL OF IMPLANT Left 10/19/2020    Procedure: REMOVAL, IMPLANT;  Surgeon: Yonas Walsh MD;  Location: [facility] OR;  Service: OB/GYN;  Laterality: Left;  Nextplan birthcontrol implant removal    SHOULDER SURGERY         Fhx:  Family History   Problem Relation Name Age of Onset    Asthma Mother      Migraines Mother      Hypothyroidism Mother      Arthritis Mother      Migraines Father      Allergies Father      Chiari malformation Sister      Migraines Sister      Pancreatitis Maternal Grandmother      Migraines Maternal Grandmother      Thyroid disease Maternal Grandmother      Other Maternal Grandmother          pituitary gland issues    Heart failure Maternal Grandfather      Diabetes Maternal Grandfather      Kidney disease Maternal Grandfather      Alzheimer's disease Paternal Grandmother      Prostate cancer Paternal Grandfather         Shx:   Social History     Socioeconomic History    Marital status:  Single   Tobacco Use    Smoking status: Never    Smokeless tobacco: Never   Substance and Sexual Activity    Alcohol use: Yes     Comment: Martini every 6 months    Drug use: No    Sexual activity: Yes     Partners: Male     Social Drivers of Health     Financial Resource Strain: Medium Risk (2/2/2024)    Overall Financial Resource Strain (CARDIA)     Difficulty of Paying Living Expenses: Somewhat hard   Food Insecurity: Food Insecurity Present (2/2/2024)    Hunger Vital Sign     Worried About Running Out of Food in the Last Year: Sometimes true     Ran Out of Food in the Last Year: Never true   Transportation Needs: No Transportation Needs (2/2/2024)    PRAPARE - Transportation     Lack of Transportation (Medical): No     Lack of Transportation (Non-Medical): No   Physical Activity: Insufficiently Active (2/2/2024)    Exercise Vital Sign     Days of Exercise per Week: 1 day     Minutes of Exercise per Session: 20 min   Stress: Stress Concern Present (2/2/2024)    [employer] - Occupational Stress Questionnaire     Feeling of Stress : Very much   Housing Stability: Low Risk  (2/2/2024)    Housing Stability Vital Sign     Unable to Pay for Housing in the Last Year: No     Number of Places Lived in the Last Year: 1     Unstable Housing in the Last Year: No           Labs:   Reviewed in chart     Imaging:   Reviewed in chart       Other testing:  Reviewed in chart     Note: I have independently reviewed any/all imaging/labs/tests and agree with the report (s) as documented.  Any discrepancies will be as noted/demarcated by free text.  ANIKET GREENFIELD 10/17/2024                     ROS:   Review Of Systems (questions asked, positive or additions in BOLD)  Gen: Weight change, fatigue/malaise, pyrexia   HEENT: Tinnitus, headache,  blurred vision, eye pain, diplopia, photophobia,  nose bleeds, congestion, sore throat, jaw pain, scalp pain, neck stiffness   Card: Palpitations, CP   Pulm: SOB, cough  "  Vas: Easy bruising, easy bleeding   GI: N/V/D/C, incontinence, hematemesis, hematochezia    : incontinence, hematuria   Endocrine: Temp intolerance, polyuria, polydipsia   M/S: Neck pain, myalgia, back pain, joint pain, falls    Neuro: PER HPI   PSY: Memory loss, confusion, depression, anxiety, trouble in sleep, hallucinations          EXAM:   /85 (BP Location: Right arm, Patient Position: Sitting)   Pulse 74   Temp 96.4 °F (35.8 °C) (Temporal)   Resp 17   Ht 5' 6" (1.676 m)   Wt 87.5 kg (193 lb 0.2 oz)   LMP 10/13/2020 Comment: PT INSTRUCTED ON NEED FOR URINE SAMPLE PRIOR TO PROCEDURE AND VERBALIZES UNDERSTANDING  BMI 31.15 kg/m²    GEN:  NAD  HEENT: NC/AT       EXTREM:   no edema present.    NEURO:  Mental Status:  Awake, alert and appropriately oriented to time, place, and person.  Normal attention and concentration.  Speech is fluent and appropriate language function (I.e., comprehension).     Cranial Nerves:     Extraocular movements are intact and without nystagmus.  Visual fields are full to confrontation testing.   Facial movement is symmetric.  F  Hearing is normal. Uvula in midline. FROM of neck in all (6) directions, shoulder shrug symmetrical. Tongue in midline without fasiculation.     Motor:  antigravity in all limbs         Gait and Stance: Normal manner of stance and gait function testing.          This document has been electronically signed by  Jackson JAUREGUIJane Pratt MPA, PA-C on 10/17/2024, I have personally typed this message using the EMR.       Dr Javier MD  was available during today's visit.     Personal Protective Equipment:    Personal Protective Equipment was used during this encounter including:   non latex gloves.          CC: Sathish Hamilton MD            "

## 2024-11-06 ENCOUNTER — PATIENT MESSAGE (OUTPATIENT)
Dept: FAMILY MEDICINE | Facility: CLINIC | Age: 39
End: 2024-11-06
Payer: MEDICAID

## 2024-11-07 ENCOUNTER — TELEPHONE (OUTPATIENT)
Dept: CARDIOLOGY | Facility: HOSPITAL | Age: 39
End: 2024-11-07

## 2024-11-07 ENCOUNTER — HOSPITAL ENCOUNTER (EMERGENCY)
Facility: HOSPITAL | Age: 39
Discharge: HOME | End: 2024-11-07
Attending: EMERGENCY MEDICINE
Payer: MEDICAID

## 2024-11-07 VITALS
BODY MASS INDEX: 31.47 KG/M2 | TEMPERATURE: 99 F | DIASTOLIC BLOOD PRESSURE: 78 MMHG | OXYGEN SATURATION: 99 % | WEIGHT: 195 LBS | HEART RATE: 83 BPM | RESPIRATION RATE: 20 BRPM | SYSTOLIC BLOOD PRESSURE: 112 MMHG

## 2024-11-07 DIAGNOSIS — R07.9 CHEST PAIN: ICD-10-CM

## 2024-11-07 LAB
ALBUMIN SERPL BCP-MCNC: 4.1 G/DL (ref 3.5–5.2)
ALP SERPL-CCNC: 97 U/L (ref 40–150)
ALT SERPL W/O P-5'-P-CCNC: 23 U/L (ref 10–44)
ANION GAP SERPL CALC-SCNC: 10 MMOL/L (ref 8–16)
AST SERPL-CCNC: 16 U/L (ref 10–40)
BASOPHILS # BLD AUTO: 0.02 K/UL (ref 0–0.2)
BASOPHILS NFR BLD: 0.2 % (ref 0–1.9)
BILIRUB SERPL-MCNC: 0.3 MG/DL (ref 0.1–1)
BNP SERPL-MCNC: <10 PG/ML (ref 0–99)
BUN SERPL-MCNC: 11 MG/DL (ref 6–20)
CALCIUM SERPL-MCNC: 9.6 MG/DL (ref 8.7–10.5)
CHLORIDE SERPL-SCNC: 105 MMOL/L (ref 95–110)
CO2 SERPL-SCNC: 23 MMOL/L (ref 23–29)
CREAT SERPL-MCNC: 0.8 MG/DL (ref 0.5–1.4)
D DIMER PPP IA.FEU-MCNC: 0.53 MG/L FEU
DIFFERENTIAL METHOD BLD: NORMAL
EOSINOPHIL # BLD AUTO: 0.1 K/UL (ref 0–0.5)
EOSINOPHIL NFR BLD: 1.3 % (ref 0–8)
ERYTHROCYTE [DISTWIDTH] IN BLOOD BY AUTOMATED COUNT: 12.6 % (ref 11.5–14.5)
EST. GFR  (NO RACE VARIABLE): >60 ML/MIN/1.73 M^2
GLUCOSE SERPL-MCNC: 80 MG/DL (ref 70–110)
HCT VFR BLD AUTO: 41 % (ref 37–48.5)
HCV AB SERPL QL IA: NEGATIVE
HGB BLD-MCNC: 13.7 G/DL (ref 12–16)
HIV 1+2 AB+HIV1 P24 AG SERPL QL IA: NEGATIVE
IMM GRANULOCYTES # BLD AUTO: 0.02 K/UL (ref 0–0.04)
IMM GRANULOCYTES NFR BLD AUTO: 0.2 % (ref 0–0.5)
LYMPHOCYTES # BLD AUTO: 2.1 K/UL (ref 1–4.8)
LYMPHOCYTES NFR BLD: 23.4 % (ref 18–48)
MCH RBC QN AUTO: 28.5 PG (ref 27–31)
MCHC RBC AUTO-ENTMCNC: 33.4 G/DL (ref 32–36)
MCV RBC AUTO: 85 FL (ref 82–98)
MONOCYTES # BLD AUTO: 0.6 K/UL (ref 0.3–1)
MONOCYTES NFR BLD: 6.3 % (ref 4–15)
NEUTROPHILS # BLD AUTO: 6.2 K/UL (ref 1.8–7.7)
NEUTROPHILS NFR BLD: 68.6 % (ref 38–73)
NRBC BLD-RTO: 0 /100 WBC
PLATELET # BLD AUTO: 392 K/UL (ref 150–450)
PMV BLD AUTO: 9.7 FL (ref 9.2–12.9)
POTASSIUM SERPL-SCNC: 3.9 MMOL/L (ref 3.5–5.1)
PROT SERPL-MCNC: 8.2 G/DL (ref 6–8.4)
RBC # BLD AUTO: 4.8 M/UL (ref 4–5.4)
SODIUM SERPL-SCNC: 138 MMOL/L (ref 136–145)
TROPONIN I SERPL DL<=0.01 NG/ML-MCNC: 0.02 NG/ML (ref 0–0.03)
TROPONIN I SERPL DL<=0.01 NG/ML-MCNC: <0.006 NG/ML (ref 0–0.03)
TROPONIN I SERPL DL<=0.01 NG/ML-MCNC: <0.006 NG/ML (ref 0–0.03)
WBC # BLD AUTO: 9.07 K/UL (ref 3.9–12.7)

## 2024-11-07 PROCEDURE — 85379 FIBRIN DEGRADATION QUANT: CPT | Performed by: EMERGENCY MEDICINE

## 2024-11-07 PROCEDURE — 63600175 PHARM REV CODE 636 W HCPCS: Performed by: EMERGENCY MEDICINE

## 2024-11-07 PROCEDURE — 94760 N-INVAS EAR/PLS OXIMETRY 1: CPT

## 2024-11-07 PROCEDURE — 96374 THER/PROPH/DIAG INJ IV PUSH: CPT | Mod: 59

## 2024-11-07 PROCEDURE — 86803 HEPATITIS C AB TEST: CPT | Performed by: EMERGENCY MEDICINE

## 2024-11-07 PROCEDURE — 25500020 PHARM REV CODE 255

## 2024-11-07 PROCEDURE — 83880 ASSAY OF NATRIURETIC PEPTIDE: CPT | Performed by: EMERGENCY MEDICINE

## 2024-11-07 PROCEDURE — 36415 COLL VENOUS BLD VENIPUNCTURE: CPT | Performed by: EMERGENCY MEDICINE

## 2024-11-07 PROCEDURE — 84484 ASSAY OF TROPONIN QUANT: CPT | Mod: 91 | Performed by: EMERGENCY MEDICINE

## 2024-11-07 PROCEDURE — 93010 ELECTROCARDIOGRAM REPORT: CPT | Mod: 59,,, | Performed by: GENERAL PRACTICE

## 2024-11-07 PROCEDURE — 25000003 PHARM REV CODE 250: Performed by: EMERGENCY MEDICINE

## 2024-11-07 PROCEDURE — 96376 TX/PRO/DX INJ SAME DRUG ADON: CPT

## 2024-11-07 PROCEDURE — 85025 COMPLETE CBC W/AUTO DIFF WBC: CPT | Performed by: EMERGENCY MEDICINE

## 2024-11-07 PROCEDURE — 80053 COMPREHEN METABOLIC PANEL: CPT | Performed by: EMERGENCY MEDICINE

## 2024-11-07 PROCEDURE — 93005 ELECTROCARDIOGRAM TRACING: CPT

## 2024-11-07 PROCEDURE — 99285 EMERGENCY DEPT VISIT HI MDM: CPT | Mod: 25

## 2024-11-07 PROCEDURE — 87389 HIV-1 AG W/HIV-1&-2 AB AG IA: CPT | Performed by: EMERGENCY MEDICINE

## 2024-11-07 PROCEDURE — 96375 TX/PRO/DX INJ NEW DRUG ADDON: CPT

## 2024-11-07 RX ORDER — KETOROLAC TROMETHAMINE 30 MG/ML
15 INJECTION, SOLUTION INTRAMUSCULAR; INTRAVENOUS
Status: COMPLETED | OUTPATIENT
Start: 2024-11-07 | End: 2024-11-07

## 2024-11-07 RX ORDER — ASPIRIN 325 MG
325 TABLET ORAL
Status: COMPLETED | OUTPATIENT
Start: 2024-11-07 | End: 2024-11-07

## 2024-11-07 RX ORDER — PREDNISONE 20 MG/1
40 TABLET ORAL DAILY
Qty: 10 TABLET | Refills: 0 | Status: SHIPPED | OUTPATIENT
Start: 2024-11-07 | End: 2024-11-11 | Stop reason: SDUPTHER

## 2024-11-07 RX ORDER — METHYLPREDNISOLONE SOD SUCC 125 MG
125 VIAL (EA) INJECTION
Status: COMPLETED | OUTPATIENT
Start: 2024-11-07 | End: 2024-11-07

## 2024-11-07 RX ORDER — PREDNISONE 20 MG/1
40 TABLET ORAL DAILY
Qty: 10 TABLET | Refills: 0 | Status: SHIPPED | OUTPATIENT
Start: 2024-11-07 | End: 2024-11-07

## 2024-11-07 RX ADMIN — IOHEXOL 75 ML: 350 INJECTION, SOLUTION INTRAVENOUS at 10:11

## 2024-11-07 RX ADMIN — KETOROLAC TROMETHAMINE 15 MG: 30 INJECTION, SOLUTION INTRAMUSCULAR; INTRAVENOUS at 01:11

## 2024-11-07 RX ADMIN — METHYLPREDNISOLONE SODIUM SUCCINATE 125 MG: 125 INJECTION, POWDER, FOR SOLUTION INTRAMUSCULAR; INTRAVENOUS at 11:11

## 2024-11-07 RX ADMIN — ASPIRIN 325 MG: 325 TABLET ORAL at 09:11

## 2024-11-07 RX ADMIN — KETOROLAC TROMETHAMINE 15 MG: 30 INJECTION, SOLUTION INTRAMUSCULAR at 09:11

## 2024-11-07 NOTE — PLAN OF CARE
11/07/24 1258   Discharge Reassessment   Assessment Type Discharge Planning Reassessment   Did the patient's condition or plan change since previous assessment? Yes   DME Needed Upon Discharge  none   Transition of Care Barriers None   Post-Acute Status   Discharge Delays None known at this time     Pt scheduled for Stress Test on 11/8/2024, Pt will be contacted the day before with instructions and arrival time.

## 2024-11-07 NOTE — DISCHARGE INSTRUCTIONS
Stress test was ordered from the emergency department.  Please do not skip this test it was an important part of your workup.  Please return to the ER if any recurring or worsening pain.

## 2024-11-07 NOTE — ED PROVIDER NOTES
Encounter Date: 11/7/2024       History     Chief Complaint   Patient presents with    Chest Pain     Midsternal chest tightness that radiates to back and SOB starting yesterday.     Patient presents complaining of midsternal chest pain that is worse with inspiration.  Symptoms started overnight.  Patient has a prior history of pleurisy many years ago.  She denies any history of smoking or high blood pressure.  No history of high cholesterol.  No family history of heart disease.  She was not a diabetic.  She denies dyspnea on exertion or pain with exertion.  She denies fever chills or cough.  She was not on birth control.  No hormones.  No history of clots.      Review of patient's allergies indicates:   Allergen Reactions    Nubain [nalbuphine] Hives     Past Medical History:   Diagnosis Date    Asthma     Encounter for blood transfusion     Gestational diabetes     gestational diabetes    Migraine headache      Past Surgical History:   Procedure Laterality Date    ARTHROSCOPY, HIP Left 6/22/2022    Procedure: ARTHROSCOPY, HIP, WITH LABRUM DEBRIDEMENT;  Surgeon: oJselo Henderson II, MD;  Location: [facility] OR;  Service: Orthopedics;  Laterality: Left;    LAPAROSCOPIC SALPINGECTOMY Bilateral 10/19/2020    Procedure: SALPINGECTOMY, LAPAROSCOPIC;  Surgeon: Yonas Walsh MD;  Location: [facility] OR;  Service: OB/GYN;  Laterality: Bilateral;  Possible Overnight  Claudia Cabrera MD to assist    LAPAROSCOPIC TOTAL HYSTERECTOMY N/A 10/19/2020    Procedure: HYSTERECTOMY, TOTAL, LAPAROSCOPIC;  Surgeon: Yonas Walsh MD;  Location: [facility] OR;  Service: OB/GYN;  Laterality: N/A;    REMOVAL OF IMPLANT Left 10/19/2020    Procedure: REMOVAL, IMPLANT;  Surgeon: Yonas Walsh MD;  Location: [facility] OR;  Service: OB/GYN;  Laterality: Left;  Nextplan birthcontrol implant removal    SHOULDER SURGERY       Family History   Problem Relation Name Age of Onset    Asthma Mother      Migraines Mother      Hypothyroidism Mother      Arthritis Mother       Migraines Father      Allergies Father      Chiari malformation Sister      Migraines Sister      Pancreatitis Maternal Grandmother      Migraines Maternal Grandmother      Thyroid disease Maternal Grandmother      Other Maternal Grandmother          pituitary gland issues    Heart failure Maternal Grandfather      Diabetes Maternal Grandfather      Kidney disease Maternal Grandfather      Alzheimer's disease Paternal Grandmother      Prostate cancer Paternal Grandfather       Social History     Tobacco Use    Smoking status: Never    Smokeless tobacco: Never   Substance Use Topics    Alcohol use: Yes     Comment: Martini every 6 months    Drug use: No     Review of Systems   All other systems reviewed and are negative.      Physical Exam     Initial Vitals   BP Pulse Resp Temp SpO2   11/07/24 0845 11/07/24 0844 11/07/24 0845 11/07/24 0845 11/07/24 0844   (!) 134/92 97 20 98.6 °F (37 °C) 96 %      MAP       --                Physical Exam    Nursing note and vitals reviewed.  Constitutional: She appears well-developed and well-nourished.   Pleasant, polite   HENT:   Head: Normocephalic and atraumatic. Mouth/Throat: Oropharynx is clear and moist.   Eyes: EOM are normal.   Neck: Neck supple.   Normal range of motion.  Cardiovascular:  Normal rate, regular rhythm, normal heart sounds and intact distal pulses.           Pulmonary/Chest: No respiratory distress.   Pain is reproducible with deep breath.   Abdominal: Abdomen is soft.   Musculoskeletal:         General: Normal range of motion.      Cervical back: Normal range of motion and neck supple.     Neurological: She is alert and oriented to person, place, and time. She has normal strength.   Skin: Skin is warm and dry. Capillary refill takes less than 2 seconds.   Psychiatric: She has a normal mood and affect. Her behavior is normal. Judgment and thought content normal.         ED Course   Procedures  Labs Reviewed   D DIMER, QUANTITATIVE - Abnormal        Result Value    D-Dimer 0.53 (*)     Narrative:      Ddimer  critical result(s) called and verbal readback obtained from   Delfina Leiva by BTI1 11/07/2024 10:14   CBC W/ AUTO DIFFERENTIAL    WBC 9.07      RBC 4.80      Hemoglobin 13.7      Hematocrit 41.0      MCV 85      MCH 28.5      MCHC 33.4      RDW 12.6      Platelets 392      MPV 9.7      Immature Granulocytes 0.2      Gran # (ANC) 6.2      Immature Grans (Abs) 0.02      Lymph # 2.1      Mono # 0.6      Eos # 0.1      Baso # 0.02      nRBC 0      Gran % 68.6      Lymph % 23.4      Mono % 6.3      Eosinophil % 1.3      Basophil % 0.2      Differential Method Automated     COMPREHENSIVE METABOLIC PANEL    Sodium 138      Potassium 3.9      Chloride 105      CO2 23      Glucose 80      BUN 11      Creatinine 0.8      Calcium 9.6      Total Protein 8.2      Albumin 4.1      Total Bilirubin 0.3      Alkaline Phosphatase 97      AST 16      ALT 23      eGFR >60      Anion Gap 10     TROPONIN I    Troponin I <0.006     B-TYPE NATRIURETIC PEPTIDE    BNP <10     HIV 1 / 2 ANTIBODY    HIV 1/2 Ag/Ab Negative      Narrative:     Release to patient->Immediate   HEPATITIS C ANTIBODY    Hepatitis C Ab Negative      Narrative:     Release to patient->Immediate   TROPONIN I    Troponin I 0.017            Imaging Results              CTA Chest Non-Coronary (PE Studies) (Final result)  Result time 11/07/24 11:46:34      Final result by Conor Huggins Jr., MD (11/07/24 11:46:34)                   Impression:      No CTA evidence of pulmonary embolus or aortic dissection.  Otherwise negative CT chest.      Electronically signed by: Conor Huggins MD  Date:    11/07/2024  Time:    11:46               Narrative:    EXAMINATION:  CTA CHEST NON CORONARY (PE STUDIES)    CLINICAL HISTORY:  Pulmonary embolism (PE) suspected, positive D-dimer;    TECHNIQUE:  Low dose axial images, sagittal and coronal reformations were obtained from the thoracic inlet to the lung bases following  the IV administration of 75 mL of Omnipaque 350.  Contrast timing was optimized to evaluate the pulmonary arteries.  MIP images were performed.    COMPARISON:  Chest x-ray of November 7, 2024    FINDINGS:  There is no CTA evidence of pulmonary embolus.  Opacification of the pulmonary arteries is excellent.  Aortic dissection or aneurysm is not seen.  The cardiac size and contour is within normal limits.    An intrapulmonary mass or nodule is not identified.  There is minor dependent atelectasis posteriorly.  The interstitial density is otherwise within normal limits.  No pneumothorax or pleural effusion is noted.                                       X-Ray Chest AP Portable (Final result)  Result time 11/07/24 09:35:50      Final result by Conor Huggins Jr., MD (11/07/24 09:35:50)                   Impression:      No acute abnormality.      Electronically signed by: Conor Huggins MD  Date:    11/07/2024  Time:    09:35               Narrative:    EXAMINATION:  XR CHEST AP PORTABLE    CLINICAL HISTORY:  Chest Pain;    TECHNIQUE:  Single frontal view of the chest was performed.    COMPARISON:  Chest x-ray of June 7, 2022    FINDINGS:  The lungs are clear, with normal appearance of pulmonary vasculature and no pleural effusion or pneumothorax.    The cardiac silhouette is normal in size. The hilar and mediastinal contours are unremarkable.    Bones are intact.                                       Medications   ketorolac injection 15 mg (has no administration in time range)   aspirin tablet 325 mg (325 mg Oral Given 11/7/24 0902)   ketorolac injection 15 mg (15 mg Intravenous Given 11/7/24 0902)   iohexoL (OMNIPAQUE 350) 350 mg iodine/mL injection (75 mLs Intravenous Given 11/7/24 1046)   methylPREDNISolone sodium succinate injection 125 mg (125 mg Intravenous Given 11/7/24 1147)     Medical Decision Making  Patient appears in no distress    Considerations include pulmonary embolism, acute coronary syndrome,  musculoskeletal pain, anxiety, pleurisy    MDM    Patient presents for emergent evaluation of acute chest pain that poses a threat to life and/or bodily function.    In the ED patient found to have acute pleurisy.     Amount and/or complexity of data reviewed   I ordered labs and personally reviewed them.  Labs significant for negative troponin x2.  I ordered X-rays and personally reviewed them and reviewed the radiologist interpretation.  Xray significant for no acute cardiopulmonary process.    I ordered EKG and personally reviewed it.  EKG significant for regular rate and rhythm without ST elevation x2.    I ordered CT scan and personally reviewed it and reviewed the radiologist interpretation.  CT significant for no evidence of pulmonary embolism.        Discharge MDM and Risk    Patient was managed in the ED with IV Toradol, Solu-Medrol, aspirin.    The response to treatment was improved.  This patient is low risk for ACS with normal EKG x2 and normal tropes.  CTA of the chest without pulmonary embolism.  We will treat as pleurisy but in the abundance of caution ordered outpatient stress test which we discussed the importance of close follow up.  Shared decision-making with the patient regarding diagnosis, treatment, and plan was well received.    Patient was discharged in stable condition.  Detailed return precautions discussed.    Amount and/or Complexity of Data Reviewed  Labs: ordered.  Radiology: ordered.    Risk  OTC drugs.  Prescription drug management.      Additional MDM:   Heart Score:    History:          Slightly suspicious.  ECG:             Normal  Age:               Less than 45 years  Risk factors: no risk factors known  Troponin:       Less than or equal to normal limit  Heart Score = 0                                       Clinical Impression:  Final diagnoses:  [R07.9] Chest pain          ED Disposition Condition    Discharge Stable          ED Prescriptions    None       Follow-up  Information       Follow up With Specialties Details Why Contact Info    Sathish Hamilton MD Family Medicine Schedule an appointment as soon as possible for a visit in 2 days  [address] [facility] [address]  [phone]               Luis Carlos Parada MD  11/07/24 2191

## 2024-11-08 ENCOUNTER — HOSPITAL ENCOUNTER (OUTPATIENT)
Dept: RADIOLOGY | Facility: HOSPITAL | Age: 39
Discharge: HOME | End: 2024-11-08
Attending: EMERGENCY MEDICINE
Payer: MEDICAID

## 2024-11-08 ENCOUNTER — CLINICAL SUPPORT (OUTPATIENT)
Dept: CARDIOLOGY | Facility: HOSPITAL | Age: 39
End: 2024-11-08
Attending: EMERGENCY MEDICINE
Payer: MEDICAID

## 2024-11-08 DIAGNOSIS — R07.9 CHEST PAIN: ICD-10-CM

## 2024-11-08 LAB
CV STRESS BASE HR: 101 BPM
DIASTOLIC BLOOD PRESSURE: 85 MMHG
OHS CV CPX 1 MINUTE RECOVERY HEART RATE: 150 BPM
OHS CV CPX 85 PERCENT MAX PREDICTED HEART RATE MALE: 152
OHS CV CPX ESTIMATED METS: 7.1
OHS CV CPX MAX PREDICTED HEART RATE: 179
OHS CV CPX PATIENT IS FEMALE: 1
OHS CV CPX PATIENT IS MALE: 0
OHS CV CPX PEAK DIASTOLIC BLOOD PRESSURE: 92 MMHG
OHS CV CPX PEAK HEAR RATE: 164 BPM
OHS CV CPX PEAK RATE PRESSURE PRODUCT: NORMAL
OHS CV CPX PEAK SYSTOLIC BLOOD PRESSURE: 166 MMHG
OHS CV CPX PERCENT MAX PREDICTED HEART RATE ACHIEVED: 97
OHS CV CPX RATE PRESSURE PRODUCT PRESENTING: NORMAL
STRESS ECHO POST EXERCISE DUR MIN: 6 MINUTES
STRESS ECHO POST EXERCISE DUR SEC: 0 SECONDS
SYSTOLIC BLOOD PRESSURE: 121 MMHG

## 2024-11-08 PROCEDURE — A9500 TC99M SESTAMIBI: HCPCS | Performed by: EMERGENCY MEDICINE

## 2024-11-08 PROCEDURE — 78452 HT MUSCLE IMAGE SPECT MULT: CPT | Mod: TC

## 2024-11-08 PROCEDURE — 93017 CV STRESS TEST TRACING ONLY: CPT

## 2024-11-08 PROCEDURE — 78452 HT MUSCLE IMAGE SPECT MULT: CPT | Mod: 26,,, | Performed by: RADIOLOGY

## 2024-11-08 PROCEDURE — 93016 CV STRESS TEST SUPVJ ONLY: CPT | Mod: ,,, | Performed by: INTERNAL MEDICINE

## 2024-11-08 PROCEDURE — 93018 CV STRESS TEST I&R ONLY: CPT | Mod: ,,, | Performed by: INTERNAL MEDICINE

## 2024-11-08 RX ORDER — TETRAKIS(2-METHOXYISOBUTYLISOCYANIDE)COPPER(I) TETRAFLUOROBORATE 1 MG/ML
22.5 INJECTION, POWDER, LYOPHILIZED, FOR SOLUTION INTRAVENOUS
Status: COMPLETED | OUTPATIENT
Start: 2024-11-08 | End: 2024-11-08

## 2024-11-08 RX ORDER — TETRAKIS(2-METHOXYISOBUTYLISOCYANIDE)COPPER(I) TETRAFLUOROBORATE 1 MG/ML
11 INJECTION, POWDER, LYOPHILIZED, FOR SOLUTION INTRAVENOUS
Status: COMPLETED | OUTPATIENT
Start: 2024-11-08 | End: 2024-11-08

## 2024-11-08 RX ADMIN — TECHNETIUM TC 99M SESTAMIBI 11 MILLICURIE: 1 INJECTION, POWDER, FOR SOLUTION INTRAVENOUS at 08:11

## 2024-11-08 RX ADMIN — TECHNETIUM TC 99M SESTAMIBI 22.5 MILLICURIE: 1 INJECTION, POWDER, FOR SOLUTION INTRAVENOUS at 09:11

## 2024-11-08 NOTE — NURSING NOTE
Treadmill portion of Nuclear Stress Test completed without complications. Patient verbalized understanding of pre-post test instructions. Discharged from lab per Cardiology.

## 2024-11-11 ENCOUNTER — OFFICE VISIT (OUTPATIENT)
Dept: FAMILY MEDICINE | Facility: CLINIC | Age: 39
End: 2024-11-11
Payer: MEDICAID

## 2024-11-11 ENCOUNTER — TELEPHONE (OUTPATIENT)
Dept: CARDIOLOGY | Facility: HOSPITAL | Age: 39
End: 2024-11-11

## 2024-11-11 VITALS
WEIGHT: 195.31 LBS | HEIGHT: 66 IN | RESPIRATION RATE: 18 BRPM | HEART RATE: 102 BPM | DIASTOLIC BLOOD PRESSURE: 82 MMHG | OXYGEN SATURATION: 96 % | BODY MASS INDEX: 31.39 KG/M2 | SYSTOLIC BLOOD PRESSURE: 124 MMHG

## 2024-11-11 DIAGNOSIS — K21.9 GASTROESOPHAGEAL REFLUX DISEASE, UNSPECIFIED WHETHER ESOPHAGITIS PRESENT: ICD-10-CM

## 2024-11-11 DIAGNOSIS — R06.02 SHORTNESS OF BREATH: ICD-10-CM

## 2024-11-11 DIAGNOSIS — R07.9 CHEST PAIN, UNSPECIFIED TYPE: Primary | ICD-10-CM

## 2024-11-11 DIAGNOSIS — R94.30 LEFT VENTRICULAR EJECTION FRACTION GREATER THAN 40%: ICD-10-CM

## 2024-11-11 LAB
OHS QRS DURATION: 78 MS
OHS QRS DURATION: 80 MS
OHS QTC CALCULATION: 420 MS
OHS QTC CALCULATION: 438 MS

## 2024-11-11 PROCEDURE — 3074F SYST BP LT 130 MM HG: CPT | Mod: CPTII,,,

## 2024-11-11 PROCEDURE — 3079F DIAST BP 80-89 MM HG: CPT | Mod: CPTII,,,

## 2024-11-11 PROCEDURE — 99999 PR PBB SHADOW E&M-EST. PATIENT-LVL V: CPT | Mod: PBBFAC,,,

## 2024-11-11 PROCEDURE — G2211 COMPLEX E/M VISIT ADD ON: HCPCS | Mod: S$PBB,,,

## 2024-11-11 PROCEDURE — 3044F HG A1C LEVEL LT 7.0%: CPT | Mod: CPTII,,,

## 2024-11-11 PROCEDURE — 1159F MED LIST DOCD IN RCRD: CPT | Mod: CPTII,,,

## 2024-11-11 PROCEDURE — 3008F BODY MASS INDEX DOCD: CPT | Mod: CPTII,,,

## 2024-11-11 PROCEDURE — 1160F RVW MEDS BY RX/DR IN RCRD: CPT | Mod: CPTII,,,

## 2024-11-11 PROCEDURE — 99214 OFFICE O/P EST MOD 30 MIN: CPT | Mod: S$PBB,,,

## 2024-11-11 PROCEDURE — 99215 OFFICE O/P EST HI 40 MIN: CPT | Mod: PBBFAC,PO

## 2024-11-11 RX ORDER — PREDNISONE 20 MG/1
40 TABLET ORAL DAILY
Qty: 10 TABLET | Refills: 0 | Status: SHIPPED | OUTPATIENT
Start: 2024-11-11 | End: 2024-11-16

## 2024-11-11 RX ORDER — FAMOTIDINE 40 MG/1
40 TABLET, FILM COATED ORAL NIGHTLY PRN
Qty: 90 TABLET | Refills: 3 | Status: SHIPPED | OUTPATIENT
Start: 2024-11-11 | End: 2025-11-11

## 2024-11-11 NOTE — PROGRESS NOTES
Subjective:       Patient ID: Subha Bach is a 39 y.o. female.    Chief Complaint: Follow-up    Follow-up  Associated symptoms include chest pain.       History of Present Illness    CHIEF COMPLAINT:  Patient presents with chest pressure, shortness of breath, and concerns about cardiac issues due to recent family history of heart problems.    HPI:  Patient reports chest pressure and shortness of breath that began 5 days ago on Wednesday. The symptoms intensified by Thursday morning, prompting an emergency room visit. She also mentions having had chest pain the previous week, which resolved spontaneously.    Patient's heart rate is elevated at 102 BPM today, which she reports is atypical for her. She is aware of her rapid heartbeat.    Patient's family history of cardiac issues has recently been discovered, increasing her concern about her symptoms. Her father has been diagnosed with aortic stenosis and bicuspid valve prolapse, her sister has dysautonomia, and her mother has tachycardia and thrombocytosis. These family diagnoses have all occurred within the last month.    Patient reports a history of pleurisy at age 17. She also reports acid reflux symptoms for about a year, which she manages with Protonix. The breakthrough symptoms of reflux are diet-dependent, particularly with high-acid foods like tomato-based sauces.    During the recent ER visit, the patient underwent a stress test, which showed an elevated EF of 82%. The ER prescribed prednisone, but the patient was unable to obtain it due to pharmacy issues.    Patient denies burping and having had a myocardial infarction.                   Past Medical History:   Diagnosis Date    Asthma     Encounter for blood transfusion     Gestational diabetes     gestational diabetes    Migraine headache        Review of patient's allergies indicates:   Allergen Reactions    Nubain [nalbuphine] Hives         Current Outpatient Medications:     albuterol  (PROVENTIL/VENTOLIN HFA) 90 mcg/actuation inhaler, Inhale 2 puffs into the lungs every 4 (four) hours as needed for Wheezing or Shortness of Breath., Disp: 18 g, Rfl: 11    azelastine (ASTELIN) 137 mcg (0.1 %) nasal spray, 1 spray (137 mcg total) by Nasal route 2 (two) times daily., Disp: 30 mL, Rfl: 11    dietary supplement Cap, Take by mouth. Mg, Vitamin D3, Riboflavin, Disp: , Rfl:     fluticasone-umeclidin-vilanter (TRELEGY ELLIPTA) 200-62.5-25 mcg inhaler, Inhale 1 puff into the lungs once daily., Disp: 60 each, Rfl: 11    gabapentin (NEURONTIN) 300 MG capsule, 1 pill 2 hours before bed, Disp: 30 capsule, Rfl: 6    loratadine (CLARITIN) 10 mg tablet, Take 1 tablet (10 mg total) by mouth once daily., Disp: 30 tablet, Rfl: 11    magnesium gluconate 27.5 mg magne- sium (500 mg) Tab, Take by mouth once., Disp: , Rfl:     meclizine (ANTIVERT) 25 mg tablet, Take 25 mg by mouth., Disp: , Rfl:     ondansetron (ZOFRAN-ODT) 4 MG TbDL, Take 1 tablet (4 mg total) by mouth every 8 (eight) hours as needed (nausea)., Disp: 20 tablet, Rfl: 3    pantoprazole (PROTONIX) 40 MG tablet, Take 1 tablet (40 mg total) by mouth once daily., Disp: 90 tablet, Rfl: 3    riboflavin, vitamin B2, 400 mg Tab, Take 400 mg by mouth once daily., Disp: , Rfl:     tiZANidine (ZANAFLEX) 4 MG tablet, Take 4 mg by mouth every evening. May take up to 3 tablets, Disp: , Rfl:     ubrogepant (UBRELVY) 100 mg tablet, 1 pill at onset headache, second pill 2 hours later if needed, no more than 2 pills day/3 days use in week, Disp: 12 tablet, Rfl: 6    venlafaxine (EFFEXOR-XR) 150 MG Cp24, Take 1 capsule (150 mg total) by mouth once daily., Disp: 90 capsule, Rfl: 1    famotidine (PEPCID) 40 MG tablet, Take 1 tablet (40 mg total) by mouth nightly as needed for Heartburn., Disp: 90 tablet, Rfl: 3    fexofenadine (ALLEGRA) 180 MG tablet, Take 1 tablet (180 mg total) by mouth once daily. (Patient not taking: Reported on 11/11/2024), Disp: 30 tablet, Rfl: 0     "predniSONE (DELTASONE) 20 MG tablet, Take 2 tablets (40 mg total) by mouth once daily. for 5 days, Disp: 10 tablet, Rfl: 0    Review of Systems   Respiratory:  Positive for chest tightness and shortness of breath.    Cardiovascular:  Positive for chest pain.       Objective:      /82 (BP Location: Right arm, Patient Position: Sitting)   Pulse 102   Resp 18   Ht 5' 6" (1.676 m)   Wt 88.6 kg (195 lb 5.2 oz)   LMP 10/13/2020 Comment: PT INSTRUCTED ON NEED FOR URINE SAMPLE PRIOR TO PROCEDURE AND VERBALIZES UNDERSTANDING  SpO2 96%   BMI 31.53 kg/m²   Physical Exam  Vitals reviewed.   Constitutional:       General: She is not in acute distress.     Appearance: Normal appearance. She is not ill-appearing, toxic-appearing or diaphoretic.   HENT:      Head: Normocephalic.      Right Ear: External ear normal.      Left Ear: External ear normal.      Nose: Nose normal. No congestion or rhinorrhea.      Mouth/Throat:      Mouth: Mucous membranes are moist.      Pharynx: Oropharynx is clear.   Eyes:      General: No scleral icterus.        Right eye: No discharge.         Left eye: No discharge.      Extraocular Movements: Extraocular movements intact.      Conjunctiva/sclera: Conjunctivae normal.   Cardiovascular:      Rate and Rhythm: Regular rhythm. Tachycardia present.      Pulses: Normal pulses.      Heart sounds: Murmur (Perhaps a slight systolic murmur) heard.      No friction rub. No gallop.   Pulmonary:      Effort: Pulmonary effort is normal. No respiratory distress.      Breath sounds: Normal breath sounds. No wheezing, rhonchi or rales.   Chest:      Chest wall: No tenderness.   Musculoskeletal:         General: No swelling, tenderness or deformity. Normal range of motion.      Cervical back: Normal range of motion.      Right lower leg: No edema.      Left lower leg: No edema.   Skin:     General: Skin is warm and dry.      Capillary Refill: Capillary refill takes less than 2 seconds.      Coloration: " Skin is not jaundiced.      Findings: No bruising, erythema, lesion or rash.   Neurological:      Mental Status: She is alert and oriented to person, place, and time.      Gait: Gait normal.   Psychiatric:         Mood and Affect: Mood normal.         Behavior: Behavior normal.         Thought Content: Thought content normal.         Judgment: Judgment normal.           Physical Exam    Vitals: Heart rate: 102 bpm.  General: No acute distress. Well-developed. Well-nourished.  Eyes: EOMI. Sclerae anicteric.  HENT: Normocephalic. Atraumatic. Nares patent. Moist oral mucosa.  Ears: Bilateral TMs clear. Bilateral EACs clear.  Cardiovascular: Tachycardia. Regular rhythm. Possible very slight murmur on left side. No rubs. No gallops. Normal S1, S2.  Respiratory: Normal respiratory effort. Clear to auscultation bilaterally. No rales. No rhonchi. No wheezing.  Abdomen: Soft. Non-tender. Non-distended. Normoactive bowel sounds.  Musculoskeletal: No  obvious deformity.  Extremities: No lower extremity edema.  Neurological: Alert & oriented x3. No slurred speech. Normal gait.  Psychiatric: Normal mood. Normal affect. Good insight. Good judgment.  Skin: Warm. Dry. No rash.        Assessment:       1. Chest pain, unspecified type    2. Shortness of breath    3. Gastroesophageal reflux disease, unspecified whether esophagitis present    4. Left ventricular ejection fraction greater than 40%          Assessment & Plan    Noted elevated heart rate of 102, unusual for patient  Reviewed stress test results showing elevated EF of 82%  Considered family history of cardiac issues (aortic stenosis, bicuspid valve prolapse, dysautonomia)  Detected possible slight heart murmur on exam  Differential diagnosis includes pleurisy, but cardiac evaluation warranted given family history and test results    GASTROESOPHAGEAL REFLUX DISEASE (GERD):  Started famotidine (Pepcid) at higher dose as needed for reflux symptoms, with instructions to take  nightly for at least the next week.  Continued Protonix (pantoprazole) for ongoing reflux management.      MEDICATIONS/SUPPLEMENTS:  Started prednisone.          Plan:       Chest pain, unspecified type  -     Ambulatory referral/consult to Cardiology; Future; Expected date: 11/18/2024  -     predniSONE (DELTASONE) 20 MG tablet; Take 2 tablets (40 mg total) by mouth once daily. for 5 days  Dispense: 10 tablet; Refill: 0    Shortness of breath  -     Ambulatory referral/consult to Cardiology; Future; Expected date: 11/18/2024    Gastroesophageal reflux disease, unspecified whether esophagitis present  -     famotidine (PEPCID) 40 MG tablet; Take 1 tablet (40 mg total) by mouth nightly as needed for Heartburn.  Dispense: 90 tablet; Refill: 3    Left ventricular ejection fraction greater than 40%  -     Ambulatory referral/consult to Cardiology; Future; Expected date: 11/18/2024                   Ajay Gtz PA-C  Family Medicine Physician Assistant       Future Appointments       Date Provider Specialty Appt Notes    11/18/2024 Veronica Agudelo MD Cardiology Left ventricular ejection fraction greater than 40%    12/4/2024 Jackson Pratt PA-C Neurology Botox#2 (Consent 9/5/24 MANUEL)    2/6/2025 Sathish Hamilton MD Family Medicine annual    5/12/2025 Noemí Julian MD Dermatology Family history of skin cancer & many spots               I spent a total of 30 minutes on the day of the visit.This includes face to face time and non-face to face time preparing to see the patient (eg, review of tests), obtaining and/or reviewing separately obtained history, documenting clinical information in the electronic or other health record, independently interpreting results and communicating results to the patient/family/caregiver, or care coordinator.      We have addressed [4] Moderate: 1 or more chronic illnesses with exacerbation, progression, or side effects of treatment / 2 or more stable chronic illnesses /  1 undiagnosed new problem with uncertain prognosis / 1 acute illness with systemic symptoms / 1 acute complicated injury  The complexity of the data reviewed and analyzed for this visit was [2] Minimal or None  The risk of complications and/or morbidity or mortality are [4] Moderate risk (I.e. prescription drug management / decision regarding minor surgery with identified pt or procedure risk factors / decision regarding elective major surgery without identified pt or procedure risk factors / diagnosis or treatment significantly limited by social determinants of health)   The level of Medical Decision Making for this visit is [4] Moderate     This note was generated with the assistance of ambient listening technology. Verbal consent was obtained by the patient and accompanying visitor(s) for the recording of patient appointment to facilitate this note. I attest to having reviewed and edited the generated note for accuracy, though some syntax or spelling errors may persist. Please contact the author of this note for any clarification.

## 2024-11-15 ENCOUNTER — PATIENT MESSAGE (OUTPATIENT)
Dept: FAMILY MEDICINE | Facility: CLINIC | Age: 39
End: 2024-11-15
Payer: MEDICAID

## 2024-11-17 NOTE — PROGRESS NOTES
[facility]     SUBJECTIVE:     History of Present Illness:  Patient is a 39 y.o. female presents with chest pains. Noted this two weeks ago. Was eating dinner two weeks ago and noted chest pressure ; mid chest ; slept with it and has had it  continuously for the last two weeks. Same intensity; no radiation; no change with any movement; has it now; no change with  breathing. Has had shortness of breath as well at rest but worse with moving. No PND or orthopnea . No change in the last two weeks . Walked  on treadmill while working 2 weeks earlier with no issues       Primary Diagnosis:   Hypertension: none  DM: gestational  Smoker: none   Family history of early CAD: none   Heart disease : none   Exercise induced asthma   ROS  No syncope  Has history of migraines  Activity: walks; housework with difficulty in breathing.   Review of patient's allergies indicates:   Allergen Reactions    Nubain [nalbuphine] Hives       Past Medical History:   Diagnosis Date    Asthma     Encounter for blood transfusion     Gestational diabetes     gestational diabetes    Migraine headache        Past Surgical History:   Procedure Laterality Date    ARTHROSCOPY, HIP Left 6/22/2022    Procedure: ARTHROSCOPY, HIP, WITH LABRUM DEBRIDEMENT;  Surgeon: Joselo Henderson II, MD;  Location: [facility] OR;  Service: Orthopedics;  Laterality: Left;    LAPAROSCOPIC SALPINGECTOMY Bilateral 10/19/2020    Procedure: SALPINGECTOMY, LAPAROSCOPIC;  Surgeon: Yonas Walsh MD;  Location: [facility] OR;  Service: OB/GYN;  Laterality: Bilateral;  Possible Overnight  Claudia Cabrera MD to assist    LAPAROSCOPIC TOTAL HYSTERECTOMY N/A 10/19/2020    Procedure: HYSTERECTOMY, TOTAL, LAPAROSCOPIC;  Surgeon: Yonas Walsh MD;  Location: [facility] OR;  Service: OB/GYN;  Laterality: N/A;    REMOVAL OF IMPLANT Left 10/19/2020    Procedure: REMOVAL, IMPLANT;  Surgeon: Yonas Walsh MD;  Location: [facility] OR;  Service: OB/GYN;  Laterality: Left;  Nextplan birthcontrol implant  "removal    SHOULDER SURGERY             Past Hospitalization:     ER visit: : chest pain: workup negative     Cardiac meds:    Inhalers        OBJECTIVE:     Vital Signs (Most Recent)  Vitals:    24 0856   BP: 117/88   Pulse: (!) 115   SpO2: (!) 94%   Weight: 89.2 kg (196 lb 10.4 oz)   Height: 5' 6" (1.676 m)         Physical Exam:  Neck: normal carotids, no bruits; normal JVP  Lungs :clear  Heart: RR, normal S1,S2, no murmurs, no gallops  Abd: no masses; no bruits;   Exts: normal DP and PT pulses bilaterally, normal radials; no edema noted               Labs    : LDL : 143;   Diagnostic Results:    1.EK/24: normal   2.Echo:   3. Stress test: nuclear stress: negative for ischemia ; normal EF   4. cath    Chart review:        ASSESSMENT/PLAN:     Noncardiac pain - continuous for 2 weeks with negative stress nuclear: reviewed by me as well    Plan: reassuarance  Needs lipid control  Return prn     Veronica Agudelo MD    "

## 2024-11-18 ENCOUNTER — OFFICE VISIT (OUTPATIENT)
Dept: CARDIOLOGY | Facility: CLINIC | Age: 39
End: 2024-11-18
Payer: MEDICAID

## 2024-11-18 ENCOUNTER — TELEPHONE (OUTPATIENT)
Dept: FAMILY MEDICINE | Facility: CLINIC | Age: 39
End: 2024-11-18
Payer: MEDICAID

## 2024-11-18 VITALS
DIASTOLIC BLOOD PRESSURE: 88 MMHG | HEIGHT: 66 IN | HEART RATE: 115 BPM | SYSTOLIC BLOOD PRESSURE: 117 MMHG | BODY MASS INDEX: 31.6 KG/M2 | WEIGHT: 196.63 LBS | OXYGEN SATURATION: 94 %

## 2024-11-18 DIAGNOSIS — R94.30 LEFT VENTRICULAR EJECTION FRACTION GREATER THAN 40%: ICD-10-CM

## 2024-11-18 DIAGNOSIS — R06.02 SHORTNESS OF BREATH: ICD-10-CM

## 2024-11-18 DIAGNOSIS — R07.9 CHEST PAIN, UNSPECIFIED TYPE: ICD-10-CM

## 2024-11-18 PROCEDURE — 3074F SYST BP LT 130 MM HG: CPT | Mod: CPTII,,, | Performed by: INTERNAL MEDICINE

## 2024-11-18 PROCEDURE — 1160F RVW MEDS BY RX/DR IN RCRD: CPT | Mod: CPTII,,, | Performed by: INTERNAL MEDICINE

## 2024-11-18 PROCEDURE — 3044F HG A1C LEVEL LT 7.0%: CPT | Mod: CPTII,,, | Performed by: INTERNAL MEDICINE

## 2024-11-18 PROCEDURE — 99999 PR PBB SHADOW E&M-EST. PATIENT-LVL III: CPT | Mod: PBBFAC,,, | Performed by: INTERNAL MEDICINE

## 2024-11-18 PROCEDURE — 3079F DIAST BP 80-89 MM HG: CPT | Mod: CPTII,,, | Performed by: INTERNAL MEDICINE

## 2024-11-18 PROCEDURE — 1159F MED LIST DOCD IN RCRD: CPT | Mod: CPTII,,, | Performed by: INTERNAL MEDICINE

## 2024-11-18 PROCEDURE — 99204 OFFICE O/P NEW MOD 45 MIN: CPT | Mod: S$PBB,,, | Performed by: INTERNAL MEDICINE

## 2024-11-18 PROCEDURE — 3008F BODY MASS INDEX DOCD: CPT | Mod: CPTII,,, | Performed by: INTERNAL MEDICINE

## 2024-11-18 PROCEDURE — 99213 OFFICE O/P EST LOW 20 MIN: CPT | Mod: PBBFAC,PN | Performed by: INTERNAL MEDICINE

## 2024-11-18 NOTE — TELEPHONE ENCOUNTER
Left message for patient to return call to try and help schedule Mrs. Bach an appointment with pulmonology.

## 2024-12-02 ENCOUNTER — OFFICE VISIT (OUTPATIENT)
Dept: PULMONOLOGY | Facility: CLINIC | Age: 39
End: 2024-12-02
Payer: MEDICAID

## 2024-12-02 VITALS
OXYGEN SATURATION: 95 % | BODY MASS INDEX: 32.67 KG/M2 | WEIGHT: 202.38 LBS | SYSTOLIC BLOOD PRESSURE: 124 MMHG | DIASTOLIC BLOOD PRESSURE: 87 MMHG

## 2024-12-02 DIAGNOSIS — R06.02 SHORTNESS OF BREATH: Primary | ICD-10-CM

## 2024-12-02 DIAGNOSIS — J45.20 MILD INTERMITTENT ASTHMA WITHOUT COMPLICATION: ICD-10-CM

## 2024-12-02 PROCEDURE — 3079F DIAST BP 80-89 MM HG: CPT | Mod: CPTII,,,

## 2024-12-02 PROCEDURE — 99213 OFFICE O/P EST LOW 20 MIN: CPT | Mod: PBBFAC,PN

## 2024-12-02 PROCEDURE — 1159F MED LIST DOCD IN RCRD: CPT | Mod: CPTII,,,

## 2024-12-02 PROCEDURE — 99213 OFFICE O/P EST LOW 20 MIN: CPT | Mod: S$PBB,,,

## 2024-12-02 PROCEDURE — 99999 PR PBB SHADOW E&M-EST. PATIENT-LVL III: CPT | Mod: PBBFAC,,,

## 2024-12-02 PROCEDURE — 3044F HG A1C LEVEL LT 7.0%: CPT | Mod: CPTII,,,

## 2024-12-02 PROCEDURE — 3008F BODY MASS INDEX DOCD: CPT | Mod: CPTII,,,

## 2024-12-02 PROCEDURE — 3074F SYST BP LT 130 MM HG: CPT | Mod: CPTII,,,

## 2024-12-02 RX ORDER — LEVALBUTEROL TARTRATE 45 UG/1
1-2 AEROSOL, METERED ORAL EVERY 4 HOURS PRN
Qty: 15 G | Refills: 9 | Status: SHIPPED | OUTPATIENT
Start: 2024-12-02 | End: 2025-12-02

## 2024-12-02 RX ORDER — LEVALBUTEROL TARTRATE 45 UG/1
1-2 AEROSOL, METERED ORAL EVERY 4 HOURS PRN
Qty: 15 G | Refills: 9 | Status: SHIPPED | OUTPATIENT
Start: 2024-12-02 | End: 2024-12-02

## 2024-12-02 NOTE — PROGRESS NOTES
History and Physical Note  [facility]    Subjective:     Reason for visit: Cough     Patient ID:  Subha Bach is a 39 y.o. female    Interval History 12/02/2024:  Patient presents as a referral from her PCP for evaluation of cough and chest tightness. She has been struggling since ER visit on 11/07/2024 with these symptoms. Spo2 levels below baseline and has been tachycardic since the initial presentation. She has been cleared by cardiology. History of asthma diagnosed in childhood and pleurisy.   Palpitations in the last 3 weeks that has not been relieved.   Daily coughing with no wheezing. Most bothersome symptom is chest tightness with a 7-8 in intensity.  Current treatment plan: trelegy daily; albuterol inhaler periodically no overuse in the albuterol  Same home since December 2016;     Surgical history of : Two left shoulder and hip surgery  Hystestomy 4 years ago      Additional Pulmonary History:  Occupational: former competitive swimmer;   Environmental Exposures: none during this year  Exposure to Animals/Pets: labrodoodle;   Travel History: none recently   Childhood Illnesses:  childhood;   Tobacco/Smoking: life time non-smoker  Social History     Tobacco Use   Smoking Status Never   Smokeless Tobacco Never       Objective:     Vitals:    12/02/24 1559   BP: 124/87   BP Location: Left arm   Patient Position: Sitting   SpO2: 95%   Weight: 91.8 kg (202 lb 6.1 oz)         Physical Exam  Constitutional:       Appearance: Normal appearance. She is well-developed.   HENT:      Head: Normocephalic.   Cardiovascular:      Rate and Rhythm: Tachycardia present.      Pulses: Normal pulses.   Pulmonary:      Effort: Pulmonary effort is normal.      Breath sounds: Normal breath sounds. No decreased breath sounds.   Musculoskeletal:      Right lower leg: No edema.      Left lower leg: No edema.   Skin:     General: Skin is warm.      Capillary Refill: Capillary refill takes less than 2 seconds.       Findings: No rash.      Nails: There is no clubbing.   Neurological:      Mental Status: She is alert and oriented to person, place, and time. Mental status is at baseline.   Psychiatric:         Attention and Perception: Attention normal.         Mood and Affect: Mood and affect normal.         Speech: Speech normal.         Behavior: Behavior is cooperative.          Personal Diagnostic Review and Interpretation  CTA Chest 11/07/2024:   An intrapulmonary mass or nodule is not identified. There is minor dependent atelectasis posteriorly. The interstitial density is otherwise within normal limits. No pneumothorax or pleural effusion is noted.     Pertinent Studies Reviewed & Interpreted:     Pulmonary Function Tests:   Pending 2024    08/10/2022:   Normal spirometry.  Airflow is not clearly improved after bronchodilator. Clinical improvement following bronchodilator therapy may occur in the absence of spirometric improvement.  Normal lung volumes.       Other Pertinent Laboratories:  Lab Results   Component Value Date    WBC 9.07 11/07/2024    RBC 4.80 11/07/2024    HGB 13.7 11/07/2024    MCV 85 11/07/2024    MCH 28.5 11/07/2024    MCHC 33.4 11/07/2024    RDW 12.6 11/07/2024     11/07/2024    MPV 9.7 11/07/2024    GRAN 6.2 11/07/2024    GRAN 68.6 11/07/2024    LYMPH 2.1 11/07/2024    LYMPH 23.4 11/07/2024    MONO 0.6 11/07/2024    MONO 6.3 11/07/2024    EOS 0.1 11/07/2024    BASO 0.02 11/07/2024        Latest Reference Range & Units 10/01/19 08:20 03/24/20 00:25 09/02/20 10:08 09/02/20 12:30 09/03/20 03:29 09/11/20 08:06 09/24/20 14:32 10/15/20 08:19 11/20/20 11:04 12/08/20 15:41 06/07/22 13:08 02/12/24 08:12 11/07/24 09:02   Eos # 0.0 - 0.5 K/uL 0.1 0.2 0.1 0.1 0.2 0.3 0.1 0.1 0.1 0.1 0.2 0.1 0.1       Assessment & Plan:       Plan:  Clinical impression is resonably certain and repeated evaluation prn +/- follow up will be needed as below.      1. Shortness of breath  -     Ambulatory referral/consult to  Pulmonology  -     CT Chest Without Contrast; Future    2. Mild intermittent asthma without complication  Overview:  Obtain PFT to evaluate severity of obstruction  Obtain Chest CT to evaluate lung fields. Persistent shortness of breath + chest tightness for over one month despite excellent adherence to maintenance inhaler.   No overuse of albuterol.  Switch albuterol inhaler to levalbuterol due to heart palpitations.     Eos not elevated    Orders:  -     Ambulatory referral/consult to Pulmonology  -     Complete PFT with bronchodilator; Future  -     CT Chest Without Contrast; Future  -     levalbuterol (XOPENEX HFA) 45 mcg/actuation inhaler; Inhale 1-2 puffs into the lungs every 4 (four) hours as needed for Wheezing or Shortness of Breath. Rescue  Dispense: 15 g; Refill: 9    Other orders  -     Discontinue: levalbuterol (XOPENEX HFA) 45 mcg/actuation inhaler; Inhale 1-2 puffs into the lungs every 4 (four) hours as needed for Wheezing or Shortness of Breath. Rescue  Dispense: 15 g; Refill: 9        Follow up in about 4 weeks (around 12/30/2024).      RETURN TO CLINIC IN 1 MONTHS     Total professional time spent for the encounter: 23 minutes  Time was spent preparing to see the patient, reviewing results of prior testing, obtaining and/or reviewing separately obtained history, performing a medically appropriate examination and interview, counseling and educating the patient/family, ordering medications/tests/procedures, referring and communicating with other health care professionals, documenting clinical information in the electronic health record, and independently interpreting results.    Pamela Manuel, DNP

## 2024-12-04 ENCOUNTER — PROCEDURE VISIT (OUTPATIENT)
Dept: NEUROLOGY | Facility: CLINIC | Age: 39
End: 2024-12-04
Payer: MEDICAID

## 2024-12-04 VITALS
TEMPERATURE: 98 F | RESPIRATION RATE: 17 BRPM | DIASTOLIC BLOOD PRESSURE: 82 MMHG | HEART RATE: 103 BPM | WEIGHT: 202.38 LBS | HEIGHT: 66 IN | SYSTOLIC BLOOD PRESSURE: 128 MMHG | BODY MASS INDEX: 32.53 KG/M2

## 2024-12-04 DIAGNOSIS — G43.709 CHRONIC MIGRAINE WITHOUT AURA WITHOUT STATUS MIGRAINOSUS, NOT INTRACTABLE: Primary | ICD-10-CM

## 2024-12-04 PROCEDURE — 64615 CHEMODENERV MUSC MIGRAINE: CPT | Mod: S$PBB,,, | Performed by: PHYSICIAN ASSISTANT

## 2024-12-04 PROCEDURE — 64615 CHEMODENERV MUSC MIGRAINE: CPT | Mod: PBBFAC,PO | Performed by: PHYSICIAN ASSISTANT

## 2024-12-04 PROCEDURE — 99999PBSHW PR PBB SHADOW TECHNICAL ONLY FILED TO HB: Mod: PBBFAC,,,

## 2024-12-04 NOTE — PROCEDURES
[facility]  [address] [place] [address]  Phone:[phone]  Fax: [phone]  Botox Visit, #2    Chief Complaint   Patient presents with    Botulinum Toxin Injection         A/P:        ICD-10-CM ICD-9-CM   1. Chronic migraine without aura without status migrainosus, not intractable  G43.709 346.70     Botox for migraine  At baseline right eyebrow higher than left eyebrow       Historically: Patient meets the criteria for chronic migraine. In summary, She has headaches/migraines >15 days per month  and last >4 hours if untreated. Specifics of duration, frequency and strength are listed in office visit HPI's.  This pattern has continued for >3 months.  She has failed at least three preventive medications (full list of medications is listed in office notes of Therapies tried in past)  I have therefore recommended a trial of Botox via the PREEMPT protocol for migraine prophylaxis.We schedule regular follow up intervals to check on status.     PROCEDURE NOTE:  BOTOX injection is indicated for the prophylaxis of headaches in adult patients with chronic  migraine. Patient meets indications for BOTOX therapy.  Potential risks and benefits were reviewed. Side effects including, but not limited to, potential  systemic allergic reactions of the anaphylactic type as well as local injection site reactions of  blepharoptosis, diplopia, infection, bleeding, pain, redness and bruising were reviewed. The  potential for headaches and/or neck pain post procedure were reviewed.  The patient's questions were answered. The patient signed a consent form. Patient  understands that depending on their insurance carrier, there may be a copay for this treatment.  BOTOX was reconstituted using  two 100 unit vials and diluted with 4 mL of sterile saline.  BOTOX was injected as per the PREEMPT trial injection paradigm with dose administered as 5  unit intramuscular (IM) injections  per site using a sterile, 30-gauge 0.5 inch needle as follows:  Muscle Dose, # of Sites   10 units divided in 2 sites  Procerus 5 units in 1 site  Frontalis 20 units divided in 4 sites  Temporalis 40 units divided in 8 sites  Occipitalis 30 units divided in 6 sites  Cervical paraspinal 20 units divided in 4 sites  Trapezius 30 units divided in 6 sites  Each site was cleaned with alcohol prior to injection. A total dose of 155 units were injected. 45  units were discarded/wasted.      The patient tolerated the procedure well with no immediate complications.  MEDICATION INFO:  NDC 5827-5893-79   Lot # [id number]  Exp 12/2026    As aside:  The Botox injections have achieved well over 50%  improvement in the patient's symptoms. Migraines have been reduced at least 7 days per month and the number of cumulative hours suffering with headaches has been reduced at least 100 total hours per month. Today she does have a headache indicating that the Botox has worn off. Frequency of treatment is every 3 months unless no response to the treatments, at which time we will discontinue the injections.         Follow up in 3 months for repeat injection, we also have interspersed office visits scheduled to ensure efficacy of botox sessions/check on patient.       Jackson Pratt MPA, PA-C  Attending available-Dr Javier MD           Personal Protective Equipment:    Personal Protective Equipment was used  non latex gloves.     12/04/2024 9:11 AM    CC: Sathish Hamilton MD

## 2024-12-05 ENCOUNTER — HOSPITAL ENCOUNTER (OUTPATIENT)
Dept: PULMONOLOGY | Facility: HOSPITAL | Age: 39
Discharge: HOME | End: 2024-12-05
Payer: MEDICAID

## 2024-12-05 ENCOUNTER — TELEPHONE (OUTPATIENT)
Dept: PULMONOLOGY | Facility: CLINIC | Age: 39
End: 2024-12-05
Payer: MEDICAID

## 2024-12-05 ENCOUNTER — HOSPITAL ENCOUNTER (OUTPATIENT)
Dept: RADIOLOGY | Facility: HOSPITAL | Age: 39
Discharge: HOME | End: 2024-12-05
Payer: MEDICAID

## 2024-12-05 DIAGNOSIS — J45.20 MILD INTERMITTENT ASTHMA WITHOUT COMPLICATION: ICD-10-CM

## 2024-12-05 DIAGNOSIS — R06.02 SHORTNESS OF BREATH: ICD-10-CM

## 2024-12-05 LAB
DLCO SINGLE BREATH LLN: 21.03
DLCO SINGLE BREATH PRE REF: 96.3 %
DLCO SINGLE BREATH REF: 26.77
DLCOC SBVA LLN: 3.63
DLCOC SBVA REF: 5.07
DLCOC SINGLE BREATH LLN: 21.03
DLCOC SINGLE BREATH REF: 26.77
DLCOVA LLN: 3.63
DLCOVA PRE REF: 84.5 %
DLCOVA PRE: 4.29 ML/(MIN*MMHG*L) (ref 3.63–6.52)
DLCOVA REF: 5.07
ERVN2 LLN: -16448.89
ERVN2 PRE REF: 33.4 %
ERVN2 PRE: 0.37 L (ref -16448.89–16451.11)
ERVN2 REF: 1.11
FEF 25 75 LLN: 2.22
FEF 25 75 PRE REF: 98.6 %
FEF 25 75 REF: 3.62
FEV1 FVC LLN: 71
FEV1 FVC PRE REF: 96.7 %
FEV1 FVC REF: 82
FEV1 LLN: 2.53
FEV1 PRE REF: 115.6 %
FEV1 REF: 3.19
FRCN2 LLN: 1.97
FRCN2 PRE REF: 103.1 %
FRCN2 REF: 2.8
FVC LLN: 3.12
FVC PRE REF: 118.7 %
FVC REF: 3.92
IVC PRE: 4.5 L (ref 3.12–4.75)
IVC SINGLE BREATH LLN: 3.12
IVC SINGLE BREATH PRE REF: 114.7 %
IVC SINGLE BREATH REF: 3.92
PEF LLN: 5.45
PEF PRE REF: 91.7 %
PEF REF: 7.27
PRE DLCO: 25.78 ML/(MIN*MMHG) (ref 21.03–32.5)
PRE FEF 25 75: 3.57 L/S (ref 2.22–5.02)
PRE FET 100: 7.37 SEC
PRE FEV1 FVC: 79.09 % (ref 70.8–90.8)
PRE FEV1: 3.68 L (ref 2.53–3.82)
PRE FRC N2: 2.88 L (ref 1.97–3.62)
PRE FVC: 4.65 L (ref 3.12–4.75)
PRE PEF: 6.67 L/S (ref 5.45–9.09)
RVN2 LLN: 1.11
RVN2 PRE REF: 148.6 %
RVN2 PRE: 2.51 L (ref 1.11–2.27)
RVN2 REF: 1.69
RVN2TLCN2 LLN: 23.31
RVN2TLCN2 PRE REF: 106.6 %
RVN2TLCN2 PRE: 35.06 % (ref 23.31–42.49)
RVN2TLCN2 REF: 32.9
TLCN2 LLN: 4.29
TLCN2 PRE REF: 135.9 %
TLCN2 PRE: 7.17 L (ref 4.29–6.26)
TLCN2 REF: 5.27
VA PRE: 6.02 L (ref 5.12–5.12)
VA SINGLE BREATH LLN: 5.12
VA SINGLE BREATH PRE REF: 117.4 %
VA SINGLE BREATH REF: 5.12
VCMAXN2 LLN: 3.12
VCMAXN2 PRE REF: 118.7 %
VCMAXN2 PRE: 4.65 L (ref 3.12–4.75)
VCMAXN2 REF: 3.92

## 2024-12-05 PROCEDURE — 71250 CT THORAX DX C-: CPT | Mod: 26,,, | Performed by: RADIOLOGY

## 2024-12-05 PROCEDURE — 94729 DIFFUSING CAPACITY: CPT

## 2024-12-05 PROCEDURE — 94727 GAS DIL/WSHOT DETER LNG VOL: CPT

## 2024-12-05 PROCEDURE — 71250 CT THORAX DX C-: CPT | Mod: TC

## 2024-12-05 PROCEDURE — 94010 BREATHING CAPACITY TEST: CPT

## 2024-12-05 NOTE — TELEPHONE ENCOUNTER
Spoke with the patient to review CT chest results, which showed no acute lung issues but noted a small hiatal hernia that may contribute to symptoms through acid reflux and airway irritation. Recommended lifestyle changes, acid reflux management with PPIs, and follow-up to ensure resolution.

## 2025-01-02 ENCOUNTER — OFFICE VISIT (OUTPATIENT)
Dept: PULMONOLOGY | Facility: CLINIC | Age: 40
End: 2025-01-02
Payer: MEDICAID

## 2025-01-02 VITALS
BODY MASS INDEX: 33.02 KG/M2 | HEART RATE: 94 BPM | DIASTOLIC BLOOD PRESSURE: 85 MMHG | WEIGHT: 204.56 LBS | SYSTOLIC BLOOD PRESSURE: 121 MMHG | OXYGEN SATURATION: 99 %

## 2025-01-02 DIAGNOSIS — J45.20 MILD INTERMITTENT ASTHMA WITHOUT COMPLICATION: Primary | ICD-10-CM

## 2025-01-02 DIAGNOSIS — K44.9 HIATAL HERNIA: ICD-10-CM

## 2025-01-02 PROCEDURE — 99999 PR PBB SHADOW E&M-EST. PATIENT-LVL IV: CPT | Mod: PBBFAC,,,

## 2025-01-02 PROCEDURE — 3079F DIAST BP 80-89 MM HG: CPT | Mod: CPTII,,,

## 2025-01-02 PROCEDURE — 99215 OFFICE O/P EST HI 40 MIN: CPT | Mod: S$PBB,,,

## 2025-01-02 PROCEDURE — 1160F RVW MEDS BY RX/DR IN RCRD: CPT | Mod: CPTII,,,

## 2025-01-02 PROCEDURE — 1159F MED LIST DOCD IN RCRD: CPT | Mod: CPTII,,,

## 2025-01-02 PROCEDURE — 3074F SYST BP LT 130 MM HG: CPT | Mod: CPTII,,,

## 2025-01-02 PROCEDURE — 3008F BODY MASS INDEX DOCD: CPT | Mod: CPTII,,,

## 2025-01-02 PROCEDURE — 99214 OFFICE O/P EST MOD 30 MIN: CPT | Mod: PBBFAC,PN

## 2025-01-02 RX ORDER — FLUTICASONE FUROATE, UMECLIDINIUM BROMIDE AND VILANTEROL TRIFENATATE 200; 62.5; 25 UG/1; UG/1; UG/1
1 POWDER RESPIRATORY (INHALATION) DAILY
Qty: 60 EACH | Refills: 11 | Status: SHIPPED | OUTPATIENT
Start: 2025-01-02

## 2025-01-02 NOTE — PATIENT INSTRUCTIONS
Inhaler regimen  -Trelegy for maintenance therapy. Rinse mouth after each use.   -Airsupra for rescue therapy. 1-2 puffs as needed for shortness of breath or wheezing    Continue protonix and pepcid

## 2025-01-02 NOTE — PROGRESS NOTES
History and Physical Note  [facility]    Subjective:     Reason for visit: Cough     Patient ID:  Subha Bach is a 39 y.o. female    Interval History 01/02/2025:   Patient is here for follow up. PFT results within normal limits ex hyperinflation RV 2.51 (148% of predicted). She meets criteria for obstruction. Bronchodilator challenge was not done on this PFT. Previous BD response in 2022 showed about 5% improvement in FEV1. Most bothersome symptom is sensation of gasping for air and not being able to get a full breath of air. She is using trelegy daily. CT scan with no acute findings. Small hiatal hernia present. She has made lifestyle modifications to reduce gastric reflux.       Interval history  12/02/2024:  Patient presents as a referral from her PCP for evaluation of cough and chest tightness. She has been struggling since ER visit on 11/07/2024 with these symptoms. Spo2 levels below baseline and has been tachycardic since the initial presentation. She has been cleared by cardiology. History of asthma diagnosed in childhood and pleurisy.   Palpitations in the last 3 weeks that has not been relieved.   Daily coughing with no wheezing. Most bothersome symptom is chest tightness with a 7-8 in intensity.  Current treatment plan: trelegy daily; albuterol inhaler periodically no overuse in the albuterol  Same home since December 2016;     Surgical history of : Two left shoulder and hip surgery  Hystestomy 4 years ago      Additional Pulmonary History:  Occupational: former competitive swimmer;   Environmental Exposures: none during this year  Exposure to Animals/Pets: labrodoodle;   Travel History: none recently   Childhood Illnesses:  childhood;   Tobacco/Smoking: life time non-smoker  Social History     Tobacco Use   Smoking Status Never   Smokeless Tobacco Never       Objective:     Vitals:    01/02/25 0938   BP: 121/85   BP Location: Left arm   Patient Position: Sitting   Pulse: 94   SpO2: 99%    Weight: 92.8 kg (204 lb 9.4 oz)         Physical Exam  Constitutional:       Appearance: Normal appearance. She is well-developed.   HENT:      Head: Normocephalic.   Cardiovascular:      Rate and Rhythm: Tachycardia present.      Pulses: Normal pulses.   Pulmonary:      Effort: Pulmonary effort is normal.      Breath sounds: Normal breath sounds. No decreased breath sounds.   Musculoskeletal:      Right lower leg: No edema.      Left lower leg: No edema.   Skin:     General: Skin is warm.      Capillary Refill: Capillary refill takes less than 2 seconds.      Findings: No rash.      Nails: There is no clubbing.   Neurological:      Mental Status: She is alert and oriented to person, place, and time. Mental status is at baseline.   Psychiatric:         Attention and Perception: Attention normal.         Mood and Affect: Mood and affect normal.         Speech: Speech normal.         Behavior: Behavior is cooperative.          Personal Diagnostic Review and Interpretation  CTA Chest 11/07/2024:   An intrapulmonary mass or nodule is not identified. There is minor dependent atelectasis posteriorly. The interstitial density is otherwise within normal limits. No pneumothorax or pleural effusion is noted.     CT chest 12/05/2024:  Lungs: There are no pulmonary nodules, infiltrates or pleural effusions.  The trachea and bronchi are normal.     Mediastinum: No hilar, mediastinal or axillary adenopathy.     There is a small hiatal hernia.    Pertinent Studies Reviewed & Interpreted:     Pulmonary Function Tests:   12/08/2024:   Spirometry is within normal limit. Lung volumes show hyperinflation. Diffusion capacity is normal.     08/10/2022:   Normal spirometry.  Airflow is not clearly improved after bronchodilator. Clinical improvement following bronchodilator therapy may occur in the absence of spirometric improvement.  Normal lung volumes.       Other Pertinent Laboratories:  Lab Results   Component Value Date    WBC 9.07  11/07/2024    RBC 4.80 11/07/2024    HGB 13.7 11/07/2024    MCV 85 11/07/2024    MCH 28.5 11/07/2024    MCHC 33.4 11/07/2024    RDW 12.6 11/07/2024     11/07/2024    MPV 9.7 11/07/2024    GRAN 6.2 11/07/2024    GRAN 68.6 11/07/2024    LYMPH 2.1 11/07/2024    LYMPH 23.4 11/07/2024    MONO 0.6 11/07/2024    MONO 6.3 11/07/2024    EOS 0.1 11/07/2024    BASO 0.02 11/07/2024        [facility] Reference Range & Units 10/01/19 08:20 03/24/20 00:25 09/02/20 10:08 09/02/20 12:30 09/03/20 03:29 09/11/20 08:06 09/24/20 14:32 10/15/20 08:19 11/20/20 11:04 12/08/20 15:41 06/07/22 13:08 02/12/24 08:12 11/07/24 09:02   Eos # 0.0 - 0.5 K/uL 0.1 0.2 0.1 0.1 0.2 0.3 0.1 0.1 0.1 0.1 0.2 0.1 0.1       Assessment & Plan:   Plan:  Clinical impression is resonably certain and repeated evaluation prn +/- follow up will be needed as below.      1. Mild intermittent asthma without complication  Overview:  Persistent shortness of breath + chest tightness for over one month despite excellent adherence to maintenance inhaler.   PFT results within normal limits ex hyperinflation RV 2.51 (148% of predicted). She meets criteria for obstruction. Bronchodilator challenge was not done on this PFT. Previous BD response in 2022 showed about 5% improvement in FEV1.  CT chest with no acute pulmonary findings. Small hiatal hernia present.     Inhaler regimen  -Trelegy for maintenance therapy. Rinse mouth after each use.   -Airsupra for rescue therapy. 1-2 puffs as needed for shortness of breath or wheezing    Continue protonix and pepcid. GI consult needed.Hiatal hernia may be contributing to problem.         Orders:  -     albuterol-budesonide (AIRSUPRA) 90-80 mcg/actuation; Inhale 2 puffs into the lungs every 6 (six) hours as needed (shortness of breath or wheezing).  Dispense: 10.7 g; Refill: 11  -     fluticasone-umeclidin-vilanter (TRELEGY ELLIPTA) 200-62.5-25 mcg inhaler; Inhale 1 puff into the lungs once daily.  Dispense: 60 each; Refill:  11    2. Hiatal hernia  -     Ambulatory referral/consult to Gastroenterology; Future; Expected date: 01/09/2025        Discussed with patient above for education the following:      Patient Instructions   Inhaler regimen  -Trelegy for maintenance therapy. Rinse mouth after each use.   -Airsupra for rescue therapy. 1-2 puffs as needed for shortness of breath or wheezing    Continue protonix and pepcid       RETURN TO CLINIC IN 3 MONTHS     Total professional time spent for the encounter: 40 minutes  Time was spent preparing to see the patient, reviewing results of prior testing, obtaining and/or reviewing separately obtained history, performing a medically appropriate examination and interview, counseling and educating the patient/family, ordering medications/tests/procedures, referring and communicating with other health care professionals, documenting clinical information in the electronic health record, and independently interpreting results.    Pamela Manuel, DNP

## 2025-02-06 ENCOUNTER — OFFICE VISIT (OUTPATIENT)
Dept: FAMILY MEDICINE | Facility: CLINIC | Age: 40
End: 2025-02-06
Payer: MEDICAID

## 2025-02-06 VITALS
WEIGHT: 203.94 LBS | OXYGEN SATURATION: 97 % | HEIGHT: 66 IN | SYSTOLIC BLOOD PRESSURE: 114 MMHG | BODY MASS INDEX: 32.78 KG/M2 | DIASTOLIC BLOOD PRESSURE: 78 MMHG | HEART RATE: 109 BPM

## 2025-02-06 DIAGNOSIS — Z00.00 ROUTINE GENERAL MEDICAL EXAMINATION AT A HEALTH CARE FACILITY: Primary | ICD-10-CM

## 2025-02-06 PROCEDURE — 3078F DIAST BP <80 MM HG: CPT | Mod: CPTII,,, | Performed by: STUDENT IN AN ORGANIZED HEALTH CARE EDUCATION/TRAINING PROGRAM

## 2025-02-06 PROCEDURE — 99214 OFFICE O/P EST MOD 30 MIN: CPT | Mod: PBBFAC,PO | Performed by: STUDENT IN AN ORGANIZED HEALTH CARE EDUCATION/TRAINING PROGRAM

## 2025-02-06 PROCEDURE — 1159F MED LIST DOCD IN RCRD: CPT | Mod: CPTII,,, | Performed by: STUDENT IN AN ORGANIZED HEALTH CARE EDUCATION/TRAINING PROGRAM

## 2025-02-06 PROCEDURE — 3008F BODY MASS INDEX DOCD: CPT | Mod: CPTII,,, | Performed by: STUDENT IN AN ORGANIZED HEALTH CARE EDUCATION/TRAINING PROGRAM

## 2025-02-06 PROCEDURE — 3074F SYST BP LT 130 MM HG: CPT | Mod: CPTII,,, | Performed by: STUDENT IN AN ORGANIZED HEALTH CARE EDUCATION/TRAINING PROGRAM

## 2025-02-06 PROCEDURE — 99396 PREV VISIT EST AGE 40-64: CPT | Mod: S$PBB,,, | Performed by: STUDENT IN AN ORGANIZED HEALTH CARE EDUCATION/TRAINING PROGRAM

## 2025-02-06 PROCEDURE — 1160F RVW MEDS BY RX/DR IN RCRD: CPT | Mod: CPTII,,, | Performed by: STUDENT IN AN ORGANIZED HEALTH CARE EDUCATION/TRAINING PROGRAM

## 2025-02-06 PROCEDURE — 99999 PR PBB SHADOW E&M-EST. PATIENT-LVL IV: CPT | Mod: PBBFAC,,, | Performed by: STUDENT IN AN ORGANIZED HEALTH CARE EDUCATION/TRAINING PROGRAM

## 2025-02-06 NOTE — PROGRESS NOTES
The 10-year ASCVD risk score (Laquita HOLLINS, et al., 2019) is: 0.9%    Values used to calculate the score:      Age: 39 years      Sex: Female      Is Non- : No      Diabetic: No      Tobacco smoker: No      Systolic Blood Pressure: 114 mmHg      Is BP treated: No      HDL Cholesterol: 42 mg/dL      Total Cholesterol: 215 mg/dL    Patient ID: Subha Bach is a 39 y.o. female.    Chief Complaint: Annual Exam    History of Present Illness    CHIEF COMPLAINT:  Patient presents today for follow up of chest pain and shortness of breath since ED visit.    HISTORY OF PRESENT ILLNESS:  She reports ongoing chest pain and shortness of breath. She has completed a comprehensive cardiac workup, including a nuclear stress test, which showed normal heart function. She reports consistently elevated heart rate, ranging from 105-107 BPM, with a resting rate of 95 BPM. She denies experiencing heart rates as high as 200 BPM. Her blood pressure monitoring at home shows variable readings with both elevated and normal values. A recent GI office visit showed blood pressure of 147/101.    RESPIRATORY:  Pulmonary function tests revealed incomplete air expulsion from the lungs. The pulmonologist suspects chronic inflammation and asthma, potentially exacerbated by esophageal inflammation affecting the bronchial tubes.    GASTROINTESTINAL:  She has a hiatal hernia and experiences persistent acid reflux despite medication. An EGD is scheduled for further evaluation.    MIGRAINES:  She reports experiencing migraines and notes a correlation between migraines and blood pressure fluctuations, though uncertain about the causative relationship.    MEDICATIONS:  Most steroid-containing medications have been discontinued except for her telepathy. She has a history of adverse reaction to beta blockers, experiencing vein collapse and hand swelling when prescribed for migraines at age 13.    PREVENTIVE CARE:  She has received flu  vaccine.    FAMILY HISTORY:  Father has history of mitral valve prolapse requiring open heart surgery. Mother has Chavarria's syndrome and history of breast cysts requiring two biopsies. No family history of breast cancer.      ROS:  General: -fever, -chills, -fatigue, -weight gain, -weight loss  Eyes: -vision changes, -redness, -discharge  ENT: -ear pain, -nasal congestion, -sore throat  Cardiovascular: +chest pain, -palpitations, -lower extremity edema  Respiratory: -cough, +shortness of breath  Gastrointestinal: -abdominal pain, -nausea, -vomiting, -diarrhea, -constipation, -blood in stool, +heartburn  Genitourinary: -dysuria, -hematuria, -frequency  Musculoskeletal: -joint pain, -muscle pain  Skin: -rash, -lesion  Neurological: +headache, -dizziness, -numbness, -tingling  Psychiatric: -anxiety, -depression, -sleep difficulty         Physical Exam    Vitals: BP: ___. Hypertensive. Heart rate: 105.  General: No acute distress. Well-developed. Well-nourished.  Eyes: EOMI. Sclerae anicteric.  HENT: Normocephalic. Atraumatic. Nares patent. Moist oral mucosa.  Ears: Bilateral TMs clear. Bilateral EACs clear.  Cardiovascular: Regular rate. Regular rhythm. No murmurs. No rubs. No gallops. Normal S1, S2.  Respiratory: Normal respiratory effort. Clear to auscultation bilaterally. No rales. No rhonchi. No wheezing.  Abdomen: Soft. Non-tender. Non-distended. Normoactive bowel sounds.  Musculoskeletal: No  obvious deformity.  Extremities: No lower extremity edema.  Neurological: Alert & oriented x3. No slurred speech. Normal gait.  Psychiatric: Normal mood. Normal affect. Good insight. Good judgment.  Skin: Warm. Dry. No rash.         Assessment & Plan    Z00.00 Routine general medical exam at a health care facility  K21.9 Gastro-esophageal reflux disease without esophagitis  G43.009 Migraine without aura, not intractable, without status migrainosus  J45.909 Unspecified asthma, uncomplicated  R00.0 Tachycardia,  unspecified  K44.9 Diaphragmatic hernia without obstruction or gangrene  R06.02 Shortness of breath  R07.9 Chest pain, unspecified  Z12.31 Encounter for screening mammogram for malignant neoplasm of breast    IMPRESSION:  - Reviewed extensive workup including CT chest, stress testing, pulmonary function testing, and upcoming EGD  - Assessed heart rate consistently elevated ( bpm at rest) but not requiring intervention; rhythm normal  - Considered POTS but opted against diagnosis/treatment due to potential worsening of asthma with beta blockers  - Evaluated blood pressure readings: some elevated, some normal; overall deemed acceptable without intervention  - Calculated 10-year heart attack risk at 0.9%, negating need for cholesterol management despite slightly elevated levels  - Determined asthma and potential esophageal issues as primary concerns moving forward    GERD (GASTROESOPHAGEAL REFLUX DISEASE):  - Noted ongoing acid reflux symptoms despite current medication regimen.  - Identified a hiatal hernia as a potential contributing factor to GERD symptoms.  - Scheduled an EGD for the following day to evaluate the esophagus.  - Continued current treatment with Protonix in the morning and another medication at night for GERD management.  - Plan to review EGD results to further assess the condition.  - Noted the presence of a hiatal hernia.  - Scheduled an EGD to evaluate the esophagus and potentially assess the hiatal hernia.    MIGRAINES:  - Discussed the potential relationship between migraines and blood pressure with the patient.  - Noted patient's history of beta blocker use for migraine management at age 13.  - Will monitor for any recurrence of migraine symptoms and their potential connection to blood pressure fluctuations.    ASTHMA:  - Emphasized the importance of flu vaccination for patients with lung conditions.  - Noted ongoing shortness of breath and difficulty expelling air reported by the  patient.  - Acknowledged asthma as the primary respiratory issue, with the patient in a chronic state of inflammation according to pulmonologist's assessment.  - Reviewed results of recent pulmonary function testing (PFT).  - Continued current asthma medication regimen, possibly including Symbicort.  - Plan to continue working on optimizing asthma management.  - Acknowledged ongoing shortness of breath as part of the patient's respiratory issues.  - Noted patient's difficulty walking due to dyspnea.  - Encouraged the patient to continue light exercise as tolerated to improve overall condition.  - Will reassess shortness of breath at follow-up visits and adjust management plan as needed.    TACHYCARDIA:  - Noted consistently elevated resting heart rate ranging from  bpm.  - Confirmed normal cardiac rhythm despite the fast heart rate.  - Assessed that the tachycardia does not require intervention at this time due to potential exacerbation of asthma symptoms.  - Will continue to monitor heart rate at future visits.    CHEST PAIN:  - Noted persistent chest pain since ED visit on November 7th.  - Reviewed results of extensive cardiac workup, including stress test and CT, which were normal.  - Assessed that the heart is not the likely cause of the chest pain.  - Scheduled an EGD to evaluate the esophagus as a potential source of chest pain.  - Will follow up on EGD results and reassess chest pain management accordingly.    MAMMOGRAM SCREENING:  - Discussed mammogram recommendations for the 40-50 age group, including higher false positive rates in younger women.  - Recommend mammogram screening every 2 years.  - Advised scheduling mammogram after completion of EGD.  - Noted patient's request to delay mammogram scheduling at this time.  - Will follow up on mammogram scheduling at the next visit.                 No follow-ups on file.    This note was generated with the assistance of ambient listening technology.  Verbal consent was obtained by the patient and accompanying visitor(s) for the recording of patient appointment to facilitate this note. I attest to having reviewed and edited the generated note for accuracy, though some syntax or spelling errors may persist. Please contact the author of this note for any clarification.

## 2025-02-11 ENCOUNTER — PATIENT MESSAGE (OUTPATIENT)
Dept: PULMONOLOGY | Facility: CLINIC | Age: 40
End: 2025-02-11
Payer: MEDICAID

## 2025-02-11 DIAGNOSIS — J45.20 MILD INTERMITTENT ASTHMA WITHOUT COMPLICATION: Primary | ICD-10-CM

## 2025-02-11 DIAGNOSIS — J45.20 MILD INTERMITTENT ASTHMA WITHOUT COMPLICATION: ICD-10-CM

## 2025-02-11 RX ORDER — BUDESONIDE AND FORMOTEROL FUMARATE DIHYDRATE 160; 4.5 UG/1; UG/1
2 AEROSOL RESPIRATORY (INHALATION) EVERY 12 HOURS
Qty: 10.2 G | Refills: 4 | Status: SHIPPED | OUTPATIENT
Start: 2025-02-11 | End: 2025-02-11 | Stop reason: SDUPTHER

## 2025-02-11 RX ORDER — BUDESONIDE AND FORMOTEROL FUMARATE DIHYDRATE 160; 4.5 UG/1; UG/1
2 AEROSOL RESPIRATORY (INHALATION) EVERY 12 HOURS
Qty: 10.2 G | Refills: 4 | Status: SHIPPED | OUTPATIENT
Start: 2025-02-11 | End: 2026-02-11

## 2025-02-13 ENCOUNTER — TELEPHONE (OUTPATIENT)
Dept: PULMONOLOGY | Facility: CLINIC | Age: 40
End: 2025-02-13
Payer: MEDICAID

## 2025-02-13 NOTE — TELEPHONE ENCOUNTER
Message was left to confirm that Ms. Bach has picked up her symbicort from the pharmacy.Claudio shared that it was picked up. Wanted to confirm. CF

## 2025-02-13 NOTE — TELEPHONE ENCOUNTER
Spoke with Claudio from Pharmacy he shared that Mrs. Bach picked up the medication and they are just waiting on [employer] to bill. CF

## 2025-02-26 ENCOUNTER — PROCEDURE VISIT (OUTPATIENT)
Dept: NEUROLOGY | Facility: CLINIC | Age: 40
End: 2025-02-26
Payer: MEDICAID

## 2025-02-26 VITALS
SYSTOLIC BLOOD PRESSURE: 121 MMHG | HEART RATE: 87 BPM | BODY MASS INDEX: 32.84 KG/M2 | DIASTOLIC BLOOD PRESSURE: 86 MMHG | WEIGHT: 203.5 LBS

## 2025-02-26 DIAGNOSIS — G43.709 CHRONIC MIGRAINE WITHOUT AURA WITHOUT STATUS MIGRAINOSUS, NOT INTRACTABLE: Primary | ICD-10-CM

## 2025-02-26 PROCEDURE — 64615 CHEMODENERV MUSC MIGRAINE: CPT | Mod: PBBFAC,PO | Performed by: PHYSICIAN ASSISTANT

## 2025-02-26 PROCEDURE — 64615 CHEMODENERV MUSC MIGRAINE: CPT | Mod: S$PBB,,, | Performed by: PHYSICIAN ASSISTANT

## 2025-02-26 PROCEDURE — 99999PBSHW PR PBB SHADOW TECHNICAL ONLY FILED TO HB: Mod: JW,PBBFAC,,

## 2025-02-26 NOTE — PROCEDURES
[facility]  [address] [place] [address]  Phone:[phone]  Fax: [phone]  Botox Visit, #3    Chief Complaint   Patient presents with    Botulinum Toxin Injection         A/P:        ICD-10-CM ICD-9-CM   1. Chronic migraine without aura without status migrainosus, not intractable  G43.709 346.70     Botox for migraine  At baseline right eyebrow higher than left eyebrow       Historically: Patient meets the criteria for chronic migraine. In summary, She has headaches/migraines >15 days per month  and last >4 hours if untreated. Specifics of duration, frequency and strength are listed in office visit HPI's.  This pattern has continued for >3 months.  She has failed at least three preventive medications (full list of medications is listed in office notes of Therapies tried in past)  I have therefore recommended a trial of Botox via the PREEMPT protocol for migraine prophylaxis.We schedule regular follow up intervals to check on status.     PROCEDURE NOTE:  BOTOX injection is indicated for the prophylaxis of headaches in adult patients with chronic  migraine. Patient meets indications for BOTOX therapy.  Potential risks and benefits were reviewed. Side effects including, but not limited to, potential  systemic allergic reactions of the anaphylactic type as well as local injection site reactions of  blepharoptosis, diplopia, infection, bleeding, pain, redness and bruising were reviewed. The  potential for headaches and/or neck pain post procedure were reviewed.  The patient's questions were answered. The patient signed a consent form. Patient  understands that depending on their insurance carrier, there may be a copay for this treatment.  BOTOX was reconstituted using  two 100 unit vials and diluted with 4 mL of sterile saline.  BOTOX was injected as per the PREEMPT trial injection paradigm with dose administered as 5  unit intramuscular (IM) injections  per site using a sterile, 30-gauge 0.5 inch needle as follows:  Muscle Dose, # of Sites   10 units divided in 2 sites  Procerus 5 units in 1 site  Frontalis 20 units divided in 4 sites  Temporalis 40 units divided in 8 sites  Occipitalis 30 units divided in 6 sites  Cervical paraspinal 20 units divided in 4 sites  Trapezius 30 units divided in 6 sites  Each site was cleaned with alcohol prior to injection. A total dose of 155 units were injected. 45  units were discarded/wasted.      The patient tolerated the procedure well with no immediate complications.  MEDICATION INFO:  NDC 9781-7556-52   Lot # [id number]  Exp 05/2027    As aside:  The Botox injections have achieved well over 50%  improvement in the patient's symptoms. Migraines have been reduced at least 7 days per month and the number of cumulative hours suffering with headaches has been reduced at least 100 total hours per month. Today she does have a headache indicating that the Botox has worn off. Frequency of treatment is every 3 months unless no response to the treatments, at which time we will discontinue the injections.         Follow up in 3 months for repeat injection, we also have interspersed office visits scheduled to ensure efficacy of botox sessions/check on patient.       Jackson Pratt MPA, PA-C  Attending available-Dr Javier MD           Personal Protective Equipment:    Personal Protective Equipment was used  non latex gloves.     02/26/2025      CC: Sathish Hamilton MD

## 2025-03-18 DIAGNOSIS — K21.9 GASTROESOPHAGEAL REFLUX DISEASE, UNSPECIFIED WHETHER ESOPHAGITIS PRESENT: ICD-10-CM

## 2025-03-18 RX ORDER — PANTOPRAZOLE SODIUM 40 MG/1
40 TABLET, DELAYED RELEASE ORAL DAILY
Qty: 90 TABLET | Refills: 3 | Status: SHIPPED | OUTPATIENT
Start: 2025-03-18

## 2025-03-18 NOTE — TELEPHONE ENCOUNTER
No care due was identified.  [facility] Embedded Care Due Messages. Reference number: [id number].   3/18/2025 12:06:37 PM CDT

## 2025-03-19 NOTE — TELEPHONE ENCOUNTER
Refill Decision Note   Subha Bach  is requesting a refill authorization.  Brief Assessment and Rationale for Refill:  Approve     Medication Therapy Plan:         Comments:     Note composed:10:54 PM 03/18/2025             Appointments     Last Visit   2/6/2025 Sathish Hamilton MD   Next Visit   Visit date not found Sathish Hamilton MD

## 2025-03-26 ENCOUNTER — LAB VISIT (OUTPATIENT)
Dept: LAB | Facility: HOSPITAL | Age: 40
End: 2025-03-26
Payer: MEDICAID

## 2025-03-26 DIAGNOSIS — J45.20 MILD INTERMITTENT ASTHMA WITHOUT COMPLICATION: ICD-10-CM

## 2025-03-26 LAB
ABSOLUTE EOSINOPHIL (SMH): 0.05 K/UL
ABSOLUTE MONOCYTE (SMH): 0.51 K/UL (ref 0.3–1)
ABSOLUTE NEUTROPHIL COUNT (SMH): 6.9 K/UL (ref 1.8–7.7)
BASOPHILS # BLD AUTO: 0.02 K/UL
BASOPHILS NFR BLD AUTO: 0.2 %
ERYTHROCYTE [DISTWIDTH] IN BLOOD BY AUTOMATED COUNT: 13 % (ref 11.5–14.5)
HCT VFR BLD AUTO: 40.5 % (ref 37–48.5)
HGB BLD-MCNC: 13.1 GM/DL (ref 12–16)
IMM GRANULOCYTES # BLD AUTO: 0.05 K/UL (ref 0–0.04)
IMM GRANULOCYTES NFR BLD AUTO: 0.5 % (ref 0–0.5)
LYMPHOCYTES # BLD AUTO: 1.83 K/UL (ref 1–4.8)
MCH RBC QN AUTO: 27.9 PG (ref 27–31)
MCHC RBC AUTO-ENTMCNC: 32.3 G/DL (ref 32–36)
MCV RBC AUTO: 86 FL (ref 82–98)
NUCLEATED RBC (/100WBC) (SMH): 0 /100 WBC
PLATELET # BLD AUTO: 422 K/UL (ref 150–450)
PMV BLD AUTO: 9.8 FL (ref 9.2–12.9)
RBC # BLD AUTO: 4.69 M/UL (ref 4–5.4)
RELATIVE EOSINOPHIL (SMH): 0.5 % (ref 0–8)
RELATIVE LYMPHOCYTE (SMH): 19.5 % (ref 18–48)
RELATIVE MONOCYTE (SMH): 5.4 % (ref 4–15)
RELATIVE NEUTROPHIL (SMH): 73.9 % (ref 38–73)
WBC # BLD AUTO: 9.38 K/UL (ref 3.9–12.7)

## 2025-03-26 PROCEDURE — 36415 COLL VENOUS BLD VENIPUNCTURE: CPT

## 2025-03-26 PROCEDURE — 82785 ASSAY OF IGE: CPT

## 2025-03-26 PROCEDURE — 85025 COMPLETE CBC W/AUTO DIFF WBC: CPT

## 2025-03-28 LAB — IGE SERPL-ACNC: 27.4 KU/L

## 2025-04-16 ENCOUNTER — OFFICE VISIT (OUTPATIENT)
Dept: PULMONOLOGY | Facility: CLINIC | Age: 40
End: 2025-04-16
Payer: MEDICAID

## 2025-04-16 VITALS
DIASTOLIC BLOOD PRESSURE: 84 MMHG | OXYGEN SATURATION: 98 % | HEART RATE: 102 BPM | SYSTOLIC BLOOD PRESSURE: 119 MMHG | WEIGHT: 201.63 LBS | BODY MASS INDEX: 32.54 KG/M2

## 2025-04-16 DIAGNOSIS — J45.20 MILD INTERMITTENT ASTHMA WITHOUT COMPLICATION: Primary | ICD-10-CM

## 2025-04-16 DIAGNOSIS — R06.02 SHORTNESS OF BREATH: ICD-10-CM

## 2025-04-16 DIAGNOSIS — K21.9 GASTROESOPHAGEAL REFLUX DISEASE WITHOUT ESOPHAGITIS: ICD-10-CM

## 2025-04-16 DIAGNOSIS — K44.9 HIATAL HERNIA: ICD-10-CM

## 2025-04-16 PROCEDURE — 99214 OFFICE O/P EST MOD 30 MIN: CPT | Mod: S$PBB,,,

## 2025-04-16 PROCEDURE — 99214 OFFICE O/P EST MOD 30 MIN: CPT | Mod: PBBFAC,PN

## 2025-04-16 PROCEDURE — 3008F BODY MASS INDEX DOCD: CPT | Mod: CPTII,,,

## 2025-04-16 PROCEDURE — 3074F SYST BP LT 130 MM HG: CPT | Mod: CPTII,,,

## 2025-04-16 PROCEDURE — 3079F DIAST BP 80-89 MM HG: CPT | Mod: CPTII,,,

## 2025-04-16 PROCEDURE — 1159F MED LIST DOCD IN RCRD: CPT | Mod: CPTII,,,

## 2025-04-16 PROCEDURE — 1160F RVW MEDS BY RX/DR IN RCRD: CPT | Mod: CPTII,,,

## 2025-04-16 PROCEDURE — 99999 PR PBB SHADOW E&M-EST. PATIENT-LVL IV: CPT | Mod: PBBFAC,,,

## 2025-04-16 RX ORDER — ESOMEPRAZOLE MAGNESIUM 40 MG/1
40 CAPSULE, DELAYED RELEASE ORAL
COMMUNITY
Start: 2025-03-20

## 2025-04-16 NOTE — ASSESSMENT & PLAN NOTE
Continue current GI regimen.  Encourage lifestyle modifications: avoiding meals 2-3 hours before bed, elevating the head of the bed, and avoiding known reflux triggers.  Monitor for ongoing symptoms; if poorly controlled, consider repeat GI follow-up.

## 2025-04-16 NOTE — PROGRESS NOTES
History and Physical Note  [facility]    Subjective:     Reason for visit: Cough     Patient ID:  Subha Bach is a 39 y.o. female    Interval History 04/16/2025:   The patient presents for follow-up of asthma, reporting daily shortness of breath over the past two weeks. She notes that symptoms have been more bothersome than usual, with some days being more manageable than others. She has been adherent to her maintenance inhaler with no significant improvement since our last visit three months ago. A recent CT chest was unremarkable. CBC and IgE levels are within normal limits. The patient has also been evaluated by Gastroenterology, and findings were consistent with gastroesophageal reflux, which she is currently managing with medications. She is additionally concerned about an elevated heart rate. Of note, her sister has a history of POTS (postural orthostatic tachycardia syndrome), and the patient is questioning whether her symptoms may be related.    Interval History 01/02/2025:   Patient is here for follow up. PFT results within normal limits ex hyperinflation RV 2.51 (148% of predicted). She meets criteria for obstruction. Bronchodilator challenge was not done on this PFT. Previous BD response in 2022 showed about 5% improvement in FEV1. Most bothersome symptom is sensation of gasping for air and not being able to get a full breath of air. She is using trelegy daily. CT scan with no acute findings. Small hiatal hernia present. She has made lifestyle modifications to reduce gastric reflux.     Interval history  12/02/2024:  Patient presents as a referral from her PCP for evaluation of cough and chest tightness. She has been struggling since ER visit on 11/07/2024 with these symptoms. Spo2 levels below baseline and has been tachycardic since the initial presentation. She has been cleared by cardiology. History of asthma diagnosed in childhood and pleurisy.   Palpitations in the last 3 weeks that has  not been relieved.   Daily coughing with no wheezing. Most bothersome symptom is chest tightness with a 7-8 in intensity.  Current treatment plan: trelegy daily; albuterol inhaler periodically no overuse in the albuterol  Same home since December 2016;     Surgical history of : Two left shoulder and hip surgery  Hystestomy 4 years ago    Additional Pulmonary History:  Occupational: former competitive swimmer;   Environmental Exposures: none during this year; cold weather  Exposure to Animals/Pets: labrodoodle;   Travel History: none recently   Childhood Illnesses:  childhood;   Tobacco/Smoking: life time non-smoker  Social History     Tobacco Use   Smoking Status Never   Smokeless Tobacco Never       Objective:     Vitals:    04/16/25 0923   BP: 119/84   BP Location: Left arm   Patient Position: Sitting   Pulse: 102   SpO2: 98%   Weight: 91.4 kg (201 lb 9.8 oz)         Physical Exam  Constitutional:       Appearance: Normal appearance. She is well-developed.   HENT:      Head: Normocephalic.   Cardiovascular:      Rate and Rhythm: Tachycardia present.      Pulses: Normal pulses.   Pulmonary:      Effort: Pulmonary effort is normal.      Breath sounds: Normal breath sounds. No decreased breath sounds.   Musculoskeletal:      Right lower leg: No edema.      Left lower leg: No edema.   Skin:     General: Skin is warm.      Capillary Refill: Capillary refill takes less than 2 seconds.      Findings: No rash.      Nails: There is no clubbing.   Neurological:      Mental Status: She is alert and oriented to person, place, and time. Mental status is at baseline.   Psychiatric:         Attention and Perception: Attention normal.         Mood and Affect: Mood and affect normal.         Speech: Speech normal.         Behavior: Behavior is cooperative.          Personal Diagnostic Review and Interpretation  CTA Chest 11/07/2024:   An intrapulmonary mass or nodule is not identified. There is minor dependent atelectasis  posteriorly. The interstitial density is otherwise within normal limits. No pneumothorax or pleural effusion is noted.     CT chest 12/05/2024:  Lungs: There are no pulmonary nodules, infiltrates or pleural effusions.  The trachea and bronchi are normal.     Mediastinum: No hilar, mediastinal or axillary adenopathy.     There is a small hiatal hernia.    Pertinent Studies Reviewed & Interpreted:     Pulmonary Function Tests:   12/08/2024:   Spirometry is within normal limit. Lung volumes show hyperinflation. Diffusion capacity is normal.     08/10/2022:   Normal spirometry.  Airflow is not clearly improved after bronchodilator. Clinical improvement following bronchodilator therapy may occur in the absence of spirometric improvement.  Normal lung volumes.       Other Pertinent Laboratories:  Lab Results   Component Value Date    WBC 9.38 03/26/2025    RBC 4.69 03/26/2025    HGB 13.1 03/26/2025    MCV 86 03/26/2025    MCH 27.9 03/26/2025    MCHC 32.3 03/26/2025    RDW 13.0 03/26/2025     03/26/2025    MPV 9.8 03/26/2025    GRAN 6.2 11/07/2024    GRAN 68.6 11/07/2024    LYMPH 2.1 11/07/2024    LYMPH 23.4 11/07/2024    MONO 0.6 11/07/2024    MONO 6.3 11/07/2024    EOS 0.1 11/07/2024    BASO 0.02 11/07/2024        Latest Reference Range & Units 10/01/19 08:20 03/24/20 00:25 09/02/20 10:08 09/02/20 12:30 09/03/20 03:29 09/11/20 08:06 09/24/20 14:32 10/15/20 08:19 11/20/20 11:04 12/08/20 15:41 06/07/22 13:08 02/12/24 08:12 11/07/24 09:02   Eos # 0.0 - 0.5 K/uL 0.1 0.2 0.1 0.1 0.2 0.3 0.1 0.1 0.1 0.1 0.2 0.1 0.1       Assessment & Plan:   Plan:    Clinical impression is resonably certain and repeated evaluation prn +/- follow up will be needed as below.      1. Mild intermittent asthma without complication  Overview:  Persistent shortness of breath + chest tightness for over one month despite excellent adherence to maintenance inhaler.   PFT results within normal limits ex hyperinflation RV 2.51 (148% of predicted).  She meets criteria for obstruction. Bronchodilator challenge was not done on this PFT. Previous BD response in 2022 showed about 5% improvement in FEV1.  CT chest with no acute pulmonary findings. Small hiatal hernia present.     Inhaler regimen  -Trelegy for maintenance therapy. Rinse mouth after each use.     Assessment & Plan:  Continue current maintenance inhaler regimen.  Recheck pulmonary function testing (PFT) to assess severity and control.  Reinforced inhaler technique and adherence.    Orders:  -     Complete PFT with bronchodilator; Future    2. Hiatal hernia    3. Shortness of breath    4. Gastroesophageal reflux disease without esophagitis  Assessment & Plan:  Continue current GI regimen.  Encourage lifestyle modifications: avoiding meals 2-3 hours before bed, elevating the head of the bed, and avoiding known reflux triggers.  Monitor for ongoing symptoms; if poorly controlled, consider repeat GI follow-up.        RETURN TO CLINIC IN 8 MONTHS     Total professional time spent for the encounter: 30 minutes  Time was spent preparing to see the patient, reviewing results of prior testing, obtaining and/or reviewing separately obtained history, performing a medically appropriate examination and interview, counseling and educating the patient/family, ordering medications/tests/procedures, referring and communicating with other health care professionals, documenting clinical information in the electronic health record, and independently interpreting results.    Pamela Manuel, DNP

## 2025-04-16 NOTE — ASSESSMENT & PLAN NOTE
Continue current maintenance inhaler regimen.  Recheck pulmonary function testing (PFT) to assess severity and control.  Reinforced inhaler technique and adherence.

## 2025-04-25 ENCOUNTER — PATIENT MESSAGE (OUTPATIENT)
Dept: FAMILY MEDICINE | Facility: CLINIC | Age: 40
End: 2025-04-25
Payer: MEDICAID

## 2025-04-25 DIAGNOSIS — Z01.89 PATIENT REQUEST FOR DIAGNOSTIC TESTING: Primary | ICD-10-CM

## 2025-04-30 ENCOUNTER — LAB VISIT (OUTPATIENT)
Dept: LAB | Facility: HOSPITAL | Age: 40
End: 2025-04-30
Attending: STUDENT IN AN ORGANIZED HEALTH CARE EDUCATION/TRAINING PROGRAM
Payer: MEDICAID

## 2025-04-30 DIAGNOSIS — Z01.89 PATIENT REQUEST FOR DIAGNOSTIC TESTING: ICD-10-CM

## 2025-04-30 LAB — TSH SERPL-ACNC: 0.85 UIU/ML (ref 0.4–4)

## 2025-04-30 PROCEDURE — 84443 ASSAY THYROID STIM HORMONE: CPT

## 2025-04-30 PROCEDURE — 36415 COLL VENOUS BLD VENIPUNCTURE: CPT | Mod: PO

## 2025-05-02 ENCOUNTER — OFFICE VISIT (OUTPATIENT)
Dept: FAMILY MEDICINE | Facility: CLINIC | Age: 40
End: 2025-05-02
Payer: MEDICAID

## 2025-05-02 DIAGNOSIS — R53.83 FATIGUE, UNSPECIFIED TYPE: Primary | ICD-10-CM

## 2025-05-02 PROCEDURE — G2211 COMPLEX E/M VISIT ADD ON: HCPCS | Mod: 95,,, | Performed by: STUDENT IN AN ORGANIZED HEALTH CARE EDUCATION/TRAINING PROGRAM

## 2025-05-02 PROCEDURE — 98005 SYNCH AUDIO-VIDEO EST LOW 20: CPT | Mod: 95,,, | Performed by: STUDENT IN AN ORGANIZED HEALTH CARE EDUCATION/TRAINING PROGRAM

## 2025-05-02 PROCEDURE — 1159F MED LIST DOCD IN RCRD: CPT | Mod: CPTII,95,, | Performed by: STUDENT IN AN ORGANIZED HEALTH CARE EDUCATION/TRAINING PROGRAM

## 2025-05-02 PROCEDURE — 1160F RVW MEDS BY RX/DR IN RCRD: CPT | Mod: CPTII,95,, | Performed by: STUDENT IN AN ORGANIZED HEALTH CARE EDUCATION/TRAINING PROGRAM

## 2025-05-02 NOTE — PROGRESS NOTES
The patient location is:[place], [place]  The chief complaint leading to consultation is: Fatigue    Visit type: audiovisual    Face to Face time with patient: 8 minutes  10 minutes of total time spent on the encounter, which includes face to face time and non-face to face time preparing to see the patient (eg, review of tests), Obtaining and/or reviewing separately obtained history, Documenting clinical information in the electronic or other health record, Independently interpreting results (not separately reported) and communicating results to the patient/family/caregiver, or Care coordination (not separately reported).         Each patient to whom he or she provides medical services by telemedicine is:  (1) informed of the relationship between the physician and patient and the respective role of any other health care provider with respect to management of the patient; and (2) notified that he or she may decline to receive medical services by telemedicine and may withdraw from such care at any time.    Notes:     [facility] MEDICINE CLINIC NOTE    Patient Name: Subha Bach  YOB: 1985    PRESENTING HISTORY     History of Present Illness:  Ms. Subha Bach is a 39 y.o. female here with fatigue.     C/o feeling exceptionally exhausted.   Goes back to sleep for 2-3 hours after dropping daughter off.   Both sleepy and fatigued. Never feels rested.   Concerned about thyroid given family history.     Onset about a month  Not improving.     No snoring, no siwnessed apneas.     No depressed feelings.   ROS      OBJECTIVE:   Vital Signs:  There were no vitals filed for this visit.       Physical Exam: Well appearing    Physical Exam    ASSESSMENT & PLAN:     Fatigue, unspecified type  -     CBC Auto Differential; Future; Expected date: 05/02/2025  -     Ferritin; Future; Expected date: 05/02/2025  -     Comprehensive Metabolic Panel; Future; Expected date: 11/02/2025  -     Magnesium; Future;  Expected date: 05/02/2025  -     C-Reactive Protein; Future; Expected date: 05/02/2025  -     Vitamin B12; Future; Expected date: 05/02/2025  -     Vitamin B1; Future; Expected date: 05/02/2025     No obvious etiology on history.   Investigate above.         Sathish Hamilton  Internal Medicine

## 2025-05-06 ENCOUNTER — LAB VISIT (OUTPATIENT)
Dept: LAB | Facility: HOSPITAL | Age: 40
End: 2025-05-06
Attending: STUDENT IN AN ORGANIZED HEALTH CARE EDUCATION/TRAINING PROGRAM
Payer: MEDICAID

## 2025-05-06 DIAGNOSIS — R53.83 FATIGUE, UNSPECIFIED TYPE: ICD-10-CM

## 2025-05-06 LAB
ABSOLUTE EOSINOPHIL (OHS): 0.13 K/UL
ABSOLUTE MONOCYTE (OHS): 0.65 K/UL (ref 0.3–1)
ABSOLUTE NEUTROPHIL COUNT (OHS): 5.61 K/UL (ref 1.8–7.7)
ALBUMIN SERPL BCP-MCNC: 3.9 G/DL (ref 3.5–5.2)
ALP SERPL-CCNC: 91 UNIT/L (ref 40–150)
ALT SERPL W/O P-5'-P-CCNC: 24 UNIT/L (ref 10–44)
ANION GAP (OHS): 11 MMOL/L (ref 8–16)
AST SERPL-CCNC: 22 UNIT/L (ref 11–45)
BASOPHILS # BLD AUTO: 0.04 K/UL
BASOPHILS NFR BLD AUTO: 0.4 %
BILIRUB SERPL-MCNC: 0.2 MG/DL (ref 0.1–1)
BUN SERPL-MCNC: 13 MG/DL (ref 6–20)
CALCIUM SERPL-MCNC: 9.8 MG/DL (ref 8.7–10.5)
CHLORIDE SERPL-SCNC: 104 MMOL/L (ref 95–110)
CO2 SERPL-SCNC: 21 MMOL/L (ref 23–29)
CREAT SERPL-MCNC: 0.7 MG/DL (ref 0.5–1.4)
CRP SERPL-MCNC: 6.8 MG/L
ERYTHROCYTE [DISTWIDTH] IN BLOOD BY AUTOMATED COUNT: 13.2 % (ref 11.5–14.5)
FERRITIN SERPL-MCNC: 61 NG/ML (ref 20–300)
GFR SERPLBLD CREATININE-BSD FMLA CKD-EPI: >60 ML/MIN/1.73/M2
GLUCOSE SERPL-MCNC: 63 MG/DL (ref 70–110)
HCT VFR BLD AUTO: 44.9 % (ref 37–48.5)
HGB BLD-MCNC: 13.9 GM/DL (ref 12–16)
IMM GRANULOCYTES # BLD AUTO: 0.02 K/UL (ref 0–0.04)
IMM GRANULOCYTES NFR BLD AUTO: 0.2 % (ref 0–0.5)
LYMPHOCYTES # BLD AUTO: 2.7 K/UL (ref 1–4.8)
MAGNESIUM SERPL-MCNC: 2 MG/DL (ref 1.6–2.6)
MCH RBC QN AUTO: 27.4 PG (ref 27–31)
MCHC RBC AUTO-ENTMCNC: 31 G/DL (ref 32–36)
MCV RBC AUTO: 88 FL (ref 82–98)
NUCLEATED RBC (/100WBC) (OHS): 0 /100 WBC
PLATELET # BLD AUTO: 434 K/UL (ref 150–450)
PMV BLD AUTO: 10.2 FL (ref 9.2–12.9)
POTASSIUM SERPL-SCNC: 4 MMOL/L (ref 3.5–5.1)
PROT SERPL-MCNC: 7.9 GM/DL (ref 6–8.4)
RBC # BLD AUTO: 5.08 M/UL (ref 4–5.4)
RELATIVE EOSINOPHIL (OHS): 1.4 %
RELATIVE LYMPHOCYTE (OHS): 29.5 % (ref 18–48)
RELATIVE MONOCYTE (OHS): 7.1 % (ref 4–15)
RELATIVE NEUTROPHIL (OHS): 61.4 % (ref 38–73)
SODIUM SERPL-SCNC: 136 MMOL/L (ref 136–145)
VIT B12 SERPL-MCNC: 507 PG/ML (ref 210–950)
WBC # BLD AUTO: 9.15 K/UL (ref 3.9–12.7)

## 2025-05-06 PROCEDURE — 82728 ASSAY OF FERRITIN: CPT

## 2025-05-06 PROCEDURE — 83735 ASSAY OF MAGNESIUM: CPT

## 2025-05-06 PROCEDURE — 82607 VITAMIN B-12: CPT

## 2025-05-06 PROCEDURE — 85025 COMPLETE CBC W/AUTO DIFF WBC: CPT

## 2025-05-06 PROCEDURE — 84425 ASSAY OF VITAMIN B-1: CPT | Mod: PO

## 2025-05-06 PROCEDURE — 86140 C-REACTIVE PROTEIN: CPT

## 2025-05-06 PROCEDURE — 80053 COMPREHEN METABOLIC PANEL: CPT

## 2025-05-06 PROCEDURE — 36415 COLL VENOUS BLD VENIPUNCTURE: CPT | Mod: PO

## 2025-05-07 ENCOUNTER — RESULTS FOLLOW-UP (OUTPATIENT)
Dept: FAMILY MEDICINE | Facility: CLINIC | Age: 40
End: 2025-05-07
Payer: MEDICAID

## 2025-05-07 DIAGNOSIS — F32.A DEPRESSION, UNSPECIFIED DEPRESSION TYPE: Primary | ICD-10-CM

## 2025-05-07 RX ORDER — BUPROPION HYDROCHLORIDE 150 MG/1
150 TABLET ORAL DAILY
Qty: 30 TABLET | Refills: 11 | Status: SHIPPED | OUTPATIENT
Start: 2025-05-07 | End: 2026-05-07

## 2025-05-12 ENCOUNTER — OFFICE VISIT (OUTPATIENT)
Dept: DERMATOLOGY | Facility: CLINIC | Age: 40
End: 2025-05-12
Payer: MEDICAID

## 2025-05-12 VITALS — WEIGHT: 201.5 LBS | HEIGHT: 66 IN | BODY MASS INDEX: 32.38 KG/M2

## 2025-05-12 DIAGNOSIS — D22.9 MULTIPLE BENIGN NEVI: Primary | ICD-10-CM

## 2025-05-12 DIAGNOSIS — L73.8 SEBACEOUS HYPERPLASIA OF FACE: ICD-10-CM

## 2025-05-12 DIAGNOSIS — L82.1 SEBORRHEIC KERATOSES: ICD-10-CM

## 2025-05-12 DIAGNOSIS — L81.4 SOLAR LENTIGO: ICD-10-CM

## 2025-05-12 DIAGNOSIS — L91.8 SKIN TAG, ACQUIRED: ICD-10-CM

## 2025-05-12 DIAGNOSIS — L57.8 ACTINIC SKIN DAMAGE: ICD-10-CM

## 2025-05-12 PROCEDURE — 99203 OFFICE O/P NEW LOW 30 MIN: CPT | Mod: S$GLB,,, | Performed by: DERMATOLOGY

## 2025-05-12 PROCEDURE — 3008F BODY MASS INDEX DOCD: CPT | Mod: CPTII,S$GLB,, | Performed by: DERMATOLOGY

## 2025-05-12 PROCEDURE — 1159F MED LIST DOCD IN RCRD: CPT | Mod: CPTII,S$GLB,, | Performed by: DERMATOLOGY

## 2025-05-12 PROCEDURE — 1160F RVW MEDS BY RX/DR IN RCRD: CPT | Mod: CPTII,S$GLB,, | Performed by: DERMATOLOGY

## 2025-05-12 NOTE — PROGRESS NOTES
Subjective:      Patient ID:  Subha Bach is a 39 y.o. female who presents for   Chief Complaint   Patient presents with    Skin Check     TBSC      New Patient     Patient here today for TBSC.  Patient is here today for a skin check.   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   Pt recalls several blistering sunburns in the past- possibly  Pt has history of tanning bed use- few sessions in college  Pt has  had moles removed in the past- yes, benign  Pt has history of melanoma in first degree relatives-  no      Derm Hx:  Denies Phx of NMSC  Denies Fhx of MM    Current Outpatient Medications:   ·  albuterol-budesonide (AIRSUPRA) 90-80 mcg/actuation, Inhale 2 puffs into the lungs every 6 (six) hours as needed (shortness of breath or wheezing)., Disp: 10.7 g, Rfl: 11  ·  budesonide-formoterol 160-4.5 mcg (SYMBICORT) 160-4.5 mcg/actuation HFAA, Inhale 2 puffs into the lungs every 12 (twelve) hours. 1 puff as needed for breakthrough symptoms (instead of levalbuterol) up to a maximum of 6 puffs/day (if 160/4.5), Disp: 10.2 g, Rfl: 4  ·  buPROPion (WELLBUTRIN XL) 150 MG TB24 tablet, Take 1 tablet (150 mg total) by mouth once daily., Disp: 30 tablet, Rfl: 11  ·  dietary supplement Cap, Take by mouth. Mg, Vitamin D3, Riboflavin, Disp: , Rfl:   ·  esomeprazole (NEXIUM) 40 MG capsule, Take 40 mg by mouth., Disp: , Rfl:   ·  famotidine (PEPCID) 40 MG tablet, Take 1 tablet (40 mg total) by mouth nightly as needed for Heartburn., Disp: 90 tablet, Rfl: 3  ·  fluticasone-umeclidin-vilanter (TRELEGY ELLIPTA) 200-62.5-25 mcg inhaler, Inhale 1 puff into the lungs once daily., Disp: 60 each, Rfl: 11  ·  gabapentin (NEURONTIN) 300 MG capsule, 1 pill 2 hours before bed, Disp: 30 capsule, Rfl: 6  ·  levalbuterol (XOPENEX HFA) 45 mcg/actuation inhaler, Inhale 1-2 puffs into the lungs every 4 (four) hours as needed for Wheezing or Shortness of Breath. Rescue, Disp: 15 g, Rfl: 9  ·   loratadine (CLARITIN) 10 mg tablet, Take 1 tablet (10 mg total) by mouth once daily., Disp: 30 tablet, Rfl: 11  ·  magnesium gluconate 27.5 mg magne- sium (500 mg) Tab, Take by mouth once., Disp: , Rfl:   ·  meclizine (ANTIVERT) 25 mg tablet, Take 25 mg by mouth., Disp: , Rfl:   ·  ondansetron (ZOFRAN-ODT) 4 MG TbDL, Take 1 tablet (4 mg total) by mouth every 8 (eight) hours as needed (nausea)., Disp: 20 tablet, Rfl: 3  ·  riboflavin, vitamin B2, 400 mg Tab, Take 400 mg by mouth once daily., Disp: , Rfl:   ·  tiZANidine (ZANAFLEX) 4 MG tablet, Take 4 mg by mouth every evening. May take up to 3 tablets, Disp: , Rfl:   ·  ubrogepant (UBRELVY) 100 mg tablet, Take 1 tablet at onset headache. May take 2nd tab 2 hours later if needed. No more than 2 tabs day/3 days use in week, Disp: 12 tablet, Rfl: 6  ·  venlafaxine (EFFEXOR-XR) 150 MG Cp24, Take 1 capsule (150 mg total) by mouth once daily., Disp: 90 capsule, Rfl: 1  ·  albuterol (PROVENTIL/VENTOLIN HFA) 90 mcg/actuation inhaler, Inhale 2 puffs into the lungs every 4 (four) hours as needed for Wheezing or Shortness of Breath. (Patient not taking: Reported on 5/12/2025), Disp: 18 g, Rfl: 11  ·  azelastine (ASTELIN) 137 mcg (0.1 %) nasal spray, 1 spray (137 mcg total) by Nasal route 2 (two) times daily. (Patient not taking: Reported on 12/2/2024), Disp: 30 mL, Rfl: 11  ·  fexofenadine (ALLEGRA) 180 MG tablet, Take 1 tablet (180 mg total) by mouth once daily. (Patient not taking: Reported on 12/2/2024), Disp: 30 tablet, Rfl: 0  ·  pantoprazole (PROTONIX) 40 MG tablet, Take 1 tablet (40 mg total) by mouth once daily. (Patient not taking: Reported on 5/12/2025), Disp: 90 tablet, Rfl: 3        Review of Systems   Constitutional:  Negative for fever, chills and fatigue.   Skin:  Positive for daily sunscreen use and activity-related sunscreen use. Negative for wears hat.   Hematologic/Lymphatic: Bruises/bleeds easily.       Objective:   Physical Exam   Constitutional: She  appears well-developed and well-nourished. No distress.   Neurological: She is alert and oriented to person, place, and time. She is not disoriented.   Psychiatric: She has a normal mood and affect.   Skin:   Areas Examined (abnormalities noted in diagram):   Scalp / Hair Palpated and Inspected  Head / Face Inspection Performed  Neck Inspection Performed  Chest / Axilla Inspection Performed  Abdomen Inspection Performed  Genitals / Buttocks / Groin Inspection Performed  Back Inspection Performed  RUE Inspected  LUE Inspection Performed  RLE Inspected  LLE Inspection Performed  Nails and Digits Inspection Performed                     Diagram Legend     Erythematous scaling macule/papule c/w actinic keratosis       Vascular papule c/w angioma      Pigmented verrucoid papule/plaque c/w seborrheic keratosis      Yellow umbilicated papule c/w sebaceous hyperplasia      Irregularly shaped tan macule c/w lentigo     1-2 mm smooth white papules consistent with Milia      Movable subcutaneous cyst with punctum c/w epidermal inclusion cyst      Subcutaneous movable cyst c/w pilar cyst      Firm pink to brown papule c/w dermatofibroma      Pedunculated fleshy papule(s) c/w skin tag(s)      Evenly pigmented macule c/w junctional nevus     Mildly variegated pigmented, slightly irregular-bordered macule c/w mildly atypical nevus      Flesh colored to evenly pigmented papule c/w intradermal nevus       Pink pearly papule/plaque c/w basal cell carcinoma      Erythematous hyperkeratotic cursted plaque c/w SCC      Surgical scar with no sign of skin cancer recurrence      Open and closed comedones      Inflammatory papules and pustules      Verrucoid papule consistent consistent with wart     Erythematous eczematous patches and plaques     Dystrophic onycholytic nail with subungual debris c/w onychomycosis     Umbilicated papule    Erythematous-base heme-crusted tan verrucoid plaque consistent with inflamed seborrheic keratosis      Erythematous Silvery Scaling Plaque c/w Psoriasis     See annotation      Assessment / Plan:        Multiple benign nevi  Careful dermoscopy evaluation of nevi performed with none identified as needing biopsy today  Monitor for new mole or moles that are becoming bigger, darker, irritated, or developing irregular borders.     Seborrheic keratoses  These are benign inherited growths without a malignant potential. Reassurance given to patient. No treatment is necessary.     Solar lentigo  This is a benign hyperpigmented sun induced lesion. Daily sun protection will reduce the number of new lesions. Treatment of these benign lesions are considered cosmetic.    Skin tag, acquired  Reassurance given to patient. No treatment is necessary.   Treatment of benign, asymptomatic lesions may be considered cosmetic.    Sebaceous hyperplasia of face  This is a common condition representing benign enlargement of the sebaceous lobule. It typically occurs in adulthood. Reassurance given to patient.     Actinic skin damage  Patient instructed in importance in daily broad spectrum sun protection of at least spf 30. Mineral sunscreen ingredients preferred (Zinc +/- Titanium) and can be found OTC.   Patient encouraged to wear hat for all outdoor exposure.   Also discussed sun avoidance and use of protective clothing.             No follow-ups on file.

## 2025-05-14 LAB — W VITAMIN B1: 71 UG/L

## 2025-05-21 ENCOUNTER — PROCEDURE VISIT (OUTPATIENT)
Dept: NEUROLOGY | Facility: CLINIC | Age: 40
End: 2025-05-21
Payer: MEDICAID

## 2025-05-21 VITALS
DIASTOLIC BLOOD PRESSURE: 85 MMHG | WEIGHT: 201.5 LBS | BODY MASS INDEX: 32.38 KG/M2 | SYSTOLIC BLOOD PRESSURE: 123 MMHG | HEIGHT: 66 IN | TEMPERATURE: 98 F | HEART RATE: 71 BPM | RESPIRATION RATE: 17 BRPM

## 2025-05-21 DIAGNOSIS — G43.709 CHRONIC MIGRAINE WITHOUT AURA WITHOUT STATUS MIGRAINOSUS, NOT INTRACTABLE: Primary | ICD-10-CM

## 2025-05-21 PROCEDURE — 64615 CHEMODENERV MUSC MIGRAINE: CPT | Mod: PBBFAC,PO | Performed by: PHYSICIAN ASSISTANT

## 2025-05-21 PROCEDURE — 99999PBSHW PR PBB SHADOW TECHNICAL ONLY FILED TO HB: Mod: JW,PBBFAC,,

## 2025-05-21 PROCEDURE — 64615 CHEMODENERV MUSC MIGRAINE: CPT | Mod: S$PBB,,, | Performed by: PHYSICIAN ASSISTANT

## 2025-05-21 NOTE — PROCEDURES
[facility]  [address] [place] [address]  Phone:[phone]  Fax: [phone]  Botox Visit, ##4    Chief Complaint   Patient presents with    Botulinum Toxin Injection         A/P:        ICD-10-CM ICD-9-CM   1. Chronic migraine without aura without status migrainosus, not intractable  G43.709 346.70            Historically: Patient meets the criteria for chronic migraine. In summary, She has headaches/migraines >15 days per month  and last >4 hours if untreated. Specifics of duration, frequency and strength are listed in office visit HPI's.  This pattern has continued for >3 months.  She has failed at least three preventive medications (full list of medications is listed in office notes of Therapies tried in past)  I have therefore recommended a trial of Botox via the PREEMPT protocol for migraine prophylaxis.We schedule regular follow up intervals to check on status.     PROCEDURE NOTE:  BOTOX injection is indicated for the prophylaxis of headaches in adult patients with chronic  migraine. Patient meets indications for BOTOX therapy.  Potential risks and benefits were reviewed. Side effects including, but not limited to, potential  systemic allergic reactions of the anaphylactic type as well as local injection site reactions of  blepharoptosis, diplopia, infection, bleeding, pain, redness and bruising were reviewed. The  potential for headaches and/or neck pain post procedure were reviewed.  The patient's questions were answered. The patient signed a consent form. Patient  understands that depending on their insurance carrier, there may be a copay for this treatment.  BOTOX was reconstituted using  two 100 unit vials and diluted with 4 mL of sterile saline.  BOTOX was injected as per the PREEMPT trial injection paradigm with dose administered as 5  unit intramuscular (IM) injections per site using a sterile, 30-gauge 0.5 inch needle as  follows:  Muscle Dose, # of Sites   10 units divided in 2 sites  Procerus 5 units in 1 site  Frontalis 20 units divided in 4 sites  Temporalis 40 units divided in 8 sites  Occipitalis 30 units divided in 6 sites  Cervical paraspinal 20 units divided in 4 sites  Trapezius 30 units divided in 6 sites  Each site was cleaned with alcohol prior to injection. A total dose of 155 units were injected. 45  units were discarded/wasted.      The patient tolerated the procedure well with no immediate complications.  MEDICATION INFO:  NDC 5372-5050-10   Lot # [id number]  Exp10/2027    As aside:  The Botox injections have achieved well over 50%  improvement in the patient's symptoms. Migraines have been reduced at least 7 days per month and the number of cumulative hours suffering with headaches has been reduced at least 100 total hours per month. Today she does have a headache indicating that the Botox has worn off. Frequency of treatment is every 3 months unless no response to the treatments, at which time we will discontinue the injections.         Follow up in 3 months for repeat injection, we also have interspersed office visits scheduled to ensure efficacy of botox sessions/check on patient.       Jackson Pratt MPA, PA-C  Attending available-Dr Javier MD           Personal Protective Equipment:    Personal Protective Equipment was used  non latex gloves.     05/21/2025      CC: Sathish Hamilton MD

## 2025-05-23 ENCOUNTER — OFFICE VISIT (OUTPATIENT)
Dept: NEUROLOGY | Facility: CLINIC | Age: 40
End: 2025-05-23
Payer: MEDICAID

## 2025-05-23 VITALS
HEART RATE: 85 BPM | WEIGHT: 200.19 LBS | RESPIRATION RATE: 17 BRPM | DIASTOLIC BLOOD PRESSURE: 71 MMHG | SYSTOLIC BLOOD PRESSURE: 123 MMHG | HEIGHT: 66 IN | TEMPERATURE: 98 F | BODY MASS INDEX: 32.17 KG/M2

## 2025-05-23 DIAGNOSIS — M79.18 MYOFASCIAL PAIN: ICD-10-CM

## 2025-05-23 DIAGNOSIS — G43.709 CHRONIC MIGRAINE WITHOUT AURA WITHOUT STATUS MIGRAINOSUS, NOT INTRACTABLE: Primary | ICD-10-CM

## 2025-05-23 PROCEDURE — 99214 OFFICE O/P EST MOD 30 MIN: CPT | Mod: PBBFAC,PO | Performed by: PHYSICIAN ASSISTANT

## 2025-05-23 PROCEDURE — 99999 PR PBB SHADOW E&M-EST. PATIENT-LVL IV: CPT | Mod: PBBFAC,,, | Performed by: PHYSICIAN ASSISTANT

## 2025-05-23 RX ORDER — VENLAFAXINE HYDROCHLORIDE 150 MG/1
150 CAPSULE, EXTENDED RELEASE ORAL DAILY
Qty: 90 CAPSULE | Refills: 1 | Status: SHIPPED | OUTPATIENT
Start: 2025-05-23 | End: 2026-05-23

## 2025-05-23 RX ORDER — GABAPENTIN 300 MG/1
CAPSULE ORAL
Qty: 30 CAPSULE | Refills: 6 | Status: SHIPPED | OUTPATIENT
Start: 2025-05-23

## 2025-05-23 RX ORDER — UBROGEPANT 100 MG/1
TABLET ORAL
Qty: 12 TABLET | Refills: 6 | Status: SHIPPED | OUTPATIENT
Start: 2025-05-23

## 2025-05-23 NOTE — PROGRESS NOTES
[facility]  [address] [facility] [address]  [place] [address]  Phone:[phone]  Fax: [phone]  Follow up visit     Patient Name: Subha Bach  : 1985  MRN:  [id number]  Today: 2025   LOV: 2025 for botox #4  chief complaint: Headache    PCP: Sathish Hamilton MD.       Assessment:   Subha Bach is a 39 y.o. right handed female with a PMHx of: asthma, FMG, HA and GERD   whom presents solo at the request of the PCP for HA. Appears to have chronic migraine and resistant to a multitude of therapies. Myofascial pain/hx of FMG likely contributes. HA are much better on current regimen.       Review:    ICD-10-CM ICD-9-CM   1. Chronic migraine without aura without status migrainosus, not intractable  G43.709 346.70   2. Myofascial pain  M79.18 729.1             Plan:               -if HA change in quality/nature, will get updated imaging study        For HA Prevention:  1 no BB d/t PMHx of asthma   2 refilled effexor (came to me on this medicine, using for her HA),   3  zanaflex per other providers-We don't manage FMG here in this clinic  4  Patient meets the criteria for chronic migraine. In summary, She has  migraines >15 days per month  and last >4 hours if untreated. Specifics of duration, frequency and strength are listed in the HPI (please refer to this section).  This pattern has continued for >3 months.  She has failed at least three preventive medications (full list of medications is listed below in the HPI under Therapies tried in past, but for ease of reference effexor, topamax, zanaflex, MgOx, emgality, B2, gabapentin, wellbutrin, keppra, aimovig, emgality, ajovy, baclofen, etc)  I have therefore recommended a trial of Botox via the PREEMPT protocol for migraine prophylaxis.   We discussed what to expect on procedure day at length, wear old or loose fitting clothing (if possible, merely to keep work clothes from getting any blood or being  "wrinkled), no make up (if applicable), eat meals/stay well hydrated and secure a ride if necessary. Also, discussed it can take up to 2-3 sessions of botox to get results desired. Lastly, we discussed procedure at length, 31 injections done in the office, potential complications not limited to muscle weakness, respiratory issues, or worst case scenario-death. The patient voiced understanding and wished to move forward.  Muscles to be injected:   10 units divided in 2 sites  Procerus 5 units in 1 site  Frontalis 20 units divided in 4 sites  Temporalis 40 units divided in 8 sites  Occipitalis 30 units divided in 6 sites  Cervical paraspinal 20 units divided in 4 sites  Trapezius 30 units divided in 6 sites  A total dose of 155 units of botox to be used with  45 units to be discarded/wasted (unavoidable).       -The Botox injections have achieved well over 50%  improvement in the patient's symptoms. Migraines have been reduced at least 7 days per month and the number of cumulative hours suffering with headaches has been reduced at least 100 total hours per month. Move forward with botox #5    5 gabapentin at 300 mg Q2h before bed, refilled           -The  was reviewed, no aberrant behaviour was noted. JEAN PIERRE MARCIAL. Checked at today's visit- 2025         For HA :  Ubrelvy -refilled      -encouraged her to take at onset HA, not "wait until it gets bad."     To break up Headaches:  N/A    Other:  N/A           All test results as well as any necessary instructions were reviewed and discussed with patient.    Review:  Orders Placed This Encounter    gabapentin (NEURONTIN) 300 MG capsule    venlafaxine (EFFEXOR-XR) 150 MG Cp24    ubrogepant (UBRELVY) 100 mg tablet             Patient to return to PCP/other specialists for all other problems  Patient to continue on all medications as Rx'd  Full office note available in EMR   RTO- for botox 5  The patient indicates understanding of these issues and " agrees to the plan.    HPI:   Subha Bach is a 39 y.o.right handed, female with a PMHx of: asthma, FMG, HA and GERD   whom presents solo in follow up for HA.     At LOV (5/21/2025) received botox #4. Previously refilled effexor, gabapentin nightly, and given ubrelvy. Has zofran for nausea.   Currently 4/10 pain   No side effects from botox   1 migraine day a week (historically was at 25 migraine days a month)   Having more sinus HA recently   Pain high right parietal   Ubrelvy is abortive, though didn't take this am   Gabapentin every night, no side effects             From previous visits: Botox 1 given. Previously has effexor and ubrelvy. Steroids helped.   16 migraine days a month at this juncture (historically 25 HD a month)  No side effects from botox  Currently has HA, pain in back of the head on right   Ubrelvy works but slow acting   Has been taking gabapentin 600 mg PRN as opposed to scheduled, makes her feel tired         From previous visit:   Start:HA since 13 years old, strong Fhx of migraines, has been seen by many providers over the years   History of trauma (no), History of CNS infection (no), History of Stroke (no)  Location:right occipitalis radiates to whole right hemicrania, rarely on the LEFT  Severity: range: 1-10/10  Duration:>4 hours a day   Frequency:25+ migraine days a month   Associated factors (bolded positive) WITH HA ( or migraine): Nausea, vomiting, photophobia, phonophobia, tinnitus, scalp pain, vision loss, diplopia, scintillations, eye pain, jaw pain, weakness?    Tried:effexor, relpax, trudhesa, cannabis (gabapentin for FMG)   Triggers (in bold): stress, lack of sleep, hormonal changes, too much caffeine, too little caffeine, weather change, seasonal change, strong odours, bright lights, sunlight, food    Currently having a HA?:yes   Positives in bold: Hx of Kidney Stones, asthma, GI bleed, osteoporosis, CAD/MI, CVA/TIA, DM    Imaging on file: no imaging in many years    Therapies tried in past: (failures to be marked, if known---why did it fail?)  Effexor  Topamax  Zanaflex  Zofran  MgOx  Emgality  Gabapentin  Relpax  Wellbutrin  keppra  Aimovig  Ajovy  imitex  Baclofen  B2  Trudhesa   Maxalt   Botox  Ubrelvy       Medication Reconciliation:   Current Outpatient Medications   Medication Sig Dispense Refill    albuterol (PROVENTIL/VENTOLIN HFA) 90 mcg/actuation inhaler Inhale 2 puffs into the lungs every 4 (four) hours as needed for Wheezing or Shortness of Breath. 18 g 11    albuterol-budesonide (AIRSUPRA) 90-80 mcg/actuation Inhale 2 puffs into the lungs every 6 (six) hours as needed (shortness of breath or wheezing). 10.7 g 11    budesonide-formoterol 160-4.5 mcg (SYMBICORT) 160-4.5 mcg/actuation HFAA Inhale 2 puffs into the lungs every 12 (twelve) hours. 1 puff as needed for breakthrough symptoms (instead of levalbuterol) up to a maximum of 6 puffs/day (if 160/4.5) 10.2 g 4    buPROPion (WELLBUTRIN XL) 150 MG TB24 tablet Take 1 tablet (150 mg total) by mouth once daily. 30 tablet 11    dietary supplement Cap Take by mouth. Mg, Vitamin D3, Riboflavin      esomeprazole (NEXIUM) 40 MG capsule Take 40 mg by mouth.      famotidine (PEPCID) 40 MG tablet Take 1 tablet (40 mg total) by mouth nightly as needed for Heartburn. 90 tablet 3    fluticasone-umeclidin-vilanter (TRELEGY ELLIPTA) 200-62.5-25 mcg inhaler Inhale 1 puff into the lungs once daily. 60 each 11    levalbuterol (XOPENEX HFA) 45 mcg/actuation inhaler Inhale 1-2 puffs into the lungs every 4 (four) hours as needed for Wheezing or Shortness of Breath. Rescue 15 g 9    loratadine (CLARITIN) 10 mg tablet Take 1 tablet (10 mg total) by mouth once daily. 30 tablet 11    magnesium gluconate 27.5 mg magne- sium (500 mg) Tab Take by mouth once.      meclizine (ANTIVERT) 25 mg tablet Take 25 mg by mouth.      ondansetron (ZOFRAN-ODT) 4 MG TbDL Take 1 tablet (4 mg total) by mouth every 8 (eight) hours as needed (nausea). 20 tablet  3    pantoprazole (PROTONIX) 40 MG tablet Take 1 tablet (40 mg total) by mouth once daily. 90 tablet 3    riboflavin, vitamin B2, 400 mg Tab Take 400 mg by mouth once daily.      tiZANidine (ZANAFLEX) 4 MG tablet Take 4 mg by mouth every evening. May take up to 3 tablets      azelastine (ASTELIN) 137 mcg (0.1 %) nasal spray 1 spray (137 mcg total) by Nasal route 2 (two) times daily. (Patient not taking: Reported on 12/2/2024) 30 mL 11    fexofenadine (ALLEGRA) 180 MG tablet Take 1 tablet (180 mg total) by mouth once daily. (Patient not taking: Reported on 12/2/2024) 30 tablet 0    gabapentin (NEURONTIN) 300 MG capsule 1 pill 2 hours before bed 30 capsule 6    ubrogepant (UBRELVY) 100 mg tablet Take 1 tablet at onset headache. May take 2nd tab 2 hours later if needed. No more than 2 tabs day/3 days use in week 12 tablet 6    venlafaxine (EFFEXOR-XR) 150 MG Cp24 Take 1 capsule (150 mg total) by mouth once daily. 90 capsule 1     No current facility-administered medications for this visit.     Review of patient's allergies indicates:   Allergen Reactions    Nubain [nalbuphine] Hives       PMHx:  Past Medical History:   Diagnosis Date    Asthma     Encounter for blood transfusion     Gestational diabetes     gestational diabetes    Migraine headache      Past Surgical History:   Procedure Laterality Date    ARTHROSCOPY, HIP Left 6/22/2022    Procedure: ARTHROSCOPY, HIP, WITH LABRUM DEBRIDEMENT;  Surgeon: Joselo Henderson II, MD;  Location: [facility] OR;  Service: Orthopedics;  Laterality: Left;    LAPAROSCOPIC SALPINGECTOMY Bilateral 10/19/2020    Procedure: SALPINGECTOMY, LAPAROSCOPIC;  Surgeon: Yonas Walsh MD;  Location: [facility] OR;  Service: OB/GYN;  Laterality: Bilateral;  Possible Overnight  Claudia Cabrera MD to assist    LAPAROSCOPIC TOTAL HYSTERECTOMY N/A 10/19/2020    Procedure: HYSTERECTOMY, TOTAL, LAPAROSCOPIC;  Surgeon: Yonas Walhs MD;  Location: [facility] OR;  Service: OB/GYN;  Laterality: N/A;    REMOVAL OF  IMPLANT Left 10/19/2020    Procedure: REMOVAL, IMPLANT;  Surgeon: Yonas Walsh MD;  Location: [facility] OR;  Service: OB/GYN;  Laterality: Left;  Nextplan birthcontrol implant removal    SHOULDER SURGERY         Fhx:  Family History   Problem Relation Name Age of Onset    Asthma Mother      Migraines Mother      Hypothyroidism Mother      Arthritis Mother      Migraines Father      Allergies Father      Chiari malformation Sister      Migraines Sister      Pancreatitis Maternal Grandmother      Migraines Maternal Grandmother      Thyroid disease Maternal Grandmother      Other Maternal Grandmother          pituitary gland issues    Heart failure Maternal Grandfather      Diabetes Maternal Grandfather      Kidney disease Maternal Grandfather      Alzheimer's disease Paternal Grandmother      Prostate cancer Paternal Grandfather         Shx:   Social History     Socioeconomic History    Marital status: Single   Tobacco Use    Smoking status: Never    Smokeless tobacco: Never   Substance and Sexual Activity    Alcohol use: Yes     Comment: Martini every 6 months    Drug use: No    Sexual activity: Yes     Partners: Male     Social Drivers of Health     Financial Resource Strain: Patient Declined (5/2/2025)    Overall Financial Resource Strain (CARDIA)     Difficulty of Paying Living Expenses: Patient declined   Food Insecurity: Unknown (5/2/2025)    Hunger Vital Sign     Worried About Running Out of Food in the Last Year: Never true     Ran Out of Food in the Last Year: Patient declined   Transportation Needs: No Transportation Needs (5/2/2025)    PRAPARE - Transportation     Lack of Transportation (Medical): No     Lack of Transportation (Non-Medical): No   Physical Activity: Inactive (5/2/2025)    Exercise Vital Sign     Days of Exercise per Week: 0 days     Minutes of Exercise per Session: 0 min   Stress: Stress Concern Present (5/2/2025)    [employer] [place] [employer] - Occupational Stress Questionnaire      Feeling of Stress : To some extent   Housing Stability: Unknown (5/2/2025)    Housing Stability Vital Sign     Unable to Pay for Housing in the Last Year: Patient declined           Labs:   Reviewed in chart     Imaging:   Reviewed in chart       Other testing:  Reviewed in chart     Note: I have independently reviewed any/all imaging/labs/tests and agree with the report (s) as documented.  Any discrepancies will be as noted/demarcated by free text.  ANIKET GREENFIELD 5/23/2025                     ROS:   Review Of Systems (questions asked, positive or additions in BOLD)  Gen: Weight change, fatigue/malaise, pyrexia   HEENT: Tinnitus, headache,  blurred vision, eye pain, diplopia, photophobia,  nose bleeds, congestion, sore throat, jaw pain, scalp pain, neck stiffness   Card: Palpitations, CP   Pulm: SOB, cough   Vas: Easy bruising, easy bleeding   GI: N/V/D/C, incontinence, hematemesis, hematochezia    : incontinence, hematuria   Endocrine: Temp intolerance, polyuria, polydipsia   M/S: Neck pain, myalgia, back pain, joint pain, falls    Neuro: PER HPI   PSY: Memory loss, confusion, depression, anxiety, trouble in sleep, hallucinations          EXAM:   LMP 10/13/2020 Comment: PT INSTRUCTED ON NEED FOR URINE SAMPLE PRIOR TO PROCEDURE AND VERBALIZES UNDERSTANDING   GEN:  NAD  HEENT: NC/AT       EXTREM:   no edema present.    NEURO:  Mental Status:  Awake, alert and appropriately oriented to time, place, and person.  Normal attention and concentration.  Speech is fluent and appropriate language function (I.e., comprehension).     Cranial Nerves:     Extraocular movements are intact and without nystagmus.  Visual fields are full to confrontation testing.   Facial movement is symmetric.    Hearing is normal. Uvula in midline. FROM of neck in all (6) directions, shoulder shrug symmetrical. Tongue in midline without fasiculation.     Motor:  antigravity in all limbs, no drift, no resting tremor      DTR: 2+ bilat patellar       Gait and Stance: Normal manner of stance and gait function testing.          This document has been electronically signed by  Jackson JAUREGUIJane Pratt MPA, PA-C on 5/23/2025, I have personally typed this message using the EMR.       Dr Javier MD  was available during today's visit.     Personal Protective Equipment:    Personal Protective Equipment was used during this encounter including:   non latex gloves.          CC: Sathish Hamilton MD

## 2025-06-04 ENCOUNTER — OFFICE VISIT (OUTPATIENT)
Dept: FAMILY MEDICINE | Facility: CLINIC | Age: 40
End: 2025-06-04
Payer: MEDICAID

## 2025-06-04 VITALS
WEIGHT: 198.44 LBS | SYSTOLIC BLOOD PRESSURE: 130 MMHG | HEIGHT: 66 IN | HEART RATE: 107 BPM | RESPIRATION RATE: 18 BRPM | BODY MASS INDEX: 31.89 KG/M2 | OXYGEN SATURATION: 98 % | DIASTOLIC BLOOD PRESSURE: 70 MMHG

## 2025-06-04 DIAGNOSIS — R53.83 FATIGUE, UNSPECIFIED TYPE: Primary | ICD-10-CM

## 2025-06-04 DIAGNOSIS — G43.009 MIGRAINE WITHOUT AURA AND WITHOUT STATUS MIGRAINOSUS, NOT INTRACTABLE: ICD-10-CM

## 2025-06-04 DIAGNOSIS — M79.7 FIBROMYALGIA: ICD-10-CM

## 2025-06-04 PROCEDURE — 1159F MED LIST DOCD IN RCRD: CPT | Mod: CPTII,,, | Performed by: PHYSICIAN ASSISTANT

## 2025-06-04 PROCEDURE — 3078F DIAST BP <80 MM HG: CPT | Mod: CPTII,,, | Performed by: PHYSICIAN ASSISTANT

## 2025-06-04 PROCEDURE — 99214 OFFICE O/P EST MOD 30 MIN: CPT | Mod: S$PBB,,, | Performed by: PHYSICIAN ASSISTANT

## 2025-06-04 PROCEDURE — 3075F SYST BP GE 130 - 139MM HG: CPT | Mod: CPTII,,, | Performed by: PHYSICIAN ASSISTANT

## 2025-06-04 PROCEDURE — G2211 COMPLEX E/M VISIT ADD ON: HCPCS | Mod: S$PBB,,, | Performed by: PHYSICIAN ASSISTANT

## 2025-06-04 PROCEDURE — 3008F BODY MASS INDEX DOCD: CPT | Mod: CPTII,,, | Performed by: PHYSICIAN ASSISTANT

## 2025-06-04 PROCEDURE — 99214 OFFICE O/P EST MOD 30 MIN: CPT | Mod: PBBFAC,PO | Performed by: PHYSICIAN ASSISTANT

## 2025-06-04 PROCEDURE — 99999 PR PBB SHADOW E&M-EST. PATIENT-LVL IV: CPT | Mod: PBBFAC,,, | Performed by: PHYSICIAN ASSISTANT

## 2025-06-04 PROCEDURE — 1160F RVW MEDS BY RX/DR IN RCRD: CPT | Mod: CPTII,,, | Performed by: PHYSICIAN ASSISTANT

## 2025-07-09 ENCOUNTER — TELEPHONE (OUTPATIENT)
Dept: PULMONOLOGY | Facility: CLINIC | Age: 40
End: 2025-07-09
Payer: MEDICAID

## 2025-07-09 DIAGNOSIS — J45.20 MILD INTERMITTENT ASTHMA WITHOUT COMPLICATION: Primary | ICD-10-CM

## 2025-07-09 RX ORDER — BUDESONIDE AND FORMOTEROL FUMARATE DIHYDRATE 160; 4.5 UG/1; UG/1
2 AEROSOL RESPIRATORY (INHALATION) EVERY 12 HOURS
Qty: 10.2 G | Refills: 11 | Status: SHIPPED | OUTPATIENT
Start: 2025-07-09 | End: 2026-07-09

## 2025-07-09 NOTE — TELEPHONE ENCOUNTER
Copied from CRM #[id number]. Topic: Medications - Medication Refill  >> Jul 9, 2025 12:45 PM Marleen wrote:  Type:  RX Refill Request    Who Called: [facility]   Refill or New Rx:refill   RX Name and Strength:Symbicort 160 mg    Preferred Pharmacy with phone number:  [facility] [place] - [address] [facility]  [address]  [place] [address]  Phone: [phone] Fax: [phone]  Local or Mail Order:local   Ordering Provider:mail   Would the patient rather a call back or a response via [facility]? Call   Best Call Back Number:[phone]   Additional Information: please call  >> Jul 9, 2025 12:49 PM Pamela Manuel DNP wrote:  Refills sent to her pharmacy if we can notify her.  ----- Message -----  From: Marleen Adame  Sent: 7/9/2025  12:47 PM CDT  To: Kaleb Santiago Staff    Message was left for Ms. Bach to let her know that her refills were sent to her pharmacy. [facility] - [place] - [address] [facility]  [address]  [place] [address]

## 2025-07-11 DIAGNOSIS — R11.0 NAUSEA: ICD-10-CM

## 2025-07-11 RX ORDER — ONDANSETRON 4 MG/1
4 TABLET, ORALLY DISINTEGRATING ORAL EVERY 8 HOURS PRN
Qty: 20 TABLET | Refills: 0 | Status: SHIPPED | OUTPATIENT
Start: 2025-07-11

## 2025-08-06 DIAGNOSIS — Z12.31 OTHER SCREENING MAMMOGRAM: ICD-10-CM

## 2025-08-07 ENCOUNTER — PATIENT OUTREACH (OUTPATIENT)
Dept: ADMINISTRATIVE | Facility: HOSPITAL | Age: 40
End: 2025-08-07
Payer: MEDICAID

## 2025-08-07 ENCOUNTER — PATIENT MESSAGE (OUTPATIENT)
Dept: ADMINISTRATIVE | Facility: HOSPITAL | Age: 40
End: 2025-08-07
Payer: MEDICAID

## 2025-08-07 NOTE — PROGRESS NOTES
Population Health Chart Review & Patient Outreach Details    Outreach Performed: YES Portal Active and/or Letter inactive    Additional [facility] Notes:    BREAST CANCER SCREENING    Non-compliant report chart audits for BREAST CANCER SCREENING     Outreach to patient in reference to SCHEDULING A MAMMOGRAM EXAM.            Updates Requested / Reviewed:               Health Maintenance Topics Overdue:      VBHM Score: 1     Mammogram

## 2025-08-13 ENCOUNTER — PROCEDURE VISIT (OUTPATIENT)
Dept: NEUROLOGY | Facility: CLINIC | Age: 40
End: 2025-08-13
Payer: MEDICAID

## 2025-08-13 VITALS
SYSTOLIC BLOOD PRESSURE: 119 MMHG | WEIGHT: 198.44 LBS | HEIGHT: 66 IN | DIASTOLIC BLOOD PRESSURE: 87 MMHG | BODY MASS INDEX: 31.89 KG/M2 | RESPIRATION RATE: 18 BRPM | TEMPERATURE: 98 F | HEART RATE: 107 BPM

## 2025-08-13 DIAGNOSIS — G43.709 CHRONIC MIGRAINE WITHOUT AURA WITHOUT STATUS MIGRAINOSUS, NOT INTRACTABLE: Primary | ICD-10-CM

## 2025-08-13 PROCEDURE — 64615 CHEMODENERV MUSC MIGRAINE: CPT | Mod: PBBFAC,PO | Performed by: PHYSICIAN ASSISTANT

## 2025-08-13 PROCEDURE — 99999PBSHW PR PBB SHADOW TECHNICAL ONLY FILED TO HB: Mod: PBBFAC,,,

## 2025-08-13 PROCEDURE — 64615 CHEMODENERV MUSC MIGRAINE: CPT | Mod: S$PBB,,, | Performed by: PHYSICIAN ASSISTANT

## 2025-08-14 DIAGNOSIS — R11.0 NAUSEA: ICD-10-CM

## 2025-08-14 RX ORDER — ONDANSETRON 4 MG/1
4 TABLET, ORALLY DISINTEGRATING ORAL EVERY 8 HOURS PRN
Qty: 20 TABLET | Refills: 0 | Status: SHIPPED | OUTPATIENT
Start: 2025-08-14

## 2025-09-03 DIAGNOSIS — R11.0 NAUSEA: ICD-10-CM

## 2025-09-03 RX ORDER — ONDANSETRON 4 MG/1
4 TABLET, ORALLY DISINTEGRATING ORAL EVERY 8 HOURS PRN
Qty: 20 TABLET | Refills: 2 | Status: SHIPPED | OUTPATIENT
Start: 2025-09-03

## (undated) DEVICE — TOWEL OR DISP STRL BLUE 4/PK

## (undated) DEVICE — TROCAR ENDOPATH XCEL 11MM 10CM

## (undated) DEVICE — Device

## (undated) DEVICE — APPLICATOR CHLORAPREP ORN 26ML

## (undated) DEVICE — PACK BASIC

## (undated) DEVICE — TIP RUMI BLUE DISPOSABLE 5/BX

## (undated) DEVICE — ALCOHOL 70% ISOP RUBBING 4OZ

## (undated) DEVICE — SEE MEDLINE ITEM 157181

## (undated) DEVICE — BLADE SURG CARBON STEEL SZ11

## (undated) DEVICE — DRESSING XEROFORM FOIL PK 1X8

## (undated) DEVICE — SYR 50CC LL

## (undated) DEVICE — UNDERGLOVES BIOGEL PI SIZE 8.5

## (undated) DEVICE — SEE MEDLINE ITEM 157128

## (undated) DEVICE — GLOVE SURG ULTRA TOUCH 6

## (undated) DEVICE — GOWN SMARTGOWN LVL4 X-LONG XL

## (undated) DEVICE — SEE MEDLINE ITEM 152487

## (undated) DEVICE — GOWN B1 X-LG X-LONG

## (undated) DEVICE — PAD PERI POST REPLACEMNT

## (undated) DEVICE — DRAPE IOBAN 2 STERI

## (undated) DEVICE — SUT 4/0 18IN GUT PLAINPS-2

## (undated) DEVICE — GLOVE SURG BIOGEL LATEX SZ 7.5

## (undated) DEVICE — KIT ANTIFOG

## (undated) DEVICE — APPLICATOR ARISTA FLEX XL

## (undated) DEVICE — ENDOSTITCH INSTRUMENT

## (undated) DEVICE — CATH URETHRAL 16FR RED

## (undated) DEVICE — UNDERGLOVE BIOGEL PI SZ 6.5 LF

## (undated) DEVICE — PAD K-THERMIA 24IN X 60IN

## (undated) DEVICE — SPONGE BULKEE II ABSRB 6X6.75

## (undated) DEVICE — TUBING SUC UNIV W/CONN 12FT

## (undated) DEVICE — TROCAR ENDOPATH XCEL 8MM 10CM

## (undated) DEVICE — CUBE COLD THERAPY POLAR CARE

## (undated) DEVICE — DRAPE TIBURON 36X104X123IN

## (undated) DEVICE — SEE MEDLINE ITEM 154981

## (undated) DEVICE — SOL IRR STRL WATER 500ML

## (undated) DEVICE — MANIFOLD 4 PORT

## (undated) DEVICE — TUBING PUMP ARTHROSCOPY STRL

## (undated) DEVICE — SOL IRR NACL .9% 3000ML

## (undated) DEVICE — BNDG COFLEX FOAM LF2 ST 6X5YD

## (undated) DEVICE — GLOVE SURGICAL LATEX SZ 8

## (undated) DEVICE — SOL NS 1000CC

## (undated) DEVICE — WRAPON POLAR PAD HIP FOR POLAR

## (undated) DEVICE — POWDER ARISTA AH 3G

## (undated) DEVICE — SEE MEDLINE ITEM 157118

## (undated) DEVICE — TROCAR ENDOPATH XCEL 5MM 7.5CM

## (undated) DEVICE — DEVICE CLOSURE DISP 14G

## (undated) DEVICE — NEPTUNE 4 PORT MANIFOLD

## (undated) DEVICE — TUBING HEATED INSUFFLATOR

## (undated) DEVICE — KIT URINE METER 350ML STAT LOC

## (undated) DEVICE — OCCLUDER COLPO-PNEUMO STERILE

## (undated) DEVICE — PADS MAXI GARD

## (undated) DEVICE — SYR 10CC LUER LOCK

## (undated) DEVICE — CANNULA FLOWPORT II 165MM

## (undated) DEVICE — SEE L#95700

## (undated) DEVICE — SUT CTD VICRYL 0 UND BR CT

## (undated) DEVICE — SEE MEDLINE ITEM 146313

## (undated) DEVICE — SUT 0 VICRYL / CT-1

## (undated) DEVICE — SHEARS HARMONIC 5CM 36CM

## (undated) DEVICE — CANNULA ARTHRO TWST 8.25MMX9CM

## (undated) DEVICE — GLOVE SURG ULTRA TOUCH 8

## (undated) DEVICE — DRESSING AQUACEL AG FOAM 4X4

## (undated) DEVICE — BANDAGE ADHESIVE

## (undated) DEVICE — PACK SET UP 190 OMC-NS

## (undated) DEVICE — BLADE SAMURAI CURVED

## (undated) DEVICE — APPLICATOR STERILE 6IN 100/CA

## (undated) DEVICE — STRAP OR TABLE 5IN X 72IN

## (undated) DEVICE — KIT PORTAL ENTRY

## (undated) DEVICE — DRAPE STERI INSTRUMENT 1018

## (undated) DEVICE — SEE MEDLINE ITEM 146292

## (undated) DEVICE — SUT ETHILON 3-0 PS2 18 BLK

## (undated) DEVICE — GLOVE SURG ULTRA TOUCH 8.5

## (undated) DEVICE — PAD PREP 50/CA

## (undated) DEVICE — LEGGINGS 48X31 INCH

## (undated) DEVICE — TAPE MEDIPORE 4IN X 2YDS

## (undated) DEVICE — GOWN SURGICAL X-LARGE

## (undated) DEVICE — SUT 2/0 30IN SILK BLK BRAI

## (undated) DEVICE — TROCAR ENDOPATH XCEL 5X75MM

## (undated) DEVICE — ENDOSTITCH POLYSORB 0 ES-9

## (undated) DEVICE — SUT 0 VICRYL / UR6 (J603)

## (undated) DEVICE — DRAPE C ARM 42 X 120 10/BX

## (undated) DEVICE — TROCAR KII FIOS 5MM X 100MM